# Patient Record
Sex: FEMALE | Race: WHITE | NOT HISPANIC OR LATINO | Employment: OTHER | URBAN - METROPOLITAN AREA
[De-identification: names, ages, dates, MRNs, and addresses within clinical notes are randomized per-mention and may not be internally consistent; named-entity substitution may affect disease eponyms.]

---

## 2017-01-04 ENCOUNTER — OFFICE VISIT (OUTPATIENT)
Dept: FAMILY MEDICINE | Facility: CLINIC | Age: 52
End: 2017-01-04
Payer: MEDICARE

## 2017-01-04 ENCOUNTER — HOSPITAL ENCOUNTER (OUTPATIENT)
Dept: RADIOLOGY | Facility: HOSPITAL | Age: 52
Discharge: HOME | End: 2017-01-04
Attending: FAMILY MEDICINE
Payer: MEDICARE

## 2017-01-04 VITALS
DIASTOLIC BLOOD PRESSURE: 70 MMHG | TEMPERATURE: 98 F | SYSTOLIC BLOOD PRESSURE: 134 MMHG | BODY MASS INDEX: 35.45 KG/M2 | HEART RATE: 100 BPM | OXYGEN SATURATION: 97 % | WEIGHT: 168.88 LBS | HEIGHT: 58 IN

## 2017-01-04 DIAGNOSIS — R06.2 WHEEZES: ICD-10-CM

## 2017-01-04 DIAGNOSIS — E11.69 DM TYPE 2 WITH DIABETIC DYSLIPIDEMIA: Primary | ICD-10-CM

## 2017-01-04 DIAGNOSIS — E78.5 DM TYPE 2 WITH DIABETIC DYSLIPIDEMIA: Primary | ICD-10-CM

## 2017-01-04 DIAGNOSIS — I10 ESSENTIAL HYPERTENSION: ICD-10-CM

## 2017-01-04 DIAGNOSIS — J44.1 COPD EXACERBATION: ICD-10-CM

## 2017-01-04 PROCEDURE — 71020 XR CHEST PA AND LATERAL: CPT | Mod: TC

## 2017-01-04 PROCEDURE — 71020 XR CHEST PA AND LATERAL: CPT | Mod: 26,,, | Performed by: RADIOLOGY

## 2017-01-04 PROCEDURE — 4010F ACE/ARB THERAPY RXD/TAKEN: CPT | Mod: S$GLB,,, | Performed by: FAMILY MEDICINE

## 2017-01-04 PROCEDURE — 1159F MED LIST DOCD IN RCRD: CPT | Mod: S$GLB,,, | Performed by: FAMILY MEDICINE

## 2017-01-04 PROCEDURE — 99214 OFFICE O/P EST MOD 30 MIN: CPT | Mod: S$GLB,,, | Performed by: FAMILY MEDICINE

## 2017-01-04 PROCEDURE — 3075F SYST BP GE 130 - 139MM HG: CPT | Mod: S$GLB,,, | Performed by: FAMILY MEDICINE

## 2017-01-04 PROCEDURE — 99499 UNLISTED E&M SERVICE: CPT | Mod: S$PBB,,, | Performed by: FAMILY MEDICINE

## 2017-01-04 PROCEDURE — 99999 PR PBB SHADOW E&M-EST. PATIENT-LVL III: CPT | Mod: PBBFAC,,, | Performed by: FAMILY MEDICINE

## 2017-01-04 PROCEDURE — 3078F DIAST BP <80 MM HG: CPT | Mod: S$GLB,,, | Performed by: FAMILY MEDICINE

## 2017-01-04 PROCEDURE — 2022F DILAT RTA XM EVC RTNOPTHY: CPT | Mod: S$GLB,,, | Performed by: FAMILY MEDICINE

## 2017-01-04 PROCEDURE — 3045F PR MOST RECENT HEMOGLOBIN A1C LEVEL 7.0-9.0%: CPT | Mod: S$GLB,,, | Performed by: FAMILY MEDICINE

## 2017-01-04 RX ORDER — PREDNISONE 20 MG/1
TABLET ORAL
Qty: 6 TABLET | Refills: 0 | Status: SHIPPED | OUTPATIENT
Start: 2017-01-04 | End: 2017-01-11

## 2017-01-04 RX ORDER — LEVOFLOXACIN 500 MG/1
500 TABLET, FILM COATED ORAL DAILY
Qty: 10 TABLET | Refills: 0 | Status: SHIPPED | OUTPATIENT
Start: 2017-01-04 | End: 2017-01-11

## 2017-01-04 NOTE — PROGRESS NOTES
Subjective:       Patient ID: Bianca Weldon is a 51 y.o. female.    Chief Complaint: Diabetes and URI    HPI Comments: 51y old female  here for f/u on DM, HTN and DLP . On aspirin,Current immunizations except for zoster .  Trying to watch her carbs , exercises :not walking just yet . Opthalmology : within the last 6 m  , Fastin bs are 165 , 188, 161, 184, 210  and  2 hrs pp 165 , 93 , 153, 165. She has ha also been wheezing for the last 2 days , she also has nasal congestion and green rhinorrhea. She has been using cough drop  And combivent  . She quit smoking . No fever    Diabetes     URI    Associated symptoms include rhinorrhea and wheezing.     Review of Systems   Constitutional: Negative.    HENT: Positive for rhinorrhea and sinus pressure.    Respiratory: Positive for shortness of breath and wheezing.    Cardiovascular: Negative.    Gastrointestinal: Negative.        Objective:      Physical Exam   Constitutional: She is oriented to person, place, and time. She appears well-developed and well-nourished. No distress.   HENT:   Head: Normocephalic and atraumatic.   Right Ear: External ear normal.   Left Ear: External ear normal.   Mouth/Throat: No oropharyngeal exudate.   Eyes: Conjunctivae and EOM are normal. Pupils are equal, round, and reactive to light. Right eye exhibits no discharge. Left eye exhibits no discharge. No scleral icterus.   Neck: Normal range of motion. Neck supple. No JVD present. No tracheal deviation present. No thyromegaly present.   Cardiovascular: Normal rate, regular rhythm and normal heart sounds.  Exam reveals no gallop and no friction rub.    No murmur heard.  Pulmonary/Chest: Effort normal. No stridor. No respiratory distress. She has decreased breath sounds. She has wheezes in the right upper field, the right middle field, the right lower field, the left upper field, the left middle field and the left lower field. She has rhonchi in the right middle field. She has no rales. She  exhibits no tenderness.   Abdominal: Soft. Bowel sounds are normal. She exhibits no distension. There is no tenderness. There is no rebound and no guarding.   Musculoskeletal: Normal range of motion.   Lymphadenopathy:     She has no cervical adenopathy.   Neurological: She is alert and oriented to person, place, and time.   Skin: Skin is warm and dry. She is not diaphoretic.   Psychiatric: She has a normal mood and affect. Her behavior is normal. Judgment and thought content normal.       Assessment:      Bianca was seen today for diabetes and uri.    Diagnoses and all orders for this visit:    DM type 2 with diabetic dyslipidemia  -     Comprehensive metabolic panel; Future  -     Hemoglobin A1c; Future    Wheezes  -     X-Ray Chest PA And Lateral; Future    COPD exacerbation    Essential hypertension    Other orders  -     predniSONE (DELTASONE) 20 MG tablet; 1 tab po qd for 3 days then 1/2 tab po qd for 3 days  -     levoFLOXacin (LEVAQUIN) 500 MG tablet; Take 1 tablet (500 mg total) by mouth once daily.      Plan:   Pt. encouraged to follow a strict diabetic type diet.   Encouraged daily physical activity/exercise for at least 30mins if tolerated.   Weight control recommenced as always.   Discussed how lifestyle changes make biggest impact on diabetes control.   Continue home glucose monitoring once daily and keep log.   Counseling provided today about hypoglycemia as well as about how diabetes increases risk of cardiovascular, cerebrovascular ,peripheral vascular disease and other serious health complications. He has been instructed to call if developing any new symptoms or hypoglycemia related symptoms.   See encounter for associated orders/medications.   - Lipid panel; Future     Low dose of prednisone , stop if BS> 350 . F/u in 1 w  Controlled. Cont med

## 2017-01-04 NOTE — MR AVS SNAPSHOT
[facility]  [address]  [facility] [address]  Phone: [phone]  Fax: [phone]                  Bianca Weldon   2017 8:00 AM   Office Visit    Description:  Female : 1965   Provider:  Lacy Cottrell MD   Department:  [facility]           Reason for Visit     Diabetes     URI           Diagnoses this Visit        Comments    DM type 2 with diabetic dyslipidemia    -  Primary     Wheezes         COPD exacerbation         Essential hypertension                To Do List           Future Appointments        Provider Department Dept Phone    2017 10:00 AM ONLH XR1- [facility] [phone]    2017 10:20 AM LABORATORY, [place]     2017 8:20 AM Lacy Cottrell MD [facility] [phone]    2017 10:00 AM Steve Best MD [facility] - Pulmonary Services [phone]      Goals (5 Years of Data)     None       These Medications        Disp Refills Start End    predniSONE (DELTASONE) 20 MG tablet 6 tablet 0 2017     1 tab po qd for 3 days then 1/2 tab po qd for 3 days    Pharmacy: [id number] [address] Ph #: [phone]       levoFLOXacin (LEVAQUIN) 500 MG tablet 10 tablet 0 2017    Take 1 tablet (500 mg total) by mouth once daily. - Oral    Pharmacy: [id number] [address] Ph #: [phone]         [facility]     [facility] Nurse Care [facility] -  Assistance  Registered nurses in the [facility] provide clinical advisement, health education, appointment booking, and other advisory services.  Call for this free service at [phone].             Medications           Message regarding Medications     Verify the changes and/or additions to your medication regime listed below are the same as discussed  with your clinician today.  If any of these changes or additions are incorrect, please notify your healthcare provider.        START taking these NEW medications        Refills    predniSONE (DELTASONE) 20 MG tablet 0    Si tab po qd for 3 days then 1/2 tab po qd for 3 days    Class: Normal    levoFLOXacin (LEVAQUIN) 500 MG tablet 0    Sig: Take 1 tablet (500 mg total) by mouth once daily.    Class: Normal    Route: Oral           Verify that the below list of medications is an accurate representation of the medications you are currently taking.  If none reported, the list may be blank. If incorrect, please contact your healthcare provider. Carry this list with you in case of emergency.           Current Medications     aripiprazole (ABILIFY) 20 MG Tab Take 20 mg by mouth once daily.     ascorbic acid (VITAMIN C) 250 MG tablet Take 250 mg by mouth once daily.    aspirin (ECOTRIN) 81 MG EC tablet Take 81 mg by mouth once daily.    atorvastatin (LIPITOR) 40 MG tablet Take 1 tablet (40 mg total) by mouth once daily.    benztropine (COGENTIN) 1 MG tablet Take 1 mg by mouth once daily.     blood sugar diagnostic Strp Use the one insurance covers Check b.s fastin and 2 hrs after biggest meal    blood-glucose meter Misc Check BG twice daily when fasting    calcium acetate (PHOSLO) 667 mg capsule Take 667 mg by mouth 3 (three) times daily with meals.    clonazepam (KLONOPIN) 2 MG Tab Take 2 mg by mouth every evening.     desvenlafaxine succinate (PRISTIQ) 50 MG Tb24 Take 50 mg by mouth once daily.     diclofenac (VOLTAREN) 75 MG EC tablet Take 1 tablet (75 mg total) by mouth 2 (two) times daily.    ferrous sulfate 325 (65 FE) MG EC tablet Take 325 mg by mouth 3 (three) times daily with meals.    FIBER CHOICE, INULIN, ORAL Take by mouth.    fluticasone (FLONASE) 50 mcg/actuation nasal spray 2 sprays by Each Nare route once daily.    fluticasone-vilanterol (BREO) 100-25 mcg/dose diskus inhaler Inhale 1 puff into the lungs  "once daily.    glipiZIDE (GLUCOTROL) 10 MG tablet 10 mg on am with breakfast , 7.5 mg on pm with dinner    ibuprofen (MOTRIN IB) 200 MG tablet Take 1 tablet (200 mg total) by mouth every 6 (six) hours as needed for Pain.    ipratropium-albuterol (COMBIVENT)  mcg/actuation inhaler Inhale 1 puff into the lungs every 6 (six) hours as needed for Wheezing.    lamotrigine (LAMICTAL) 100 MG tablet Take 400 mg by mouth once daily.     lancets 28 gauge Misc 1 lancet by Misc.(Non-Drug; Combo Route) route 2 (two) times daily. Use the one insurance covers    levothyroxine (SYNTHROID) 75 MCG tablet take 1 tablet by mouth once daily    lisinopril (PRINIVIL,ZESTRIL) 2.5 MG tablet Take 1 tablet (2.5 mg total) by mouth once daily.    lisinopril (PRINIVIL,ZESTRIL) 2.5 MG tablet Take 1 tablet (2.5 mg total) by mouth once daily.    methylphenidate (RITALIN) 10 MG tablet Take 10 mg by mouth once daily.    pantoprazole (PROTONIX) 40 MG tablet 1 tab po daily    Saccharomyces boulardii (PROBIOTIC, S.BOULARDII,) 250 mg capsule Take 250 mg by mouth once daily.    levoFLOXacin (LEVAQUIN) 500 MG tablet Take 1 tablet (500 mg total) by mouth once daily.    predniSONE (DELTASONE) 20 MG tablet 1 tab po qd for 3 days then 1/2 tab po qd for 3 days           Clinical Reference Information           Vital Signs - Last Recorded  Most recent update: 1/4/2017  8:09 AM by Olive Martinez MA    BP Pulse Temp Ht Wt SpO2    134/70 (BP Location: Right arm, Patient Position: Sitting, BP Method: Manual) 100 98.4 °F (36.9 °C) (Oral) 4' 10" (1.473 m) 76.6 kg (168 lb 14 oz) 97%    BMI                35.29 kg/m2          Blood Pressure          Most Recent Value    BP  134/70      Allergies as of 1/4/2017     Bactrim [Sulfamethoxazole-trimethoprim]    Macrodantin [Nitrofurantoin Macrocrystalline]      Immunizations Administered on Date of Encounter - 1/4/2017     None      Orders Placed During Today's Visit     Future Labs/Procedures Expected by Expires    " Comprehensive metabolic panel  1/4/2017 3/5/2018    Hemoglobin A1c  1/4/2017 3/5/2018    X-Ray Chest PA And Lateral  1/4/2017 4/4/2017      Smoking Cessation     If you would like to quit smoking:   You may be eligible for free services if you are a [place] resident and started smoking cigarettes before September 1, 1988.  Call the Smoking Cessation Trust (SCT) toll free at [phone] [phone] or [phone].   Call 0-800-QUIT-NOW if you do not meet the above criteria.

## 2017-01-05 ENCOUNTER — PATIENT MESSAGE (OUTPATIENT)
Dept: FAMILY MEDICINE | Facility: CLINIC | Age: 52
End: 2017-01-05

## 2017-01-06 RX ORDER — TRAMADOL HYDROCHLORIDE 50 MG/1
50 TABLET ORAL EVERY 6 HOURS PRN
OUTPATIENT
Start: 2017-01-06 | End: 2017-01-16

## 2017-01-06 NOTE — TELEPHONE ENCOUNTER
Bianca Cottrell MD 17 hours ago (3:37 PM)                        Would you please call in tramadol  hcl 50mg tablet for pain in my shoulder. Refills are appreciated, but, aren't necessary.  My last prescription was for 1/2 to 1 tablet by mouth every 6 to 8 hours if needed for pain.  Qty 60

## 2017-01-11 ENCOUNTER — OFFICE VISIT (OUTPATIENT)
Dept: FAMILY MEDICINE | Facility: CLINIC | Age: 52
End: 2017-01-11
Payer: MEDICARE

## 2017-01-11 VITALS
WEIGHT: 171.63 LBS | HEART RATE: 84 BPM | DIASTOLIC BLOOD PRESSURE: 70 MMHG | BODY MASS INDEX: 36.03 KG/M2 | SYSTOLIC BLOOD PRESSURE: 120 MMHG | HEIGHT: 58 IN | OXYGEN SATURATION: 95 % | TEMPERATURE: 98 F

## 2017-01-11 DIAGNOSIS — J44.1 COPD EXACERBATION: Primary | ICD-10-CM

## 2017-01-11 DIAGNOSIS — F31.0 BIPOLAR AFFECTIVE DISORDER, CURRENT EPISODE HYPOMANIC: ICD-10-CM

## 2017-01-11 DIAGNOSIS — E11.00 UNCONTROLLED TYPE 2 DIABETES MELLITUS WITH HYPEROSMOLARITY WITHOUT COMA, WITHOUT LONG-TERM CURRENT USE OF INSULIN: ICD-10-CM

## 2017-01-11 PROCEDURE — 3078F DIAST BP <80 MM HG: CPT | Mod: S$GLB,,, | Performed by: FAMILY MEDICINE

## 2017-01-11 PROCEDURE — 4010F ACE/ARB THERAPY RXD/TAKEN: CPT | Mod: S$GLB,,, | Performed by: FAMILY MEDICINE

## 2017-01-11 PROCEDURE — 1159F MED LIST DOCD IN RCRD: CPT | Mod: S$GLB,,, | Performed by: FAMILY MEDICINE

## 2017-01-11 PROCEDURE — 99999 PR PBB SHADOW E&M-EST. PATIENT-LVL III: CPT | Mod: PBBFAC,,, | Performed by: FAMILY MEDICINE

## 2017-01-11 PROCEDURE — 99213 OFFICE O/P EST LOW 20 MIN: CPT | Mod: S$GLB,,, | Performed by: FAMILY MEDICINE

## 2017-01-11 PROCEDURE — 2022F DILAT RTA XM EVC RTNOPTHY: CPT | Mod: S$GLB,,, | Performed by: FAMILY MEDICINE

## 2017-01-11 PROCEDURE — 3045F PR MOST RECENT HEMOGLOBIN A1C LEVEL 7.0-9.0%: CPT | Mod: S$GLB,,, | Performed by: FAMILY MEDICINE

## 2017-01-11 PROCEDURE — 99499 UNLISTED E&M SERVICE: CPT | Mod: S$PBB,,, | Performed by: FAMILY MEDICINE

## 2017-01-11 PROCEDURE — 3074F SYST BP LT 130 MM HG: CPT | Mod: S$GLB,,, | Performed by: FAMILY MEDICINE

## 2017-01-11 NOTE — MR AVS SNAPSHOT
[facility]  [address]  [facility] [address]  Phone: [phone]  Fax: [phone]                  Bianca Weldon   2017 8:20 AM   Office Visit    Description:  Female : 1965   Provider:  Lacy Cottrell MD   Department:  [facility]           Reason for Visit     Follow-up     Diabetes           Diagnoses this Visit        Comments    COPD exacerbation    -  Primary     Uncontrolled type 2 diabetes mellitus with hyperosmolarity without coma, without long-term current use of insulin                To Do List           Future Appointments        Provider Department Dept Phone    2017 8:00 AM Lacy Cottrell MD [facility] [phone]    2017 10:00 AM Steve Best MD [facility] - Pulmonary Services [phone]      Goals (5 Years of Data)     None       These Medications        Disp Refills Start End    SITagliptan (JANUVIA) 25 MG Tab 90 tablet 3 2017    Take 1 tablet (25 mg total) by mouth once daily. - Oral    [facility] [address] Ph #: [phone]         [facility]     [facility] Nurse Care [facility] -  Assistance  Registered nurses in the [facility] provide clinical advisement, health education, appointment booking, and other advisory services.  Call for this free service at [phone].             Medications           Message regarding Medications     Verify the changes and/or additions to your medication regime listed below are the same as discussed with your clinician today.  If any of these changes or additions are incorrect, please notify your healthcare provider.        START taking these NEW medications        Refills    SITagliptan (JANUVIA) 25 MG Tab 3    Sig: Take 1 tablet (25 mg total) by mouth once daily.    Class: Normal    Route: Oral      STOP taking these medications      predniSONE (DELTASONE) 20 MG tablet 1 tab po qd for 3 days then 1/2 tab po qd for 3 days    levoFLOXacin (LEVAQUIN) 500 MG tablet Take 1 tablet (500 mg total) by mouth once daily.           Verify that the below list of medications is an accurate representation of the medications you are currently taking.  If none reported, the list may be blank. If incorrect, please contact your healthcare provider. Carry this list with you in case of emergency.           Current Medications     aripiprazole (ABILIFY) 20 MG Tab Take 20 mg by mouth once daily.     ascorbic acid (VITAMIN C) 250 MG tablet Take 250 mg by mouth once daily.    aspirin (ECOTRIN) 81 MG EC tablet Take 81 mg by mouth once daily.    atorvastatin (LIPITOR) 40 MG tablet Take 1 tablet (40 mg total) by mouth once daily.    benztropine (COGENTIN) 1 MG tablet Take 1 mg by mouth once daily.     blood sugar diagnostic Strp Use the one insurance covers Check b.s fastin and 2 hrs after biggest meal    blood-glucose meter Misc Check BG twice daily when fasting    calcium acetate (PHOSLO) 667 mg capsule Take 667 mg by mouth 3 (three) times daily with meals.    clonazepam (KLONOPIN) 2 MG Tab Take 2 mg by mouth every evening.     desvenlafaxine succinate (PRISTIQ) 50 MG Tb24 Take 50 mg by mouth once daily.     diclofenac (VOLTAREN) 75 MG EC tablet Take 1 tablet (75 mg total) by mouth 2 (two) times daily.    ferrous sulfate 325 (65 FE) MG EC tablet Take 325 mg by mouth 3 (three) times daily with meals.    FIBER CHOICE, INULIN, ORAL Take by mouth.    fluticasone (FLONASE) 50 mcg/actuation nasal spray 2 sprays by Each Nare route once daily.    fluticasone-vilanterol (BREO) 100-25 mcg/dose diskus inhaler Inhale 1 puff into the lungs once daily.    glipiZIDE (GLUCOTROL) 10 MG tablet 10 mg on am with breakfast , 7.5 mg on pm with dinner    ibuprofen (MOTRIN IB) 200 MG tablet Take 1 tablet (200 mg total) by mouth every 6 (six) hours as needed for Pain.    ipratropium-albuterol  "(COMBIVENT)  mcg/actuation inhaler Inhale 1 puff into the lungs every 6 (six) hours as needed for Wheezing.    lamotrigine (LAMICTAL) 100 MG tablet Take 400 mg by mouth once daily.     lancets 28 gauge Misc 1 lancet by Misc.(Non-Drug; Combo Route) route 2 (two) times daily. Use the one insurance covers    levothyroxine (SYNTHROID) 75 MCG tablet take 1 tablet by mouth once daily    lisinopril (PRINIVIL,ZESTRIL) 2.5 MG tablet Take 1 tablet (2.5 mg total) by mouth once daily.    lisinopril (PRINIVIL,ZESTRIL) 2.5 MG tablet Take 1 tablet (2.5 mg total) by mouth once daily.    methylphenidate (RITALIN) 10 MG tablet Take 10 mg by mouth once daily.    pantoprazole (PROTONIX) 40 MG tablet 1 tab po daily    Saccharomyces boulardii (PROBIOTIC, S.BOULARDII,) 250 mg capsule Take 250 mg by mouth once daily.    SITagliptan (JANUVIA) 25 MG Tab Take 1 tablet (25 mg total) by mouth once daily.           Clinical Reference Information           Vital Signs - Last Recorded  Most recent update: 1/11/2017  8:02 AM by Olive Martinez MA    BP Pulse Temp Ht    120/70 (BP Location: Left arm, Patient Position: Sitting, BP Method: Manual) 84 98.4 °F (36.9 °C) (Oral) 4' 10" (1.473 m)    Wt SpO2 BMI    77.9 kg (171 lb 10.1 oz) 95% 35.87 kg/m2      Blood Pressure          Most Recent Value    BP  120/70      Allergies as of 1/11/2017     Bactrim [Sulfamethoxazole-trimethoprim]    Macrodantin [Nitrofurantoin Macrocrystalline]      Immunizations Administered on Date of Encounter - 1/11/2017     None      Smoking Cessation     If you would like to quit smoking:   You may be eligible for free services if you are a [place] resident and started smoking cigarettes before September 1, 1988.  Call the Smoking Cessation Trust (SCT) toll free at ([phone] [phone] or [phone].   Call 1-800-QUIT-NOW if you do not meet the above criteria.            "

## 2017-01-11 NOTE — PROGRESS NOTES
Subjective:       Patient ID: Bianca Weldon is a 51 y.o. female.    Chief Complaint: Follow-up and Diabetes    HPI Comments: 51 y old female with COPD, Uncontrolled  Type 2 dm and bipolar disorder   here for f.u on ex . Finish off abx and prednisone . BS did no get above 250 (fastin and 2 hr pp) , Breathing better . Still using albuterol prn . Wlaking on terdamill 3 timess wekly . Stilll see Dr Soni for BPD . No regis , no depression . compliant with meds     Diabetes       Review of Systems   Constitutional: Negative.    HENT: Negative.    Respiratory: Negative.    Cardiovascular: Negative.    Hematological: Negative.        Objective:      Physical Exam   Constitutional: She is oriented to person, place, and time. She appears well-developed and well-nourished. No distress.   HENT:   Head: Normocephalic and atraumatic.   Right Ear: External ear normal.   Left Ear: External ear normal.   Mouth/Throat: No oropharyngeal exudate.   Eyes: Conjunctivae and EOM are normal. Pupils are equal, round, and reactive to light. Right eye exhibits no discharge. Left eye exhibits no discharge. No scleral icterus.   Neck: Normal range of motion. Neck supple. No JVD present. No tracheal deviation present. No thyromegaly present.   Cardiovascular: Normal rate, regular rhythm and normal heart sounds.  Exam reveals no gallop and no friction rub.    No murmur heard.  Pulmonary/Chest: Effort normal and breath sounds normal. No stridor. No respiratory distress. She has no wheezes. She has no rales. She exhibits no tenderness.   Abdominal: Soft. Bowel sounds are normal. She exhibits no distension. There is no tenderness. There is no rebound and no guarding.   Musculoskeletal: Normal range of motion.   Lymphadenopathy:     She has no cervical adenopathy.   Neurological: She is alert and oriented to person, place, and time.   Skin: Skin is warm and dry. She is not diaphoretic.   Psychiatric: She has a normal mood and affect. Her behavior  is normal. Judgment and thought content normal.       Assessment:       Bianca was seen today for follow-up and diabetes.    Diagnoses and all orders for this visit:    COPD exacerbation    Uncontrolled type 2 diabetes mellitus with hyperosmolarity without coma, without long-term current use of insulin    Bipolar affective disorder, current episode hypomanic    Uncontrolled type 2 diabetes mellitus without complication, without long-term current use of insulin    Other orders  -     SITagliptan (JANUVIA) 25 MG Tab; Take 1 tablet (25 mg total) by mouth once daily.      Plan:   Resolved . Use albutyerol prn and Breo daily . Inmunizations are UTD   Lab Results   Component Value Date    HGBA1C 7.7 (H) 01/04/2017   start low woodward of Januvia. Cont lifestyle changes. Email BS in 1 w. F.u in 3 m   Stable /f.u with Psych

## 2017-01-13 ENCOUNTER — TELEPHONE (OUTPATIENT)
Dept: FAMILY MEDICINE | Facility: CLINIC | Age: 52
End: 2017-01-13

## 2017-01-13 NOTE — TELEPHONE ENCOUNTER
Returned call. Pt states that she needed to see or talk to the doctor about getting something stronger for pain. Informed doctor out of the office till Monday and offered urgent care. Pt states she has already been to urgent care but they wont giver her anything stronger than tramadol. Informed pt to wait till doctor was back in office and schedule appt.

## 2017-01-13 NOTE — TELEPHONE ENCOUNTER
----- Message from Brooke Mccarthy sent at 1/12/2017  3:46 PM CST -----  Would like to speak to nurse. please call back at [phone]. Thanks//cdb

## 2017-01-18 ENCOUNTER — PATIENT MESSAGE (OUTPATIENT)
Dept: FAMILY MEDICINE | Facility: CLINIC | Age: 52
End: 2017-01-18

## 2017-02-01 ENCOUNTER — PATIENT OUTREACH (OUTPATIENT)
Dept: ADMINISTRATIVE | Facility: HOSPITAL | Age: 52
End: 2017-02-01

## 2017-02-01 NOTE — PROGRESS NOTES
Last Diabetic Eye Exam and Mammogram results needed. Care Coordination nurse attempted to contact patient to determine who performed last Diabetic Eye Exam and last Mammogram. Unable to reach pt left voicemail.

## 2017-02-02 ENCOUNTER — LAB VISIT (OUTPATIENT)
Dept: LAB | Facility: HOSPITAL | Age: 52
End: 2017-02-02
Attending: FAMILY MEDICINE
Payer: MEDICARE

## 2017-02-02 ENCOUNTER — OFFICE VISIT (OUTPATIENT)
Dept: FAMILY MEDICINE | Facility: CLINIC | Age: 52
End: 2017-02-02
Payer: MEDICARE

## 2017-02-02 VITALS
OXYGEN SATURATION: 97 % | TEMPERATURE: 97 F | HEART RATE: 66 BPM | DIASTOLIC BLOOD PRESSURE: 64 MMHG | WEIGHT: 175.63 LBS | BODY MASS INDEX: 36.86 KG/M2 | SYSTOLIC BLOOD PRESSURE: 108 MMHG | HEIGHT: 58 IN

## 2017-02-02 DIAGNOSIS — Z01.818 PRE-OP EVALUATION: ICD-10-CM

## 2017-02-02 DIAGNOSIS — Z01.818 PRE-OP EVALUATION: Primary | ICD-10-CM

## 2017-02-02 LAB
BASOPHILS # BLD AUTO: 0.03 K/UL
BASOPHILS NFR BLD: 0.3 %
DIFFERENTIAL METHOD: NORMAL
EOSINOPHIL # BLD AUTO: 0.3 K/UL
EOSINOPHIL NFR BLD: 2.8 %
ERYTHROCYTE [DISTWIDTH] IN BLOOD BY AUTOMATED COUNT: 12.5 %
HCT VFR BLD AUTO: 42 %
HGB BLD-MCNC: 13.9 G/DL
LYMPHOCYTES # BLD AUTO: 2.2 K/UL
LYMPHOCYTES NFR BLD: 23 %
MCH RBC QN AUTO: 28.8 PG
MCHC RBC AUTO-ENTMCNC: 33.1 %
MCV RBC AUTO: 87 FL
MONOCYTES # BLD AUTO: 0.5 K/UL
MONOCYTES NFR BLD: 5.3 %
NEUTROPHILS # BLD AUTO: 6.4 K/UL
NEUTROPHILS NFR BLD: 68.4 %
PLATELET # BLD AUTO: 261 K/UL
PMV BLD AUTO: 10.1 FL
RBC # BLD AUTO: 4.82 M/UL
WBC # BLD AUTO: 9.4 K/UL

## 2017-02-02 PROCEDURE — 99499 UNLISTED E&M SERVICE: CPT | Mod: S$PBB,,, | Performed by: FAMILY MEDICINE

## 2017-02-02 PROCEDURE — 36415 COLL VENOUS BLD VENIPUNCTURE: CPT | Mod: PO

## 2017-02-02 PROCEDURE — 80048 BASIC METABOLIC PNL TOTAL CA: CPT

## 2017-02-02 PROCEDURE — 93010 ELECTROCARDIOGRAM REPORT: CPT | Mod: S$GLB,,, | Performed by: INTERNAL MEDICINE

## 2017-02-02 PROCEDURE — 93005 ELECTROCARDIOGRAM TRACING: CPT | Mod: S$GLB,,, | Performed by: FAMILY MEDICINE

## 2017-02-02 PROCEDURE — 99999 PR PBB SHADOW E&M-EST. PATIENT-LVL III: CPT | Mod: PBBFAC,,, | Performed by: FAMILY MEDICINE

## 2017-02-02 PROCEDURE — 99214 OFFICE O/P EST MOD 30 MIN: CPT | Mod: S$GLB,,, | Performed by: FAMILY MEDICINE

## 2017-02-02 PROCEDURE — 3074F SYST BP LT 130 MM HG: CPT | Mod: S$GLB,,, | Performed by: FAMILY MEDICINE

## 2017-02-02 PROCEDURE — 3078F DIAST BP <80 MM HG: CPT | Mod: S$GLB,,, | Performed by: FAMILY MEDICINE

## 2017-02-02 PROCEDURE — 85025 COMPLETE CBC W/AUTO DIFF WBC: CPT

## 2017-02-02 RX ORDER — MUPIROCIN 20 MG/G
OINTMENT TOPICAL 3 TIMES DAILY
Qty: 30 G | Refills: 0 | Status: SHIPPED | OUTPATIENT
Start: 2017-02-02 | End: 2017-02-12

## 2017-02-02 RX ORDER — GLIPIZIDE 10 MG/1
TABLET ORAL
Qty: 180 TABLET | Refills: 0 | Status: SHIPPED | OUTPATIENT
Start: 2017-02-02 | End: 2017-06-12 | Stop reason: SDUPTHER

## 2017-02-02 NOTE — PROGRESS NOTES
Subjective:       Patient ID: Bianca Weldon is a 52 y.o. female.    Chief Complaint: Pre-op Exam    HPI Comments: 52 Y OLD FEMALE with Mild COPD , uncontrolled diabetes here for .pre op eval  for  left shoulder arthroscopic debridement  with subacromial decompression and clavicle excision by Dr Agustin on 2/9 at [facility] . Doing great . Walking on treadmill daily , using bREO daily, She ha snot used rescue inhaler  in weeks . No SOB . CP , palpitations  . FBS :  122, 118 , 79, 160 , 169 . 2 hr pp 149-205, 160, 122 . On glipizide 20 mg divided and Januvia 50 mg qd     Review of Systems   Constitutional: Negative.    HENT: Negative.    Respiratory: Negative.    Cardiovascular: Negative.    Gastrointestinal: Negative.    Genitourinary: Negative.    Musculoskeletal: Negative.        Objective:      Physical Exam   Constitutional: She is oriented to person, place, and time. She appears well-developed and well-nourished. No distress.   HENT:   Head: Normocephalic and atraumatic.   Right Ear: External ear normal.   Left Ear: External ear normal.   Mouth/Throat: No oropharyngeal exudate.   Eyes: Conjunctivae and EOM are normal. Pupils are equal, round, and reactive to light. Right eye exhibits no discharge. Left eye exhibits no discharge. No scleral icterus.   Neck: Normal range of motion. Neck supple. No JVD present. No tracheal deviation present. No thyromegaly present.   Cardiovascular: Normal rate, regular rhythm and normal heart sounds.  Exam reveals no gallop and no friction rub.    No murmur heard.  Pulmonary/Chest: Effort normal and breath sounds normal. No stridor. No respiratory distress. She has no wheezes. She has no rales. She exhibits no tenderness.   Abdominal: Soft. Bowel sounds are normal. She exhibits no distension. There is no tenderness. There is no rebound and no guarding.   Musculoskeletal: Normal range of motion.   Lymphadenopathy:     She has no cervical adenopathy.   Neurological: She is alert and  oriented to person, place, and time.   Skin: Skin is warm and dry. She is not diaphoretic.   Psychiatric: She has a normal mood and affect. Her behavior is normal. Judgment and thought content normal.       Assessment:     Bianca was seen today for pre-op exam.    Diagnoses and all orders for this visit:    Pre-op evaluation  -     IN OFFICE EKG 12-LEAD (to Muse)  -     CBC auto differential; Future  -     Basic metabolic panel; Future    Other orders  -     SITagliptan (JANUVIA) 50 MG Tab; 1 tab and 1/2 daily with breakfast  -     glipiZIDE (GLUCOTROL) 10 MG tablet; 10 mg on am with breakfast , 10 mg on pm with dinner  -     mupirocin (BACTROBAN) 2 % ointment; Apply topically 3 (three) times daily.      Plan:       Risk has been discussed with patient who expressed and verbalized understanding. Based on my assessment , it is okay to proceed with surgery PROVIDED RISK IS ACCEPTABLE TO BOTH THE PATIENT AND THE SURGEON PERFORMING THE SURGERY.

## 2017-02-02 NOTE — MR AVS SNAPSHOT
[facility]  [address]  [facility] [address]  Phone: [phone]  Fax: [phone]                  Bianca Weldon   2017 11:40 AM   Office Visit    Description:  Female : 1965   Provider:  Lacy Cottrell MD   Department:  [facility]           Reason for Visit     Pre-op Exam           Diagnoses this Visit        Comments    Pre-op evaluation    -  Primary            To Do List           Future Appointments        Provider Department Dept Phone    2017 12:00 PM [facility], [place] [facility]     2017 8:00 AM Lacy Cottrell MD [facility] [phone]    2017 10:00 AM Steve Best MD [facility] [phone]      Goals (5 Years of Data)     None       These Medications        Disp Refills Start End    SITagliptan (JANUVIA) 50 MG Tab 135 tablet 0 2017     1 tab and 1/2 daily with breakfast    Pharmacy: [facility] Ph #: [phone]       glipiZIDE (GLUCOTROL) 10 MG tablet 180 tablet 0 2017     10 mg on am with breakfast , 10 mg on pm with dinner    Pharmacy: [id number] Ph #: [phone]       mupirocin (BACTROBAN) 2 % ointment 30 g 0 2017    Apply topically 3 (three) times daily. - Topical (Top)    Pharmacy: [id number] Ph #: [phone]         [facility]     [facility] -  Assistance  Registered nurses in the [facility] provide clinical advisement, health education, appointment booking, and other advisory services.  Call for this free service at [phone].             Medications           Message regarding Medications     Verify the changes and/or additions to your medication regime listed below  are the same as discussed with your clinician today.  If any of these changes or additions are incorrect, please notify your healthcare provider.        START taking these NEW medications        Refills    mupirocin (BACTROBAN) 2 % ointment 0    Sig: Apply topically 3 (three) times daily.    Class: Normal    Route: Topical (Top)      CHANGE how you are taking these medications     Start Taking Instead of    SITagliptan (JANUVIA) 50 MG Tab SITagliptan (JANUVIA) 25 MG Tab    Dosage:  1 tab and 1/2 daily with breakfast Dosage:  Take 1 tablet (25 mg total) by mouth once daily.    Reason for Change:  Reorder     glipiZIDE (GLUCOTROL) 10 MG tablet glipiZIDE (GLUCOTROL) 10 MG tablet    Dosage:  10 mg on am with breakfast , 10 mg on pm with dinner Dosage:  10 mg on am with breakfast , 7.5 mg on pm with dinner    Reason for Change:  Reorder       STOP taking these medications     diclofenac (VOLTAREN) 75 MG EC tablet Take 1 tablet (75 mg total) by mouth 2 (two) times daily.    ferrous sulfate 325 (65 FE) MG EC tablet Take 325 mg by mouth 3 (three) times daily with meals.           Verify that the below list of medications is an accurate representation of the medications you are currently taking.  If none reported, the list may be blank. If incorrect, please contact your healthcare provider. Carry this list with you in case of emergency.           Current Medications     aripiprazole (ABILIFY) 20 MG Tab Take 20 mg by mouth once daily.     ascorbic acid (VITAMIN C) 250 MG tablet Take 250 mg by mouth once daily.    aspirin (ECOTRIN) 81 MG EC tablet Take 81 mg by mouth once daily.    atorvastatin (LIPITOR) 40 MG tablet Take 1 tablet (40 mg total) by mouth once daily.    benztropine (COGENTIN) 1 MG tablet Take 1 mg by mouth once daily.     blood sugar diagnostic Strp Use the one insurance covers Check b.s fastin and 2 hrs after biggest meal    blood-glucose meter Misc Check BG twice daily when fasting    calcium acetate (PHOSLO)  "667 mg capsule Take 667 mg by mouth 3 (three) times daily with meals.    clonazepam (KLONOPIN) 2 MG Tab Take 2 mg by mouth every evening.     desvenlafaxine succinate (PRISTIQ) 50 MG Tb24 Take 50 mg by mouth once daily.     FIBER CHOICE, INULIN, ORAL Take by mouth.    fluticasone (FLONASE) 50 mcg/actuation nasal spray 2 sprays by Each Nare route once daily.    fluticasone-vilanterol (BREO) 100-25 mcg/dose diskus inhaler Inhale 1 puff into the lungs once daily.    glipiZIDE (GLUCOTROL) 10 MG tablet 10 mg on am with breakfast , 10 mg on pm with dinner    ibuprofen (MOTRIN IB) 200 MG tablet Take 1 tablet (200 mg total) by mouth every 6 (six) hours as needed for Pain.    ipratropium-albuterol (COMBIVENT)  mcg/actuation inhaler Inhale 1 puff into the lungs every 6 (six) hours as needed for Wheezing.    lamotrigine (LAMICTAL) 100 MG tablet Take 400 mg by mouth 4 (four) times daily.     lancets 28 gauge Misc 1 lancet by Misc.(Non-Drug; Combo Route) route 2 (two) times daily. Use the one insurance covers    levothyroxine (SYNTHROID) 75 MCG tablet take 1 tablet by mouth once daily    lisinopril (PRINIVIL,ZESTRIL) 2.5 MG tablet Take 1 tablet (2.5 mg total) by mouth once daily.    methylphenidate (RITALIN) 10 MG tablet Take 10 mg by mouth once daily.    pantoprazole (PROTONIX) 40 MG tablet 1 tab po daily    Saccharomyces boulardii (PROBIOTIC, S.BOULARDII,) 250 mg capsule Take 250 mg by mouth once daily.    SITagliptan (JANUVIA) 50 MG Tab 1 tab and 1/2 daily with breakfast    mupirocin (BACTROBAN) 2 % ointment Apply topically 3 (three) times daily.           Clinical Reference Information           Your Vitals Were     BP Pulse Temp Height    108/64 (BP Location: Left arm, Patient Position: Sitting, BP Method: Manual) 66 97 °F (36.1 °C) (Tympanic) 4' 10" (1.473 m)    Weight SpO2 BMI    79.6 kg (175 lb 9.5 oz) 97% 36.7 kg/m2      Blood Pressure          Most Recent Value    BP  108/64      Allergies as of 2/2/2017     " Bactrim [Sulfamethoxazole-trimethoprim]    Macrodantin [Nitrofurantoin Macrocrystalline]      Immunizations Administered on Date of Encounter - 2/2/2017     None      Orders Placed During Today's Visit      Normal Orders This Visit    IN OFFICE EKG 12-LEAD (to [place])     Future Labs/Procedures Expected by Expires    Basic metabolic panel  2/2/2017 4/3/2018    CBC auto differential  2/2/2017 5/3/2017      Language Assistance Services     ATTENTION: Language assistance services are available, free of charge. Please call [phone].      ATENCIÓN: Si habla rocky, tiene a franks disposición servicios gratuitos de asistencia lingüística. Llame al [phone].     CHÚ Ý: N?u b?n nói Ti?ng Vi?t, có các d?ch v? h? tr? ngôn ng? mi?n phí dành cho b?n. G?i s? [phone].         [facility] complies with applicable Federal civil rights laws and does not discriminate on the basis of race, color, national origin, age, disability, or sex.

## 2017-02-03 LAB
ANION GAP SERPL CALC-SCNC: 11 MMOL/L
BUN SERPL-MCNC: 10 MG/DL
CALCIUM SERPL-MCNC: 8.7 MG/DL
CHLORIDE SERPL-SCNC: 106 MMOL/L
CO2 SERPL-SCNC: 24 MMOL/L
CREAT SERPL-MCNC: 0.9 MG/DL
EST. GFR  (AFRICAN AMERICAN): >60 ML/MIN/1.73 M^2
EST. GFR  (NON AFRICAN AMERICAN): >60 ML/MIN/1.73 M^2
GLUCOSE SERPL-MCNC: 85 MG/DL
POTASSIUM SERPL-SCNC: 4.2 MMOL/L
SODIUM SERPL-SCNC: 141 MMOL/L

## 2017-02-06 ENCOUNTER — TELEPHONE (OUTPATIENT)
Dept: FAMILY MEDICINE | Facility: CLINIC | Age: 52
End: 2017-02-06

## 2017-02-06 DIAGNOSIS — R94.31 ABNORMAL EKG: Primary | ICD-10-CM

## 2017-02-07 ENCOUNTER — OFFICE VISIT (OUTPATIENT)
Dept: CARDIOLOGY | Facility: CLINIC | Age: 52
End: 2017-02-07
Payer: MEDICARE

## 2017-02-07 ENCOUNTER — PATIENT MESSAGE (OUTPATIENT)
Dept: FAMILY MEDICINE | Facility: CLINIC | Age: 52
End: 2017-02-07

## 2017-02-07 ENCOUNTER — TELEPHONE (OUTPATIENT)
Dept: FAMILY MEDICINE | Facility: CLINIC | Age: 52
End: 2017-02-07

## 2017-02-07 VITALS
DIASTOLIC BLOOD PRESSURE: 64 MMHG | SYSTOLIC BLOOD PRESSURE: 114 MMHG | WEIGHT: 176.13 LBS | HEIGHT: 58 IN | HEART RATE: 76 BPM | BODY MASS INDEX: 36.97 KG/M2

## 2017-02-07 DIAGNOSIS — Z01.810 PREOP CARDIOVASCULAR EXAM: Primary | ICD-10-CM

## 2017-02-07 DIAGNOSIS — J44.9 COPD, MILD: ICD-10-CM

## 2017-02-07 DIAGNOSIS — E78.5 DYSLIPIDEMIA: ICD-10-CM

## 2017-02-07 DIAGNOSIS — Z82.49 FAMILY HISTORY OF ISCHEMIC HEART DISEASE: ICD-10-CM

## 2017-02-07 DIAGNOSIS — E66.9 OBESITY (BMI 30-39.9): ICD-10-CM

## 2017-02-07 PROCEDURE — 3045F PR MOST RECENT HEMOGLOBIN A1C LEVEL 7.0-9.0%: CPT | Mod: S$GLB,,, | Performed by: INTERNAL MEDICINE

## 2017-02-07 PROCEDURE — 3074F SYST BP LT 130 MM HG: CPT | Mod: S$GLB,,, | Performed by: INTERNAL MEDICINE

## 2017-02-07 PROCEDURE — 99499 UNLISTED E&M SERVICE: CPT | Mod: S$PBB,,, | Performed by: INTERNAL MEDICINE

## 2017-02-07 PROCEDURE — 3078F DIAST BP <80 MM HG: CPT | Mod: S$GLB,,, | Performed by: INTERNAL MEDICINE

## 2017-02-07 PROCEDURE — 4010F ACE/ARB THERAPY RXD/TAKEN: CPT | Mod: S$GLB,,, | Performed by: INTERNAL MEDICINE

## 2017-02-07 PROCEDURE — 99999 PR PBB SHADOW E&M-EST. PATIENT-LVL III: CPT | Mod: PBBFAC,,, | Performed by: INTERNAL MEDICINE

## 2017-02-07 PROCEDURE — 2022F DILAT RTA XM EVC RTNOPTHY: CPT | Mod: S$GLB,,, | Performed by: INTERNAL MEDICINE

## 2017-02-07 PROCEDURE — 99204 OFFICE O/P NEW MOD 45 MIN: CPT | Mod: S$GLB,,, | Performed by: INTERNAL MEDICINE

## 2017-02-07 RX ORDER — HYDROCODONE BITARTRATE AND ACETAMINOPHEN 10; 325 MG/1; MG/1
1 TABLET ORAL EVERY 8 HOURS PRN
COMMUNITY
End: 2017-03-23

## 2017-02-07 RX ORDER — MELOXICAM 15 MG/1
15 TABLET ORAL DAILY
COMMUNITY
End: 2017-03-23 | Stop reason: SDUPTHER

## 2017-02-07 NOTE — LETTER
February 7, 2017      Lacy Cottrell MD  [address] [address] [address]           [facility]  [address] [place] [address]  Phone: [phone]  Fax: [phone]          Patient: Bianca Weldon   MR Number: [id number]   YOB: 1965   Date of Visit: 2/7/2017       Dear Dr. Lacy Cottrell:    Thank you for referring Bianca Weldon to me for evaluation. Attached you will find relevant portions of my assessment and plan of care.    If you have questions, please do not hesitate to call me. I look forward to following Bianca Weldon along with you.    Sincerely,    Tristian Verduzco MD    Enclosure  CC:  No Recipients    If you would like to receive this communication electronically, please contact [email or web address] or [phone] to request more information on [employer] Link access.    For providers and/or their staff who would like to refer a patient to [facility], please contact us through our one-stop-shop provider referral line, [facility] , at [phone].    If you feel you have received this communication in error or would no longer like to receive these types of communications, please e-mail [email or web address]

## 2017-02-07 NOTE — TELEPHONE ENCOUNTER
Please fax my note, pre op form , med list  , labs , ekg and cardiac clearance to surgeon. Let pt know when done

## 2017-02-07 NOTE — TELEPHONE ENCOUNTER
----- Message from Vivien Shipley sent at 2/7/2017  9:34 AM CST -----  Contact: pt  Pt requests nurse to call her at [phone] regarding approval for procedure. Pt would also like to know if papers/approval was faxed to [facility]. Pt can be reached at [phone] (home)

## 2017-02-07 NOTE — TELEPHONE ENCOUNTER
----- Message from Renetta Gibson sent at 2/7/2017  2:35 PM CST -----  Contact: [facility]  office k[place]ihsa [phone]  Can you pleas fax over clearance note preop note from visit . Pt is schedule to have surgery on 2-09-17.please fax at [phone].

## 2017-02-07 NOTE — PROGRESS NOTES
Subjective:   Patient ID:  Bianca Weldon is a 52 y.o. female who presents for cardiac consult of preop.    Refer from .     HPI  Hx of DM, HTN, HLP, COPD, obesity.   The patient came in today for cardiac consult of preop of left shoulder arthroscopic debridement with subacromial decompression and clavicle excision by Dr Agustin on 2/9 at [facility] .   Patient feels OK, not on diet, no chest pain, no sob, no palpitation, no dizziness, no syncope, no CNS symptoms.   Patient has fairly good exercise tolerance, treadmill 3 times a week.   Patient is compliant with medications.  Her father had CABG at age of 45.   2/2/2017 EKG, NSR, normal EKG.     Past Medical History   Diagnosis Date    Abnormal Pap smear of vagina      after hyst, repeat every 6 months, per pt, had bx's, unknown results    Acid reflux     Arthritis     Depression      bipolar    Diabetes mellitus     Diabetes mellitus, type 2     Hyperlipidemia     Hypertension     IBS (irritable bowel syndrome)     Interstitial cystitis     Restless legs syndrome     Thyroid disease        Past Surgical History   Procedure Laterality Date    Appendectomy      Tonsillectomy      Cystostomy w/ bladder biopsy       interstial cystitis    Hysterectomy  2001     TLH, LSO (endometriosis)    Oophorectomy  2001     LSO    Dilation and curettage of uterus       missed ab    Adenoidectomy      [place] tooth extraction         Social History   Substance Use Topics    Smoking status: Former Smoker     Quit date: 1/5/2017    Smokeless tobacco: Not on file    Alcohol use No       Family History   Problem Relation Age of Onset    Diabetes Mellitus Mother     Diabetes Mellitus Father     Colon cancer Father     Rheum arthritis Maternal Grandmother     Breast cancer Maternal Grandmother     Thrombophilia Paternal Grandmother      DVTs    Ovarian cancer Neg Hx        Patient's Medications   New Prescriptions    No medications on file   Previous  Medications    ARIPIPRAZOLE (ABILIFY) 20 MG TAB    Take 20 mg by mouth once daily.     ASCORBIC ACID (VITAMIN C) 250 MG TABLET    Take 250 mg by mouth once daily.    ASPIRIN (ECOTRIN) 81 MG EC TABLET    Take 81 mg by mouth once daily.    ATORVASTATIN (LIPITOR) 40 MG TABLET    Take 1 tablet (40 mg total) by mouth once daily.    BENZTROPINE (COGENTIN) 1 MG TABLET    Take 1 mg by mouth once daily.     BLOOD SUGAR DIAGNOSTIC STRP    Use the one insurance covers Check b.s fastin and 2 hrs after biggest meal    BLOOD-GLUCOSE METER MISC    Check BG twice daily when fasting    CALCIUM ACETATE (PHOSLO) 667 MG CAPSULE    Take 667 mg by mouth 3 (three) times daily with meals.    CLONAZEPAM (KLONOPIN) 2 MG TAB    Take 2 mg by mouth every evening.     DESVENLAFAXINE SUCCINATE (PRISTIQ) 50 MG TB24    Take 50 mg by mouth once daily.     FIBER CHOICE, INULIN, ORAL    Take by mouth.    FLUTICASONE (FLONASE) 50 MCG/ACTUATION NASAL SPRAY    2 sprays by Each Nare route once daily.    FLUTICASONE-VILANTEROL (BREO) 100-25 MCG/DOSE DISKUS INHALER    Inhale 1 puff into the lungs once daily.    GLIPIZIDE (GLUCOTROL) 10 MG TABLET    10 mg on am with breakfast , 10 mg on pm with dinner    IBUPROFEN (MOTRIN IB) 200 MG TABLET    Take 1 tablet (200 mg total) by mouth every 6 (six) hours as needed for Pain.    IPRATROPIUM-ALBUTEROL (COMBIVENT)  MCG/ACTUATION INHALER    Inhale 1 puff into the lungs every 6 (six) hours as needed for Wheezing.    LAMOTRIGINE (LAMICTAL) 100 MG TABLET    Take 400 mg by mouth 4 (four) times daily.     LANCETS 28 GAUGE MISC    1 lancet by Misc.(Non-Drug; Combo Route) route 2 (two) times daily. Use the one insurance covers    LEVOTHYROXINE (SYNTHROID) 75 MCG TABLET    take 1 tablet by mouth once daily    LISINOPRIL (PRINIVIL,ZESTRIL) 2.5 MG TABLET    Take 1 tablet (2.5 mg total) by mouth once daily.    METHYLPHENIDATE (RITALIN) 10 MG TABLET    Take 10 mg by mouth once daily.    MUPIROCIN (BACTROBAN) 2 % OINTMENT     Apply topically 3 (three) times daily.    PANTOPRAZOLE (PROTONIX) 40 MG TABLET    1 tab po daily    SACCHAROMYCES BOULARDII (PROBIOTIC, S.BOULARDII,) 250 MG CAPSULE    Take 250 mg by mouth once daily.    SITAGLIPTAN (JANUVIA) 50 MG TAB    1 tab and 1/2 daily with breakfast   Modified Medications    No medications on file   Discontinued Medications    No medications on file       Review of Systems   Constitution: Negative.   HENT: Negative.    Eyes: Negative.    Cardiovascular: Negative for chest pain.   Respiratory: Negative.    Endocrine: Negative.    Hematologic/Lymphatic: Negative.    Skin: Negative.    Musculoskeletal: Positive for back pain and joint pain.   Gastrointestinal: Negative.    Genitourinary: Negative.    Neurological: Negative.    Psychiatric/Behavioral: Negative.    Allergic/Immunologic: Negative.        Objective:   Physical Exam   Constitutional: She is oriented to person, place, and time. She appears well-developed and well-nourished.   HENT:   Head: Normocephalic and atraumatic.   Eyes: Conjunctivae are normal. Pupils are equal, round, and reactive to light.   Neck: Normal range of motion. Neck supple.   Cardiovascular: Normal rate and regular rhythm.    Pulmonary/Chest: Effort normal and breath sounds normal.   Abdominal: Soft. Bowel sounds are normal.   Musculoskeletal: Normal range of motion.   Neurological: She is alert and oriented to person, place, and time.   Skin: Skin is warm and dry.   Psychiatric: She has a normal mood and affect.       Lab Results   Component Value Date     02/02/2017    K 4.2 02/02/2017     02/02/2017    CO2 24 02/02/2017    BUN 10 02/02/2017    CREATININE 0.9 02/02/2017    GLU 85 02/02/2017    HGBA1C 7.7 (H) 01/04/2017    AST 18 01/04/2017    ALT 15 01/04/2017    ALBUMIN 4.1 01/04/2017    PROT 7.2 01/04/2017    BILITOT 0.5 01/04/2017    WBC 9.40 02/02/2017    HGB 13.9 02/02/2017    HCT 42.0 02/02/2017    MCV 87 02/02/2017     02/02/2017    TSH  1.641 05/02/2016    CHOL 102 (L) 08/04/2016    HDL 41 08/04/2016    LDLCALC 41.8 (L) 08/04/2016    TRIG 96 08/04/2016     Assessment:        ICD-10-CM ICD-9-CM   1. Preop cardiovascular exam Z01.810 V72.81   2. Family history of ischemic heart disease Z82.49 V17.3   3. Dyslipidemia E78.5 272.4   4. Uncontrolled type 2 diabetes mellitus without complication, without long-term current use of insulin E11.65 250.02   5. COPD, mild J44.9 496   6. Obesity (BMI 30-39.9) E66.9 278.00       Plan:     - she has fairly good ET, > 4 METS, she is at acceptable cardiac condition for planned intermediate surgical risk procedure, shoulder surgery. Holding aspirin 7 days prior to surgery.   - good BP and chol control. Cont current meds. Education on diet and weight loss. Follow up with PCP.   RTC prn.     Thank you for allowing me to participate in this patient's care. Please do not hesitate to contact me with any questions or concerns. Consult note has been forwarded to the referral physician.

## 2017-02-07 NOTE — PATIENT INSTRUCTIONS
Losing Weight for Heart Health  Excess weight is a major risk factor for heart disease. Losing weight has many benefits including lowering your blood pressure, improving your cholesterol level, and decreasing your risk for diseases such as diabetes and heart disease. It may help keep your arteries open so that your heart can get the oxygen-rich blood it needs. All in all, losing weight makes you healthier.     Exercise with a friend. When activity is fun, you're more likely to stick with it.   Calories and weight loss  · Calories are the fuel your body burns for energy. You get the calories you need from the food you eat. For healthy weight loss, women should eat at least 1,200 calories a day, men at least 1,500.  · When you eat more calories than you need, your body stores the extra calories as fat. One pound of fat equals 3,500 calories.  · To lose weight, try to reduce your total calorie intake by 500 calories. To do this, eat 250 calories less each day. Add activity to burn the other 250 calories. Walking 2.5 miles burns about 250 calories. Other more intense activities can burn more calories in the time you spend doing them, such as swimming and running. It is important to understand that reducing calorie intake is much more effective at weight loss than is exercise.  · Eat a variety of healthy foods to get the nutrients you need.  Tips for losing weight  · Drink 8 to 10 glasses of water a day.  · Dont skip meals. Instead, eat smaller portions.  · Eat your meals earlier in the day.  · Cut out sugary drinks such as soda and fruit juices.  · Make your later meals lighter than your earlier meals.   Brisk activity is best  Brisk activity gets your heart pumping faster and it makes it healthier. Its also a great way to burn calories. In fact, your body may keep burning calories for hours after you stop a brisk activity:  · Begin by walking 10 minutes most days.  · Add more time and speed to your walk. Build up  as you feel able.  · Aim for 3 to 4 sessions of aerobic exercise a week. Each session should last about 40 minutes and include moderate to vigorous physical activity.  · The most important part of the activity is that you break a sweat. This indicates your heart is working hard enough to burn fat.  Date Last Reviewed: 6/1/2016  © 5051-5722 [employer]. [address] [place] [address], [place] [address]. All rights reserved. This information is not intended as a substitute for professional medical care. Always follow your healthcare professional's instructions.

## 2017-02-07 NOTE — MR AVS SNAPSHOT
[facility]  [address] [facility] [address]  Phone: [phone]  Fax: [phone]                  Bianca Weldon   2017 8:20 AM   Office Visit    Description:  Female : 1965   Provider:  Tristian Verduzco MD   Department:  [facility]           Reason for Visit     Pre-op Exam           Diagnoses this Visit        Comments    Preop cardiovascular exam    -  Primary     Family history of ischemic heart disease         Dyslipidemia         Uncontrolled type 2 diabetes mellitus without complication, without long-term current use of insulin         COPD, mild         Obesity (BMI 30-39.9)                To Do List           Future Appointments        Provider Department Dept Phone    2017 8:00 AM Lacy Cottrell MD [facility] [phone]    2017 10:00 AM Steve Best MD [facility] [phone]      Goals (5 Years of Data)     None      [facility] On Call     [facility] On Call Nurse Care Line -  Assistance  Registered nurses in the [facility] On Call Center provide clinical advisement, health education, appointment booking, and other advisory services.  Call for this free service at [phone].             Medications           Message regarding Medications     Verify the changes and/or additions to your medication regime listed below are the same as discussed with your clinician today.  If any of these changes or additions are incorrect, please notify your healthcare provider.             Verify that the below list of medications is an accurate representation of the medications you are currently taking.  If none reported, the list may be blank. If incorrect, please contact your healthcare provider. Carry this list with you in case of emergency.           Current Medications     aripiprazole (ABILIFY) 20 MG Tab Take 20 mg by mouth once daily.     ascorbic acid (VITAMIN C) 250 MG tablet Take 250 mg by mouth once  daily.    aspirin (ECOTRIN) 81 MG EC tablet Take 81 mg by mouth once daily.    atorvastatin (LIPITOR) 40 MG tablet Take 1 tablet (40 mg total) by mouth once daily.    benztropine (COGENTIN) 1 MG tablet Take 1 mg by mouth once daily.     blood sugar diagnostic Strp Use the one insurance covers Check b.s fastin and 2 hrs after biggest meal    blood-glucose meter Misc Check BG twice daily when fasting    calcium acetate (PHOSLO) 667 mg capsule Take 667 mg by mouth 3 (three) times daily with meals.    clonazepam (KLONOPIN) 2 MG Tab Take 2 mg by mouth every evening.     desvenlafaxine succinate (PRISTIQ) 50 MG Tb24 Take 50 mg by mouth once daily.     FIBER CHOICE, INULIN, ORAL Take by mouth.    fluticasone (FLONASE) 50 mcg/actuation nasal spray 2 sprays by Each Nare route once daily.    fluticasone-vilanterol (BREO) 100-25 mcg/dose diskus inhaler Inhale 1 puff into the lungs once daily.    glipiZIDE (GLUCOTROL) 10 MG tablet 10 mg on am with breakfast , 10 mg on pm with dinner    hydrocodone-acetaminophen 10-325mg (NORCO)  mg Tab Take 1 tablet by mouth every 8 (eight) hours as needed for Pain.    ibuprofen (MOTRIN IB) 200 MG tablet Take 1 tablet (200 mg total) by mouth every 6 (six) hours as needed for Pain.    ipratropium-albuterol (COMBIVENT)  mcg/actuation inhaler Inhale 1 puff into the lungs every 6 (six) hours as needed for Wheezing.    lamotrigine (LAMICTAL) 100 MG tablet Take 400 mg by mouth 4 (four) times daily.     lancets 28 gauge Misc 1 lancet by Misc.(Non-Drug; Combo Route) route 2 (two) times daily. Use the one insurance covers    levothyroxine (SYNTHROID) 75 MCG tablet take 1 tablet by mouth once daily    lisinopril (PRINIVIL,ZESTRIL) 2.5 MG tablet Take 1 tablet (2.5 mg total) by mouth once daily.    meloxicam (MOBIC) 15 MG tablet Take 15 mg by mouth once daily.    methylphenidate (RITALIN) 10 MG tablet Take 10 mg by mouth once daily.    mupirocin (BACTROBAN) 2 % ointment Apply topically 3  "(three) times daily.    pantoprazole (PROTONIX) 40 MG tablet 1 tab po daily    Saccharomyces boulardii (PROBIOTIC, S.BOULARDII,) 250 mg capsule Take 250 mg by mouth once daily.    SITagliptan (JANUVIA) 50 MG Tab 1 tab and 1/2 daily with breakfast           Clinical Reference Information           Your Vitals Were     BP Pulse Height Weight BMI    114/64 76 4' 10" (1.473 m) 79.9 kg (176 lb 2.4 oz) 36.81 kg/m2      Blood Pressure          Most Recent Value    Right Arm BP - Sitting  114/64    Left Arm BP - Sitting  110/74    BP  114/64      Allergies as of 2/7/2017     Bactrim [Sulfamethoxazole-trimethoprim]    Macrodantin [Nitrofurantoin Macrocrystalline]      Immunizations Administered on Date of Encounter - 2/7/2017     None      Instructions      Losing Weight for Heart Health  Excess weight is a major risk factor for heart disease. Losing weight has many benefits including lowering your blood pressure, improving your cholesterol level, and decreasing your risk for diseases such as diabetes and heart disease. It may help keep your arteries open so that your heart can get the oxygen-rich blood it needs. All in all, losing weight makes you healthier.     Exercise with a friend. When activity is fun, you're more likely to stick with it.   Calories and weight loss  · Calories are the fuel your body burns for energy. You get the calories you need from the food you eat. For healthy weight loss, women should eat at least 1,200 calories a day, men at least 1,500.  · When you eat more calories than you need, your body stores the extra calories as fat. One pound of fat equals 3,500 calories.  · To lose weight, try to reduce your total calorie intake by 500 calories. To do this, eat 250 calories less each day. Add activity to burn the other 250 calories. Walking 2.5 miles burns about 250 calories. Other more intense activities can burn more calories in the time you spend doing them, such as swimming and running. It is " important to understand that reducing calorie intake is much more effective at weight loss than is exercise.  · Eat a variety of healthy foods to get the nutrients you need.  Tips for losing weight  · Drink 8 to 10 glasses of water a day.  · Dont skip meals. Instead, eat smaller portions.  · Eat your meals earlier in the day.  · Cut out sugary drinks such as soda and fruit juices.  · Make your later meals lighter than your earlier meals.   Brisk activity is best  Brisk activity gets your heart pumping faster and it makes it healthier. Its also a great way to burn calories. In fact, your body may keep burning calories for hours after you stop a brisk activity:  · Begin by walking 10 minutes most days.  · Add more time and speed to your walk. Build up as you feel able.  · Aim for 3 to 4 sessions of aerobic exercise a week. Each session should last about 40 minutes and include moderate to vigorous physical activity.  · The most important part of the activity is that you break a sweat. This indicates your heart is working hard enough to burn fat.  Date Last Reviewed: 6/1/2016  © 4673-4501 [employer]. [address] [place] [address]. All rights reserved. This information is not intended as a substitute for professional medical care. Always follow your healthcare professional's instructions.             Smoking Cessation     If you would like to quit smoking:   You may be eligible for free services if you are a [place] resident and started smoking cigarettes before September 1, 1988.  Call the [employer]oking [employer]essation [employer] (S[employer]) toll free at (208) 240-2575 or (021) 291-8174.   Call 1-800-QUIT-NOW if you do not meet the above criteria.            Language Assistance Services     ATTENTION: Language assistance services are available, free of charge. Please call [phone].      ATENCIÓN: Si habla español, tiene a franks disposición servicios gratuitos de asistencia lingüística. Llame al  [phone].     AYANA Ý: N?u b?n nói Ti?ng Vi?t, có các d?ch v? h? tr? ngôn ng? mi?n phí dành cho b?n. G?i s? [phone].         [facility] complies with applicable Federal civil rights laws and does not discriminate on the basis of race, color, national origin, age, disability, or sex.

## 2017-02-14 ENCOUNTER — PATIENT MESSAGE (OUTPATIENT)
Dept: FAMILY MEDICINE | Facility: CLINIC | Age: 52
End: 2017-02-14

## 2017-02-14 ENCOUNTER — TELEPHONE (OUTPATIENT)
Dept: FAMILY MEDICINE | Facility: CLINIC | Age: 52
End: 2017-02-14

## 2017-02-15 NOTE — TELEPHONE ENCOUNTER
Called and spoke with patient. Informed doctor request was patient schedule and appt. Patient scheduled appt for next Tuesday and stated that nothing has come up on her yet regarding the scabies, she was just exposed.

## 2017-02-22 ENCOUNTER — OFFICE VISIT (OUTPATIENT)
Dept: FAMILY MEDICINE | Facility: CLINIC | Age: 52
End: 2017-02-22
Payer: MEDICARE

## 2017-02-22 VITALS
TEMPERATURE: 98 F | HEART RATE: 90 BPM | WEIGHT: 177.69 LBS | BODY MASS INDEX: 37.3 KG/M2 | HEIGHT: 58 IN | SYSTOLIC BLOOD PRESSURE: 118 MMHG | DIASTOLIC BLOOD PRESSURE: 72 MMHG | OXYGEN SATURATION: 97 %

## 2017-02-22 DIAGNOSIS — T78.40XA ALLERGIC REACTION, INITIAL ENCOUNTER: Primary | ICD-10-CM

## 2017-02-22 PROCEDURE — 99213 OFFICE O/P EST LOW 20 MIN: CPT | Mod: S$GLB,,, | Performed by: FAMILY MEDICINE

## 2017-02-22 PROCEDURE — 3078F DIAST BP <80 MM HG: CPT | Mod: S$GLB,,, | Performed by: FAMILY MEDICINE

## 2017-02-22 PROCEDURE — 1160F RVW MEDS BY RX/DR IN RCRD: CPT | Mod: S$GLB,,, | Performed by: FAMILY MEDICINE

## 2017-02-22 PROCEDURE — 3074F SYST BP LT 130 MM HG: CPT | Mod: S$GLB,,, | Performed by: FAMILY MEDICINE

## 2017-02-22 PROCEDURE — 99999 PR PBB SHADOW E&M-EST. PATIENT-LVL III: CPT | Mod: PBBFAC,,, | Performed by: FAMILY MEDICINE

## 2017-02-22 RX ORDER — MUPIROCIN 20 MG/G
OINTMENT TOPICAL 3 TIMES DAILY
Qty: 22 G | Refills: 0 | Status: SHIPPED | OUTPATIENT
Start: 2017-02-22 | End: 2017-03-04

## 2017-02-22 RX ORDER — OXYCODONE AND ACETAMINOPHEN 10; 325 MG/1; MG/1
TABLET ORAL
Refills: 0 | COMMUNITY
Start: 2017-02-09 | End: 2017-03-23

## 2017-02-22 RX ORDER — TRAMADOL HYDROCHLORIDE 50 MG/1
TABLET ORAL
Refills: 0 | COMMUNITY
Start: 2017-02-20 | End: 2017-09-28

## 2017-02-22 NOTE — PROGRESS NOTES
Subjective:       Patient ID: Bianca Weldon is a 52 y.o. female.    Chief Complaint: infection from surgery site    HPI Comments: 52 y old female who underwent  left shoulder arthroscopic debridement  with subacromial decompression and clavicle excision by Dr Agustin on 2/9 , her to have us take a look at allergic reaction she developed on left forearm below incision site .Dr Agustin has  Laid eyes on it . Much better . Using topical Bactroban , it has improved     Review of Systems   Constitutional: Negative.    HENT: Negative.    Respiratory: Negative.    Cardiovascular: Negative.    Gastrointestinal: Negative.    Genitourinary: Negative.    Musculoskeletal: Negative.    Skin: Positive for rash.       Objective:      Physical Exam   Constitutional: She is oriented to person, place, and time. She appears well-developed and well-nourished. No distress.   HENT:   Head: Normocephalic and atraumatic.   Right Ear: External ear normal.   Left Ear: External ear normal.   Mouth/Throat: No oropharyngeal exudate.   Eyes: Conjunctivae and EOM are normal. Pupils are equal, round, and reactive to light. Right eye exhibits no discharge. Left eye exhibits no discharge. No scleral icterus.   Neck: Normal range of motion. Neck supple. No JVD present. No tracheal deviation present. No thyromegaly present.   Cardiovascular: Normal rate, regular rhythm and normal heart sounds.  Exam reveals no gallop and no friction rub.    No murmur heard.  Pulmonary/Chest: Effort normal and breath sounds normal. No stridor. No respiratory distress. She has no wheezes. She has no rales. She exhibits no tenderness.   Abdominal: Soft. Bowel sounds are normal. She exhibits no distension. There is no tenderness. There is no rebound and no guarding.   Musculoskeletal: Normal range of motion.   Lymphadenopathy:     She has no cervical adenopathy.   Neurological: She is alert and oriented to person, place, and time.   Skin: Skin is warm and dry. She is  not diaphoretic.        8 cm in length healing skin area with dried yellow  crust    Psychiatric: She has a normal mood and affect. Her behavior is normal. Judgment and thought content normal.       Assessment:       Bianca was seen today for infection from surgery site.    Diagnoses and all orders for this visit:    Allergic reaction, initial encounter    Other orders  -     mupirocin (BACTROBAN) 2 % ointment; Apply topically 3 (three) times daily.      Plan:   Improved. Cont wound care . Prn f.u

## 2017-02-22 NOTE — MR AVS SNAPSHOT
[facility]  [address]  [facility] [address]  Phone: [phone]  Fax: [phone]                  Bianca Weldon   2017 8:20 AM   Office Visit    Description:  Female : 1965   Provider:  Lacy Cottrell MD   Department:  [facility]           Reason for Visit     infection from surgery site                To Do List           Future Appointments        Provider Department Dept Phone    2017 8:00 AM Lacy Cottrell MD [facility] [phone]    2017 10:00 AM Steve Best MD [facility] [phone]      Goals (5 Years of Data)     None      [facility] On Call     [facility] On Call Nurse Care Line -  Assistance  Registered nurses in the [facility] On Call Center provide clinical advisement, health education, appointment booking, and other advisory services.  Call for this free service at [phone].             Medications           Message regarding Medications     Verify the changes and/or additions to your medication regime listed below are the same as discussed with your clinician today.  If any of these changes or additions are incorrect, please notify your healthcare provider.             Verify that the below list of medications is an accurate representation of the medications you are currently taking.  If none reported, the list may be blank. If incorrect, please contact your healthcare provider. Carry this list with you in case of emergency.           Current Medications     aripiprazole (ABILIFY) 20 MG Tab Take 20 mg by mouth once daily.     ascorbic acid (VITAMIN C) 250 MG tablet Take 250 mg by mouth once daily.    aspirin (ECOTRIN) 81 MG EC tablet Take 81 mg by mouth once daily.    atorvastatin (LIPITOR) 40 MG tablet Take 1 tablet (40 mg total) by mouth once daily.    benztropine (COGENTIN) 1 MG tablet Take 1 mg by mouth once daily.     blood sugar diagnostic Strp Use the  one insurance covers Check b.s fastin and 2 hrs after biggest meal    blood-glucose meter Misc Check BG twice daily when fasting    calcium acetate (PHOSLO) 667 mg capsule Take 667 mg by mouth 3 (three) times daily with meals.    clonazepam (KLONOPIN) 2 MG Tab Take 2 mg by mouth every evening.     desvenlafaxine succinate (PRISTIQ) 50 MG Tb24 Take 50 mg by mouth once daily.     FIBER CHOICE, INULIN, ORAL Take by mouth.    fluticasone (FLONASE) 50 mcg/actuation nasal spray 2 sprays by Each Nare route once daily.    fluticasone-vilanterol (BREO) 100-25 mcg/dose diskus inhaler Inhale 1 puff into the lungs once daily.    glipiZIDE (GLUCOTROL) 10 MG tablet 10 mg on am with breakfast , 10 mg on pm with dinner    hydrocodone-acetaminophen 10-325mg (NORCO)  mg Tab Take 1 tablet by mouth every 8 (eight) hours as needed for Pain.    ibuprofen (MOTRIN IB) 200 MG tablet Take 1 tablet (200 mg total) by mouth every 6 (six) hours as needed for Pain.    ipratropium-albuterol (COMBIVENT)  mcg/actuation inhaler Inhale 1 puff into the lungs every 6 (six) hours as needed for Wheezing.    lamotrigine (LAMICTAL) 100 MG tablet Take 400 mg by mouth 4 (four) times daily.     lancets 28 gauge Misc 1 lancet by Misc.(Non-Drug; Combo Route) route 2 (two) times daily. Use the one insurance covers    levothyroxine (SYNTHROID) 75 MCG tablet take 1 tablet by mouth once daily    lisinopril (PRINIVIL,ZESTRIL) 2.5 MG tablet Take 1 tablet (2.5 mg total) by mouth once daily.    meloxicam (MOBIC) 15 MG tablet Take 15 mg by mouth once daily.    methylphenidate (RITALIN) 10 MG tablet Take 10 mg by mouth once daily.    oxycodone-acetaminophen (PERCOCET)  mg per tablet take 1 to 2 tablets by mouth every 6 to 8 hours if needed for pain    pantoprazole (PROTONIX) 40 MG tablet 1 tab po daily    Saccharomyces boulardii (PROBIOTIC, S.BOULARDII,) 250 mg capsule Take 250 mg by mouth once daily.    SITagliptan (JANUVIA) 50 MG Tab 1 tab and 1/2  "daily with breakfast    tramadol (ULTRAM) 50 mg tablet take 1/2 to 1 tablet by mouth every 6 to 8 hours if needed for pain           Clinical Reference Information           Your Vitals Were     BP Pulse Temp Height    118/72 (BP Location: Right arm, Patient Position: Sitting, BP Method: Manual) 90 97.6 °F (36.4 °C) (Tympanic) 4' 10" (1.473 m)    Weight SpO2 BMI    80.6 kg (177 lb 10.5 oz) 97% 37.13 kg/m2      Blood Pressure          Most Recent Value    BP  118/72      Allergies as of 2/22/2017     Bactrim [Sulfamethoxazole-trimethoprim]    Macrodantin [Nitrofurantoin Macrocrystalline]      Immunizations Administered on Date of Encounter - 2/22/2017     None      Language Assistance Services     ATTENTION: Language assistance services are available, free of charge. Please call [phone].      ATENCIÓN: Si habla español, tiene a franks disposición servicios gratuitos de asistencia lingüística. Llame al [phone].     AYANA Ý: N?u b?n nói Ti?ng Vi?t, có các d?ch v? h? tr? ngôn ng? mi?n phí dành cho b?n. G?i s? [phone].         [facility] complies with applicable Federal civil rights laws and does not discriminate on the basis of race, color, national origin, age, disability, or sex.        "

## 2017-03-23 ENCOUNTER — OFFICE VISIT (OUTPATIENT)
Dept: DERMATOLOGY | Facility: CLINIC | Age: 52
End: 2017-03-23
Payer: MEDICARE

## 2017-03-23 DIAGNOSIS — L23.9 ALLERGIC CONTACT DERMATITIS, UNSPECIFIED TRIGGER: Primary | ICD-10-CM

## 2017-03-23 PROCEDURE — 1160F RVW MEDS BY RX/DR IN RCRD: CPT | Mod: S$GLB,,, | Performed by: DERMATOLOGY

## 2017-03-23 PROCEDURE — 99999 PR PBB SHADOW E&M-EST. PATIENT-LVL II: CPT | Mod: PBBFAC,,, | Performed by: DERMATOLOGY

## 2017-03-23 PROCEDURE — 99201 PR OFFICE/OUTPT VISIT,NEW,LEVL I: CPT | Mod: S$GLB,,, | Performed by: DERMATOLOGY

## 2017-03-23 RX ORDER — BETAMETHASONE DIPROPIONATE 0.5 MG/G
OINTMENT TOPICAL 2 TIMES DAILY
Qty: 90 G | Refills: 1 | Status: SHIPPED | OUTPATIENT
Start: 2017-03-23 | End: 2017-06-07

## 2017-03-23 RX ORDER — MELOXICAM 15 MG/1
15 TABLET ORAL
COMMUNITY
Start: 2014-06-05 | End: 2019-06-24

## 2017-03-23 RX ORDER — ESOMEPRAZOLE MAGNESIUM 40 MG/1
40 CAPSULE, DELAYED RELEASE ORAL
COMMUNITY
Start: 2015-07-15 | End: 2017-03-23

## 2017-03-23 RX ORDER — QUETIAPINE FUMARATE 100 MG/1
100 TABLET, FILM COATED ORAL
COMMUNITY
End: 2017-03-23

## 2017-03-23 RX ORDER — DESVENLAFAXINE SUCCINATE 50 MG/1
50 TABLET, EXTENDED RELEASE ORAL DAILY
COMMUNITY
Start: 2013-11-05 | End: 2021-04-01

## 2017-03-23 RX ORDER — LAMOTRIGINE 100 MG/1
100 TABLET ORAL
COMMUNITY
End: 2020-06-23

## 2017-03-23 RX ORDER — DEXTROSE 4 G
TABLET,CHEWABLE ORAL
COMMUNITY
Start: 2014-02-12 | End: 2017-05-25 | Stop reason: SDUPTHER

## 2017-03-23 RX ORDER — TRAZODONE HYDROCHLORIDE 100 MG/1
100 TABLET ORAL
COMMUNITY
End: 2017-03-23 | Stop reason: ALTCHOICE

## 2017-03-23 RX ORDER — CYCLOBENZAPRINE HCL 10 MG
TABLET ORAL
Refills: 0 | COMMUNITY
Start: 2017-01-18 | End: 2017-03-23

## 2017-03-23 RX ORDER — LEVOCETIRIZINE DIHYDROCHLORIDE 5 MG/1
TABLET, FILM COATED ORAL
Qty: 30 TABLET | Refills: 11 | Status: SHIPPED | OUTPATIENT
Start: 2017-03-23 | End: 2017-05-25

## 2017-03-23 RX ORDER — LEVOTHYROXINE SODIUM 75 UG/1
75 TABLET ORAL
COMMUNITY
Start: 2015-04-15 | End: 2017-03-23 | Stop reason: SDUPTHER

## 2017-03-23 RX ORDER — METHYLPHENIDATE HYDROCHLORIDE 20 MG/1
20 TABLET ORAL DAILY
Refills: 0 | COMMUNITY
Start: 2017-03-09 | End: 2017-09-28

## 2017-03-23 RX ORDER — HYDROXYZINE HYDROCHLORIDE 10 MG/1
10 TABLET, FILM COATED ORAL NIGHTLY PRN
Qty: 30 TABLET | Refills: 2 | Status: SHIPPED | OUTPATIENT
Start: 2017-03-23 | End: 2017-04-22

## 2017-03-23 RX ORDER — PROMETHAZINE HYDROCHLORIDE 25 MG/1
TABLET ORAL
Refills: 0 | COMMUNITY
Start: 2017-02-09 | End: 2017-03-23

## 2017-03-23 RX ORDER — ZOLPIDEM TARTRATE 10 MG/1
10 TABLET ORAL NIGHTLY
Refills: 0 | COMMUNITY
Start: 2017-01-12 | End: 2017-04-11

## 2017-03-23 RX ORDER — BENZTROPINE MESYLATE 1 MG/1
1 TABLET ORAL
COMMUNITY
Start: 2013-11-05 | End: 2017-03-23 | Stop reason: SDUPTHER

## 2017-03-23 RX ORDER — CLONAZEPAM 2 MG/1
2 TABLET ORAL
COMMUNITY
End: 2017-03-23 | Stop reason: SDUPTHER

## 2017-03-23 RX ORDER — CHOLECALCIFEROL (VITAMIN D3) 25 MCG
185 TABLET ORAL
COMMUNITY
End: 2017-05-25

## 2017-03-23 RX ORDER — LANCETS
EACH MISCELLANEOUS
COMMUNITY
Start: 2014-02-12 | End: 2017-03-23 | Stop reason: SDUPTHER

## 2017-03-23 RX ORDER — ARIPIPRAZOLE 20 MG/1
20 TABLET ORAL DAILY
COMMUNITY
Start: 2014-01-02 | End: 2021-04-01

## 2017-03-23 NOTE — PROGRESS NOTES
Subjective:       Patient ID:  Bianca Weldon is a 52 y.o. female who presents for   Chief Complaint   Patient presents with    Blister     c/o blister w/ itching and pain tx bactoban     HPI Comments: History of Present Illness: The patient presents with chief complaint of rash, began after shoulder surgery on 2/8/16.   Location: left arm   Duration: 6 weeks  Signs/Symptoms: pruritus, blistering    Prior treatments: bactroban with vitamin E        Review of Systems   Constitutional: Negative for fever and chills.   Gastrointestinal: Negative for nausea and vomiting.   Skin: Positive for itching and rash. Negative for daily sunscreen use, activity-related sunscreen use and recent sunburn.   Hematologic/Lymphatic: Does not bruise/bleed easily.        Objective:    Physical Exam   Constitutional: She appears well-developed and well-nourished. No distress.   Neurological: She is alert and oriented to person, place, and time. She is not disoriented.   Psychiatric: She has a normal mood and affect.   Skin:   Areas Examined (abnormalities noted in diagram):   Head / Face Inspection Performed  Neck Inspection Performed  RUE Inspected  LUE Inspection Performed  Nails and Digits Inspection Performed                 Assessment / Plan:        Allergic contact dermatitis, unspecified trigger  -     levocetirizine (XYZAL) 5 MG tablet; Take 1 tablet each morning.  Dispense: 30 tablet; Refill: 11  -     hydrOXYzine HCl (ATARAX) 10 MG Tab; Take 1 tablet (10 mg total) by mouth nightly as needed. May cause drowsiness.  Do not drive or operate heavy machinery.  Dispense: 30 tablet; Refill: 2  -     betamethasone dipropionate (DIPROLENE) 0.05 % ointment; Apply topically 2 (two) times daily.  Dispense: 90 g; Refill: 1  -     Possibility of allergic reaction to antiseptic vs. Adhesive from bandage used at time of surgery.  Will start above meds.  Offered ILK, pt defers.            Return if symptoms worsen or fail to improve.

## 2017-03-23 NOTE — MR AVS SNAPSHOT
[facility]  [address] [facility] [place] [address]  Phone: [phone]  Fax: [phone]                  Bianca Weldon   3/23/2017 3:00 PM   Office Visit    Description:  Female : 1965   Provider:  Selma Ricketts MD   Department:  [facility]           Reason for Visit     Blister           Diagnoses this Visit        Comments    Allergic contact dermatitis, unspecified trigger    -  Primary            To Do List           Future Appointments        Provider Department Dept Phone    3/23/2017 3:00 PM Selma Ricketts MD [facility] - Dermatology [phone]    2017 8:00 AM Lacy Cottrell MD [facility] [phone]    2017 10:00 AM Steve Best MD [facility] - Pulmonary Services [phone]      Goals (5 Years of Data)     None      Follow-Up and Disposition     Return if symptoms worsen or fail to improve.       These Medications        Disp Refills Start End    levocetirizine (XYZAL) 5 MG tablet 30 tablet 11 3/23/2017     Take 1 tablet each morning.    [facility] Ph #: [phone]       hydrOXYzine HCl (ATARAX) 10 MG Tab 30 tablet 2 3/23/2017 2017    Take 1 tablet (10 mg total) by mouth nightly as needed. May cause drowsiness.  Do not drive or operate heavy machinery. - Oral    Pharmacy: [facility] Ph #: [phone]       betamethasone dipropionate (DIPROLENE) 0.05 % ointment 90 g 1 3/23/2017 2017    Apply topically 2 (two) times daily. - Topical (Top)    Pharmacy: [facility] Ph #: [phone]         [facility]     [facility] -  Assistance  Registered nurses in the [facility] provide clinical advisement, health education, appointment booking, and other advisory services.  Call for this free  service at [phone].             Medications           Message regarding Medications     Verify the changes and/or additions to your medication regime listed below are the same as discussed with your clinician today.  If any of these changes or additions are incorrect, please notify your healthcare provider.        START taking these NEW medications        Refills    levocetirizine (XYZAL) 5 MG tablet 11    Sig: Take 1 tablet each morning.    Class: Normal    hydrOXYzine HCl (ATARAX) 10 MG Tab 2    Sig: Take 1 tablet (10 mg total) by mouth nightly as needed. May cause drowsiness.  Do not drive or operate heavy machinery.    Class: Normal    Route: Oral    betamethasone dipropionate (DIPROLENE) 0.05 % ointment 1    Sig: Apply topically 2 (two) times daily.    Class: Normal    Route: Topical (Top)      STOP taking these medications     lancets Misc Use to check blood glucose    atorvastatin (LIPITOR) 40 MG tablet Take 1 tablet (40 mg total) by mouth once daily.    cyclobenzaprine (FLEXERIL) 10 MG tablet     esomeprazole (NEXIUM) 40 MG capsule Take 40 mg by mouth.    hydrocodone-acetaminophen 10-325mg (NORCO)  mg Tab Take 1 tablet by mouth every 8 (eight) hours as needed for Pain.    ibuprofen (MOTRIN IB) 200 MG tablet Take 1 tablet (200 mg total) by mouth every 6 (six) hours as needed for Pain.    oxycodone-acetaminophen (PERCOCET)  mg per tablet take 1 to 2 tablets by mouth every 6 to 8 hours if needed for pain    promethazine (PHENERGAN) 25 MG tablet TAKE 1 TABLET BY MOUTH EVERY 4 TO 6 HOURS AS NEEDED FOR NAUSEA    quetiapine (SEROQUEL) 100 MG Tab Take 100 mg by mouth.    trazodone (DESYREL) 100 MG tablet Take 100 mg by mouth.           Verify that the below list of medications is an accurate representation of the medications you are currently taking.  If none reported, the list may be blank. If incorrect, please contact your healthcare provider. Carry this list with you in case of emergency.            Current Medications     aripiprazole (ABILIFY) 20 MG Tab Take 10 mg by mouth.    aspirin (ECOTRIN) 81 MG EC tablet Take 81 mg by mouth once daily.    benztropine (COGENTIN) 1 MG tablet Take 1 mg by mouth once daily.     blood sugar diagnostic Strp Use the one insurance covers Check b.s fastin and 2 hrs after biggest meal    blood-glucose meter Misc Check BG twice daily when fasting    clonazepam (KLONOPIN) 2 MG Tab Take 2 mg by mouth every evening.     desvenlafaxine succinate (PRISTIQ) 50 MG Tb24 Take 50 mg by mouth.    FIBER CHOICE, INULIN, ORAL Take by mouth.    fluticasone (FLONASE) 50 mcg/actuation nasal spray 2 sprays by Each Nare route once daily.    fluticasone-vilanterol (BREO) 100-25 mcg/dose diskus inhaler Inhale 1 puff into the lungs once daily.    glipiZIDE (GLUCOTROL) 10 MG tablet 10 mg on am with breakfast , 10 mg on pm with dinner    ipratropium-albuterol (COMBIVENT)  mcg/actuation inhaler Inhale 1 puff into the lungs every 6 (six) hours as needed for Wheezing.    lamotrigine (LAMICTAL) 100 MG tablet Take 100 mg by mouth.    lancets 28 gauge Misc 1 lancet by Misc.(Non-Drug; Combo Route) route 2 (two) times daily. Use the one insurance covers    levothyroxine (SYNTHROID) 75 MCG tablet take 1 tablet by mouth once daily    meloxicam (MOBIC) 15 MG tablet Take 15 mg by mouth.    methylphenidate (RITALIN) 10 MG tablet Take 10 mg by mouth once daily.    methylphenidate (RITALIN) 20 MG tablet Take 20 mg by mouth once daily.    pantoprazole (PROTONIX) 40 MG tablet 1 tab po daily    Saccharomyces boulardii (PROBIOTIC, S.BOULARDII,) 250 mg capsule Take 250 mg by mouth once daily.    SITagliptan (JANUVIA) 50 MG Tab 1 tab and 1/2 daily with breakfast    tramadol (ULTRAM) 50 mg tablet take 1/2 to 1 tablet by mouth every 6 to 8 hours if needed for pain    vitamin D 1000 units Tab Take 185 mg by mouth.    ascorbic acid (VITAMIN C) 250 MG tablet Take 250 mg by mouth once daily.    betamethasone  dipropionate (DIPROLENE) 0.05 % ointment Apply topically 2 (two) times daily.    calcium acetate (PHOSLO) 667 mg capsule Take 667 mg by mouth 3 (three) times daily with meals.    hydrOXYzine HCl (ATARAX) 10 MG Tab Take 1 tablet (10 mg total) by mouth nightly as needed. May cause drowsiness.  Do not drive or operate heavy machinery.    levocetirizine (XYZAL) 5 MG tablet Take 1 tablet each morning.    lisinopril (PRINIVIL,ZESTRIL) 2.5 MG tablet Take 1 tablet (2.5 mg total) by mouth once daily.    zolpidem (AMBIEN) 10 mg Tab Take 10 mg by mouth every evening.           Clinical Reference Information           Allergies as of 3/23/2017     Bactrim [Sulfamethoxazole-trimethoprim]    Macrodantin [Nitrofurantoin Macrocrystalline]      Immunizations Administered on Date of Encounter - 3/23/2017     None      Language Assistance Services     ATTENTION: Language assistance services are available, free of charge. Please call [phone].      ATENCIÓN: Si habla rocky, tiene a franks disposición servicios gratuitos de asistencia lingüística. Llame al [phone].     AYANA Ý: N?u b?n nói Ti?ng Vi?t, có các d?ch v? h? tr? ngôn ng? mi?n phí dành cho b?n. G?i s? [phone].         [facility] - Dermatology complies with applicable Federal civil rights laws and does not discriminate on the basis of race, color, national origin, age, disability, or sex.

## 2017-04-11 ENCOUNTER — OFFICE VISIT (OUTPATIENT)
Dept: FAMILY MEDICINE | Facility: CLINIC | Age: 52
End: 2017-04-11
Payer: MEDICARE

## 2017-04-11 ENCOUNTER — LAB VISIT (OUTPATIENT)
Dept: LAB | Facility: HOSPITAL | Age: 52
End: 2017-04-11
Attending: FAMILY MEDICINE
Payer: MEDICARE

## 2017-04-11 VITALS
OXYGEN SATURATION: 97 % | HEIGHT: 58 IN | HEART RATE: 90 BPM | BODY MASS INDEX: 38.23 KG/M2 | DIASTOLIC BLOOD PRESSURE: 72 MMHG | SYSTOLIC BLOOD PRESSURE: 108 MMHG | TEMPERATURE: 98 F | WEIGHT: 182.13 LBS

## 2017-04-11 DIAGNOSIS — E66.01 SEVERE OBESITY (BMI 35.0-35.9 WITH COMORBIDITY): ICD-10-CM

## 2017-04-11 DIAGNOSIS — E11.65 TYPE 2 DIABETES MELLITUS WITH HYPERGLYCEMIA, WITHOUT LONG-TERM CURRENT USE OF INSULIN: ICD-10-CM

## 2017-04-11 DIAGNOSIS — L03.012 PARONYCHIA, LEFT: ICD-10-CM

## 2017-04-11 DIAGNOSIS — I10 ESSENTIAL HYPERTENSION: ICD-10-CM

## 2017-04-11 DIAGNOSIS — E11.65 TYPE 2 DIABETES MELLITUS WITH HYPERGLYCEMIA, WITHOUT LONG-TERM CURRENT USE OF INSULIN: Primary | ICD-10-CM

## 2017-04-11 PROBLEM — E66.9 OBESITY (BMI 30-39.9): Status: RESOLVED | Noted: 2017-02-07 | Resolved: 2017-04-11

## 2017-04-11 LAB
ALBUMIN SERPL BCP-MCNC: 3.6 G/DL
ALP SERPL-CCNC: 166 U/L
ALT SERPL W/O P-5'-P-CCNC: 11 U/L
ANION GAP SERPL CALC-SCNC: 10 MMOL/L
AST SERPL-CCNC: 13 U/L
BILIRUB SERPL-MCNC: 0.6 MG/DL
BUN SERPL-MCNC: 10 MG/DL
CALCIUM SERPL-MCNC: 8.6 MG/DL
CHLORIDE SERPL-SCNC: 104 MMOL/L
CO2 SERPL-SCNC: 25 MMOL/L
CREAT SERPL-MCNC: 1 MG/DL
EST. GFR  (AFRICAN AMERICAN): >60 ML/MIN/1.73 M^2
EST. GFR  (NON AFRICAN AMERICAN): >60 ML/MIN/1.73 M^2
GLUCOSE SERPL-MCNC: 161 MG/DL
POTASSIUM SERPL-SCNC: 4.3 MMOL/L
PROT SERPL-MCNC: 6.4 G/DL
SODIUM SERPL-SCNC: 139 MMOL/L

## 2017-04-11 PROCEDURE — 4010F ACE/ARB THERAPY RXD/TAKEN: CPT | Mod: S$GLB,,, | Performed by: FAMILY MEDICINE

## 2017-04-11 PROCEDURE — 1160F RVW MEDS BY RX/DR IN RCRD: CPT | Mod: S$GLB,,, | Performed by: FAMILY MEDICINE

## 2017-04-11 PROCEDURE — 80053 COMPREHEN METABOLIC PANEL: CPT

## 2017-04-11 PROCEDURE — 99499 UNLISTED E&M SERVICE: CPT | Mod: S$PBB,,, | Performed by: FAMILY MEDICINE

## 2017-04-11 PROCEDURE — 99999 PR PBB SHADOW E&M-EST. PATIENT-LVL III: CPT | Mod: PBBFAC,,, | Performed by: FAMILY MEDICINE

## 2017-04-11 PROCEDURE — 36415 COLL VENOUS BLD VENIPUNCTURE: CPT | Mod: PO

## 2017-04-11 PROCEDURE — 3074F SYST BP LT 130 MM HG: CPT | Mod: S$GLB,,, | Performed by: FAMILY MEDICINE

## 2017-04-11 PROCEDURE — 3045F PR MOST RECENT HEMOGLOBIN A1C LEVEL 7.0-9.0%: CPT | Mod: S$GLB,,, | Performed by: FAMILY MEDICINE

## 2017-04-11 PROCEDURE — 3078F DIAST BP <80 MM HG: CPT | Mod: S$GLB,,, | Performed by: FAMILY MEDICINE

## 2017-04-11 PROCEDURE — 99214 OFFICE O/P EST MOD 30 MIN: CPT | Mod: S$GLB,,, | Performed by: FAMILY MEDICINE

## 2017-04-11 PROCEDURE — 83036 HEMOGLOBIN GLYCOSYLATED A1C: CPT

## 2017-04-11 RX ORDER — CLINDAMYCIN HYDROCHLORIDE 300 MG/1
300 CAPSULE ORAL 3 TIMES DAILY
Qty: 30 CAPSULE | Refills: 0 | Status: SHIPPED | OUTPATIENT
Start: 2017-04-11 | End: 2017-04-18

## 2017-04-11 NOTE — MR AVS SNAPSHOT
[facility] Medicine  [address]  [facility] [address]  Phone: [phone]  Fax: [phone]                  Bianca Weldon   2017 8:00 AM   Office Visit    Description:  Female : 1965   Provider:  Lacy Cottrell MD   Department:  [facility]           Reason for Visit     Diabetes           Diagnoses this Visit        Comments    Type 2 diabetes mellitus with hyperglycemia, without long-term current use of insulin    -  Primary     Essential hypertension         Severe obesity (BMI 35.0-35.9 with comorbidity)         Paronychia, left                To Do List           Future Appointments        Provider Department Dept Phone    2017 10:00 AM [facility]     2017 2:40 PM Lacy Cottrell MD [facility] [phone]    2017 10:00 AM Steve Best MD [facility] - Pulmonary Services [phone]      Goals (5 Years of Data)     None       These Medications        Disp Refills Start End    clindamycin (CLEOCIN) 300 MG capsule 30 capsule 0 2017    Take 1 capsule (300 mg total) by mouth 3 (three) times daily. - Oral    [facility] #: [phone]         [facility]     [facility] Care [facility] -  Assistance  Unless otherwise directed by your provider, please contact [facility], our nurse care line that is available for  assistance.     Registered nurses in the [facility] provide: appointment scheduling, clinical advisement, health education, and other advisory services.  Call: [phone] (toll free)               Medications           Message regarding Medications     Verify the changes and/or additions to your medication regime listed below are the same as discussed with your clinician today.  If any of these changes or additions are  incorrect, please notify your healthcare provider.        START taking these NEW medications        Refills    clindamycin (CLEOCIN) 300 MG capsule 0    Sig: Take 1 capsule (300 mg total) by mouth 3 (three) times daily.    Class: Normal    Route: Oral      STOP taking these medications     zolpidem (AMBIEN) 10 mg Tab Take 10 mg by mouth every evening.           Verify that the below list of medications is an accurate representation of the medications you are currently taking.  If none reported, the list may be blank. If incorrect, please contact your healthcare provider. Carry this list with you in case of emergency.           Current Medications     aripiprazole (ABILIFY) 20 MG Tab Take 10 mg by mouth.    ascorbic acid (VITAMIN C) 250 MG tablet Take 250 mg by mouth once daily.    aspirin (ECOTRIN) 81 MG EC tablet Take 81 mg by mouth once daily.    benztropine (COGENTIN) 1 MG tablet Take 1 mg by mouth once daily.     blood sugar diagnostic Strp Use the one insurance covers Check b.s fastin and 2 hrs after biggest meal    blood-glucose meter Misc Check BG twice daily when fasting    calcium acetate (PHOSLO) 667 mg capsule Take 667 mg by mouth 3 (three) times daily with meals.    clonazepam (KLONOPIN) 2 MG Tab Take 2 mg by mouth every evening.     desvenlafaxine succinate (PRISTIQ) 50 MG Tb24 Take 50 mg by mouth.    FIBER CHOICE, INULIN, ORAL Take by mouth.    fluticasone (FLONASE) 50 mcg/actuation nasal spray 2 sprays by Each Nare route once daily.    fluticasone-vilanterol (BREO) 100-25 mcg/dose diskus inhaler Inhale 1 puff into the lungs once daily.    glipiZIDE (GLUCOTROL) 10 MG tablet 10 mg on am with breakfast , 10 mg on pm with dinner    hydrOXYzine HCl (ATARAX) 10 MG Tab Take 1 tablet (10 mg total) by mouth nightly as needed. May cause drowsiness.  Do not drive or operate heavy machinery.    ipratropium-albuterol (COMBIVENT)  mcg/actuation inhaler Inhale 1 puff into the lungs every 6 (six) hours as  "needed for Wheezing.    lamotrigine (LAMICTAL) 100 MG tablet Take 100 mg by mouth.    lancets 28 gauge Misc 1 lancet by Misc.(Non-Drug; Combo Route) route 2 (two) times daily. Use the one insurance covers    levocetirizine (XYZAL) 5 MG tablet Take 1 tablet each morning.    levothyroxine (SYNTHROID) 75 MCG tablet take 1 tablet by mouth once daily    lisinopril (PRINIVIL,ZESTRIL) 2.5 MG tablet Take 1 tablet (2.5 mg total) by mouth once daily.    meloxicam (MOBIC) 15 MG tablet Take 15 mg by mouth.    methylphenidate (RITALIN) 10 MG tablet Take 10 mg by mouth once daily.    methylphenidate (RITALIN) 20 MG tablet Take 20 mg by mouth once daily.    pantoprazole (PROTONIX) 40 MG tablet 1 tab po daily    Saccharomyces boulardii (PROBIOTIC, S.BOULARDII,) 250 mg capsule Take 250 mg by mouth once daily.    SITagliptan (JANUVIA) 50 MG Tab 1 tab and 1/2 daily with breakfast    tramadol (ULTRAM) 50 mg tablet take 1/2 to 1 tablet by mouth every 6 to 8 hours if needed for pain    vitamin D 1000 units Tab Take 185 mg by mouth.    betamethasone dipropionate (DIPROLENE) 0.05 % ointment Apply topically 2 (two) times daily.    clindamycin (CLEOCIN) 300 MG capsule Take 1 capsule (300 mg total) by mouth 3 (three) times daily.           Clinical Reference Information           Your Vitals Were     BP Pulse Temp Height Weight SpO2    108/72 (BP Location: Left arm, Patient Position: Sitting, BP Method: Manual) 90 98.3 °F (36.8 °C) (Oral) 4' 10" (1.473 m) 82.6 kg (182 lb 1.6 oz) 97%    BMI                38.06 kg/m2          Blood Pressure          Most Recent Value    BP  108/72      Allergies as of 4/11/2017     Bactrim [Sulfamethoxazole-trimethoprim]    Macrodantin [Nitrofurantoin Macrocrystalline]      Immunizations Administered on Date of Encounter - 4/11/2017     None      Orders Placed During Today's Visit     Future Labs/Procedures Expected by Expires    Comprehensive metabolic panel  4/11/2017 6/10/2018    Hemoglobin A1c  4/11/2017 " 6/10/2018      Language Assistance Services     ATTENTION: Language assistance services are available, free of charge. Please call [phone].      ATENCIÓN: Si habla rocky, tiene a franks disposición servicios gratuitos de asistencia lingüística. Llame al [phone].     CHÚ Ý: N?u b?n nói Ti?ng Vi?t, có các d?ch v? h? tr? ngôn ng? mi?n phí dành cho b?n. G?i s? [phone].         [facility] complies with applicable Federal civil rights laws and does not discriminate on the basis of race, color, national origin, age, disability, or sex.

## 2017-04-11 NOTE — PROGRESS NOTES
Subjective:       Patient ID: Bianca Weldon is a 52 y.o. female.    Chief Complaint: Diabetes    HPI Comments: 52 y old here for f/u on DM , HTN and Obesity. On aspirin,Immunizations are UTD , exercises : doing  elliptical and bike for 45 min ,  watching starches  , Opthalmology  : seen Last Y at [place] [facility] , Fastin bs are  : 150, 146 , 180 , 140 , 156, 120 and  2 hrs pp  140, 120 , 150 , 790 , 119 , 159. Compliant with meds . She also has a nail infection on Left middle finger , oozing some, using Bactroban . Started 1 w ago     Diabetes       Review of Systems   Constitutional: Negative.    HENT: Negative.    Respiratory: Negative.    Cardiovascular: Negative.    Gastrointestinal: Negative.    Genitourinary: Negative.    Musculoskeletal: Negative.        Objective:      Physical Exam   Constitutional: She is oriented to person, place, and time. She appears well-developed and well-nourished. No distress.   HENT:   Head: Normocephalic and atraumatic.   Right Ear: External ear normal.   Left Ear: External ear normal.   Mouth/Throat: No oropharyngeal exudate.   Eyes: Conjunctivae and EOM are normal. Pupils are equal, round, and reactive to light. Right eye exhibits no discharge. Left eye exhibits no discharge. No scleral icterus.   Neck: Normal range of motion. Neck supple. No JVD present. No tracheal deviation present. No thyromegaly present.   Cardiovascular: Normal rate, regular rhythm and normal heart sounds.  Exam reveals no gallop and no friction rub.    No murmur heard.  Pulses:       Dorsalis pedis pulses are 2+ on the right side, and 2+ on the left side.        Posterior tibial pulses are 2+ on the right side, and 2+ on the left side.   Pulmonary/Chest: Effort normal and breath sounds normal. No stridor. No respiratory distress. She has no wheezes. She has no rales. She exhibits no tenderness.   Abdominal: Soft. Bowel sounds are normal. She exhibits no distension. There is no tenderness. There is  no rebound and no guarding.   Musculoskeletal: Normal range of motion.        Hands:       Right foot: There is normal range of motion and no deformity.        Left foot: There is no deformity.   Left middle finger : Mild Left lateal nail fold  edema ,non fluctuating    Feet:   Right Foot:   Protective Sensation: 10 sites tested. 10 sites sensed.   Skin Integrity: Negative for ulcer, blister, skin breakdown, erythema, warmth, callus or dry skin.   Left Foot:   Protective Sensation: 10 sites tested. 10 sites sensed.   Skin Integrity: Negative for blister, skin breakdown, erythema, warmth, callus or dry skin.   Lymphadenopathy:     She has no cervical adenopathy.   Neurological: She is alert and oriented to person, place, and time.   Skin: Skin is warm and dry. She is not diaphoretic.   Psychiatric: She has a normal mood and affect. Her behavior is normal. Judgment and thought content normal.       Assessment:     Bianca was seen today for diabetes.    Diagnoses and all orders for this visit:    Type 2 diabetes mellitus with hyperglycemia, without long-term current use of insulin  -     Hemoglobin A1c; Future  -     Comprehensive metabolic panel; Future    Essential hypertension    Severe obesity (BMI 35.0-35.9 with comorbidity)    Paronychia, left    Other orders  -     clindamycin (CLEOCIN) 300 MG capsule; Take 1 capsule (300 mg total) by mouth 3 (three) times daily.      Plan:   .Pt. encouraged to follow a strict diabetic type diet.   Encouraged daily physical activity/exercise for at least 30mins if tolerated.   Weight control recommenced as always.   Discussed how lifestyle changes make biggest impact on diabetes control.   Continue home glucose monitoring once daily and keep log.   Counseling provided today about hypoglycemia as well as about how diabetes increases risk of cardiovascular, cerebrovascular ,peripheral vascular disease and other serious health complications. He has been instructed to call if  developing any new symptoms or hypoglycemia related symptoms.   See encounter for associated orders/medications.   - Lipid panel; Future  controlled . Cont med   The patient is asked to make an attempt to improve diet and exercise patterns to aid in medical management of this problem.    STEPHIE Mullins discussed. F.u in 1 w

## 2017-04-12 LAB
ESTIMATED AVG GLUCOSE: 151 MG/DL
HBA1C MFR BLD HPLC: 6.9 %

## 2017-04-18 ENCOUNTER — LAB VISIT (OUTPATIENT)
Dept: LAB | Facility: HOSPITAL | Age: 52
End: 2017-04-18
Attending: FAMILY MEDICINE
Payer: MEDICARE

## 2017-04-18 ENCOUNTER — OFFICE VISIT (OUTPATIENT)
Dept: FAMILY MEDICINE | Facility: CLINIC | Age: 52
End: 2017-04-18
Payer: MEDICARE

## 2017-04-18 VITALS
WEIGHT: 178.56 LBS | DIASTOLIC BLOOD PRESSURE: 70 MMHG | BODY MASS INDEX: 37.48 KG/M2 | TEMPERATURE: 98 F | SYSTOLIC BLOOD PRESSURE: 124 MMHG | HEART RATE: 96 BPM | OXYGEN SATURATION: 97 % | HEIGHT: 58 IN

## 2017-04-18 DIAGNOSIS — R74.8 ELEVATED ALKALINE PHOSPHATASE LEVEL: Primary | ICD-10-CM

## 2017-04-18 DIAGNOSIS — R74.8 ELEVATED ALKALINE PHOSPHATASE LEVEL: ICD-10-CM

## 2017-04-18 LAB — GGT SERPL-CCNC: 69 U/L

## 2017-04-18 PROCEDURE — 3078F DIAST BP <80 MM HG: CPT | Mod: S$GLB,,, | Performed by: FAMILY MEDICINE

## 2017-04-18 PROCEDURE — 82977 ASSAY OF GGT: CPT

## 2017-04-18 PROCEDURE — 99213 OFFICE O/P EST LOW 20 MIN: CPT | Mod: S$GLB,,, | Performed by: FAMILY MEDICINE

## 2017-04-18 PROCEDURE — 3074F SYST BP LT 130 MM HG: CPT | Mod: S$GLB,,, | Performed by: FAMILY MEDICINE

## 2017-04-18 PROCEDURE — 1160F RVW MEDS BY RX/DR IN RCRD: CPT | Mod: S$GLB,,, | Performed by: FAMILY MEDICINE

## 2017-04-18 PROCEDURE — 99999 PR PBB SHADOW E&M-EST. PATIENT-LVL III: CPT | Mod: PBBFAC,,, | Performed by: FAMILY MEDICINE

## 2017-04-18 PROCEDURE — 36415 COLL VENOUS BLD VENIPUNCTURE: CPT | Mod: PO

## 2017-04-18 RX ORDER — ATORVASTATIN CALCIUM 40 MG/1
40 TABLET, FILM COATED ORAL DAILY
Qty: 90 TABLET | Refills: 3
Start: 2017-04-18 | End: 2017-08-08 | Stop reason: SDUPTHER

## 2017-04-18 NOTE — MR AVS SNAPSHOT
[facility] Medicine  [address]  [facility] [address]  Phone: [phone]  Fax: [phone]                  Bianca Weldon   2017 2:40 PM   Office Visit    Description:  Female : 1965   Provider:  Lacy Cottrell MD   Department:  [facility]           Reason for Visit     Follow-up     discuss enzymes           Diagnoses this Visit        Comments    Elevated alkaline phosphatase level    -  Primary            To Do List           Future Appointments        Provider Department Dept Phone    2017 10:00 AM Steve Best MD [facility] - Pulmonary Services [phone]      Goals (5 Years of Data)     None       These Medications        Disp Refills Start End    atorvastatin (LIPITOR) 40 MG tablet 90 tablet 3 2017    Take 1 tablet (40 mg total) by mouth once daily. - Oral    Pharmacy: [address] Ph #: [phone]         [facility]     [facility] On Call Nurse Care Line -  Assistance  Unless otherwise directed by your provider, please contact [facility], our nurse care line that is available for  assistance.     Registered nurses in the [facility] provide: appointment scheduling, clinical advisement, health education, and other advisory services.  Call: [phone] (toll free)               Medications           Message regarding Medications     Verify the changes and/or additions to your medication regime listed below are the same as discussed with your clinician today.  If any of these changes or additions are incorrect, please notify your healthcare provider.        START taking these NEW medications        Refills    atorvastatin (LIPITOR) 40 MG tablet 3    Sig: Take 1 tablet (40 mg total) by mouth once daily.    Class: No Print    Route: Oral      STOP taking these medications     clindamycin (CLEOCIN) 300 MG capsule Take 1  capsule (300 mg total) by mouth 3 (three) times daily.           Verify that the below list of medications is an accurate representation of the medications you are currently taking.  If none reported, the list may be blank. If incorrect, please contact your healthcare provider. Carry this list with you in case of emergency.           Current Medications     aripiprazole (ABILIFY) 20 MG Tab Take 10 mg by mouth.    ascorbic acid (VITAMIN C) 250 MG tablet Take 250 mg by mouth once daily.    aspirin (ECOTRIN) 81 MG EC tablet Take 81 mg by mouth once daily.    benztropine (COGENTIN) 1 MG tablet Take 1 mg by mouth once daily.     blood sugar diagnostic Strp Use the one insurance covers Check b.s fastin and 2 hrs after biggest meal    blood-glucose meter Misc Check BG twice daily when fasting    calcium acetate (PHOSLO) 667 mg capsule Take 667 mg by mouth 3 (three) times daily with meals.    clonazepam (KLONOPIN) 2 MG Tab Take 2 mg by mouth every evening.     desvenlafaxine succinate (PRISTIQ) 50 MG Tb24 Take 50 mg by mouth.    FIBER CHOICE, INULIN, ORAL Take by mouth.    fluticasone (FLONASE) 50 mcg/actuation nasal spray 2 sprays by Each Nare route once daily.    fluticasone-vilanterol (BREO) 100-25 mcg/dose diskus inhaler Inhale 1 puff into the lungs once daily.    glipiZIDE (GLUCOTROL) 10 MG tablet 10 mg on am with breakfast , 10 mg on pm with dinner    hydrOXYzine HCl (ATARAX) 10 MG Tab Take 1 tablet (10 mg total) by mouth nightly as needed. May cause drowsiness.  Do not drive or operate heavy machinery.    ipratropium-albuterol (COMBIVENT)  mcg/actuation inhaler Inhale 1 puff into the lungs every 6 (six) hours as needed for Wheezing.    lamotrigine (LAMICTAL) 100 MG tablet Take 100 mg by mouth.    lancets 28 gauge Misc 1 lancet by Misc.(Non-Drug; Combo Route) route 2 (two) times daily. Use the one insurance covers    levocetirizine (XYZAL) 5 MG tablet Take 1 tablet each morning.    levothyroxine (SYNTHROID) 75  "MCG tablet take 1 tablet by mouth once daily    lisinopril (PRINIVIL,ZESTRIL) 2.5 MG tablet Take 1 tablet (2.5 mg total) by mouth once daily.    meloxicam (MOBIC) 15 MG tablet Take 15 mg by mouth.    methylphenidate (RITALIN) 10 MG tablet Take 10 mg by mouth once daily.    methylphenidate (RITALIN) 20 MG tablet Take 20 mg by mouth once daily.    pantoprazole (PROTONIX) 40 MG tablet 1 tab po daily    Saccharomyces boulardii (PROBIOTIC, S.BOULARDII,) 250 mg capsule Take 250 mg by mouth once daily.    SITagliptan (JANUVIA) 50 MG Tab 1 tab and 1/2 daily with breakfast    tramadol (ULTRAM) 50 mg tablet take 1/2 to 1 tablet by mouth every 6 to 8 hours if needed for pain    vitamin D 1000 units Tab Take 185 mg by mouth.    atorvastatin (LIPITOR) 40 MG tablet Take 1 tablet (40 mg total) by mouth once daily.    betamethasone dipropionate (DIPROLENE) 0.05 % ointment Apply topically 2 (two) times daily.           Clinical Reference Information           Your Vitals Were     BP Pulse Temp Height Weight SpO2    124/70 (BP Location: Right arm, Patient Position: Sitting, BP Method: Manual) 96 98.4 °F (36.9 °C) (Oral) 4' 10" (1.473 m) 81 kg (178 lb 9.2 oz) 97%    BMI                37.32 kg/m2          Blood Pressure          Most Recent Value    BP  124/70      Allergies as of 4/18/2017     Bactrim [Sulfamethoxazole-trimethoprim]    Macrodantin [Nitrofurantoin Macrocrystalline]      Immunizations Administered on Date of Encounter - 4/18/2017     None      Orders Placed During Today's Visit     Future Labs/Procedures Expected by Expires    Gamma GT  4/18/2017 6/17/2018      Language Assistance Services     ATTENTION: Language assistance services are available, free of charge. Please call [phone].      ATENCIÓN: Si habla rocky, tiene a franks disposición servicios gratuitos de asistencia lingüística. Llame al [phone].     CHÚ Ý: N?u b?n nói Ti?ng Vi?t, có các d?ch v? h? tr? ngôn ng? mi?n phí dành cho b?n. G?i s? " [phone].         [facility] complies with applicable Federal civil rights laws and does not discriminate on the basis of race, color, national origin, age, disability, or sex.

## 2017-04-18 NOTE — PROGRESS NOTES
Subjective:       Patient ID: Bianca Weldon is a 52 y.o. female.    Chief Complaint: Follow-up and discuss enzymes    HPI Comments: 52 y old female here to discuss alk phop elevation .Not taking any new meds. NO risk fact for hep c , no jaundice , abd pain etc     Review of Systems   Constitutional: Negative.    Respiratory: Negative.    Cardiovascular: Negative.    Gastrointestinal: Negative.        Objective:      Physical Exam   Constitutional: She is oriented to person, place, and time. She appears well-developed and well-nourished. No distress.   HENT:   Head: Normocephalic and atraumatic.   Right Ear: External ear normal.   Left Ear: External ear normal.   Mouth/Throat: No oropharyngeal exudate.   Eyes: Conjunctivae and EOM are normal. Pupils are equal, round, and reactive to light. Right eye exhibits no discharge. Left eye exhibits no discharge. No scleral icterus.   Neck: Normal range of motion. Neck supple. No JVD present. No tracheal deviation present. No thyromegaly present.   Cardiovascular: Normal rate, regular rhythm and normal heart sounds.  Exam reveals no gallop and no friction rub.    No murmur heard.  Pulmonary/Chest: Effort normal and breath sounds normal. No stridor. No respiratory distress. She has no wheezes. She has no rales. She exhibits no tenderness.   Abdominal: Soft. Bowel sounds are normal. She exhibits no distension. There is no tenderness. There is no rebound and no guarding.   Musculoskeletal: Normal range of motion.   Lymphadenopathy:     She has no cervical adenopathy.   Neurological: She is alert and oriented to person, place, and time.   Skin: Skin is warm and dry. She is not diaphoretic.   Psychiatric: She has a normal mood and affect. Her behavior is normal. Judgment and thought content normal.       Assessment:       1. Elevated alkaline phosphatase level        Plan:     Bianca was seen today for follow-up and discuss enzymes.    Diagnoses and all orders for this  visit:    Elevated alkaline phosphatase level  -     Gamma GT; Future    Other orders  -     atorvastatin (LIPITOR) 40 MG tablet; Take 1 tablet (40 mg total) by mouth once daily.

## 2017-04-19 ENCOUNTER — TELEPHONE (OUTPATIENT)
Dept: FAMILY MEDICINE | Facility: CLINIC | Age: 52
End: 2017-04-19

## 2017-04-19 DIAGNOSIS — R74.8 ELEVATED ALKALINE PHOSPHATASE LEVEL: Primary | ICD-10-CM

## 2017-04-20 ENCOUNTER — PATIENT MESSAGE (OUTPATIENT)
Dept: FAMILY MEDICINE | Facility: CLINIC | Age: 52
End: 2017-04-20

## 2017-04-21 ENCOUNTER — TELEPHONE (OUTPATIENT)
Dept: FAMILY MEDICINE | Facility: CLINIC | Age: 52
End: 2017-04-21

## 2017-04-21 DIAGNOSIS — R74.8 ALKALINE PHOSPHATASE ELEVATION: Primary | ICD-10-CM

## 2017-04-21 NOTE — TELEPHONE ENCOUNTER
----- Message from Renetta Gibson sent at 4/20/2017  2:56 PM CDT -----  Pt need to talk to you about seeing dr. Hernandez. Please call pt back at [phone].

## 2017-04-28 ENCOUNTER — PATIENT MESSAGE (OUTPATIENT)
Dept: DERMATOLOGY | Facility: CLINIC | Age: 52
End: 2017-04-28

## 2017-04-28 RX ORDER — INSULIN PUMP SYRINGE, 3 ML
EACH MISCELLANEOUS
COMMUNITY
End: 2017-04-28 | Stop reason: SDUPTHER

## 2017-04-28 NOTE — TELEPHONE ENCOUNTER
----- Message from Arley Jama sent at 4/28/2017  9:30 AM CDT -----  Contact: [facility]-[phone]  Would like an order for  truemetrix meter. Please fax order to [phone]. Please call back @ [phone]. Thanks [facility].

## 2017-05-01 ENCOUNTER — INITIAL CONSULT (OUTPATIENT)
Dept: GASTROENTEROLOGY | Facility: CLINIC | Age: 52
End: 2017-05-01
Payer: MEDICARE

## 2017-05-01 ENCOUNTER — LAB VISIT (OUTPATIENT)
Dept: LAB | Facility: HOSPITAL | Age: 52
End: 2017-05-01
Attending: INTERNAL MEDICINE
Payer: MEDICARE

## 2017-05-01 VITALS
SYSTOLIC BLOOD PRESSURE: 130 MMHG | BODY MASS INDEX: 37.94 KG/M2 | HEIGHT: 58 IN | DIASTOLIC BLOOD PRESSURE: 70 MMHG | HEART RATE: 70 BPM | WEIGHT: 180.75 LBS

## 2017-05-01 DIAGNOSIS — R79.89 ABNORMAL LFTS: Primary | ICD-10-CM

## 2017-05-01 DIAGNOSIS — E66.09 OBESITY DUE TO EXCESS CALORIES, UNSPECIFIED OBESITY SEVERITY: ICD-10-CM

## 2017-05-01 DIAGNOSIS — R79.89 ABNORMAL LFTS: ICD-10-CM

## 2017-05-01 DIAGNOSIS — E11.9 TYPE 2 DIABETES MELLITUS WITHOUT COMPLICATION, WITHOUT LONG-TERM CURRENT USE OF INSULIN: ICD-10-CM

## 2017-05-01 DIAGNOSIS — I10 ESSENTIAL HYPERTENSION: ICD-10-CM

## 2017-05-01 LAB — CERULOPLASMIN SERPL-MCNC: 35 MG/DL

## 2017-05-01 PROCEDURE — 3075F SYST BP GE 130 - 139MM HG: CPT | Mod: S$GLB,,, | Performed by: INTERNAL MEDICINE

## 2017-05-01 PROCEDURE — 83516 IMMUNOASSAY NONANTIBODY: CPT | Mod: 59

## 2017-05-01 PROCEDURE — 3078F DIAST BP <80 MM HG: CPT | Mod: S$GLB,,, | Performed by: INTERNAL MEDICINE

## 2017-05-01 PROCEDURE — 1160F RVW MEDS BY RX/DR IN RCRD: CPT | Mod: S$GLB,,, | Performed by: INTERNAL MEDICINE

## 2017-05-01 PROCEDURE — 99203 OFFICE O/P NEW LOW 30 MIN: CPT | Mod: S$GLB,,, | Performed by: INTERNAL MEDICINE

## 2017-05-01 PROCEDURE — 86256 FLUORESCENT ANTIBODY TITER: CPT

## 2017-05-01 PROCEDURE — 86235 NUCLEAR ANTIGEN ANTIBODY: CPT | Mod: 91

## 2017-05-01 PROCEDURE — 82390 ASSAY OF CERULOPLASMIN: CPT

## 2017-05-01 PROCEDURE — 99499 UNLISTED E&M SERVICE: CPT | Mod: S$PBB,,, | Performed by: INTERNAL MEDICINE

## 2017-05-01 PROCEDURE — 99999 PR PBB SHADOW E&M-EST. PATIENT-LVL III: CPT | Mod: PBBFAC,,, | Performed by: INTERNAL MEDICINE

## 2017-05-01 PROCEDURE — 4010F ACE/ARB THERAPY RXD/TAKEN: CPT | Mod: S$GLB,,, | Performed by: INTERNAL MEDICINE

## 2017-05-01 PROCEDURE — 80074 ACUTE HEPATITIS PANEL: CPT

## 2017-05-01 PROCEDURE — 3044F HG A1C LEVEL LT 7.0%: CPT | Mod: S$GLB,,, | Performed by: INTERNAL MEDICINE

## 2017-05-01 NOTE — PROGRESS NOTES
Subjective:       Patient ID: Bianca Weldon is a 52 y.o. female.    Chief Complaint: Elevated Hepatic Enzymes    HPI Comments: The patient has a history of abnormal LFTs going back at least 34 years. She has no known history of primary viral hepatitis, but ruggiero had Mononucleosis which involved the liver. Her LFT abnormalities involve predominantly an increased Alkaline phosphatase. Her GGT is also elevated suggesting  her Alk phos is from her liver. She denies abdominal pain or nausea and vomiting. There is no anorexia or weight loss. She has no history of Jaundice as an adult patient.     The patient is on Januvia for control of diabetes and Pristiq.  Both of these have a potential to cause abnormal liver function studies, but the incidence is less than 1%.  The patient is also diabetic and significantly obese, though not morbid.  Was proceeded risk for fatty infiltration of the liver, but in most cases this leads to elevated transaminases and not an isolated alkaline phosphatase increased.    Review of Systems   Constitutional: Negative for activity change, appetite change, chills, diaphoresis, fatigue, fever and unexpected weight change.   HENT: Negative for congestion, ear discharge, ear pain, hearing loss, nosebleeds, postnasal drip and tinnitus.    Eyes: Negative for photophobia and visual disturbance.   Respiratory: Negative for apnea, cough, choking, chest tightness, shortness of breath and wheezing.    Cardiovascular: Negative for chest pain, palpitations and leg swelling.   Gastrointestinal: Negative for abdominal distention, abdominal pain, anal bleeding, blood in stool, constipation, diarrhea, nausea, rectal pain and vomiting.   Genitourinary: Negative for difficulty urinating, dyspareunia, dysuria, flank pain, frequency, hematuria, menstrual problem, pelvic pain, urgency, vaginal bleeding and vaginal discharge.   Musculoskeletal: Negative for arthralgias, back pain, gait problem, joint swelling, myalgias  and neck stiffness.   Skin: Positive for rash. Negative for pallor.   Neurological: Negative for dizziness, tremors, seizures, syncope, speech difficulty, weakness, numbness and headaches.   Hematological: Negative for adenopathy.   Psychiatric/Behavioral: Negative for agitation, confusion, hallucinations, sleep disturbance and suicidal ideas.       Objective:      Physical Exam   Constitutional: She is oriented to person, place, and time. She appears well-developed and well-nourished.   Obesity   HENT:   Head: Normocephalic and atraumatic.   Bilateral turbinate congestion   Eyes: Conjunctivae and EOM are normal. Pupils are equal, round, and reactive to light. Right eye exhibits no discharge. Left eye exhibits no discharge. No scleral icterus.   Neck: Normal range of motion. Neck supple. No JVD present. No thyromegaly present.   Cardiovascular: Normal rate, regular rhythm, normal heart sounds and intact distal pulses.  Exam reveals no gallop and no friction rub.    No murmur heard.  Pulmonary/Chest: Effort normal and breath sounds normal. No respiratory distress. She has no wheezes. She has no rales. She exhibits no tenderness.   Abdominal: Soft. Bowel sounds are normal. She exhibits no distension and no mass. There is no tenderness. There is no rebound and no guarding.   Musculoskeletal: Normal range of motion. She exhibits no edema.   Lymphadenopathy:     She has no cervical adenopathy.   Neurological: She is alert and oriented to person, place, and time. She has normal reflexes. She exhibits normal muscle tone. Coordination normal.   Skin: Skin is warm and dry. No rash noted. No erythema. No pallor.   Psychiatric: She has a normal mood and affect. Her behavior is normal. Judgment and thought content normal.   Vitals reviewed.      Assessment:     Hyper alkaline phosphatemia                 Increased gamma GT suggest this is a liver source    Type 2 diabetes mellitus    Hypothyroidism    Obesity     No diagnosis  found.    Plan:     check abdominal ultrasound.  Also check labs compatible with alternate causes of hepatitis.  Return to clinic after above.

## 2017-05-01 NOTE — LETTER
May 1, 2017      Lacy Cottrell MD  [address] [address] [address]           [facility]  [address] [place] [address]  Phone: [phone]  Fax: [phone]          Patient: Bianca Weldon   MR Number: [id number]   YOB: 1965   Date of Visit: 5/1/2017       Dear Dr. Lacy Cottrell:    Thank you for referring Bianca Weldon to me for evaluation. Attached you will find relevant portions of my assessment and plan of care.    If you have questions, please do not hesitate to call me. I look forward to following Bianca Weldon along with you.    Sincerely,    Fredo Grady III, MD    Enclosure  CC:  No Recipients    If you would like to receive this communication electronically, please contact [email or web address] or [phone] to request more information on "[employer]" Link access.    For providers and/or their staff who would like to refer a patient to [facility], please contact us through our one-stop-shop provider referral line, [facility], at [phone].    If you feel you have received this communication in error or would no longer like to receive these types of communications, please e-mail [email or web address]

## 2017-05-01 NOTE — MR AVS SNAPSHOT
[facility]  [address] [facility]  [place] [address]  Phone: [phone]  Fax: [phone]                  Bianca Weldon   2017 2:00 PM   Initial consult    Description:  Female : 1965   Provider:  Fredo Grady III, MD   Department:  [facility]           Reason for Visit     Elevated Hepatic Enzymes           Diagnoses this Visit        Comments    Abnormal LFTs    -  Primary            To Do List           Future Appointments        Provider Department Dept Phone    2017 10:00 AM Steve Best MD [facility] [phone]    2017 12:30 PM ONLH US1 [facility] [phone]      Goals (5 Years of Data)     None      [facility]     [facility] Nurse Care Line -  Assistance  Unless otherwise directed by your provider, please contact [facility], our nurse care line that is available for  assistance.     Registered nurses in the [facility] provide: appointment scheduling, clinical advisement, health education, and other advisory services.  Call: [phone] (toll free)               Medications           Message regarding Medications     Verify the changes and/or additions to your medication regime listed below are the same as discussed with your clinician today.  If any of these changes or additions are incorrect, please notify your healthcare provider.             Verify that the below list of medications is an accurate representation of the medications you are currently taking.  If none reported, the list may be blank. If incorrect, please contact your healthcare provider. Carry this list with you in case of emergency.           Current Medications     aripiprazole (ABILIFY) 20 MG Tab Take 10 mg by mouth.    ascorbic acid (VITAMIN C) 250 MG tablet Take 250 mg by mouth once daily.    aspirin (ECOTRIN) 81 MG EC tablet Take 81 mg by mouth once daily.    atorvastatin  (LIPITOR) 40 MG tablet Take 1 tablet (40 mg total) by mouth once daily.    benztropine (COGENTIN) 1 MG tablet Take 1 mg by mouth once daily.     BLOOD SUGAR DIAGNOSTIC (TRUE METRIX GLUCOSE TEST STRIP MISC) by Misc.(Non-Drug; Combo Route) route.    blood sugar diagnostic Strp Use the one insurance covers Check b.s fastin and 2 hrs after biggest meal    blood-glucose meter kit by Other route. Use as instructed    blood-glucose meter Misc Check BG twice daily when fasting    clonazepam (KLONOPIN) 2 MG Tab Take 2 mg by mouth every evening.     desvenlafaxine succinate (PRISTIQ) 50 MG Tb24 Take 50 mg by mouth.    esomeprazole magnesium (NEXIUM 24HR) 22.3 mg CpDR Take 1 tablet by mouth once daily at 6am.    fluticasone (FLONASE) 50 mcg/actuation nasal spray 2 sprays by Each Nare route once daily.    fluticasone-vilanterol (BREO) 100-25 mcg/dose diskus inhaler Inhale 1 puff into the lungs once daily.    glipiZIDE (GLUCOTROL) 10 MG tablet 10 mg on am with breakfast , 10 mg on pm with dinner    lamotrigine (LAMICTAL) 100 MG tablet Take 100 mg by mouth.    lancets 28 gauge Misc 1 lancet by Misc.(Non-Drug; Combo Route) route 2 (two) times daily. Use the one insurance covers    levothyroxine (SYNTHROID) 75 MCG tablet take 1 tablet by mouth once daily    lisinopril (PRINIVIL,ZESTRIL) 2.5 MG tablet Take 1 tablet (2.5 mg total) by mouth once daily.    methylphenidate (RITALIN) 20 MG tablet Take 20 mg by mouth once daily.    SITagliptan (JANUVIA) 50 MG Tab 1 tab and 1/2 daily with breakfast    betamethasone dipropionate (DIPROLENE) 0.05 % ointment Apply topically 2 (two) times daily.    calcium acetate (PHOSLO) 667 mg capsule Take 667 mg by mouth 3 (three) times daily with meals.    FIBER CHOICE, INULIN, ORAL Take by mouth.    ipratropium-albuterol (COMBIVENT)  mcg/actuation inhaler Inhale 1 puff into the lungs every 6 (six) hours as needed for Wheezing.    levocetirizine (XYZAL) 5 MG tablet Take 1 tablet each morning.     "meloxicam (MOBIC) 15 MG tablet Take 15 mg by mouth.    pantoprazole (PROTONIX) 40 MG tablet 1 tab po daily    Saccharomyces boulardii (PROBIOTIC, S.BOULARDII,) 250 mg capsule Take 250 mg by mouth once daily.    tramadol (ULTRAM) 50 mg tablet take 1/2 to 1 tablet by mouth every 6 to 8 hours if needed for pain    vitamin D 1000 units Tab Take 185 mg by mouth.           Clinical Reference Information           Your Vitals Were     BP Pulse Height Weight BMI    130/70 70 4' 10" (1.473 m) 82 kg (180 lb 12.4 oz) 37.78 kg/m2      Blood Pressure          Most Recent Value    BP  130/70      Allergies as of 5/1/2017     Bactrim [Sulfamethoxazole-trimethoprim]    Macrodantin [Nitrofurantoin Macrocrystalline]      Immunizations Administered on Date of Encounter - 5/1/2017     None      Orders Placed During Today's Visit     Future Labs/Procedures Expected by Expires    ANTI-SMOOTH MUSCLE ANTIBODY  5/1/2017 6/30/2018    Antimitochondrial antibody  5/1/2017 6/30/2018    Celiac Disease Panel  5/1/2017 6/30/2018    Ceruloplasmin  5/1/2017 6/30/2018    Hepatitis panel, acute  5/1/2017 6/30/2018    US Abdomen Complete  5/1/2017 5/1/2018      Language Assistance Services     ATTENTION: Language assistance services are available, free of charge. Please call [phone].      ATENCIÓN: Si habla español, tiene a franks disposición servicios gratuitos de asistencia lingüística. Llame al [phone].     CHÚ Ý: N?u b?n nói Ti?ng Vi?t, có các d?ch v? h? tr? ngôn ng? mi?n phí dành cho b?n. G?i s? [phone].         [facility] complies with applicable Federal civil rights laws and does not discriminate on the basis of race, color, national origin, age, disability, or sex.        "

## 2017-05-02 LAB
HAV IGM SERPL QL IA: NEGATIVE
HBV CORE IGM SERPL QL IA: NEGATIVE
HBV SURFACE AG SERPL QL IA: NEGATIVE
HCV AB SERPL QL IA: NEGATIVE
MITOCHONDRIA AB TITR SER IF: NORMAL {TITER}
SMOOTH MUSCLE AB TITR SER IF: NORMAL {TITER}

## 2017-05-02 RX ORDER — INSULIN PUMP SYRINGE, 3 ML
EACH MISCELLANEOUS
Qty: 1 EACH | Refills: 0 | Status: SHIPPED | OUTPATIENT
Start: 2017-05-02 | End: 2018-05-11

## 2017-05-02 RX ORDER — LANCETS 28 GAUGE
1 EACH MISCELLANEOUS 2 TIMES DAILY
Qty: 180 EACH | Refills: 3 | Status: SHIPPED | OUTPATIENT
Start: 2017-05-02 | End: 2018-05-11

## 2017-05-03 ENCOUNTER — PATIENT MESSAGE (OUTPATIENT)
Dept: GASTROENTEROLOGY | Facility: CLINIC | Age: 52
End: 2017-05-03

## 2017-05-03 LAB
GLIADIN PEPTIDE IGA SER-ACNC: 5 UNITS
GLIADIN PEPTIDE IGG SER-ACNC: 2 UNITS
IGA SERPL-MCNC: 110 MG/DL
TTG IGA SER IA-ACNC: 4 UNITS
TTG IGG SER IA-ACNC: 2 UNITS

## 2017-05-04 ENCOUNTER — PATIENT MESSAGE (OUTPATIENT)
Dept: FAMILY MEDICINE | Facility: CLINIC | Age: 52
End: 2017-05-04

## 2017-05-04 RX ORDER — ATORVASTATIN CALCIUM 40 MG/1
TABLET, FILM COATED ORAL
Qty: 90 TABLET | Refills: 0 | Status: SHIPPED | OUTPATIENT
Start: 2017-05-04 | End: 2017-05-09

## 2017-05-05 ENCOUNTER — PATIENT MESSAGE (OUTPATIENT)
Dept: GASTROENTEROLOGY | Facility: CLINIC | Age: 52
End: 2017-05-05

## 2017-05-08 ENCOUNTER — OFFICE VISIT (OUTPATIENT)
Dept: FAMILY MEDICINE | Facility: CLINIC | Age: 52
End: 2017-05-08
Payer: MEDICARE

## 2017-05-08 VITALS
TEMPERATURE: 98 F | RESPIRATION RATE: 18 BRPM | DIASTOLIC BLOOD PRESSURE: 80 MMHG | HEART RATE: 96 BPM | OXYGEN SATURATION: 98 % | HEIGHT: 58 IN | WEIGHT: 179.38 LBS | SYSTOLIC BLOOD PRESSURE: 130 MMHG | BODY MASS INDEX: 37.66 KG/M2

## 2017-05-08 DIAGNOSIS — J40 SINOBRONCHITIS: ICD-10-CM

## 2017-05-08 DIAGNOSIS — J32.9 SINOBRONCHITIS: ICD-10-CM

## 2017-05-08 PROCEDURE — 1160F RVW MEDS BY RX/DR IN RCRD: CPT | Mod: S$GLB,,, | Performed by: NURSE PRACTITIONER

## 2017-05-08 PROCEDURE — 3075F SYST BP GE 130 - 139MM HG: CPT | Mod: S$GLB,,, | Performed by: NURSE PRACTITIONER

## 2017-05-08 PROCEDURE — 96372 THER/PROPH/DIAG INJ SC/IM: CPT | Mod: S$GLB,,, | Performed by: NURSE PRACTITIONER

## 2017-05-08 PROCEDURE — 3079F DIAST BP 80-89 MM HG: CPT | Mod: S$GLB,,, | Performed by: NURSE PRACTITIONER

## 2017-05-08 PROCEDURE — 99213 OFFICE O/P EST LOW 20 MIN: CPT | Mod: 25,S$GLB,, | Performed by: NURSE PRACTITIONER

## 2017-05-08 PROCEDURE — 99999 PR PBB SHADOW E&M-EST. PATIENT-LVL V: CPT | Mod: PBBFAC,,, | Performed by: NURSE PRACTITIONER

## 2017-05-08 RX ORDER — METHYLPREDNISOLONE ACETATE 80 MG/ML
80 INJECTION, SUSPENSION INTRA-ARTICULAR; INTRALESIONAL; INTRAMUSCULAR; SOFT TISSUE
Status: COMPLETED | OUTPATIENT
Start: 2017-05-08 | End: 2017-05-08

## 2017-05-08 RX ORDER — PROMETHAZINE HYDROCHLORIDE AND DEXTROMETHORPHAN HYDROBROMIDE 6.25; 15 MG/5ML; MG/5ML
5 SYRUP ORAL 3 TIMES DAILY PRN
Qty: 118 ML | Refills: 0 | Status: SHIPPED | OUTPATIENT
Start: 2017-05-08 | End: 2017-05-18

## 2017-05-08 RX ORDER — AMOXICILLIN AND CLAVULANATE POTASSIUM 875; 125 MG/1; MG/1
1 TABLET, FILM COATED ORAL EVERY 12 HOURS
Qty: 20 TABLET | Refills: 0 | Status: SHIPPED | OUTPATIENT
Start: 2017-05-08 | End: 2017-05-25

## 2017-05-08 RX ADMIN — METHYLPREDNISOLONE ACETATE 80 MG: 80 INJECTION, SUSPENSION INTRA-ARTICULAR; INTRALESIONAL; INTRAMUSCULAR; SOFT TISSUE at 09:05

## 2017-05-08 NOTE — MR AVS SNAPSHOT
[facility]  [address]  [facility] [address]  Phone: [phone]  Fax: [phone]                  Bianca Weldon   2017 9:20 AM   Office Visit    Description:  Female : 1965   Provider:  Kristy Vanegas NP   Department:  [facility]           Reason for Visit     Cough     Sinusitis           Diagnoses this Visit        Comments    Sinobronchitis                To Do List           Future Appointments        Provider Department Dept Phone    2017 9:15 AM ONL XR1-DR [facility] [phone]    2017 9:40 AM SPIROMETRY, ON [facility] [phone]    2017 10:00 AM Steve Best MD [facility] [phone]    2017 12:30 PM ONL US1 [facility] [phone]      Goals (5 Years of Data)     None       These Medications        Disp Refills Start End    ipratropium-albuterol (COMBIVENT)  mcg/actuation inhaler 4 g 5 2017     Inhale 1 puff into the lungs every 6 (six) hours as needed for Wheezing. - Inhalation    Pharmacy: [facility] [facility] [address] [place] [address] Ph #: [phone]       amoxicillin-clavulanate 875-125mg (AUGMENTIN) 875-125 mg per tablet 20 tablet 0 2017     Take 1 tablet by mouth every 12 (twelve) hours. - Oral    Pharmacy: [facility] [address] [facility] [address] [place] [address] Ph #: [phone]       promethazine-dextromethorphan (PROMETHAZINE-DM) 6.25-15 mg/5 mL Syrp 118 mL 0 2017    Take 5 mLs by mouth 3 (three) times daily as needed (cough). - Oral    Pharmacy: [facility] [address] [facility] [address] Ph #: [phone]         [facility]     [facility] Nurse Care Line -  Assistance  Unless otherwise directed by your provider, please contact [facility], our nurse care line that is available for   assistance.     Registered nurses in the [facility] provide: appointment scheduling, clinical advisement, health education, and other advisory services.  Call: [phone] (toll free)               Medications           Message regarding Medications     Verify the changes and/or additions to your medication regime listed below are the same as discussed with your clinician today.  If any of these changes or additions are incorrect, please notify your healthcare provider.        START taking these NEW medications        Refills    amoxicillin-clavulanate 875-125mg (AUGMENTIN) 875-125 mg per tablet 0    Sig: Take 1 tablet by mouth every 12 (twelve) hours.    Class: Normal    Route: Oral    promethazine-dextromethorphan (PROMETHAZINE-DM) 6.25-15 mg/5 mL Syrp 0    Sig: Take 5 mLs by mouth 3 (three) times daily as needed (cough).    Class: Normal    Route: Oral      These medications were administered today        Dose Freq    methylPREDNISolone acetate injection 80 mg 80 mg Clinic/HOD 1 time    Sig: Inject 1 mL (80 mg total) into the muscle one time.    Class: Normal    Route: Intramuscular           Verify that the below list of medications is an accurate representation of the medications you are currently taking.  If none reported, the list may be blank. If incorrect, please contact your healthcare provider. Carry this list with you in case of emergency.           Current Medications     amoxicillin-clavulanate 875-125mg (AUGMENTIN) 875-125 mg per tablet Take 1 tablet by mouth every 12 (twelve) hours.    aripiprazole (ABILIFY) 20 MG Tab Take 10 mg by mouth.    ascorbic acid (VITAMIN C) 250 MG tablet Take 250 mg by mouth once daily.    aspirin (ECOTRIN) 81 MG EC tablet Take 81 mg by mouth once daily.    atorvastatin (LIPITOR) 40 MG tablet Take 1 tablet (40 mg total) by mouth once daily.    atorvastatin (LIPITOR) 40 MG tablet take 1 tablet by mouth once daily    benztropine (COGENTIN) 1 MG tablet Take 1 mg  by mouth once daily.     betamethasone dipropionate (DIPROLENE) 0.05 % ointment Apply topically 2 (two) times daily.    blood sugar diagnostic (TRUE METRIX GLUCOSE TEST STRIP) Strp Dispense  insurance covered brand . Check b.s fastin and 2 hrs after biggest meal    blood sugar diagnostic Strp Use the one insurance covers Check b.s fastin and 2 hrs after biggest meal    blood-glucose meter kit Use as instructed    blood-glucose meter Misc Check BG twice daily when fasting    calcium acetate (PHOSLO) 667 mg capsule Take 667 mg by mouth 3 (three) times daily with meals.    clonazepam (KLONOPIN) 2 MG Tab Take 2 mg by mouth every evening.     desvenlafaxine succinate (PRISTIQ) 50 MG Tb24 Take 50 mg by mouth.    esomeprazole magnesium (NEXIUM 24HR) 22.3 mg CpDR Take 1 tablet by mouth once daily at 6am.    FIBER CHOICE, INULIN, ORAL Take by mouth.    fluticasone (FLONASE) 50 mcg/actuation nasal spray 2 sprays by Each Nare route once daily.    fluticasone-vilanterol (BREO) 100-25 mcg/dose diskus inhaler Inhale 1 puff into the lungs once daily.    glipiZIDE (GLUCOTROL) 10 MG tablet 10 mg on am with breakfast , 10 mg on pm with dinner    ipratropium-albuterol (COMBIVENT)  mcg/actuation inhaler Inhale 1 puff into the lungs every 6 (six) hours as needed for Wheezing.    lamotrigine (LAMICTAL) 100 MG tablet Take 100 mg by mouth.    lancets 28 gauge Misc 1 lancet by Misc.(Non-Drug; Combo Route) route 2 (two) times daily. Dispense  insurance covered brand . Check b.s fastin and 2 hrs after biggest meal    levocetirizine (XYZAL) 5 MG tablet Take 1 tablet each morning.    levothyroxine (SYNTHROID) 75 MCG tablet take 1 tablet by mouth once daily    lisinopril (PRINIVIL,ZESTRIL) 2.5 MG tablet Take 1 tablet (2.5 mg total) by mouth once daily.    meloxicam (MOBIC) 15 MG tablet Take 15 mg by mouth.    methylphenidate (RITALIN) 20 MG tablet Take 20 mg by mouth once daily.    pantoprazole (PROTONIX) 40 MG tablet 1 tab po daily     "promethazine-dextromethorphan (PROMETHAZINE-DM) 6.25-15 mg/5 mL Syrp Take 5 mLs by mouth 3 (three) times daily as needed (cough).    Saccharomyces boulardii (PROBIOTIC, S.BOULARDII,) 250 mg capsule Take 250 mg by mouth once daily.    SITagliptan (JANUVIA) 50 MG Tab 1 tab and 1/2 daily with breakfast    tramadol (ULTRAM) 50 mg tablet take 1/2 to 1 tablet by mouth every 6 to 8 hours if needed for pain    vitamin D 1000 units Tab Take 185 mg by mouth.           Clinical Reference Information           Your Vitals Were     BP Pulse Temp Resp Height Weight    130/80 96 98.3 °F (36.8 °C) (Oral) 18 4' 10" (1.473 m) 81.4 kg (179 lb 5.5 oz)    SpO2 BMI             98% 37.48 kg/m2         Blood Pressure          Most Recent Value    BP  130/80      Allergies as of 5/8/2017     Bactrim [Sulfamethoxazole-trimethoprim]    Macrodantin [Nitrofurantoin Macrocrystalline]      Immunizations Administered on Date of Encounter - 5/8/2017     None      Administrations This Visit     methylPREDNISolone acetate injection 80 mg     Admin Date Action Dose Route Administered By             05/08/2017 Given 80 mg Intramuscular Nieves Tesfaye LPN                      Language Assistance Services     ATTENTION: Language assistance services are available, free of charge. Please call [phone].      ATENCIÓN: Si bhartila rocky, tiene a franks disposición servicios gratuitos de asistencia lingüística. Llame al [phone].     CHÚ Ý: N?u b?n nói Ti?ng Vi?t, có các d?ch v? h? tr? ngôn ng? mi?n phí dành cho b?n. G?i s? [phone].         [facility] complies with applicable Federal civil rights laws and does not discriminate on the basis of race, color, national origin, age, disability, or sex.        "

## 2017-05-08 NOTE — PROGRESS NOTES
CC:   Chief Complaint   Patient presents with    Cough    Sinusitis     HPI: This is a new problem.   Bianca Weldon is a 52 y.o. female with a complaint of URI.  The current episode started in the past 2 days.   The problem has been gradually worsening.   Associated symptoms included nasal congestion, facial pain, cough, wheezing.    Pertinent negatives include fever, chills, chest pain, dyspnea   Treatments tried: otc cough suppressant has been used and this has provided no relief.     [unfilled]  Outpatient Medications Prior to Visit   Medication Sig Dispense Refill    aripiprazole (ABILIFY) 20 MG Tab Take 10 mg by mouth.      ascorbic acid (VITAMIN C) 250 MG tablet Take 250 mg by mouth once daily.      aspirin (ECOTRIN) 81 MG EC tablet Take 81 mg by mouth once daily.      atorvastatin (LIPITOR) 40 MG tablet Take 1 tablet (40 mg total) by mouth once daily. 90 tablet 3    atorvastatin (LIPITOR) 40 MG tablet take 1 tablet by mouth once daily 90 tablet 0    benztropine (COGENTIN) 1 MG tablet Take 1 mg by mouth once daily.       blood sugar diagnostic (TRUE METRIX GLUCOSE TEST STRIP) Strp Dispense  insurance covered brand . Check b.s fastin and 2 hrs after biggest meal 180 strip 3    blood sugar diagnostic Strp Use the one insurance covers Check b.s fastin and 2 hrs after biggest meal 180 strip 0    blood-glucose meter kit Use as instructed 1 each 0    blood-glucose meter Misc Check BG twice daily when fasting      calcium acetate (PHOSLO) 667 mg capsule Take 667 mg by mouth 3 (three) times daily with meals.      clonazepam (KLONOPIN) 2 MG Tab Take 2 mg by mouth every evening.       desvenlafaxine succinate (PRISTIQ) 50 MG Tb24 Take 50 mg by mouth.      esomeprazole magnesium (NEXIUM 24HR) 22.3 mg CpDR Take 1 tablet by mouth once daily at 6am.      FIBER CHOICE, INULIN, ORAL Take by mouth.      fluticasone (FLONASE) 50 mcg/actuation nasal spray 2 sprays by Each Nare route once daily. 16 g 11     "fluticasone-vilanterol (BREO) 100-25 mcg/dose diskus inhaler Inhale 1 puff into the lungs once daily. 60 each 5    glipiZIDE (GLUCOTROL) 10 MG tablet 10 mg on am with breakfast , 10 mg on pm with dinner 180 tablet 0    lamotrigine (LAMICTAL) 100 MG tablet Take 100 mg by mouth.      lancets 28 gauge Misc 1 lancet by Misc.(Non-Drug; Combo Route) route 2 (two) times daily. Dispense  insurance covered brand . Check b.s fastin and 2 hrs after biggest meal 180 each 3    levocetirizine (XYZAL) 5 MG tablet Take 1 tablet each morning. 30 tablet 11    levothyroxine (SYNTHROID) 75 MCG tablet take 1 tablet by mouth once daily 90 tablet 0    lisinopril (PRINIVIL,ZESTRIL) 2.5 MG tablet Take 1 tablet (2.5 mg total) by mouth once daily. 90 tablet 2    meloxicam (MOBIC) 15 MG tablet Take 15 mg by mouth.      methylphenidate (RITALIN) 20 MG tablet Take 20 mg by mouth once daily.  0    pantoprazole (PROTONIX) 40 MG tablet 1 tab po daily 90 tablet 0    Saccharomyces boulardii (PROBIOTIC, S.BOULARDII,) 250 mg capsule Take 250 mg by mouth once daily.      SITagliptan (JANUVIA) 50 MG Tab 1 tab and 1/2 daily with breakfast 135 tablet 0    tramadol (ULTRAM) 50 mg tablet take 1/2 to 1 tablet by mouth every 6 to 8 hours if needed for pain  0    vitamin D 1000 units Tab Take 185 mg by mouth.      ipratropium-albuterol (COMBIVENT)  mcg/actuation inhaler Inhale 1 puff into the lungs every 6 (six) hours as needed for Wheezing. (Patient taking differently: Inhale 1 puff into the lungs every 6 (six) hours. ) 4 g 5    betamethasone dipropionate (DIPROLENE) 0.05 % ointment Apply topically 2 (two) times daily. 90 g 1     No facility-administered medications prior to visit.         Physical Exam   /80  Pulse 96  Temp 98.3 °F (36.8 °C) (Oral)   Resp 18  Ht 4' 10" (1.473 m)  Wt 81.4 kg (179 lb 5.5 oz)  SpO2 98%  BMI 37.48 kg/m2  Constitutional: The patient appears well-developed and well-nourished.   Head: Normocephalic " and atraumatic.   Right Ear: Tympanic membrane and ear canal normal. No drainage, swelling or tenderness. Tympanic membrane is not injected, not erythematous and not bulging.   Left Ear: Ear canal normal. No drainage, swelling or tenderness. Tympanic membrane is not injected, not erythematous and not bulging.   Nose: Mucosal edema and rhinorrhea present.bilateral frontal and maxillary sinus tenderness on palpation   Mouth/Throat: Uvula is midline. Posterior oropharyngeal erythema present. No oropharyngeal exudate.        THE MUCOSA IS BOGGY AND ERYTHEMATOUS.     Eyes: Conjunctivae normal and lids are normal. Pupils are equal, round, and reactive to light. Right eye exhibits no discharge. Left eye exhibits no discharge. Right eye exhibits normal extraocular motion. Left eye exhibits normal extraocular motion.   Neck: Trachea normal and normal range of motion. Neck supple. No tracheal tenderness present. No mass and no thyromegaly present.   Cardiovascular: Normal rate, regular rhythm, S1 normal, S2 normal and normal heart sounds.  Exam reveals no gallop, no S3, no S4 and no friction rub.    No murmur heard.  Pulmonary/Chest: Effort normal.  Not tachypneic. No respiratory distress. The patient has mild expiratory wheezes at bases.  The patient has no rhonchi. The patient has no rales.   Skin: The patient is not diaphoretic.     Encounter Diagnosis   Name Primary?    Sinobronchitis        PLAN:    Bianca was seen today for cough and sinusitis.    Diagnoses and all orders for this visit:    Sinobronchitis  -     ipratropium-albuterol (COMBIVENT)  mcg/actuation inhaler; Inhale 1 puff into the lungs every 6 (six) hours as needed for Wheezing.  -     amoxicillin-clavulanate 875-125mg (AUGMENTIN) 875-125 mg per tablet; Take 1 tablet by mouth every 12 (twelve) hours.  -     methylPREDNISolone acetate injection 80 mg; Inject 1 mL (80 mg total) into the muscle one time.  -     promethazine-dextromethorphan  (PROMETHAZINE-DM) 6.25-15 mg/5 mL Syrp; Take 5 mLs by mouth 3 (three) times daily as needed (cough).        Medications Ordered This Encounter      amoxicillin-clavulanate 875-125mg (AUGMENTIN) 875-125 mg per tablet          Sig: Take 1 tablet by mouth every 12 (twelve) hours.          Dispense:  20 tablet          Refill:  0      ipratropium-albuterol (COMBIVENT)  mcg/actuation inhaler          Sig: Inhale 1 puff into the lungs every 6 (six) hours as needed for Wheezing.          Dispense:  4 g          Refill:  5      methylPREDNISolone acetate injection 80 mg      promethazine-dextromethorphan (PROMETHAZINE-DM) 6.25-15 mg/5 mL Syrp          Sig: Take 5 mLs by mouth 3 (three) times daily as needed (cough).          Dispense:  118 mL          Refill:  0  No orders of the defined types were placed in this encounter.    RTC if symptoms are worsening or changing significantly or if not improved by the end of therapy.

## 2017-05-09 ENCOUNTER — PROCEDURE VISIT (OUTPATIENT)
Dept: PULMONOLOGY | Facility: CLINIC | Age: 52
End: 2017-05-09
Payer: MEDICARE

## 2017-05-09 ENCOUNTER — OFFICE VISIT (OUTPATIENT)
Dept: PULMONOLOGY | Facility: CLINIC | Age: 52
End: 2017-05-09
Payer: MEDICARE

## 2017-05-09 ENCOUNTER — HOSPITAL ENCOUNTER (OUTPATIENT)
Dept: RADIOLOGY | Facility: HOSPITAL | Age: 52
Discharge: HOME | End: 2017-05-09
Attending: INTERNAL MEDICINE
Payer: MEDICARE

## 2017-05-09 VITALS
WEIGHT: 177 LBS | DIASTOLIC BLOOD PRESSURE: 60 MMHG | OXYGEN SATURATION: 90 % | RESPIRATION RATE: 18 BRPM | HEIGHT: 58 IN | BODY MASS INDEX: 37.16 KG/M2 | HEART RATE: 96 BPM | SYSTOLIC BLOOD PRESSURE: 120 MMHG

## 2017-05-09 DIAGNOSIS — J44.9 CHRONIC OBSTRUCTIVE PULMONARY DISEASE, UNSPECIFIED COPD TYPE: ICD-10-CM

## 2017-05-09 DIAGNOSIS — J44.9 CHRONIC OBSTRUCTIVE PULMONARY DISEASE, UNSPECIFIED COPD TYPE: Primary | ICD-10-CM

## 2017-05-09 DIAGNOSIS — R09.81 NASAL CONGESTION: ICD-10-CM

## 2017-05-09 DIAGNOSIS — J42 CHRONIC BRONCHITIS, UNSPECIFIED CHRONIC BRONCHITIS TYPE: Primary | ICD-10-CM

## 2017-05-09 LAB
PRE FEV1 FVC: 78.28 % (ref 69.97–89.55)
PRE FEV1: 1.78 L (ref 1.73–2.69)
PRE FVC: 2.28 L (ref 2.22–3.35)
PRE PEF: 4.15 L/S (ref 4.37–7.17)

## 2017-05-09 PROCEDURE — 99999 PR PBB SHADOW E&M-EST. PATIENT-LVL III: CPT | Mod: PBBFAC,,, | Performed by: INTERNAL MEDICINE

## 2017-05-09 PROCEDURE — 94060 EVALUATION OF WHEEZING: CPT | Mod: S$GLB,,, | Performed by: INTERNAL MEDICINE

## 2017-05-09 PROCEDURE — 1160F RVW MEDS BY RX/DR IN RCRD: CPT | Mod: S$GLB,,, | Performed by: INTERNAL MEDICINE

## 2017-05-09 PROCEDURE — 99214 OFFICE O/P EST MOD 30 MIN: CPT | Mod: 25,S$GLB,, | Performed by: INTERNAL MEDICINE

## 2017-05-09 PROCEDURE — 3074F SYST BP LT 130 MM HG: CPT | Mod: S$GLB,,, | Performed by: INTERNAL MEDICINE

## 2017-05-09 PROCEDURE — 99499 UNLISTED E&M SERVICE: CPT | Mod: S$PBB,,, | Performed by: INTERNAL MEDICINE

## 2017-05-09 PROCEDURE — 71020 XR CHEST PA AND LATERAL: CPT | Mod: 26,,, | Performed by: RADIOLOGY

## 2017-05-09 PROCEDURE — 3078F DIAST BP <80 MM HG: CPT | Mod: S$GLB,,, | Performed by: INTERNAL MEDICINE

## 2017-05-09 NOTE — PROGRESS NOTES
Subjective:      Bianca Weldon is a 52 y.o. female with known Chronic brinchitis who presents without exacerbation.    Patient uses 1 pillows at night. Patient currently is not on home oxygen therapy..   CATS score 6  Recent sinus infection  No cough, No wheezing, NO chest pain, No hemoptysis  Medications reviewed: BREO and rescue albuterol HFA  I have reviewed the patient's medical history in detail and updated the computerized patient record.  Reviewed Spirometry show improvement  Immunizations current  Quit smoking    The following portions of the patient's history were reviewed and updated as appropriate:   She  has a past medical history of Abnormal Pap smear of vagina; Acid reflux; Arthritis; Depression; Diabetes mellitus; Diabetes mellitus, type 2; Hyperlipidemia; Hypertension; IBS (irritable bowel syndrome); Interstitial cystitis; Restless legs syndrome; and Thyroid disease.  She  does not have any pertinent problems on file.  She  has a past surgical history that includes Appendectomy; Tonsillectomy; Cystostomy w/ bladder biopsy; Hysterectomy (2001); Oophorectomy (2001); Dilation and curettage of uterus; Adenoidectomy; and [place] tooth extraction.  Her family history includes Breast cancer in her maternal grandmother; Colon cancer in her father; Diabetes Mellitus in her father and mother; Heart disease (age of onset: 45) in her father; Melanoma in her mother; Rheum arthritis in her maternal grandmother; Thrombophilia in her paternal grandmother. There is no history of Ovarian cancer.  She  reports that she has never smoked. She does not have any smokeless tobacco history on file. She reports that she does not drink alcohol or use illicit drugs.  She has a current medication list which includes the following prescription(s): amoxicillin-clavulanate 875-125mg, aripiprazole, ascorbic acid (vitamin c), aspirin, atorvastatin, benztropine, blood sugar diagnostic, blood sugar diagnostic, blood-glucose meter,  blood-glucose meter, calcium acetate, clonazepam, desvenlafaxine succinate, esomeprazole magnesium, inulin, fluticasone, fluticasone-vilanterol, glipizide, ipratropium-albuterol, lamotrigine, lancets, levocetirizine, levothyroxine, lisinopril, meloxicam, methylphenidate, pantoprazole, promethazine-dextromethorphan, saccharomyces boulardii, sitagliptan, tramadol, vitamin d, and betamethasone dipropionate.  Current Outpatient Prescriptions on File Prior to Visit   Medication Sig Dispense Refill    amoxicillin-clavulanate 875-125mg (AUGMENTIN) 875-125 mg per tablet Take 1 tablet by mouth every 12 (twelve) hours. 20 tablet 0    aripiprazole (ABILIFY) 20 MG Tab Take 10 mg by mouth.      ascorbic acid (VITAMIN C) 250 MG tablet Take 250 mg by mouth once daily.      aspirin (ECOTRIN) 81 MG EC tablet Take 81 mg by mouth once daily.      atorvastatin (LIPITOR) 40 MG tablet Take 1 tablet (40 mg total) by mouth once daily. 90 tablet 3    benztropine (COGENTIN) 1 MG tablet Take 1 mg by mouth once daily.       blood sugar diagnostic (TRUE METRIX GLUCOSE TEST STRIP) Strp Dispense  insurance covered brand . Check b.s fastin and 2 hrs after biggest meal 180 strip 3    blood sugar diagnostic Strp Use the one insurance covers Check b.s fastin and 2 hrs after biggest meal 180 strip 0    blood-glucose meter kit Use as instructed 1 each 0    blood-glucose meter Misc Check BG twice daily when fasting      calcium acetate (PHOSLO) 667 mg capsule Take 667 mg by mouth 3 (three) times daily with meals.      clonazepam (KLONOPIN) 2 MG Tab Take 2 mg by mouth every evening.       desvenlafaxine succinate (PRISTIQ) 50 MG Tb24 Take 50 mg by mouth.      esomeprazole magnesium (NEXIUM 24HR) 22.3 mg CpDR Take 1 tablet by mouth once daily at 6am.      FIBER CHOICE, INULIN, ORAL Take by mouth.      fluticasone (FLONASE) 50 mcg/actuation nasal spray 2 sprays by Each Nare route once daily. 16 g 11    fluticasone-vilanterol (BREO) 100-25  mcg/dose diskus inhaler Inhale 1 puff into the lungs once daily. 60 each 5    glipiZIDE (GLUCOTROL) 10 MG tablet 10 mg on am with breakfast , 10 mg on pm with dinner 180 tablet 0    ipratropium-albuterol (COMBIVENT)  mcg/actuation inhaler Inhale 1 puff into the lungs every 6 (six) hours as needed for Wheezing. 4 g 5    lamotrigine (LAMICTAL) 100 MG tablet Take 100 mg by mouth.      lancets 28 gauge Misc 1 lancet by Misc.(Non-Drug; Combo Route) route 2 (two) times daily. Dispense  insurance covered brand . Check b.s fastin and 2 hrs after biggest meal 180 each 3    levocetirizine (XYZAL) 5 MG tablet Take 1 tablet each morning. 30 tablet 11    levothyroxine (SYNTHROID) 75 MCG tablet take 1 tablet by mouth once daily 90 tablet 0    lisinopril (PRINIVIL,ZESTRIL) 2.5 MG tablet Take 1 tablet (2.5 mg total) by mouth once daily. 90 tablet 2    meloxicam (MOBIC) 15 MG tablet Take 15 mg by mouth.      methylphenidate (RITALIN) 20 MG tablet Take 20 mg by mouth once daily.  0    pantoprazole (PROTONIX) 40 MG tablet 1 tab po daily 90 tablet 0    promethazine-dextromethorphan (PROMETHAZINE-DM) 6.25-15 mg/5 mL Syrp Take 5 mLs by mouth 3 (three) times daily as needed (cough). 118 mL 0    Saccharomyces boulardii (PROBIOTIC, S.BOULARDII,) 250 mg capsule Take 250 mg by mouth once daily.      SITagliptan (JANUVIA) 50 MG Tab 1 tab and 1/2 daily with breakfast 135 tablet 0    tramadol (ULTRAM) 50 mg tablet take 1/2 to 1 tablet by mouth every 6 to 8 hours if needed for pain  0    vitamin D 1000 units Tab Take 185 mg by mouth.      betamethasone dipropionate (DIPROLENE) 0.05 % ointment Apply topically 2 (two) times daily. 90 g 1    [DISCONTINUED] atorvastatin (LIPITOR) 40 MG tablet take 1 tablet by mouth once daily 90 tablet 0     No current facility-administered medications on file prior to visit.      She is allergic to bactrim [sulfamethoxazole-trimethoprim] and macrodantin [nitrofurantoin  "macrocrystalline]..    Review of Systems  A comprehensive review of systems was negative.       Objective:      /60  Pulse 96  Resp 18  Ht 4' 10" (1.473 m)  Wt 80.3 kg (177 lb 0.5 oz)  SpO2 (!) 90%  BMI 37 kg/m2  FEV1: 1.78 L, 81 % of predicted  FEV1/FVC: 78 %     Physical Exam   Constitutional: She is oriented to person, place, and time. She appears well-developed and well-nourished. No distress.   HENT:   Head: Normocephalic.   Nose: Mucosal edema and rhinorrhea present.   Mouth/Throat: Oropharynx is clear and moist. No oropharyngeal exudate.   Eyes: Conjunctivae and EOM are normal. Pupils are equal, round, and reactive to light. Left eye exhibits discharge.   Neck: Normal range of motion. Neck supple. No JVD present. No tracheal deviation present. No thyromegaly present.   Cardiovascular: Normal rate, regular rhythm and normal heart sounds.    Pulmonary/Chest: Effort normal and breath sounds normal. No respiratory distress. She has no wheezes.   Abdominal: Soft. Bowel sounds are normal. She exhibits no distension. There is no tenderness.   Musculoskeletal: Normal range of motion. She exhibits no edema.   Neurological: She is alert and oriented to person, place, and time. No cranial nerve deficit.   Skin: Skin is warm and dry. She is not diaphoretic.   Psychiatric: She has a normal mood and affect.   Nursing note and vitals reviewed.          CXR  lungs are symmetrically aerated and clear.  CP angles are sharp.  Stable smooth elevation RIGHT hemidiaphragm.  Heart and palmar vasculature within normal limits and trachea is midline.  Osteopenia and mild spondylosis.       Impression          Stable exam without acute infiltrate.           Lab Results   Component Value Date    PREFVC 2.12 (L) 11/02/2016    VCRUDS1420 1.19 (L) 11/02/2016    PREPEF 4.01 (L) 11/02/2016    SCPYUD277 8.06 11/02/2016    PIGQFR3UVW 74 11/02/2016    POSTFVC 2.01 (L) 11/02/2016    POSTFEV1 1.52 (L) 11/02/2016    [id number] 1.24 " (L) 11/02/2016    POSTPEF 3.57 (L) 11/02/2016    DEIXHTN036 7.06 11/02/2016    TRCLHXP81 1.83 11/02/2016    ZEBCOZY4QAF 76 11/02/2016    NNWJWXD9ZCY 79.97 11/02/2016    PREDICTEDFVC 2.81 11/02/2016    PREDICTEDFEV 2.23 11/02/2016         Assessment:       Problem List Items Addressed This Visit     COPD, mild - Primary     COPD ROS: taking medications as instructed, no medication side effects noted, no significant ongoing wheezing or shortness of breath, using bronchodilator MDI less than twice a week.   CATS was 6  Has quit smoking  New concerns: None.     Exam: appears well, vitals normal, no respiratory distress, acyanotic, normal RR, chest clear, no wheezing, crepitations, rhonchi, normal symmetric air entry.     Assessment: Chr bronchitis reasonably well controlled.  FEv1 improved from 1.57 to 1.78  FVC improved from 2.12 to 2.28     Plan: current treatment plan is effective, no change in therapy.   Likely DC BREO next visit               Other Visit Diagnoses     Nasal congestion   (Active)      instruction on use of Flonase          Instruction on proper use of FLONASE    Plan:      Return in about 1 year (around 5/9/2018) for COPD annual exam, Spirometry and cxr.    This note was prepared using voice recognition system and is likely to have sound alike errors that may have been overlooked even after proof reading.  Please call me with any questions    Discussed diagnosis, its evaluation, treatment and usual course. All questions answered.    Thank you for the courtesy of participating in the care of this patient    Steve Best MD

## 2017-05-09 NOTE — MR AVS SNAPSHOT
[facility]  [address] [address]  Phone: [phone]  Fax: [phone]                  Bianca Weldon   2017 10:00 AM   Office Visit    Description:  Female : 1965   Provider:  Steve Best MD   Department:  [facility]           Reason for Visit     COPD           Diagnoses this Visit        Comments    Chronic bronchitis, unspecified chronic bronchitis type    -  Primary     Nasal congestion     instruction on use of Flonase           To Do List           Future Appointments        Provider Department Dept Phone    2017 12:30 PM ON[facility] US1 [facility] [phone]    2018 9:45 AM ONLH XR1-DR [facility] [phone]    2018 10:00 AM PULMONARY LAB, [facility] [phone]    2018 10:20 AM Steve Best MD [facility] [phone]      Goals (5 Years of Data)     None      Follow-Up and Disposition     Return in about 1 year (around 2018) for COPD annual exam, Spirometry and cxr.      [facility]     [facility] Nurse Care Line -  Assistance  Unless otherwise directed by your provider, please contact [facility], our nurse care line that is available for  assistance.     Registered nurses in the [facility] provide: appointment scheduling, clinical advisement, health education, and other advisory services.  Call: [phone] (toll free)               Medications           Message regarding Medications     Verify the changes and/or additions to your medication regime listed below are the same as discussed with your clinician today.  If any of these changes or additions are incorrect, please notify your healthcare provider.             Verify that the below list of medications is an accurate representation of the medications you are currently taking.  If none reported, the list may be blank.  If incorrect, please contact your healthcare provider. Carry this list with you in case of emergency.           Current Medications     amoxicillin-clavulanate 875-125mg (AUGMENTIN) 875-125 mg per tablet Take 1 tablet by mouth every 12 (twelve) hours.    aripiprazole (ABILIFY) 20 MG Tab Take 10 mg by mouth.    ascorbic acid (VITAMIN C) 250 MG tablet Take 250 mg by mouth once daily.    aspirin (ECOTRIN) 81 MG EC tablet Take 81 mg by mouth once daily.    atorvastatin (LIPITOR) 40 MG tablet Take 1 tablet (40 mg total) by mouth once daily.    benztropine (COGENTIN) 1 MG tablet Take 1 mg by mouth once daily.     blood sugar diagnostic (TRUE METRIX GLUCOSE TEST STRIP) Strp Dispense  insurance covered brand . Check b.s fastin and 2 hrs after biggest meal    blood sugar diagnostic Strp Use the one insurance covers Check b.s fastin and 2 hrs after biggest meal    blood-glucose meter kit Use as instructed    blood-glucose meter Misc Check BG twice daily when fasting    calcium acetate (PHOSLO) 667 mg capsule Take 667 mg by mouth 3 (three) times daily with meals.    clonazepam (KLONOPIN) 2 MG Tab Take 2 mg by mouth every evening.     desvenlafaxine succinate (PRISTIQ) 50 MG Tb24 Take 50 mg by mouth.    esomeprazole magnesium (NEXIUM 24HR) 22.3 mg CpDR Take 1 tablet by mouth once daily at 6am.    FIBER CHOICE, INULIN, ORAL Take by mouth.    fluticasone (FLONASE) 50 mcg/actuation nasal spray 2 sprays by Each Nare route once daily.    fluticasone-vilanterol (BREO) 100-25 mcg/dose diskus inhaler Inhale 1 puff into the lungs once daily.    glipiZIDE (GLUCOTROL) 10 MG tablet 10 mg on am with breakfast , 10 mg on pm with dinner    ipratropium-albuterol (COMBIVENT)  mcg/actuation inhaler Inhale 1 puff into the lungs every 6 (six) hours as needed for Wheezing.    lamotrigine (LAMICTAL) 100 MG tablet Take 100 mg by mouth.    lancets 28 gauge Misc 1 lancet by Misc.(Non-Drug; Combo Route) route 2 (two) times daily. Dispense   "insurance covered brand . Check b.s fastin and 2 hrs after biggest meal    levocetirizine (XYZAL) 5 MG tablet Take 1 tablet each morning.    levothyroxine (SYNTHROID) 75 MCG tablet take 1 tablet by mouth once daily    lisinopril (PRINIVIL,ZESTRIL) 2.5 MG tablet Take 1 tablet (2.5 mg total) by mouth once daily.    meloxicam (MOBIC) 15 MG tablet Take 15 mg by mouth.    methylphenidate (RITALIN) 20 MG tablet Take 20 mg by mouth once daily.    pantoprazole (PROTONIX) 40 MG tablet 1 tab po daily    promethazine-dextromethorphan (PROMETHAZINE-DM) 6.25-15 mg/5 mL Syrp Take 5 mLs by mouth 3 (three) times daily as needed (cough).    Saccharomyces boulardii (PROBIOTIC, S.BOULARDII,) 250 mg capsule Take 250 mg by mouth once daily.    SITagliptan (JANUVIA) 50 MG Tab 1 tab and 1/2 daily with breakfast    tramadol (ULTRAM) 50 mg tablet take 1/2 to 1 tablet by mouth every 6 to 8 hours if needed for pain    vitamin D 1000 units Tab Take 185 mg by mouth.    betamethasone dipropionate (DIPROLENE) 0.05 % ointment Apply topically 2 (two) times daily.           Clinical Reference Information           Your Vitals Were     BP Pulse Resp Height Weight SpO2    120/60 96 18 4' 10" (1.473 m) 80.3 kg (177 lb 0.5 oz) 90%    BMI                37 kg/m2          Blood Pressure          Most Recent Value    BP  120/60      Allergies as of 5/9/2017     Bactrim [Sulfamethoxazole-trimethoprim]    Macrodantin [Nitrofurantoin Macrocrystalline]      Immunizations Administered on Date of Encounter - 5/9/2017     None      Orders Placed During Today's Visit     Future Labs/Procedures Expected by Expires    X-Ray Chest PA And Lateral  5/9/2017 5/9/2018    Spirometry with/without bronchodilator  As directed 5/9/2018      Instructions    Thank You    Thank you for choosing the PULMONARY MEDICINE DEPARTMENT at [facility]. At [facility], your satisfaction is our priority. You may receive a survey asking about your experience with us. We " would appreciate you taking the time to complete and return the survey. Our goal is to provide you with a level 5 or Very Good experience. We thank you for allowing us to serve you and value your feedback.    Your Nurse/Medical Assistant: ___Shivam Chandler___________________________    Sincerely,  Pulmonary Department  Phone: [phone]  Fax: [phone]            Language Assistance Services     ATTENTION: Language assistance services are available, free of charge. Please call [phone].      ATENCIÓN: Si habla español, tiene a franks disposición servicios gratuitos de asistencia lingüística. Llame al [phone].     CHÚ Ý: N?u b?n nói Ti?ng Vi?t, có các d?ch v? h? tr? ngôn ng? mi?n phí dành cho b?n. G?i s? [phone].         [facility] complies with applicable Federal civil rights laws and does not discriminate on the basis of race, color, national origin, age, disability, or sex.

## 2017-05-09 NOTE — ASSESSMENT & PLAN NOTE
COPD ROS: taking medications as instructed, no medication side effects noted, no significant ongoing wheezing or shortness of breath, using bronchodilator MDI less than twice a week.   CATS was 6  Has quit smoking  New concerns: None.     Exam: appears well, vitals normal, no respiratory distress, acyanotic, normal RR, chest clear, no wheezing, crepitations, rhonchi, normal symmetric air entry.     Assessment: Chr bronchitis reasonably well controlled.  FEv1 improved from 1.57 to 1.78  FVC improved from 2.12 to 2.28     Plan: current treatment plan is effective, no change in therapy.   Likely DC BREO next visit

## 2017-05-09 NOTE — PATIENT INSTRUCTIONS
Thank You    Thank you for choosing the PULMONARY MEDICINE DEPARTMENT at [facility]. At [facility], your satisfaction is our priority. You may receive a survey asking about your experience with us. We would appreciate you taking the time to complete and return the survey. Our goal is to provide you with a level 5 or Very Good experience. We thank you for allowing us to serve you and value your feedback.    Your Nurse/Medical Assistant: ___Shivam Chandler___________________________    Sincerely,  Pulmonary Department  Phone: [phone]  Fax: [phone]

## 2017-05-10 ENCOUNTER — TELEPHONE (OUTPATIENT)
Dept: RADIOLOGY | Facility: HOSPITAL | Age: 52
End: 2017-05-10

## 2017-05-11 ENCOUNTER — HOSPITAL ENCOUNTER (OUTPATIENT)
Dept: RADIOLOGY | Facility: HOSPITAL | Age: 52
Discharge: HOME | End: 2017-05-11
Attending: INTERNAL MEDICINE
Payer: MEDICARE

## 2017-05-11 DIAGNOSIS — R79.89 ABNORMAL LFTS: ICD-10-CM

## 2017-05-11 DIAGNOSIS — E11.9 TYPE 2 DIABETES MELLITUS WITHOUT COMPLICATION, WITHOUT LONG-TERM CURRENT USE OF INSULIN: ICD-10-CM

## 2017-05-11 DIAGNOSIS — E66.09 OBESITY DUE TO EXCESS CALORIES, UNSPECIFIED OBESITY SEVERITY: ICD-10-CM

## 2017-05-11 DIAGNOSIS — I10 ESSENTIAL HYPERTENSION: ICD-10-CM

## 2017-05-11 PROCEDURE — 76700 US EXAM ABDOM COMPLETE: CPT | Mod: TC

## 2017-05-11 PROCEDURE — 76700 US EXAM ABDOM COMPLETE: CPT | Mod: 26,,, | Performed by: RADIOLOGY

## 2017-05-14 DIAGNOSIS — J44.9 COPD, MILD: ICD-10-CM

## 2017-05-15 RX ORDER — FLUTICASONE FUROATE AND VILANTEROL TRIFENATATE 100; 25 UG/1; UG/1
POWDER RESPIRATORY (INHALATION)
Qty: 60 EACH | Refills: 5 | Status: SHIPPED | OUTPATIENT
Start: 2017-05-15 | End: 2017-10-30 | Stop reason: SDUPTHER

## 2017-05-16 ENCOUNTER — PATIENT MESSAGE (OUTPATIENT)
Dept: GASTROENTEROLOGY | Facility: CLINIC | Age: 52
End: 2017-05-16

## 2017-05-16 RX ORDER — FLUTICASONE PROPIONATE 50 MCG
SPRAY, SUSPENSION (ML) NASAL
Qty: 16 G | Refills: 11 | Status: SHIPPED | OUTPATIENT
Start: 2017-05-16 | End: 2018-05-18 | Stop reason: SDUPTHER

## 2017-05-17 ENCOUNTER — PATIENT MESSAGE (OUTPATIENT)
Dept: OBSTETRICS AND GYNECOLOGY | Facility: CLINIC | Age: 52
End: 2017-05-17

## 2017-05-18 ENCOUNTER — TELEPHONE (OUTPATIENT)
Dept: OBSTETRICS AND GYNECOLOGY | Facility: CLINIC | Age: 52
End: 2017-05-18

## 2017-05-18 ENCOUNTER — OFFICE VISIT (OUTPATIENT)
Dept: FAMILY MEDICINE | Facility: CLINIC | Age: 52
End: 2017-05-18
Payer: MEDICARE

## 2017-05-18 VITALS
TEMPERATURE: 99 F | HEIGHT: 58 IN | BODY MASS INDEX: 36.94 KG/M2 | OXYGEN SATURATION: 96 % | DIASTOLIC BLOOD PRESSURE: 68 MMHG | HEART RATE: 98 BPM | WEIGHT: 176 LBS | SYSTOLIC BLOOD PRESSURE: 110 MMHG

## 2017-05-18 DIAGNOSIS — E11.65 TYPE 2 DIABETES MELLITUS WITH HYPERGLYCEMIA, WITHOUT LONG-TERM CURRENT USE OF INSULIN: ICD-10-CM

## 2017-05-18 DIAGNOSIS — J44.9 COPD, MILD: ICD-10-CM

## 2017-05-18 DIAGNOSIS — B37.31 YEAST VAGINITIS: Primary | ICD-10-CM

## 2017-05-18 DIAGNOSIS — I10 ESSENTIAL HYPERTENSION: ICD-10-CM

## 2017-05-18 DIAGNOSIS — Z12.31 VISIT FOR SCREENING MAMMOGRAM: Primary | ICD-10-CM

## 2017-05-18 DIAGNOSIS — B37.31 YEAST VAGINITIS: ICD-10-CM

## 2017-05-18 PROCEDURE — 99213 OFFICE O/P EST LOW 20 MIN: CPT | Mod: S$GLB,,, | Performed by: FAMILY MEDICINE

## 2017-05-18 PROCEDURE — 3078F DIAST BP <80 MM HG: CPT | Mod: S$GLB,,, | Performed by: FAMILY MEDICINE

## 2017-05-18 PROCEDURE — 3074F SYST BP LT 130 MM HG: CPT | Mod: S$GLB,,, | Performed by: FAMILY MEDICINE

## 2017-05-18 PROCEDURE — 99499 UNLISTED E&M SERVICE: CPT | Mod: S$PBB,,, | Performed by: FAMILY MEDICINE

## 2017-05-18 PROCEDURE — 3044F HG A1C LEVEL LT 7.0%: CPT | Mod: S$GLB,,, | Performed by: FAMILY MEDICINE

## 2017-05-18 PROCEDURE — 4010F ACE/ARB THERAPY RXD/TAKEN: CPT | Mod: S$GLB,,, | Performed by: FAMILY MEDICINE

## 2017-05-18 PROCEDURE — 1160F RVW MEDS BY RX/DR IN RCRD: CPT | Mod: S$GLB,,, | Performed by: FAMILY MEDICINE

## 2017-05-18 PROCEDURE — 99999 PR PBB SHADOW E&M-EST. PATIENT-LVL III: CPT | Mod: PBBFAC,,, | Performed by: FAMILY MEDICINE

## 2017-05-18 RX ORDER — FLUCONAZOLE 150 MG/1
150 TABLET ORAL DAILY
Qty: 1 TABLET | Refills: 0 | Status: SHIPPED | OUTPATIENT
Start: 2017-05-18 | End: 2017-05-18 | Stop reason: SDUPTHER

## 2017-05-18 RX ORDER — NYSTATIN AND TRIAMCINOLONE ACETONIDE 100000; 1 [USP'U]/G; MG/G
CREAM TOPICAL 2 TIMES DAILY
Qty: 30 G | Refills: 1 | Status: SHIPPED | OUTPATIENT
Start: 2017-05-18 | End: 2017-05-18 | Stop reason: SDUPTHER

## 2017-05-18 RX ORDER — FLUCONAZOLE 150 MG/1
150 TABLET ORAL DAILY
Qty: 1 TABLET | Refills: 0 | Status: SHIPPED | OUTPATIENT
Start: 2017-05-18 | End: 2017-05-19

## 2017-05-18 RX ORDER — NYSTATIN AND TRIAMCINOLONE ACETONIDE 100000; 1 [USP'U]/G; MG/G
CREAM TOPICAL 2 TIMES DAILY
Qty: 30 G | Refills: 1 | Status: SHIPPED | OUTPATIENT
Start: 2017-05-18 | End: 2017-05-25

## 2017-05-18 NOTE — TELEPHONE ENCOUNTER
----- Message from Chasity Sorenson sent at 5/18/2017  9:09 AM CDT -----  Call pt at [phone]//pt want to talk with you about a yeast infection  And her mammo she also has an appt on the 25th//thks ht

## 2017-05-18 NOTE — MR AVS SNAPSHOT
[facility]  [address]  [facility] [address]  Phone: [phone]  Fax: [phone]                  Bianca Weldon   2017 1:20 PM   Office Visit    Description:  Female : 1965   Provider:  Scotty Figueroa MD   Department:  [facility]           Reason for Visit     Vaginal Itching           Diagnoses this Visit        Comments    Yeast vaginitis    -  Primary     COPD, mild         Type 2 diabetes mellitus with hyperglycemia, without long-term current use of insulin         Essential hypertension                To Do List           Future Appointments        Provider Department Dept Phone    2017 8:00 AM João Khan MD [facility] - OB/ -[phone]    2017 9:30 AM Selma Ricketts MD [facility] - Dermatology [phone]    8/3/2017 8:20 AM Lacy Cottrell MD [facility] [phone]    2018 9:45 AM [facility] XR1- [facility] [phone]    2018 10:00 AM PULMONARY LAB, [facility] - Pulm Function [facility] [phone]      Goals (5 Years of Data)     None       These Medications        Disp Refills Start End    fluconazole (DIFLUCAN) 150 MG Tab 1 tablet 0 2017    Take 1 tablet (150 mg total) by mouth once daily. - Oral    [facility] Ph #: [phone]       nystatin-triamcinolone (MYCOLOG II) cream 30 g 1 2017     Apply topically 2 (two) times daily. - Topical (Top)    Ph[facility]acy: [facility] [address] [facility] [address] [facility] Ph #: [phone]         [facility]     [facility] Nurse Care Line - 24/7 Assistance  Unless otherwise directed by your provider, please contact [facility], our nurse care line that is available for 24/7 assistance.     Registered nurses in the [facility] provide: appointment scheduling, clinical advisement, health  education, and other advisory services.  Call: [phone] (toll free)               Medications           Message regarding Medications     Verify the changes and/or additions to your medication regime listed below are the same as discussed with your clinician today.  If any of these changes or additions are incorrect, please notify your healthcare provider.        START taking these NEW medications        Refills    fluconazole (DIFLUCAN) 150 MG Tab 0    Sig: Take 1 tablet (150 mg total) by mouth once daily.    Class: Normal    Route: Oral    nystatin-triamcinolone (MYCOLOG II) cream 1    Sig: Apply topically 2 (two) times daily.    Class: Normal    Route: Topical (Top)           Verify that the below list of medications is an accurate representation of the medications you are currently taking.  If none reported, the list may be blank. If incorrect, please contact your healthcare provider. Carry this list with you in case of emergency.           Current Medications     aripiprazole (ABILIFY) 20 MG Tab Take 10 mg by mouth.    ascorbic acid (VITAMIN C) 250 MG tablet Take 250 mg by mouth once daily.    aspirin (ECOTRIN) 81 MG EC tablet Take 81 mg by mouth once daily.    atorvastatin (LIPITOR) 40 MG tablet Take 1 tablet (40 mg total) by mouth once daily.    benztropine (COGENTIN) 1 MG tablet Take 1 mg by mouth once daily.     blood sugar diagnostic (TRUE METRIX GLUCOSE TEST STRIP) Strp Dispense  insurance covered brand . Check b.s fastin and 2 hrs after biggest meal    blood sugar diagnostic Strp Use the one insurance covers Check b.s fastin and 2 hrs after biggest meal    blood-glucose meter kit Use as instructed    blood-glucose meter Misc Check BG twice daily when fasting    BREO ELLIPTA 100-25 mcg/dose diskus inhaler take 1 inhalation by mouth once daily    calcium acetate (PHOSLO) 667 mg capsule Take 667 mg by mouth 3 (three) times daily with meals.    clonazepam (KLONOPIN) 2 MG Tab Take 2 mg by mouth every  evening.     desvenlafaxine succinate (PRISTIQ) 50 MG Tb24 Take 50 mg by mouth.    esomeprazole magnesium (NEXIUM 24HR) 22.3 mg CpDR Take 1 tablet by mouth once daily at 6am.    amoxicillin-clavulanate 875-125mg (AUGMENTIN) 875-125 mg per tablet Take 1 tablet by mouth every 12 (twelve) hours.    betamethasone dipropionate (DIPROLENE) 0.05 % ointment Apply topically 2 (two) times daily.    FIBER CHOICE, INULIN, ORAL Take by mouth.    fluconazole (DIFLUCAN) 150 MG Tab Take 1 tablet (150 mg total) by mouth once daily.    fluticasone (FLONASE) 50 mcg/actuation nasal spray instill 2 sprays into each nostril once daily    glipiZIDE (GLUCOTROL) 10 MG tablet 10 mg on am with breakfast , 10 mg on pm with dinner    ipratropium-albuterol (COMBIVENT)  mcg/actuation inhaler Inhale 1 puff into the lungs every 6 (six) hours as needed for Wheezing.    lamotrigine (LAMICTAL) 100 MG tablet Take 100 mg by mouth.    lancets 28 gauge Misc 1 lancet by Misc.(Non-Drug; Combo Route) route 2 (two) times daily. Dispense  insurance covered brand . Check b.s fastin and 2 hrs after biggest meal    levocetirizine (XYZAL) 5 MG tablet Take 1 tablet each morning.    levothyroxine (SYNTHROID) 75 MCG tablet take 1 tablet by mouth once daily    lisinopril (PRINIVIL,ZESTRIL) 2.5 MG tablet Take 1 tablet (2.5 mg total) by mouth once daily.    meloxicam (MOBIC) 15 MG tablet Take 15 mg by mouth.    methylphenidate (RITALIN) 20 MG tablet Take 20 mg by mouth once daily.    nystatin-triamcinolone (MYCOLOG II) cream Apply topically 2 (two) times daily.    pantoprazole (PROTONIX) 40 MG tablet 1 tab po daily    promethazine-dextromethorphan (PROMETHAZINE-DM) 6.25-15 mg/5 mL Syrp Take 5 mLs by mouth 3 (three) times daily as needed (cough).    Saccharomyces boulardii (PROBIOTIC, S.BOULARDII,) 250 mg capsule Take 250 mg by mouth once daily.    SITagliptan (JANUVIA) 50 MG Tab 1 tab and 1/2 daily with breakfast    tramadol (ULTRAM) 50 mg tablet take 1/2 to 1  "tablet by mouth every 6 to 8 hours if needed for pain    vitamin D 1000 units Tab Take 185 mg by mouth.           Clinical Reference Information           Your Vitals Were     BP Pulse Temp Height Weight SpO2    110/68 98 98.8 °F (37.1 °C) (Oral) 4' 10" (1.473 m) 79.8 kg (176 lb) 96%    BMI                36.78 kg/m2          Blood Pressure          Most Recent Value    BP  110/68      Allergies as of 5/18/2017     Bactrim [Sulfamethoxazole-trimethoprim]    Macrodantin [Nitrofurantoin Macrocrystalline]      Immunizations Administered on Date of Encounter - 5/18/2017     None      Instructions      Candida Vaginal Infection    You have a Candida vaginal infection. This is also known as a yeast infection. It is most often caused by a type of yeast (fungus) called Candida. Candida are normally found in the vagina. But if they increase in number, this can lead to infection and cause symptoms.  Symptoms of a yeast infection can include:  · Clumpy or thin, white discharge, which may look like cottage cheese  · Itching or burning  · Burning with urination  Certain factors can make a yeast infection more likely. These can include:  · Taking certain medicines, such as antibiotics or birth control pills  · Pregnancy  · Diabetes  · Weakened immune system  A yeast infection is most often treated with antifungal medicine. This may be given as a vaginal cream or pills you take by mouth. Treatment may last for about 1 to 7 days. Women with severe or recurrent infections may need longer courses of treatment.  Home care  · If youre prescribed medicine, be sure to use it as directed. Finish all of the medicine, even if your symptoms go away. Note: Dont try to treat yourself using over-the-counter products without talking to your provider first. He or she will let you know if this is a good option for you.  · Ask your provider what steps you can take to help reduce your risk of having a yeast infection in the future.  Follow-up " care  Follow up with your healthcare provider, or as directed.  When to seek medical advice  Call your healthcare provider right away if:  · You have a fever of 100.4ºF (38ºC) or higher, or as directed by your provider.  · Your symptoms worsen, or they dont go away within a few days of starting treatment.  · You have new pain in the lower belly or pelvic region.  · You have side effects that bother you or a reaction to the cream or pills youre prescribed.  · You or any partners you have sex with have new symptoms, such as a rash, joint pain, or sores.  Date Last Reviewed: 7/30/2015  © 5001-3622 [employer]. [address] [place] [address], [place] [address]. All rights reserved. This information is not intended as a substitute for professional medical care. Always follow your healthcare professional's instructions.             Language Assistance Services     ATTENTION: Language assistance services are available, free of charge. Please call [phone].      ATENCIÓN: Si habla rocky, tiene a franks disposición servicios gratuitos de asistencia lingüística. Llame al [phone].     AYANA Ý: N?u b?n nói Ti?ng Vi?t, có các d?ch v? h? tr? ngôn ng? mi?n phí dành cho b?n. G?i s? [phone].         [facility] complies with applicable Federal civil rights laws and does not discriminate on the basis of race, color, national origin, age, disability, or sex.

## 2017-05-18 NOTE — PROGRESS NOTES
Subjective:       Patient ID: Bianca Weldon is a 52 y.o. female.    Chief Complaint: Vaginal Itching      HPI Comments:   She presents with several days of vaginal itching and discharge very typical of yeast infections in the past.  She's been on Augmentin recently for bronchitis.  No abdominal pain, no bleeding, she status post hysterectomy.  No fever chills  HPI  Review of Systems   Constitutional: Positive for fever. Negative for activity change and appetite change.   Respiratory: Negative for cough.    Cardiovascular: Negative for chest pain.   Gastrointestinal: Negative for abdominal pain.   Genitourinary: Positive for vaginal discharge. Negative for difficulty urinating, dysuria, flank pain, genital sores, vaginal bleeding and vaginal pain.   Neurological: Negative for light-headedness.       Objective:      Physical Exam   Constitutional: She is oriented to person, place, and time. She appears well-developed and well-nourished.   HENT:   Head: Normocephalic.   Cardiovascular: Normal rate and regular rhythm.    Pulmonary/Chest: Effort normal and breath sounds normal. She has no wheezes.   Abdominal: Soft. There is no tenderness.   Neurological: She is alert and oriented to person, place, and time.   Skin: Skin is warm and dry.   Psychiatric: She has a normal mood and affect. Her behavior is normal. Judgment and thought content normal.   Nursing note and vitals reviewed.      Assessment:       1. Yeast vaginitis    2. COPD, mild    3. Type 2 diabetes mellitus with hyperglycemia, without long-term current use of insulin    4. Essential hypertension        Plan:   Yeast vaginitis  Comments:  We'll treat empirically given the highly suggestive symptoms and the context with antibiotic use follow-up with GYN next week for reevaluation.  Orders:  -     fluconazole (DIFLUCAN) 150 MG Tab; Take 1 tablet (150 mg total) by mouth once daily.  Dispense: 1 tablet; Refill: 0  -     nystatin-triamcinolone (MYCOLOG II) cream;  Apply topically 2 (two) times daily.  Dispense: 30 g; Refill: 1    COPD, mild  Comments:  Stable on current meds    Type 2 diabetes mellitus with hyperglycemia, without long-term current use of insulin  Comments:  Stable blood sugars currently    Essential hypertension  Comments:  Controlled

## 2017-05-18 NOTE — TELEPHONE ENCOUNTER
Pt c/o vaginal itching, discharge; states she has been taking Augmentin 875mg.  Pt requests rx be sent in to pharmacy.  ([facility] [address] in [place]).  Rx sent to pharmacy, per José Luis Kennedy.  Mammo appt made for 5/26/17 at 1:20pm at [facility] location, per pt request.  Pt voiced understanding.  CHRISTEL STEEN

## 2017-05-18 NOTE — PATIENT INSTRUCTIONS
Candida Vaginal Infection    You have a Candida vaginal infection. This is also known as a yeast infection. It is most often caused by a type of yeast (fungus) called Candida. Candida are normally found in the vagina. But if they increase in number, this can lead to infection and cause symptoms.  Symptoms of a yeast infection can include:  · Clumpy or thin, white discharge, which may look like cottage cheese  · Itching or burning  · Burning with urination  Certain factors can make a yeast infection more likely. These can include:  · Taking certain medicines, such as antibiotics or birth control pills  · Pregnancy  · Diabetes  · Weakened immune system  A yeast infection is most often treated with antifungal medicine. This may be given as a vaginal cream or pills you take by mouth. Treatment may last for about 1 to 7 days. Women with severe or recurrent infections may need longer courses of treatment.  Home care  · If youre prescribed medicine, be sure to use it as directed. Finish all of the medicine, even if your symptoms go away. Note: Dont try to treat yourself using over-the-counter products without talking to your provider first. He or she will let you know if this is a good option for you.  · Ask your provider what steps you can take to help reduce your risk of having a yeast infection in the future.  Follow-up care  Follow up with your healthcare provider, or as directed.  When to seek medical advice  Call your healthcare provider right away if:  · You have a fever of 100.4ºF (38ºC) or higher, or as directed by your provider.  · Your symptoms worsen, or they dont go away within a few days of starting treatment.  · You have new pain in the lower belly or pelvic region.  · You have side effects that bother you or a reaction to the cream or pills youre prescribed.  · You or any partners you have sex with have new symptoms, such as a rash, joint pain, or sores.  Date Last Reviewed: 7/30/2015  © 6054-0273 [employer]. [address] [place] [address], [place] [address]. All rights reserved. This information is not intended as a substitute for professional medical care. Always follow your healthcare professional's instructions.

## 2017-05-25 ENCOUNTER — HOSPITAL ENCOUNTER (OUTPATIENT)
Dept: RADIOLOGY | Facility: HOSPITAL | Age: 52
Discharge: HOME | End: 2017-05-25
Attending: OBSTETRICS & GYNECOLOGY
Payer: MEDICARE

## 2017-05-25 ENCOUNTER — OFFICE VISIT (OUTPATIENT)
Dept: OBSTETRICS AND GYNECOLOGY | Facility: CLINIC | Age: 52
End: 2017-05-25
Payer: MEDICARE

## 2017-05-25 VITALS
WEIGHT: 183.88 LBS | DIASTOLIC BLOOD PRESSURE: 68 MMHG | BODY MASS INDEX: 38.6 KG/M2 | HEIGHT: 58 IN | SYSTOLIC BLOOD PRESSURE: 104 MMHG

## 2017-05-25 DIAGNOSIS — E66.9 OBESITY (BMI 35.0-39.9 WITHOUT COMORBIDITY): ICD-10-CM

## 2017-05-25 DIAGNOSIS — Z01.419 ENCOUNTER FOR GYNECOLOGICAL EXAMINATION: Primary | ICD-10-CM

## 2017-05-25 DIAGNOSIS — Z12.31 VISIT FOR SCREENING MAMMOGRAM: ICD-10-CM

## 2017-05-25 PROCEDURE — 99999 PR PBB SHADOW E&M-EST. PATIENT-LVL IV: CPT | Mod: PBBFAC,,, | Performed by: OBSTETRICS & GYNECOLOGY

## 2017-05-25 PROCEDURE — 77067 SCR MAMMO BI INCL CAD: CPT | Mod: TC

## 2017-05-25 PROCEDURE — 77063 BREAST TOMOSYNTHESIS BI: CPT | Mod: 26,,, | Performed by: RADIOLOGY

## 2017-05-25 PROCEDURE — 88142 CYTOPATH C/V THIN LAYER: CPT

## 2017-05-25 PROCEDURE — 77067 SCR MAMMO BI INCL CAD: CPT | Mod: 26,,, | Performed by: RADIOLOGY

## 2017-05-25 PROCEDURE — G0101 CA SCREEN;PELVIC/BREAST EXAM: HCPCS | Mod: S$GLB,,, | Performed by: OBSTETRICS & GYNECOLOGY

## 2017-05-25 RX ORDER — TRIAMCINOLONE ACETONIDE 1 MG/G
CREAM TOPICAL
Refills: 0 | COMMUNITY
Start: 2017-05-11 | End: 2018-04-30

## 2017-05-25 RX ORDER — DESVENLAFAXINE 50 MG/1
TABLET, EXTENDED RELEASE ORAL
COMMUNITY
Start: 2017-05-18 | End: 2017-05-25 | Stop reason: SDUPTHER

## 2017-05-25 NOTE — PROGRESS NOTES
Bianca Weldon is a 52 y.o.  who presents for   Chief Complaint   Patient presents with    Gynecologic Exam     annual exam and has no complaints      S/p TLH LSO  Pt is sexually active - mut monog.     + hx of abnormal paps, after hysterectomy. Last pap was normal on 6/11/15.   Last mammogram was normal on 6/11/15, has one today.       Past Medical History:   Diagnosis Date    Abnormal Pap smear of cervix     Abnormal Pap smear of vagina     after hyst, repeat every 6 months, per pt, had bx's, unknown results    Acid reflux     Arthritis     Depression     bipolar    Diabetes mellitus     Diabetes mellitus, type 2     Hyperlipidemia     Hypertension     IBS (irritable bowel syndrome)     Interstitial cystitis     Restless legs syndrome     Thyroid disease        Past Surgical History:   Procedure Laterality Date    ADENOIDECTOMY      APPENDECTOMY      CYSTOSTOMY W/ BLADDER BIOPSY      interstial cystitis    DILATION AND CURETTAGE OF UTERUS      missed ab    HYSTERECTOMY      TLH, LSO (endometriosis)    OOPHORECTOMY      LSO    TONSILLECTOMY      WISDOM TOOTH EXTRACTION         Current Outpatient Prescriptions   Medication Sig Dispense Refill    aripiprazole (ABILIFY) 20 MG Tab Take 10 mg by mouth.      ascorbic acid (VITAMIN C) 250 MG tablet Take 250 mg by mouth once daily.      aspirin (ECOTRIN) 81 MG EC tablet Take 81 mg by mouth once daily.      atorvastatin (LIPITOR) 40 MG tablet Take 1 tablet (40 mg total) by mouth once daily. 90 tablet 3    benztropine (COGENTIN) 1 MG tablet Take 1 mg by mouth once daily.       betamethasone dipropionate (DIPROLENE) 0.05 % ointment Apply topically 2 (two) times daily. 90 g 1    blood sugar diagnostic (TRUE METRIX GLUCOSE TEST STRIP) Strp Dispense  insurance covered brand . Check b.s fastin and 2 hrs after biggest meal 180 strip 3    blood-glucose meter kit Use as instructed 1 each 0    BREO ELLIPTA 100-25 mcg/dose diskus inhaler  take 1 inhalation by mouth once daily 60 each 5    calcium acetate (PHOSLO) 667 mg capsule Take 667 mg by mouth 3 (three) times daily with meals.      clonazepam (KLONOPIN) 2 MG Tab Take 2 mg by mouth every evening.       desvenlafaxine succinate (PRISTIQ) 50 MG Tb24 Take 50 mg by mouth.      esomeprazole magnesium (NEXIUM 24HR) 22.3 mg CpDR Take 1 tablet by mouth once daily at 6am.      FIBER CHOICE, INULIN, ORAL Take by mouth.      fluticasone (FLONASE) 50 mcg/actuation nasal spray instill 2 sprays into each nostril once daily 16 g 11    glipiZIDE (GLUCOTROL) 10 MG tablet 10 mg on am with breakfast , 10 mg on pm with dinner 180 tablet 0    ipratropium-albuterol (COMBIVENT)  mcg/actuation inhaler Inhale 1 puff into the lungs every 6 (six) hours as needed for Wheezing. 4 g 5    lamotrigine (LAMICTAL) 100 MG tablet Take 100 mg by mouth.      lancets 28 gauge Misc 1 lancet by Misc.(Non-Drug; Combo Route) route 2 (two) times daily. Dispense  insurance covered brand . Check b.s fastin and 2 hrs after biggest meal 180 each 3    levothyroxine (SYNTHROID) 75 MCG tablet take 1 tablet by mouth once daily 90 tablet 0    lisinopril (PRINIVIL,ZESTRIL) 2.5 MG tablet Take 1 tablet (2.5 mg total) by mouth once daily. 90 tablet 2    meloxicam (MOBIC) 15 MG tablet Take 15 mg by mouth.      methylphenidate (RITALIN) 20 MG tablet Take 20 mg by mouth once daily.  0    SITagliptan (JANUVIA) 50 MG Tab 1 tab and 1/2 daily with breakfast 135 tablet 0    tramadol (ULTRAM) 50 mg tablet take 1/2 to 1 tablet by mouth every 6 to 8 hours if needed for pain  0    triamcinolone acetonide 0.1% (KENALOG) 0.1 % cream apply to affected area twice a day if needed  0     No current facility-administered medications for this visit.        Family History   Problem Relation Age of Onset    Diabetes Mellitus Mother     Melanoma Mother     Diabetes Mellitus Father     Colon cancer Father     Heart disease Father 45     CABG     "Rheum arthritis Maternal Grandmother     Breast cancer Maternal Grandmother     Thrombophilia Paternal Grandmother      DVTs    Ovarian cancer Neg Hx        Review of patient's allergies indicates:   Allergen Reactions    Bactrim [sulfamethoxazole-trimethoprim] Swelling, Other (See Comments) and Anaphylaxis    Macrodantin [nitrofurantoin macrocrystalline] Swelling, Other (See Comments) and Anaphylaxis       Social History   Substance Use Topics    Smoking status: Never Smoker    Smokeless tobacco: Never Used    Alcohol use Yes      Comment: rarely       /68   Ht 4' 10" (1.473 m)   Wt 83.4 kg (183 lb 13.8 oz)   BMI 38.43 kg/m²     ROS:  GENERAL: Doing well and no acute complaints.   BREASTS: No lumps, tenderness, discharge.  GI: No nausea, vomiting, melena, hematochezia.  : No significant incontinence, dysuria, hematuria.  GYN: No unusual discharge, pain, bleeding. no vasomotor symptoms.    GEN: Pleasant overwt lady in no distress. Alert and oriented.  HEAD and NECK: Normocephalic. No adenopathy. Thyroid normal to inspection and palpation.  SKIN: No hirsutism.  BREASTS: Normal to inspection and exam ( no suspicious masses, tenderness, or axillary adenopathy). No discharge.   ABDOMEN:  Obese, soft and non tender. No hernia, mass, hepatomegaly, or RUQT/CVAT.   PELVIC:      Vulva: normal female genitalia. No lesions. No inguinal adenopathy.      Urethra: normal size and location. No lesion, prolapse, mass, tenderness, or discharge.        Vagina:  Moist, no abnormal discharge, no significant cystocele or rectocele      Cuff: Intact, well supported and free of lesion. No bladder base tenderness. Pap done b/c past hx      Adnexa: No masses, tenderness, or CDS nodularity.      Anus: normal appearing.    IMPRESSION: nl gyn exam x for obesity    COUNSELING:  -Encouraged 1200 mg of calcium and 1000 u of Vitamin D daily and regular weight bearing exercise for osteoporosis prevention.  - Long talk w patient " again re weight - 8 lb wt gain since last visit and encourage her to continue trying.  - talk re her remaining ovary and lack of significant vasomotor and expected timing of menopause     PLAN: mammo today, annual f/u

## 2017-06-01 ENCOUNTER — TELEPHONE (OUTPATIENT)
Dept: OBSTETRICS AND GYNECOLOGY | Facility: CLINIC | Age: 52
End: 2017-06-01

## 2017-06-01 NOTE — TELEPHONE ENCOUNTER
----- Message from João Khan MD sent at 5/31/2017  6:04 PM CDT -----  Please call pt and let her know her pap was normal

## 2017-06-05 DIAGNOSIS — E03.1 CONGENITAL HYPOTHYROIDISM WITHOUT GOITER: Primary | ICD-10-CM

## 2017-06-05 RX ORDER — LEVOTHYROXINE SODIUM 75 UG/1
75 TABLET ORAL DAILY
Qty: 90 TABLET | Refills: 0 | Status: SHIPPED | OUTPATIENT
Start: 2017-06-05 | End: 2017-08-08 | Stop reason: SDUPTHER

## 2017-06-05 NOTE — TELEPHONE ENCOUNTER
Last office visit 04/18/2017. Last refill date 07/14/2016. Pt is requesting a refill on levothyroxine 75mcg #90 qd

## 2017-06-07 ENCOUNTER — OFFICE VISIT (OUTPATIENT)
Dept: DERMATOLOGY | Facility: CLINIC | Age: 52
End: 2017-06-07
Payer: MEDICARE

## 2017-06-07 DIAGNOSIS — L91.0 KELOID: Primary | ICD-10-CM

## 2017-06-07 PROCEDURE — 11900 INJECT SKIN LESIONS </W 7: CPT | Mod: S$GLB,,, | Performed by: DERMATOLOGY

## 2017-06-07 PROCEDURE — 99499 UNLISTED E&M SERVICE: CPT | Mod: S$GLB,,, | Performed by: DERMATOLOGY

## 2017-06-07 PROCEDURE — 99999 PR PBB SHADOW E&M-EST. PATIENT-LVL I: CPT | Mod: PBBFAC,,, | Performed by: DERMATOLOGY

## 2017-06-12 ENCOUNTER — PATIENT MESSAGE (OUTPATIENT)
Dept: FAMILY MEDICINE | Facility: CLINIC | Age: 52
End: 2017-06-12

## 2017-06-12 RX ORDER — GLIPIZIDE 10 MG/1
TABLET ORAL
Qty: 180 TABLET | Refills: 0 | Status: SHIPPED | OUTPATIENT
Start: 2017-06-12 | End: 2017-08-08 | Stop reason: SDUPTHER

## 2017-06-13 ENCOUNTER — TELEPHONE (OUTPATIENT)
Dept: FAMILY MEDICINE | Facility: CLINIC | Age: 52
End: 2017-06-13

## 2017-06-13 RX ORDER — SITAGLIPTIN 50 MG/1
TABLET, FILM COATED ORAL
Qty: 90 TABLET | Refills: 0 | Status: SHIPPED | OUTPATIENT
Start: 2017-06-13 | End: 2017-08-08 | Stop reason: SDUPTHER

## 2017-06-13 RX ORDER — SITAGLIPTIN 50 MG/1
TABLET, FILM COATED ORAL
Qty: 135 TABLET | Refills: 0 | Status: SHIPPED | OUTPATIENT
Start: 2017-06-13 | End: 2017-06-13 | Stop reason: SDUPTHER

## 2017-06-13 NOTE — TELEPHONE ENCOUNTER
----- Message from Chelsea Cohen LPN sent at 6/13/2017  5:13 PM CDT -----  Contact: Pt       ----- Message -----  From: Jaleel Quiros  Sent: 6/13/2017   8:46 AM  To: [facility] Staff    States she is calling rg jenuvia 50mg / 1 1/2 and insurance will not cover it but will cover the 1 50mg and can be reached at [phone]//susan/glo       Pt pharm is   [id number] [place] [place] [facility] [address] [facility]  [address] [facility] [address]  Phone: [phone] Fax: [phone]

## 2017-06-14 NOTE — TELEPHONE ENCOUNTER
----- Message from Jana Tolliver sent at 6/14/2017  8:18 AM CDT -----  Contact: Pt   Pt is returning a missed call.  Please advise [phone] (home)

## 2017-06-15 ENCOUNTER — TELEPHONE (OUTPATIENT)
Dept: FAMILY MEDICINE | Facility: CLINIC | Age: 52
End: 2017-06-15

## 2017-06-15 NOTE — TELEPHONE ENCOUNTER
----- Message from Betsy Holguin sent at 6/14/2017  2:26 PM CDT -----  Contact: pt  Pt returning nurses call ,,, please call pt back at [phone]

## 2017-07-05 ENCOUNTER — PATIENT OUTREACH (OUTPATIENT)
Dept: ADMINISTRATIVE | Facility: HOSPITAL | Age: 52
End: 2017-07-05

## 2017-07-05 NOTE — LETTER
July 18, 2017    Braden Don NP             [facility]  [address]  [facility] [address]  Phone: [phone] July 18, 2017     Patient: Bianca Wledon    YOB: 1965   Date of Visit: 7/5/2017     To whom it may concern     We are seeing Bianca Weldon, LES.O.B is 1965, at [facility]. MARY JANE CACERES MD is their primary care physician. To help with our jourdan maintenance records could you please send the following:     Most recent copy of Diabetic Eye Exam that states   Positive or Negative Retinopathy.      Please send fax to [phone], Attention Bárbara TELLEZ LPN Care Coordination.    Thank-you in advance for your assistance. If you have any questions or concerns, please don't hesitate to contact me at [phone].     Bárbara MARSHALL LPN  Care Coordination Department  [facility]

## 2017-07-07 ENCOUNTER — HOSPITAL ENCOUNTER (OUTPATIENT)
Dept: RADIOLOGY | Facility: HOSPITAL | Age: 52
Discharge: HOME | End: 2017-07-07
Attending: FAMILY MEDICINE
Payer: MEDICARE

## 2017-07-07 ENCOUNTER — OFFICE VISIT (OUTPATIENT)
Dept: FAMILY MEDICINE | Facility: CLINIC | Age: 52
End: 2017-07-07
Payer: MEDICARE

## 2017-07-07 VITALS
OXYGEN SATURATION: 96 % | HEIGHT: 58 IN | HEART RATE: 100 BPM | DIASTOLIC BLOOD PRESSURE: 78 MMHG | WEIGHT: 178 LBS | BODY MASS INDEX: 37.36 KG/M2 | SYSTOLIC BLOOD PRESSURE: 138 MMHG | TEMPERATURE: 98 F

## 2017-07-07 DIAGNOSIS — S69.92XA FINGER INJURY, LEFT, INITIAL ENCOUNTER: Primary | ICD-10-CM

## 2017-07-07 DIAGNOSIS — S69.92XA FINGER INJURY, LEFT, INITIAL ENCOUNTER: ICD-10-CM

## 2017-07-07 PROCEDURE — 99999 PR PBB SHADOW E&M-EST. PATIENT-LVL IV: CPT | Mod: PBBFAC,,, | Performed by: FAMILY MEDICINE

## 2017-07-07 PROCEDURE — 99213 OFFICE O/P EST LOW 20 MIN: CPT | Mod: S$GLB,,, | Performed by: FAMILY MEDICINE

## 2017-07-07 PROCEDURE — 73140 X-RAY EXAM OF FINGER(S): CPT | Mod: 26,,, | Performed by: RADIOLOGY

## 2017-07-07 PROCEDURE — 73140 X-RAY EXAM OF FINGER(S): CPT | Mod: TC,PO

## 2017-07-07 NOTE — PROGRESS NOTES
Subjective:       Patient ID: Bianca Weldon is a 52 y.o. female.    Chief Complaint: No chief complaint on file.      HPI Comments:     In late may her purse fell off a hook and as she tried to catch it her Left pinky finger got yanked out to the side and pulled down. Pain right away but not severe. Never went away. This is her initial presentation. He cannot close this finger into a fist with the others. Hurts on the inside aspect of the finger near the PIP knuckle.      Review of Systems   Constitutional: Positive for activity change. Negative for appetite change.   HENT: Negative for sore throat.    Respiratory: Negative for cough and shortness of breath.    Cardiovascular: Negative for chest pain.   Gastrointestinal: Negative for diarrhea and nausea.   Genitourinary: Negative for dysuria.   Musculoskeletal: Negative for arthralgias and myalgias.   Skin: Negative for wound.   Neurological: Negative for headaches.       Objective:      Physical Exam   Constitutional: She is oriented to person, place, and time. She appears well-developed and well-nourished. No distress.   HENT:   Head: Normocephalic.   Cardiovascular: Normal rate, regular rhythm and normal heart sounds.    Pulmonary/Chest: Effort normal and breath sounds normal.   Musculoskeletal: She exhibits tenderness and deformity.   Neurological: She is alert and oriented to person, place, and time.   Skin: Skin is warm and dry. She is not diaphoretic.   Psychiatric: She has a normal mood and affect. Her behavior is normal. Judgment and thought content normal.   Nursing note and vitals reviewed.      Assessment:       1. Finger injury, left, initial encounter        Plan:   Finger injury, left, initial encounter  Comments:  xray neg. Pt will call her ortho for appt and notify us; or will see [facility] at next available ( august). Concerned about tendon injury.  Orders:  -     X-Ray Finger 2 or More Views; Future; Expected date: 07/07/2017  -      Ambulatory referral to Orthopedics

## 2017-07-18 NOTE — PROGRESS NOTES
I spoke to [facility] that stated pt had her last DM Eye Exam on 6/20/17. Fax form sent for Diabetic Eye Exam result.

## 2017-08-03 ENCOUNTER — LAB VISIT (OUTPATIENT)
Dept: LAB | Facility: HOSPITAL | Age: 52
End: 2017-08-03
Attending: INTERNAL MEDICINE
Payer: MEDICARE

## 2017-08-03 DIAGNOSIS — K75.81 NASH (NONALCOHOLIC STEATOHEPATITIS): Primary | ICD-10-CM

## 2017-08-03 DIAGNOSIS — R10.13 EPIGASTRIC PAIN: ICD-10-CM

## 2017-08-03 LAB
ALBUMIN SERPL BCP-MCNC: 3.7 G/DL
ALP SERPL-CCNC: 209 U/L
ALT SERPL W/O P-5'-P-CCNC: 13 U/L
AST SERPL-CCNC: 12 U/L
BASOPHILS # BLD AUTO: 0.04 K/UL
BASOPHILS NFR BLD: 0.3 %
BILIRUB DIRECT SERPL-MCNC: 0.1 MG/DL
BILIRUB SERPL-MCNC: 0.3 MG/DL
BUN SERPL-MCNC: 9 MG/DL
CREAT SERPL-MCNC: 0.9 MG/DL
DIFFERENTIAL METHOD: ABNORMAL
EOSINOPHIL # BLD AUTO: 0.3 K/UL
EOSINOPHIL NFR BLD: 2 %
ERYTHROCYTE [DISTWIDTH] IN BLOOD BY AUTOMATED COUNT: 13.1 %
EST. GFR  (AFRICAN AMERICAN): >60 ML/MIN/1.73 M^2
EST. GFR  (NON AFRICAN AMERICAN): >60 ML/MIN/1.73 M^2
HCT VFR BLD AUTO: 41.4 %
HGB BLD-MCNC: 14 G/DL
LYMPHOCYTES # BLD AUTO: 2.4 K/UL
LYMPHOCYTES NFR BLD: 18.1 %
MCH RBC QN AUTO: 29.5 PG
MCHC RBC AUTO-ENTMCNC: 33.8 G/DL
MCV RBC AUTO: 87 FL
MONOCYTES # BLD AUTO: 0.7 K/UL
MONOCYTES NFR BLD: 5 %
NEUTROPHILS # BLD AUTO: 9.7 K/UL
NEUTROPHILS NFR BLD: 74.2 %
PLATELET # BLD AUTO: 261 K/UL
PMV BLD AUTO: 10.3 FL
PROT SERPL-MCNC: 6.9 G/DL
RBC # BLD AUTO: 4.75 M/UL
WBC # BLD AUTO: 13.09 K/UL

## 2017-08-03 PROCEDURE — 85025 COMPLETE CBC W/AUTO DIFF WBC: CPT

## 2017-08-03 PROCEDURE — 82565 ASSAY OF CREATININE: CPT

## 2017-08-03 PROCEDURE — 80076 HEPATIC FUNCTION PANEL: CPT

## 2017-08-03 PROCEDURE — 36415 COLL VENOUS BLD VENIPUNCTURE: CPT | Mod: PO

## 2017-08-03 PROCEDURE — 84520 ASSAY OF UREA NITROGEN: CPT

## 2017-08-08 ENCOUNTER — LAB VISIT (OUTPATIENT)
Dept: LAB | Facility: HOSPITAL | Age: 52
End: 2017-08-08
Attending: FAMILY MEDICINE
Payer: MEDICARE

## 2017-08-08 ENCOUNTER — OFFICE VISIT (OUTPATIENT)
Dept: FAMILY MEDICINE | Facility: CLINIC | Age: 52
End: 2017-08-08
Payer: MEDICARE

## 2017-08-08 VITALS
BODY MASS INDEX: 36.94 KG/M2 | HEART RATE: 100 BPM | SYSTOLIC BLOOD PRESSURE: 126 MMHG | TEMPERATURE: 98 F | OXYGEN SATURATION: 98 % | WEIGHT: 176 LBS | DIASTOLIC BLOOD PRESSURE: 68 MMHG | HEIGHT: 58 IN | RESPIRATION RATE: 14 BRPM

## 2017-08-08 DIAGNOSIS — E78.5 DM TYPE 2 WITH DIABETIC DYSLIPIDEMIA: ICD-10-CM

## 2017-08-08 DIAGNOSIS — E11.69 DM TYPE 2 WITH DIABETIC DYSLIPIDEMIA: ICD-10-CM

## 2017-08-08 DIAGNOSIS — E03.9 HYPOTHYROIDISM, UNSPECIFIED TYPE: ICD-10-CM

## 2017-08-08 DIAGNOSIS — E78.5 DM TYPE 2 WITH DIABETIC DYSLIPIDEMIA: Primary | ICD-10-CM

## 2017-08-08 DIAGNOSIS — E11.69 DM TYPE 2 WITH DIABETIC DYSLIPIDEMIA: Primary | ICD-10-CM

## 2017-08-08 DIAGNOSIS — I10 ESSENTIAL HYPERTENSION: ICD-10-CM

## 2017-08-08 LAB
ALBUMIN SERPL BCP-MCNC: 3.5 G/DL
ALP SERPL-CCNC: 191 U/L
ALT SERPL W/O P-5'-P-CCNC: 11 U/L
ANION GAP SERPL CALC-SCNC: 11 MMOL/L
AST SERPL-CCNC: 13 U/L
BASOPHILS # BLD AUTO: 0.05 K/UL
BASOPHILS NFR BLD: 0.4 %
BILIRUB SERPL-MCNC: 0.4 MG/DL
BUN SERPL-MCNC: 12 MG/DL
CALCIUM SERPL-MCNC: 8.6 MG/DL
CHLORIDE SERPL-SCNC: 105 MMOL/L
CHOLEST/HDLC SERPL: 2.6 {RATIO}
CO2 SERPL-SCNC: 23 MMOL/L
CREAT SERPL-MCNC: 0.9 MG/DL
CREAT UR-MCNC: 82 MG/DL
DIFFERENTIAL METHOD: ABNORMAL
EOSINOPHIL # BLD AUTO: 0.4 K/UL
EOSINOPHIL NFR BLD: 3.1 %
ERYTHROCYTE [DISTWIDTH] IN BLOOD BY AUTOMATED COUNT: 13.5 %
EST. GFR  (AFRICAN AMERICAN): >60 ML/MIN/1.73 M^2
EST. GFR  (NON AFRICAN AMERICAN): >60 ML/MIN/1.73 M^2
GLUCOSE SERPL-MCNC: 237 MG/DL
HCT VFR BLD AUTO: 39.6 %
HDL/CHOLESTEROL RATIO: 38.2 %
HDLC SERPL-MCNC: 110 MG/DL
HDLC SERPL-MCNC: 42 MG/DL
HGB BLD-MCNC: 13.6 G/DL
LDLC SERPL CALC-MCNC: 52.2 MG/DL
LYMPHOCYTES # BLD AUTO: 2.1 K/UL
LYMPHOCYTES NFR BLD: 18.2 %
MCH RBC QN AUTO: 29.4 PG
MCHC RBC AUTO-ENTMCNC: 34.3 G/DL
MCV RBC AUTO: 86 FL
MICROALBUMIN UR DL<=1MG/L-MCNC: 3 UG/ML
MICROALBUMIN/CREATININE RATIO: 3.7 UG/MG
MONOCYTES # BLD AUTO: 0.4 K/UL
MONOCYTES NFR BLD: 3.6 %
NEUTROPHILS # BLD AUTO: 8.7 K/UL
NEUTROPHILS NFR BLD: 74.4 %
NONHDLC SERPL-MCNC: 68 MG/DL
PLATELET # BLD AUTO: 252 K/UL
PMV BLD AUTO: 10.2 FL
POTASSIUM SERPL-SCNC: 4.3 MMOL/L
PROT SERPL-MCNC: 6.3 G/DL
RBC # BLD AUTO: 4.63 M/UL
SODIUM SERPL-SCNC: 139 MMOL/L
TRIGL SERPL-MCNC: 79 MG/DL
TSH SERPL DL<=0.005 MIU/L-ACNC: 0.57 UIU/ML
WBC # BLD AUTO: 11.65 K/UL

## 2017-08-08 PROCEDURE — 85025 COMPLETE CBC W/AUTO DIFF WBC: CPT

## 2017-08-08 PROCEDURE — 3008F BODY MASS INDEX DOCD: CPT | Mod: S$GLB,,, | Performed by: FAMILY MEDICINE

## 2017-08-08 PROCEDURE — 4010F ACE/ARB THERAPY RXD/TAKEN: CPT | Mod: S$GLB,,, | Performed by: FAMILY MEDICINE

## 2017-08-08 PROCEDURE — 99499 UNLISTED E&M SERVICE: CPT | Mod: S$PBB,,, | Performed by: FAMILY MEDICINE

## 2017-08-08 PROCEDURE — 3078F DIAST BP <80 MM HG: CPT | Mod: S$GLB,,, | Performed by: FAMILY MEDICINE

## 2017-08-08 PROCEDURE — 80061 LIPID PANEL: CPT

## 2017-08-08 PROCEDURE — 80053 COMPREHEN METABOLIC PANEL: CPT

## 2017-08-08 PROCEDURE — 3074F SYST BP LT 130 MM HG: CPT | Mod: S$GLB,,, | Performed by: FAMILY MEDICINE

## 2017-08-08 PROCEDURE — 83036 HEMOGLOBIN GLYCOSYLATED A1C: CPT

## 2017-08-08 PROCEDURE — 99999 PR PBB SHADOW E&M-EST. PATIENT-LVL III: CPT | Mod: PBBFAC,,, | Performed by: FAMILY MEDICINE

## 2017-08-08 PROCEDURE — 3044F HG A1C LEVEL LT 7.0%: CPT | Mod: S$GLB,,, | Performed by: FAMILY MEDICINE

## 2017-08-08 PROCEDURE — 36415 COLL VENOUS BLD VENIPUNCTURE: CPT | Mod: PO

## 2017-08-08 PROCEDURE — 99214 OFFICE O/P EST MOD 30 MIN: CPT | Mod: S$GLB,,, | Performed by: FAMILY MEDICINE

## 2017-08-08 PROCEDURE — 84443 ASSAY THYROID STIM HORMONE: CPT

## 2017-08-08 RX ORDER — PANTOPRAZOLE SODIUM 40 MG/1
TABLET, DELAYED RELEASE ORAL
COMMUNITY
Start: 2017-07-26 | End: 2017-09-28

## 2017-08-08 RX ORDER — CYCLOSPORINE 0.5 MG/ML
EMULSION OPHTHALMIC
COMMUNITY
Start: 2017-07-19 | End: 2018-04-30

## 2017-08-08 RX ORDER — LOTEPREDNOL ETABONATE 5 MG/G
GEL OPHTHALMIC
COMMUNITY
Start: 2017-07-17 | End: 2017-09-28

## 2017-08-08 RX ORDER — LEVOTHYROXINE SODIUM 75 UG/1
75 TABLET ORAL DAILY
Qty: 90 TABLET | Refills: 0 | Status: SHIPPED | OUTPATIENT
Start: 2017-08-08 | End: 2020-04-15 | Stop reason: SDUPTHER

## 2017-08-08 RX ORDER — ATORVASTATIN CALCIUM 40 MG/1
40 TABLET, FILM COATED ORAL DAILY
Qty: 90 TABLET | Refills: 3 | Status: SHIPPED | OUTPATIENT
Start: 2017-08-08 | End: 2018-08-15 | Stop reason: SDUPTHER

## 2017-08-08 RX ORDER — GLIPIZIDE 10 MG/1
TABLET ORAL
Qty: 180 TABLET | Refills: 0 | Status: SHIPPED | OUTPATIENT
Start: 2017-08-08 | End: 2017-09-28

## 2017-08-08 RX ORDER — LISINOPRIL 2.5 MG/1
2.5 TABLET ORAL DAILY
Qty: 90 TABLET | Refills: 2 | Status: SHIPPED | OUTPATIENT
Start: 2017-08-08 | End: 2017-09-28

## 2017-08-08 NOTE — PROGRESS NOTES
Subjective:       Patient ID: Bianca Weldon is a 52 y.o. female.    Chief Complaint: Diabetes (follow-up)    52 y old here for f/u on DM , HTN, dlp , hypothyroidism  and Obesity. On aspirin,Immunizations are UTD  execept  for Zoster  Which she declines today , exercises : doing  elliptical and bike for 45 min ,  watching starches  , Opthalmology  : seen [facility] at [facility], pending cataract surgery  , Fastin bs are  : 147, 131, 95  hrs pp   152, 104m. Compliant with meds .       Diabetes       Review of Systems   Constitutional: Negative.    HENT: Negative.    Respiratory: Negative.    Cardiovascular: Negative.    Gastrointestinal: Negative.    Musculoskeletal: Negative.        Objective:      Physical Exam   Constitutional: She is oriented to person, place, and time. She appears well-developed and well-nourished. No distress.   HENT:   Head: Normocephalic and atraumatic.   Right Ear: External ear normal.   Left Ear: External ear normal.   Mouth/Throat: No oropharyngeal exudate.   Eyes: Conjunctivae and EOM are normal. Pupils are equal, round, and reactive to light. Right eye exhibits no discharge. Left eye exhibits no discharge. No scleral icterus.   Neck: Normal range of motion. Neck supple. No JVD present. No tracheal deviation present. No thyromegaly present.   Cardiovascular: Normal rate, regular rhythm and normal heart sounds.  Exam reveals no gallop and no friction rub.    No murmur heard.  Pulses:       Dorsalis pedis pulses are 2+ on the right side, and 2+ on the left side.        Posterior tibial pulses are 2+ on the right side, and 2+ on the left side.   Pulmonary/Chest: Effort normal and breath sounds normal. No stridor. No respiratory distress. She has no wheezes. She has no rales. She exhibits no tenderness.   Abdominal: Soft. Bowel sounds are normal. She exhibits no distension. There is no tenderness. There is no rebound and no guarding.   Musculoskeletal: Normal range of motion.         Right foot: There is normal range of motion and no deformity.        Left foot: There is normal range of motion and no deformity.   Feet:   Right Foot:   Protective Sensation: 10 sites tested. 10 sites sensed.   Skin Integrity: Positive for callus and dry skin. Negative for ulcer, blister, skin breakdown, erythema or warmth.   Left Foot:   Protective Sensation: 10 sites tested. 10 sites sensed.   Skin Integrity: Positive for callus and dry skin. Negative for ulcer, blister, skin breakdown, erythema or warmth.   Lymphadenopathy:     She has no cervical adenopathy.   Neurological: She is alert and oriented to person, place, and time.   Skin: Skin is warm and dry. She is not diaphoretic.   Psychiatric: She has a normal mood and affect. Her behavior is normal. Judgment and thought content normal.       Assessment:     Bianca was seen today for diabetes.    Diagnoses and all orders for this visit:    DM type 2 with diabetic dyslipidemia  -     CBC auto differential; Future  -     Comprehensive metabolic panel; Future  -     Hemoglobin A1c; Future  -     Lipid panel; Future  -     Microalbumin/creatinine urine ratio    Hypothyroidism, unspecified type  -     TSH; Future    Essential hypertension    Other orders  -     atorvastatin (LIPITOR) 40 MG tablet; Take 1 tablet (40 mg total) by mouth once daily.  -     glipiZIDE (GLUCOTROL) 10 MG tablet; 10 mg on am with breakfast , 10 mg on pm with dinner  -     SITagliptin (JANUVIA) 50 MG Tab; Take 1 tablet (50 mg total) by mouth once daily.  -     levothyroxine (SYNTHROID) 75 MCG tablet; Take 1 tablet (75 mcg total) by mouth once daily.  -     lisinopril (PRINIVIL,ZESTRIL) 2.5 MG tablet; Take 1 tablet (2.5 mg total) by mouth once daily.  -     SITagliptin (JANUVIA) 25 MG Tab; Take 1 tablet (25 mg total) by mouth once daily.      Plan:   Pt. encouraged to follow a strict diabetic type diet.   Encouraged daily physical activity/exercise for at least 30mins if tolerated.   Weight  control recommenced as always.   Discussed how lifestyle changes make biggest impact on diabetes control.   Continue home glucose monitoring once daily and keep log.   Counseling provided today about hypoglycemia as well as about how diabetes increases risk of cardiovascular, cerebrovascular ,peripheral vascular disease and other serious health complications. He has been instructed to call if developing any new symptoms or hypoglycemia related symptoms.   See encounter for associated orders/medications.   - Lipid panel; Future      Get labs   Controlled. Cont med

## 2017-08-09 LAB
ESTIMATED AVG GLUCOSE: 140 MG/DL
HBA1C MFR BLD HPLC: 6.5 %

## 2017-08-16 ENCOUNTER — OFFICE VISIT (OUTPATIENT)
Dept: FAMILY MEDICINE | Facility: CLINIC | Age: 52
End: 2017-08-16
Payer: MEDICARE

## 2017-08-16 VITALS
HEIGHT: 58 IN | SYSTOLIC BLOOD PRESSURE: 114 MMHG | RESPIRATION RATE: 18 BRPM | WEIGHT: 181.31 LBS | HEART RATE: 81 BPM | DIASTOLIC BLOOD PRESSURE: 75 MMHG | TEMPERATURE: 98 F | BODY MASS INDEX: 38.06 KG/M2 | OXYGEN SATURATION: 98 %

## 2017-08-16 DIAGNOSIS — M79.645 PAIN OF FINGER OF LEFT HAND: ICD-10-CM

## 2017-08-16 DIAGNOSIS — J44.9 COPD, MILD: ICD-10-CM

## 2017-08-16 DIAGNOSIS — J06.9 VIRAL UPPER RESPIRATORY TRACT INFECTION: Primary | ICD-10-CM

## 2017-08-16 PROCEDURE — 99499 UNLISTED E&M SERVICE: CPT | Mod: S$PBB,,, | Performed by: NURSE PRACTITIONER

## 2017-08-16 PROCEDURE — 99999 PR PBB SHADOW E&M-EST. PATIENT-LVL V: CPT | Mod: PBBFAC,,, | Performed by: NURSE PRACTITIONER

## 2017-08-16 PROCEDURE — 3078F DIAST BP <80 MM HG: CPT | Mod: S$GLB,,, | Performed by: NURSE PRACTITIONER

## 2017-08-16 PROCEDURE — 99213 OFFICE O/P EST LOW 20 MIN: CPT | Mod: S$GLB,,, | Performed by: NURSE PRACTITIONER

## 2017-08-16 PROCEDURE — 3008F BODY MASS INDEX DOCD: CPT | Mod: S$GLB,,, | Performed by: NURSE PRACTITIONER

## 2017-08-16 PROCEDURE — 3074F SYST BP LT 130 MM HG: CPT | Mod: S$GLB,,, | Performed by: NURSE PRACTITIONER

## 2017-08-16 RX ORDER — CODEINE PHOSPHATE AND GUAIFENESIN 10; 100 MG/5ML; MG/5ML
5 SOLUTION ORAL EVERY 8 HOURS PRN
Qty: 118 ML | Refills: 0 | Status: SHIPPED | OUTPATIENT
Start: 2017-08-16 | End: 2017-08-26

## 2017-08-16 RX ORDER — PREDNISONE 20 MG/1
TABLET ORAL
Qty: 20 TABLET | Refills: 0 | Status: SHIPPED | OUTPATIENT
Start: 2017-08-16 | End: 2018-04-30

## 2017-08-16 RX ORDER — LEVOCETIRIZINE DIHYDROCHLORIDE 5 MG/1
5 TABLET, FILM COATED ORAL NIGHTLY
Qty: 30 TABLET | Refills: 11 | Status: SHIPPED | OUTPATIENT
Start: 2017-08-16 | End: 2020-12-18 | Stop reason: SDUPTHER

## 2017-08-16 RX ORDER — TRAMADOL HYDROCHLORIDE 50 MG/1
50 TABLET ORAL EVERY 6 HOURS PRN
Qty: 10 TABLET | Refills: 0 | Status: SHIPPED | OUTPATIENT
Start: 2017-08-16 | End: 2017-08-26

## 2017-08-16 NOTE — PROGRESS NOTES
CC:   Chief Complaint   Patient presents with    Sinusitis     HPI: This is a new problem.   Bianca Weldon is a 52 y.o. female with a complaint of URI.  The current episode started in the past 3 days.   The problem has been gradually worsening.   Associated symptoms included nasal congestion, rhinorrhea, cough, wheezing.    Pertinent negatives include fever, chills, dyspnea   Treatments tried: robitussin DM has been used and this has provided no relief.     [unfilled]  Outpatient Medications Prior to Visit   Medication Sig Dispense Refill    aripiprazole (ABILIFY) 20 MG Tab Take 10 mg by mouth.      ascorbic acid (VITAMIN C) 250 MG tablet Take 250 mg by mouth once daily.      atorvastatin (LIPITOR) 40 MG tablet Take 1 tablet (40 mg total) by mouth once daily. 90 tablet 3    benztropine (COGENTIN) 1 MG tablet Take 1 mg by mouth once daily.       blood sugar diagnostic (TRUE METRIX GLUCOSE TEST STRIP) Strp Dispense  insurance covered brand . Check b.s fastin and 2 hrs after biggest meal 180 strip 3    blood-glucose meter kit Use as instructed 1 each 0    BREO ELLIPTA 100-25 mcg/dose diskus inhaler take 1 inhalation by mouth once daily 60 each 5    clonazepam (KLONOPIN) 2 MG Tab Take 2 mg by mouth every evening.       desvenlafaxine succinate (PRISTIQ) 50 MG Tb24 Take 50 mg by mouth.      FIBER CHOICE, INULIN, ORAL Take by mouth.      fluticasone (FLONASE) 50 mcg/actuation nasal spray instill 2 sprays into each nostril once daily 16 g 11    glipiZIDE (GLUCOTROL) 10 MG tablet 10 mg on am with breakfast , 10 mg on pm with dinner 180 tablet 0    ipratropium-albuterol (COMBIVENT)  mcg/actuation inhaler Inhale 1 puff into the lungs every 6 (six) hours as needed for Wheezing. 4 g 5    lamotrigine (LAMICTAL) 100 MG tablet Take 100 mg by mouth.      lancets 28 gauge Misc 1 lancet by Misc.(Non-Drug; Combo Route) route 2 (two) times daily. Dispense  insurance covered brand . Check b.s fastin and 2 hrs  "after biggest meal 180 each 3    levothyroxine (SYNTHROID) 75 MCG tablet Take 1 tablet (75 mcg total) by mouth once daily. 90 tablet 0    lisinopril (PRINIVIL,ZESTRIL) 2.5 MG tablet Take 1 tablet (2.5 mg total) by mouth once daily. 90 tablet 2    LOTEMAX 0.5 % DrpG       methylphenidate (RITALIN) 20 MG tablet Take 20 mg by mouth once daily.  0    pantoprazole (PROTONIX) 40 MG tablet       RESTASIS 0.05 % ophthalmic emulsion       SITagliptin (JANUVIA) 25 MG Tab Take 1 tablet (25 mg total) by mouth once daily. 90 tablet 3    SITagliptin (JANUVIA) 50 MG Tab Take 1 tablet (50 mg total) by mouth once daily. 90 tablet 0    triamcinolone acetonide 0.1% (KENALOG) 0.1 % cream apply to affected area twice a day if needed  0    aspirin (ECOTRIN) 81 MG EC tablet Take 81 mg by mouth once daily.      calcium acetate (PHOSLO) 667 mg capsule Take 667 mg by mouth 3 (three) times daily with meals.      meloxicam (MOBIC) 15 MG tablet Take 15 mg by mouth.      tramadol (ULTRAM) 50 mg tablet take 1/2 to 1 tablet by mouth every 6 to 8 hours if needed for pain  0     No facility-administered medications prior to visit.         Physical Exam   /75   Pulse 81   Temp 98.3 °F (36.8 °C) (Tympanic)   Resp 18   Ht 4' 10" (1.473 m)   Wt 82.2 kg (181 lb 5.3 oz)   SpO2 98%   BMI 37.90 kg/m²   Constitutional: The patient appears well-developed and well-nourished.   Head: Normocephalic and atraumatic.   Right Ear: Tympanic membrane and ear canal normal. No drainage, swelling or tenderness. Tympanic membrane is not injected, not erythematous and not bulging.   Left Ear: Ear canal normal. No drainage, swelling or tenderness. Tympanic membrane is not injected, not erythematous and not bulging.   Nose: Mucosal edema and rhinorrhea present. No sinus tenderness on palpation  Mouth/Throat: Uvula is midline. Posterior oropharyngeal erythema present. No oropharyngeal exudate.        THE MUCOSA IS BOGGY AND ERYTHEMATOUS.     Eyes: " Conjunctivae normal and lids are normal. Pupils are equal, round, and reactive to light. Right eye exhibits no discharge. Left eye exhibits no discharge. Right eye exhibits normal extraocular motion. Left eye exhibits normal extraocular motion.   Neck: Trachea normal and normal range of motion. Neck supple. No tracheal tenderness present. No mass and no thyromegaly present.   Cardiovascular: Normal rate, regular rhythm, S1 normal, S2 normal and normal heart sounds.  Exam reveals no gallop, no S3, no S4 and no friction rub.    No murmur heard.  Pulmonary/Chest: Effort normal. No stridor. Not tachypneic. No respiratory distress. The patient has mild expiratory  Wheezes throughout. The patient has no rhonchi. The patient has no rales.   Skin: The patient is not diaphoretic.     Encounter Diagnoses   Name Primary?    Viral upper respiratory tract infection Yes    COPD, mild     Pain of finger of left hand        PLAN:    Bianca was seen today for sinusitis.    Diagnoses and all orders for this visit:    Viral upper respiratory tract infection  -     predniSONE (DELTASONE) 20 MG tablet; Take 3 tabs daily for 3 days; then take 2 tabs daily for 3 day; then take 1 tab daily for 3 days; then take 0.5 tab daily for 4 days  -     guaifenesin-codeine 100-10 mg/5 ml (TUSSI-ORGANIDIN NR)  mg/5 mL syrup; Take 5 mLs by mouth every 8 (eight) hours as needed for Cough.  -     levocetirizine (XYZAL) 5 MG tablet; Take 1 tablet (5 mg total) by mouth every evening.    COPD, mild  -     predniSONE (DELTASONE) 20 MG tablet; Take 3 tabs daily for 3 days; then take 2 tabs daily for 3 day; then take 1 tab daily for 3 days; then take 0.5 tab daily for 4 days    Pain of finger of left hand  -     tramadol (ULTRAM) 50 mg tablet; Take 1 tablet (50 mg total) by mouth every 6 (six) hours as needed for Pain.        Medications Ordered This Encounter      guaifenesin-codeine 100-10 mg/5 ml (TUSSI-ORGANIDIN NR)  mg/5 mL syrup           Sig: Take 5 mLs by mouth every 8 (eight) hours as needed for Cough.          Dispense:  118 mL          Refill:  0      levocetirizine (XYZAL) 5 MG tablet          Sig: Take 1 tablet (5 mg total) by mouth every evening.          Dispense:  30 tablet          Refill:  11      predniSONE (DELTASONE) 20 MG tablet          Sig: Take 3 tabs daily for 3 days; then take 2 tabs daily for 3 day; then take 1 tab daily for 3 days; then take 0.5 tab daily for 4 days          Dispense:  20 tablet          Refill:  0      tramadol (ULTRAM) 50 mg tablet          Sig: Take 1 tablet (50 mg total) by mouth every 6 (six) hours as needed for Pain.          Dispense:  10 tablet          Refill:  0  No orders of the defined types were placed in this encounter.    RTC if symptoms are worsening or changing significantly or if not improved by the end of therapy.

## 2017-08-21 ENCOUNTER — PATIENT MESSAGE (OUTPATIENT)
Dept: GASTROENTEROLOGY | Facility: CLINIC | Age: 52
End: 2017-08-21

## 2017-08-21 ENCOUNTER — PATIENT MESSAGE (OUTPATIENT)
Dept: FAMILY MEDICINE | Facility: CLINIC | Age: 52
End: 2017-08-21

## 2017-09-28 ENCOUNTER — OFFICE VISIT (OUTPATIENT)
Dept: DERMATOLOGY | Facility: CLINIC | Age: 52
End: 2017-09-28
Payer: MEDICARE

## 2017-09-28 DIAGNOSIS — L91.0 KELOID: Primary | ICD-10-CM

## 2017-09-28 PROCEDURE — 11900 INJECT SKIN LESIONS </W 7: CPT | Mod: S$GLB,,, | Performed by: DERMATOLOGY

## 2017-09-28 PROCEDURE — 99499 UNLISTED E&M SERVICE: CPT | Mod: S$GLB,,, | Performed by: DERMATOLOGY

## 2017-09-28 PROCEDURE — 99999 PR PBB SHADOW E&M-EST. PATIENT-LVL II: CPT | Mod: PBBFAC,,, | Performed by: DERMATOLOGY

## 2017-09-28 RX ORDER — FLUTICASONE FUROATE AND VILANTEROL 100; 25 UG/1; UG/1
POWDER RESPIRATORY (INHALATION)
COMMUNITY
Start: 2017-09-01 | End: 2017-10-30 | Stop reason: SDUPTHER

## 2017-09-28 RX ORDER — ATORVASTATIN CALCIUM 40 MG/1
40 TABLET, FILM COATED ORAL
COMMUNITY
End: 2017-09-28

## 2017-09-28 RX ORDER — LEVOCETIRIZINE DIHYDROCHLORIDE 5 MG/1
5 TABLET, FILM COATED ORAL
COMMUNITY
End: 2017-09-28

## 2017-09-28 RX ORDER — LOSARTAN POTASSIUM 25 MG/1
25 TABLET ORAL DAILY
COMMUNITY
Start: 2017-08-22 | End: 2020-03-11

## 2017-09-28 RX ORDER — FLUTICASONE PROPIONATE 50 MCG
1 SPRAY, SUSPENSION (ML) NASAL
COMMUNITY
End: 2017-09-28

## 2017-09-28 RX ORDER — FLUCONAZOLE 150 MG/1
TABLET ORAL
Refills: 0 | COMMUNITY
Start: 2017-08-22 | End: 2017-09-28

## 2017-09-28 RX ORDER — METHYLPHENIDATE HYDROCHLORIDE 20 MG/1
20 TABLET ORAL DAILY
COMMUNITY
End: 2020-07-14

## 2017-09-28 RX ORDER — PANTOPRAZOLE SODIUM 20 MG/1
20 TABLET, DELAYED RELEASE ORAL
COMMUNITY
End: 2018-04-30

## 2017-09-28 RX ORDER — AZITHROMYCIN 250 MG/1
TABLET, FILM COATED ORAL
Refills: 0 | COMMUNITY
Start: 2017-09-11 | End: 2017-09-28

## 2017-09-28 RX ORDER — PEN NEEDLE, DIABETIC 30 GX3/16"
NEEDLE, DISPOSABLE MISCELLANEOUS
COMMUNITY
Start: 2017-09-11 | End: 2018-05-11

## 2017-09-28 RX ORDER — INSULIN GLARGINE 100 [IU]/ML
INJECTION, SOLUTION SUBCUTANEOUS
COMMUNITY
Start: 2017-09-11 | End: 2018-04-30

## 2017-09-28 RX ORDER — CEFDINIR 300 MG/1
300 CAPSULE ORAL 2 TIMES DAILY
Refills: 0 | COMMUNITY
Start: 2017-08-22 | End: 2017-09-28

## 2017-09-28 RX ORDER — LOTEPREDNOL ETABONATE 5 MG/ML
1 SUSPENSION/ DROPS OPHTHALMIC
COMMUNITY
End: 2018-04-30

## 2017-09-28 RX ORDER — TRAMADOL HYDROCHLORIDE 50 MG/1
TABLET ORAL
COMMUNITY
Start: 2017-08-25 | End: 2017-09-28

## 2017-09-28 RX ORDER — LEVOTHYROXINE SODIUM 75 UG/1
75 TABLET ORAL
COMMUNITY
Start: 2017-09-11 | End: 2017-09-28

## 2017-09-28 RX ORDER — CODEINE PHOSPHATE AND GUAIFENESIN 10; 100 MG/5ML; MG/5ML
5 SOLUTION ORAL
COMMUNITY
End: 2018-04-30

## 2017-09-28 RX ORDER — CYCLOSPORINE 0.5 MG/ML
1 EMULSION OPHTHALMIC 2 TIMES DAILY PRN
COMMUNITY

## 2017-09-28 NOTE — PROGRESS NOTES
Subjective:       Patient ID:  Bianca Weldon is a 52 y.o. female who presents for   Chief Complaint   Patient presents with    Scar     f/u pt notices improvement     Hx of irritant vs. ACD resolving with keloid, began after shoulder surgery on 2/8/16, last seen on 6/7/17.  She is s/p ILK #1 at last visit.  + improvement in pruritus and pain.  + occasional itch.  She is currently on xyzal.         Review of Systems   Constitutional: Negative for fever and chills.   Gastrointestinal: Negative for nausea and vomiting.   Skin: Positive for itching. Negative for daily sunscreen use, activity-related sunscreen use and recent sunburn.   Hematologic/Lymphatic: Does not bruise/bleed easily.        Objective:    Physical Exam   Constitutional: She appears well-developed and well-nourished. No distress.   Neurological: She is alert and oriented to person, place, and time. She is not disoriented.   Psychiatric: She has a normal mood and affect.   Skin:   Areas Examined (abnormalities noted in diagram):   Head / Face Inspection Performed  Neck Inspection Performed  Chest / Axilla Inspection Performed  RUE Inspected  LUE Inspection Performed  Nails and Digits Inspection Performed                 Assessment / Plan:        Keloid  -     triamcinolone acetonide injection 10 mg; 1 mL (10 mg total) by Other route one time.  -     D/c betamethasone.  + improvement, however residual area at lateral border. ILK#2 done today. After risks, benefits and alternatives explained and side effect profile reviewed, including hypopigmentation, telangiectasia and atrophy, the patient verbally consented to ILK injection. A total of 1 cc of kenalog 10 mg/ml was injected into the keloid of the left upper arm.  Pt tolerated well without side effects.  RTC in 4 weeks for repeat injection.                 Return in about 6 weeks (around 11/9/2017).

## 2017-10-30 DIAGNOSIS — J44.9 COPD, MILD: ICD-10-CM

## 2017-10-30 RX ORDER — FLUTICASONE FUROATE AND VILANTEROL TRIFENATATE 100; 25 UG/1; UG/1
POWDER RESPIRATORY (INHALATION)
Qty: 60 EACH | Refills: 5 | Status: SHIPPED | OUTPATIENT
Start: 2017-10-30 | End: 2018-05-11

## 2017-11-06 ENCOUNTER — OFFICE VISIT (OUTPATIENT)
Dept: DERMATOLOGY | Facility: CLINIC | Age: 52
End: 2017-11-06
Payer: MEDICARE

## 2017-11-06 DIAGNOSIS — L91.0 KELOID: Primary | ICD-10-CM

## 2017-11-06 PROCEDURE — 99999 PR PBB SHADOW E&M-EST. PATIENT-LVL II: CPT | Mod: PBBFAC,,, | Performed by: DERMATOLOGY

## 2017-11-06 PROCEDURE — 11900 INJECT SKIN LESIONS </W 7: CPT | Mod: S$GLB,,, | Performed by: DERMATOLOGY

## 2017-11-06 PROCEDURE — 99499 UNLISTED E&M SERVICE: CPT | Mod: S$GLB,,, | Performed by: DERMATOLOGY

## 2017-11-06 RX ORDER — CANAGLIFLOZIN 300 MG/1
TABLET, FILM COATED ORAL
COMMUNITY
Start: 2017-10-24 | End: 2019-06-24

## 2017-11-06 RX ORDER — TRIAMCINOLONE ACETONIDE 40 MG/ML
40 INJECTION, SUSPENSION INTRA-ARTICULAR; INTRAMUSCULAR ONCE
Status: COMPLETED | OUTPATIENT
Start: 2017-11-06 | End: 2017-11-06

## 2017-11-06 RX ORDER — PANTOPRAZOLE SODIUM 40 MG/1
20 TABLET, DELAYED RELEASE ORAL
COMMUNITY
Start: 2017-10-15 | End: 2020-04-15 | Stop reason: SDUPTHER

## 2017-11-06 RX ORDER — ASPIRIN 81 MG/1
81 TABLET ORAL
COMMUNITY
Start: 2017-10-24 | End: 2018-10-24

## 2017-11-06 RX ADMIN — TRIAMCINOLONE ACETONIDE 40 MG: 40 INJECTION, SUSPENSION INTRA-ARTICULAR; INTRAMUSCULAR at 10:11

## 2017-11-06 NOTE — PROGRESS NOTES
Subjective:       Patient ID:  Bianca Weldon is a 52 y.o. female who presents for   Chief Complaint   Patient presents with    Spot     c/o spot to right hand 4th digit x 1 yr,,asympatomatic,,no tx    Keloid     c/o keloid scar to right shoulder,,well improved with injections        Hx of irritant vs. ACD resolving with keloid, began after shoulder surgery on 2/8/16, last seen on 9/28/17.  She is s/p ILK #2 at last visit.  + improvement in pruritus and pain.  + occasional itch.  She is currently on xyzal.         Review of Systems   Constitutional: Negative for fever and chills.   Gastrointestinal: Negative for nausea and vomiting.   Skin: Positive for itching. Negative for daily sunscreen use, activity-related sunscreen use and recent sunburn.   Hematologic/Lymphatic: Does not bruise/bleed easily.        Objective:    Physical Exam   Constitutional: She appears well-developed and well-nourished. No distress.   Neurological: She is alert and oriented to person, place, and time. She is not disoriented.   Psychiatric: She has a normal mood and affect.   Skin:   Areas Examined (abnormalities noted in diagram):   Head / Face Inspection Performed  Neck Inspection Performed  Chest / Axilla Inspection Performed  Back Inspection Performed  RUE Inspected  LUE Inspection Performed  Nails and Digits Inspection Performed             Assessment / Plan:        Keloid  -     triamcinolone acetonide injection 40 mg; 1 mL (40 mg total) by Other route once.  + improvement, however smaller residual area within scar. ILK#3 done today. After risks, benefits and alternatives explained and side effect profile reviewed, including hypopigmentation, telangiectasia and atrophy, the patient verbally consented to ILK injection. A total of 0.5 cc of kenalog 10 mg/ml (diluted from kenalog 40 mg/ml) was injected into the keloid of the left upper arm.  Pt tolerated well without side effects.  RTC in 4 weeks for repeat injection.              Return if symptoms worsen or fail to improve.

## 2018-01-25 ENCOUNTER — TELEPHONE (OUTPATIENT)
Dept: PULMONOLOGY | Facility: CLINIC | Age: 53
End: 2018-01-25

## 2018-01-25 NOTE — TELEPHONE ENCOUNTER
----- Message from Carli Tolliver sent at 1/25/2018 12:15 PM CST -----  Contact: pt  The pt request a call concerning her 4/17 appt, the pt can be reached at [phone]///[id number]

## 2018-01-31 ENCOUNTER — TELEPHONE (OUTPATIENT)
Dept: PULMONOLOGY | Facility: CLINIC | Age: 53
End: 2018-01-31

## 2018-01-31 NOTE — TELEPHONE ENCOUNTER
----- Message from Nellie Guo sent at 1/31/2018 10:39 AM CST -----  Pt at [phone]//states she has appts on 4-17-18//which she is needing to change//please call to discuss//thanks/[facility]

## 2018-02-16 DIAGNOSIS — E11.9 TYPE 2 DIABETES MELLITUS WITHOUT COMPLICATION: ICD-10-CM

## 2018-03-06 ENCOUNTER — TELEPHONE (OUTPATIENT)
Dept: PULMONOLOGY | Facility: CLINIC | Age: 53
End: 2018-03-06

## 2018-03-09 ENCOUNTER — LAB VISIT (OUTPATIENT)
Dept: LAB | Facility: HOSPITAL | Age: 53
End: 2018-03-09
Attending: OPTOMETRIST
Payer: MEDICARE

## 2018-03-09 DIAGNOSIS — K75.81 NONALCOHOLIC STEATOHEPATITIS: Primary | ICD-10-CM

## 2018-03-09 LAB
ALBUMIN SERPL BCP-MCNC: 4.1 G/DL
ALP SERPL-CCNC: 189 U/L
ALT SERPL W/O P-5'-P-CCNC: 16 U/L
APTT BLDCRRT: 29.8 SEC
AST SERPL-CCNC: 19 U/L
BILIRUB DIRECT SERPL-MCNC: 0.2 MG/DL
BILIRUB SERPL-MCNC: 0.4 MG/DL
INR PPP: 1
PROT SERPL-MCNC: 7.1 G/DL
PROTHROMBIN TIME: 10.9 SEC

## 2018-03-09 PROCEDURE — 86038 ANTINUCLEAR ANTIBODIES: CPT

## 2018-03-09 PROCEDURE — 86256 FLUORESCENT ANTIBODY TITER: CPT | Mod: 91

## 2018-03-09 PROCEDURE — 85610 PROTHROMBIN TIME: CPT

## 2018-03-09 PROCEDURE — 86235 NUCLEAR ANTIGEN ANTIBODY: CPT

## 2018-03-09 PROCEDURE — 85730 THROMBOPLASTIN TIME PARTIAL: CPT

## 2018-03-09 PROCEDURE — 80076 HEPATIC FUNCTION PANEL: CPT

## 2018-03-09 PROCEDURE — 36415 COLL VENOUS BLD VENIPUNCTURE: CPT | Mod: PO

## 2018-03-12 LAB
ANA SER QL IF: NORMAL
MITOCHONDRIA AB TITR SER IF: NORMAL {TITER}
SMOOTH MUSCLE AB TITR SER IF: NORMAL {TITER}

## 2018-03-28 ENCOUNTER — TELEPHONE (OUTPATIENT)
Dept: PULMONOLOGY | Facility: CLINIC | Age: 53
End: 2018-03-28

## 2018-03-28 NOTE — TELEPHONE ENCOUNTER
----- Message from Liss Liang sent at 3/28/2018  2:56 PM CDT -----  Pt is returning a call from nurse in regards to an apt.          Please call pt back at [phone]

## 2018-04-30 ENCOUNTER — OFFICE VISIT (OUTPATIENT)
Dept: OBSTETRICS AND GYNECOLOGY | Facility: CLINIC | Age: 53
End: 2018-04-30
Payer: MEDICARE

## 2018-04-30 VITALS
WEIGHT: 161.81 LBS | HEIGHT: 58 IN | DIASTOLIC BLOOD PRESSURE: 66 MMHG | SYSTOLIC BLOOD PRESSURE: 114 MMHG | BODY MASS INDEX: 33.97 KG/M2

## 2018-04-30 DIAGNOSIS — Z00.00 PREVENTATIVE HEALTH CARE: ICD-10-CM

## 2018-04-30 DIAGNOSIS — Z12.39 BREAST CANCER SCREENING: ICD-10-CM

## 2018-04-30 DIAGNOSIS — Z01.419 WELL WOMAN EXAM WITH ROUTINE GYNECOLOGICAL EXAM: Primary | ICD-10-CM

## 2018-04-30 PROCEDURE — G0101 CA SCREEN;PELVIC/BREAST EXAM: HCPCS | Mod: S$GLB,,, | Performed by: NURSE PRACTITIONER

## 2018-04-30 PROCEDURE — 88175 CYTOPATH C/V AUTO FLUID REDO: CPT

## 2018-04-30 PROCEDURE — 99999 PR PBB SHADOW E&M-EST. PATIENT-LVL IV: CPT | Mod: PBBFAC,,, | Performed by: NURSE PRACTITIONER

## 2018-04-30 RX ORDER — FLUCONAZOLE 150 MG/1
150 TABLET ORAL DAILY
Qty: 2 TABLET | Refills: 11 | Status: SHIPPED | OUTPATIENT
Start: 2018-04-30 | End: 2019-06-24

## 2018-04-30 RX ORDER — TRIAMCINOLONE ACETONIDE 1 MG/G
CREAM TOPICAL 2 TIMES DAILY
Qty: 45 BOTTLE | Refills: 6 | Status: SHIPPED | OUTPATIENT
Start: 2018-04-30 | End: 2021-08-20

## 2018-04-30 RX ORDER — ESTRADIOL 0.1 MG/G
1 CREAM VAGINAL
Qty: 42.5 G | Refills: 1 | Status: SHIPPED | OUTPATIENT
Start: 2018-04-30 | End: 2018-10-25 | Stop reason: SDUPTHER

## 2018-04-30 RX ORDER — INSULIN GLARGINE 100 [IU]/ML
18 INJECTION, SOLUTION SUBCUTANEOUS DAILY
COMMUNITY
End: 2020-03-19 | Stop reason: SDUPTHER

## 2018-04-30 NOTE — PROGRESS NOTES
"CC: Well woman exam    Bianca Weldon is a 53 y.o. female  presents for a well woman exam. Patient states that she had " abnormal pap exam after hysterectomy". Was told she needed pap exam every year. Patient reports has vaginal dryness with intercourse. Last mammogram was normal. Is sexually active.    Past Medical History:   Diagnosis Date    Abnormal Pap smear of cervix     Abnormal Pap smear of vagina     after hyst, repeat every 6 months, per pt, had bx's, unknown results    Acid reflux     Arthritis     Depression     bipolar    Diabetes mellitus     Diabetes mellitus, type 2     Hyperlipidemia     Hypertension     IBS (irritable bowel syndrome)     Interstitial cystitis     Restless legs syndrome     Thyroid disease      Past Surgical History:   Procedure Laterality Date    ADENOIDECTOMY      APPENDECTOMY      COLONOSCOPY      CYSTOSTOMY W/ BLADDER BIOPSY      interstial cystitis    DILATION AND CURETTAGE OF UTERUS      missed ab    ESOPHAGEAL DILATION      HYSTERECTOMY      TLH, LSO (endometriosis)    OOPHORECTOMY      LSO    TONSILLECTOMY      WISDOM TOOTH EXTRACTION       Family History   Problem Relation Age of Onset    Diabetes Mellitus Mother     Melanoma Mother     Diabetes Mellitus Father     Colon cancer Father     Heart disease Father 45     CABG    Rheum arthritis Maternal Grandmother     Breast cancer Maternal Grandmother     Thrombophilia Paternal Grandmother      DVTs    Ovarian cancer Neg Hx      Social History   Substance Use Topics    Smoking status: Never Smoker    Smokeless tobacco: Never Used    Alcohol use No     OB History      Para Term  AB Living    3 2 2   1 2    SAB TAB Ectopic Multiple Live Births    1                  /66   Ht 4' 10" (1.473 m)   Wt 73.4 kg (161 lb 13.1 oz)   BMI 33.82 kg/m²     ROS:  GENERAL: Denies weight gain or weight loss. Feeling well overall.   SKIN: Denies rash or lesions.   HEAD: " Denies head injury or headache.   NODES: Denies enlarged lymph nodes.   CHEST: Denies chest pain or shortness of breath.   CARDIOVASCULAR: Denies palpitations or left sided chest pain.   ABDOMEN: No abdominal pain, constipation, diarrhea, nausea, vomiting or rectal bleeding.   URINARY: No frequency, dysuria, hematuria, or burning on urination.  REPRODUCTIVE: See HPI.   BREASTS: The patient performs breast self-examination and denies pain, lumps, or nipple discharge.   HEMATOLOGIC: No easy bruisability or excessive bleeding.   MUSCULOSKELETAL: Denies joint pain or swelling.   NEUROLOGIC: Denies syncope or weakness.   PSYCHIATRIC: Denies depression, anxiety or mood swings.    PE:   APPEARANCE: Well nourished, well developed, in no acute distress.  AFFECT: WNL, alert and oriented x 3.  SKIN: No acne or hirsutism.  NECK: Neck symmetric without masses or thyromegaly.  NODES: No inguinal, cervical, axillary or femoral lymph node enlargement.  CHEST: Good respiratory effort.   ABDOMEN: Soft. No tenderness or masses. No hepatosplenomegaly. No hernias.  BREASTS: Symmetrical, no skin changes or visible lesions. No palpable masses, nipple discharge bilaterally.  PELVIC: External female genitalia erythema consistent with yeast. Normal hair distribution. Adequate perineal body, normal urethral meatus. Vagina atrophic without lesions or discharge. No significant cystocele or rectocele. Bimanual exam shows uterus and cervix to be surgically absent. Adnexa without masses or tenderness.  RECTAL: Rectovaginal exam confirms above with normal sphincter tone, no masses.  EXTREMITIES: No edema.    1. Well woman exam with routine gynecological exam  Mammo Digital Screening Bilat with CAD    CANCELED: Liquid-based pap smear, screening   2. Preventative health care  Mammo Digital Screening Bilat with CAD    CANCELED: Liquid-based pap smear, screening   3. Breast cancer screening  Mammo Digital Screening Bilat with CAD     PLAN:  Mammogram  Estrace cream  Pap exam( see HPI)  Patient was counseled today on A.C.S. Pap guidelines and recommendations for yearly pelvic exams, mammograms and monthly self breast exams; to see her PCP for other health maintenance.

## 2018-05-09 ENCOUNTER — TELEPHONE (OUTPATIENT)
Dept: PULMONOLOGY | Facility: CLINIC | Age: 53
End: 2018-05-09

## 2018-05-09 NOTE — TELEPHONE ENCOUNTER
----- Message from Lynn Warner sent at 5/9/2018  8:31 AM CDT -----  Contact: patient  Calling to reschedule her appointments. Have death in family and had to leave out of town. please call patient back @ [phone]. Thanks, maría

## 2018-05-10 ENCOUNTER — PROCEDURE VISIT (OUTPATIENT)
Dept: PULMONOLOGY | Facility: CLINIC | Age: 53
End: 2018-05-10
Payer: MEDICARE

## 2018-05-10 ENCOUNTER — HOSPITAL ENCOUNTER (OUTPATIENT)
Dept: RADIOLOGY | Facility: HOSPITAL | Age: 53
Discharge: HOME | End: 2018-05-10
Attending: INTERNAL MEDICINE
Payer: MEDICARE

## 2018-05-10 DIAGNOSIS — J42 CHRONIC BRONCHITIS, UNSPECIFIED CHRONIC BRONCHITIS TYPE: ICD-10-CM

## 2018-05-10 LAB
POST FEF 25 75: 2.41 L/S (ref 1.77–2.76)
POST FET 100: 7.14 S
POST FEV1 FVC: 84 %
POST FEV1: 2 L (ref 1.88–2.35)
POST FIF 50: 3.68 L/S
POST FVC: 2.37 L (ref 2.4–2.95)
POST PEF: 4.68 L/S (ref 4.94–6.3)
PRE FEF 25 75: 2.24 L/S (ref 1.77–2.76)
PRE FET 100: 7.32 S
PRE FEV1 FVC: 82 %
PRE FEV1: 1.97 L (ref 1.88–2.35)
PRE FIF 50: 2.69 L/S
PRE FVC: 2.39 L (ref 2.4–2.95)
PRE PEF: 4.87 L/S (ref 4.94–6.3)
PREDICTED FEV1 FVC: 79.55 % (ref 74.65–84.44)
PREDICTED FEV1: 2.11 L (ref 1.88–2.35)
PREDICTED FVC: 2.68 L (ref 2.4–2.95)
PROVOCATION PROTOCOL: ABNORMAL

## 2018-05-10 PROCEDURE — 71046 X-RAY EXAM CHEST 2 VIEWS: CPT | Mod: TC,FY,PO

## 2018-05-10 PROCEDURE — 71046 X-RAY EXAM CHEST 2 VIEWS: CPT | Mod: 26,,, | Performed by: RADIOLOGY

## 2018-05-10 PROCEDURE — 94060 EVALUATION OF WHEEZING: CPT | Mod: S$GLB,,, | Performed by: INTERNAL MEDICINE

## 2018-05-11 ENCOUNTER — PATIENT MESSAGE (OUTPATIENT)
Dept: FAMILY MEDICINE | Facility: CLINIC | Age: 53
End: 2018-05-11

## 2018-05-11 ENCOUNTER — OFFICE VISIT (OUTPATIENT)
Dept: PULMONOLOGY | Facility: CLINIC | Age: 53
End: 2018-05-11
Payer: MEDICARE

## 2018-05-11 ENCOUNTER — TELEPHONE (OUTPATIENT)
Dept: PULMONOLOGY | Facility: CLINIC | Age: 53
End: 2018-05-11

## 2018-05-11 VITALS
HEART RATE: 83 BPM | BODY MASS INDEX: 33.55 KG/M2 | OXYGEN SATURATION: 98 % | WEIGHT: 159.81 LBS | DIASTOLIC BLOOD PRESSURE: 72 MMHG | HEIGHT: 58 IN | SYSTOLIC BLOOD PRESSURE: 122 MMHG | RESPIRATION RATE: 18 BRPM

## 2018-05-11 DIAGNOSIS — R06.02 SOB (SHORTNESS OF BREATH): Primary | ICD-10-CM

## 2018-05-11 DIAGNOSIS — E66.01 SEVERE OBESITY (BMI 35.0-35.9 WITH COMORBIDITY): ICD-10-CM

## 2018-05-11 DIAGNOSIS — F17.200 TOBACCO DEPENDENCE: ICD-10-CM

## 2018-05-11 DIAGNOSIS — J42 CHRONIC BRONCHITIS, UNSPECIFIED CHRONIC BRONCHITIS TYPE: Primary | ICD-10-CM

## 2018-05-11 PROCEDURE — 3074F SYST BP LT 130 MM HG: CPT | Mod: CPTII,S$GLB,, | Performed by: INTERNAL MEDICINE

## 2018-05-11 PROCEDURE — 99999 PR PBB SHADOW E&M-EST. PATIENT-LVL IV: CPT | Mod: PBBFAC,,, | Performed by: INTERNAL MEDICINE

## 2018-05-11 PROCEDURE — 3078F DIAST BP <80 MM HG: CPT | Mod: CPTII,S$GLB,, | Performed by: INTERNAL MEDICINE

## 2018-05-11 PROCEDURE — 3008F BODY MASS INDEX DOCD: CPT | Mod: CPTII,S$GLB,, | Performed by: INTERNAL MEDICINE

## 2018-05-11 PROCEDURE — 99214 OFFICE O/P EST MOD 30 MIN: CPT | Mod: S$GLB,,, | Performed by: INTERNAL MEDICINE

## 2018-05-11 RX ORDER — AMOXICILLIN 500 MG
1 CAPSULE ORAL DAILY
COMMUNITY
End: 2022-08-24 | Stop reason: ALTCHOICE

## 2018-05-11 NOTE — PROGRESS NOTES
Subjective:      Bianca Weldon is a 53 y.o. female with known Chronic bronchitis who presents without exacerbation.   LV 05/09/2017.Has not had any exacerbations.Still smoking .  No cough, No wheezing, No SOB  Immunizations are current  Has not been taking UltraLABA/ICS over last few months  Normal Spirometry. Will DC Breo.  Diabetes control improved with med changes per Dr Lyles.  Weight dropped from 177lb to 159 Lb  I have reviewed the patient's medical history in detail and updated the computerized patient record.         The following portions of the patient's history were reviewed and updated as appropriate:   She  has a past medical history of Abnormal Pap smear of cervix; Abnormal Pap smear of vagina; Acid reflux; Arthritis; Depression; Diabetes mellitus; Diabetes mellitus, type 2; Hyperlipidemia; Hypertension; IBS (irritable bowel syndrome); Interstitial cystitis; Restless legs syndrome; and Thyroid disease.  She  does not have any pertinent problems on file.  She  has a past surgical history that includes Appendectomy; Tonsillectomy; Cystostomy w/ bladder biopsy; Hysterectomy (2001); Oophorectomy (2001); Dilation and curettage of uterus; Adenoidectomy; Harmon tooth extraction; Colonoscopy; and Esophageal dilation.  Her family history includes Breast cancer in her maternal grandmother; Colon cancer in her father; Diabetes Mellitus in her father and mother; Heart disease (age of onset: 45) in her father; Melanoma in her mother; Rheum arthritis in her maternal grandmother; Thrombophilia in her paternal grandmother.  She  reports that she has been smoking.  She has never used smokeless tobacco. She reports that she does not drink alcohol or use drugs.  She has a current medication list which includes the following prescription(s): aripiprazole, ascorbic acid (vitamin c), aspirin, atorvastatin, benztropine, clonazepam, cyclosporine, desvenlafaxine succinate, estradiol, fish oil-omega-3 fatty acids, fluconazole,  fluticasone, insulin glargine, invokana, ipratropium-albuterol, lamotrigine, levocetirizine, levothyroxine, losartan, meloxicam, methylphenidate hcl, pantoprazole, triamcinolone acetonide 0.1%, vitamin e, and omega-3 fatty acids fish oil.  Current Outpatient Prescriptions on File Prior to Visit   Medication Sig Dispense Refill    aripiprazole (ABILIFY) 20 MG Tab Take 10 mg by mouth.      ascorbic acid (VITAMIN C) 250 MG tablet Take 250 mg by mouth once daily.      aspirin (ECOTRIN) 81 MG EC tablet Take 81 mg by mouth.      atorvastatin (LIPITOR) 40 MG tablet Take 1 tablet (40 mg total) by mouth once daily. 90 tablet 3    benztropine (COGENTIN) 1 MG tablet Take 1 mg by mouth once daily.       clonazepam (KLONOPIN) 2 MG Tab Take 2 mg by mouth every evening.       cycloSPORINE (RESTASIS) 0.05 % ophthalmic emulsion 1 drop.      desvenlafaxine succinate (PRISTIQ) 50 MG Tb24 Take 50 mg by mouth.      estradiol (ESTRACE) 0.01 % (0.1 mg/gram) vaginal cream Place 1 g vaginally every 7 days. 42.5 g 1    fluconazole (DIFLUCAN) 150 MG Tab Take 1 tablet (150 mg total) by mouth once daily. 2 tablet 11    fluticasone (FLONASE) 50 mcg/actuation nasal spray instill 2 sprays into each nostril once daily 16 g 11    insulin glargine (BASAGLAR KWIKPEN U-100 INSULIN) 100 unit/mL (3 mL) InPn pen Inject 18 Units into the skin once daily.      INVOKANA 300 mg Tab tablet       ipratropium-albuterol (COMBIVENT RESPIMAT)  mcg/actuation inhaler Inhale 1 puff into the lungs.      lamotrigine (LAMICTAL) 100 MG tablet Take 100 mg by mouth.      levocetirizine (XYZAL) 5 MG tablet Take 1 tablet (5 mg total) by mouth every evening. 30 tablet 11    levothyroxine (SYNTHROID) 75 MCG tablet Take 1 tablet (75 mcg total) by mouth once daily. 90 tablet 0    losartan (COZAAR) 25 MG tablet Take 25 mg by mouth once daily.       meloxicam (MOBIC) 15 MG tablet Take 15 mg by mouth.      methylphenidate HCl (RITALIN) 20 MG tablet Take 20  "mg by mouth once daily.       pantoprazole (PROTONIX) 40 MG tablet       triamcinolone acetonide 0.1% (KENALOG) 0.1 % cream Apply topically 2 (two) times daily. Do not use on face 45 Bottle 6    [DISCONTINUED] BREO ELLIPTA 100-25 mcg/dose diskus inhaler take 1 inhalation by mouth once daily 60 each 5    [DISCONTINUED] blood sugar diagnostic (TRUE METRIX GLUCOSE TEST STRIP) Strp Dispense  insurance covered brand . Check b.s fastin and 2 hrs after biggest meal 180 strip 3    [DISCONTINUED] blood-glucose meter kit Use as instructed 1 each 0    [DISCONTINUED] lancets 28 gauge Misc 1 lancet by Misc.(Non-Drug; Combo Route) route 2 (two) times daily. Dispense  insurance covered brand . Check b.s fastin and 2 hrs after biggest meal 180 each 3    [DISCONTINUED] pen needle, diabetic 31 gauge x 3/16" Ndle Daily injection       No current facility-administered medications on file prior to visit.      She is allergic to bactrim [sulfamethoxazole-trimethoprim]; macrodantin [nitrofurantoin macrocrystalline]; and metformin..    Review of Systems  A comprehensive review of systems was negative.       Objective:      /72   Pulse 83   Resp 18   Ht 4' 10" (1.473 m)   Wt 72.5 kg (159 lb 13.3 oz)   SpO2 98%   BMI 33.41 kg/m²        Physical Exam   Constitutional: She is oriented to person, place, and time. She appears well-developed and well-nourished. No distress.   HENT:   Head: Normocephalic.   Nose: No mucosal edema or rhinorrhea.   Mouth/Throat: Oropharynx is clear and moist. No oropharyngeal exudate.   Eyes: Conjunctivae and EOM are normal. Pupils are equal, round, and reactive to light. Left eye exhibits no discharge.   Neck: Normal range of motion. Neck supple. No JVD present. No tracheal deviation present. No thyromegaly present.   Cardiovascular: Normal rate, regular rhythm and normal heart sounds.    Pulmonary/Chest: Effort normal and breath sounds normal. No respiratory distress. She has no wheezes. "   Abdominal: Soft. Bowel sounds are normal. She exhibits no distension. There is no tenderness.   Musculoskeletal: Normal range of motion. She exhibits no edema.   Neurological: She is alert and oriented to person, place, and time. No cranial nerve deficit.   Skin: Skin is warm and dry. She is not diaphoretic.   Psychiatric: She has a normal mood and affect.   Nursing note and vitals reviewed.          PFT  Normal spirometry. Normal airflow.  Since study 11/02/2016: FEV1 improved by 25%.  (Physician 05/10/2018 03:05PM, Dr. MILAN Best / Final: 05/10/2018 03:05PM, Dr. MILAN Best)    FEV1 1.97L( 93%), FVC 2.39L( 89%), FEV1/FVC 83  FVC improved by 13%  FEV1 improved by 25%        Lab Results   Component Value Date    PREFVC 2.39 (L) 05/10/2018    TJSGGA1711 2.24 05/10/2018    PREPEF 4.87 (L) 05/10/2018    JOXTYG733 7.32 05/10/2018    ABSLWC0MYI 82 05/10/2018    POSTFVC 2.37 (L) 05/10/2018    POSTFEV1 2 05/10/2018    [id number] 2.41 05/10/2018    POSTPEF 4.68 (L) 05/10/2018    HOQEQGG070 7.14 05/10/2018    KGLZBIX83 3.68 05/10/2018    HMMHSKT2QBX 84 05/10/2018    PZFOBJF2BQN 79.55 05/10/2018    PREDICTEDFVC 2.68 05/10/2018    PREDICTEDFEV 2.11 05/10/2018         Assessment:       Problem List Items Addressed This Visit     Severe obesity (BMI 35.0-35.9 with comorbidity)     General weight loss/lifestyle modification strategies discussed (elicit support from others; identify saboteurs; non-food rewards).             Chronic bronchitis - Primary     CAT score 1  No cough, No wheezing  Spirometry improved  Normal spirometry  DC BREO         Relevant Orders    X-Ray Chest PA And Lateral    Tobacco dependence    Relevant Orders    Ambulatory referral to Smoking Cessation Program        May need LDCT when > 55 year if continues smoking    Improved    Plan:      Follow-up in about 1 year (around 5/11/2019) for Weight loss and exercise, Spirometry, Smoking cessation counselling and referal.    This note was prepared using voice  recognition system and is likely to have sound alike errors that may have been overlooked even after proof reading.  Please call me with any questions    Discussed diagnosis, its evaluation, treatment and usual course. All questions answered.    Thank you for the courtesy of participating in the care of this patient    Steve Best MD

## 2018-05-11 NOTE — ASSESSMENT & PLAN NOTE
General weight loss/lifestyle modification strategies discussed (elicit support from others; identify saboteurs; non-food rewards).

## 2018-05-18 RX ORDER — FLUTICASONE PROPIONATE 50 MCG
SPRAY, SUSPENSION (ML) NASAL
Qty: 16 G | Refills: 11 | Status: SHIPPED | OUTPATIENT
Start: 2018-05-18 | End: 2020-03-20

## 2018-05-24 ENCOUNTER — CLINICAL SUPPORT (OUTPATIENT)
Dept: SMOKING CESSATION | Facility: CLINIC | Age: 53
End: 2018-05-24
Payer: COMMERCIAL

## 2018-05-24 DIAGNOSIS — F17.210 HEAVY CIGARETTE SMOKER (20-39 PER DAY): Primary | ICD-10-CM

## 2018-05-24 PROCEDURE — 99404 PREV MED CNSL INDIV APPRX 60: CPT | Mod: S$GLB,,,

## 2018-05-24 PROCEDURE — 99999 PR PBB SHADOW E&M-EST. PATIENT-LVL I: CPT | Mod: PBBFAC,,,

## 2018-05-24 RX ORDER — IBUPROFEN 200 MG
1 TABLET ORAL DAILY
Qty: 28 PATCH | Refills: 0 | Status: SHIPPED | OUTPATIENT
Start: 2018-05-24 | End: 2018-06-18 | Stop reason: SDUPTHER

## 2018-05-24 RX ORDER — VARENICLINE TARTRATE 0.5 (11)-1
KIT ORAL
Qty: 53 TABLET | Refills: 0 | Status: SHIPPED | OUTPATIENT
Start: 2018-05-24 | End: 2018-06-18 | Stop reason: DRUGHIGH

## 2018-05-24 NOTE — Clinical Note
Patient seen for the tobacco cessation program. This is her first attempt to stop smoking with the smoking cessation program. She is a cigarette smoker of 1.5 PPD X 33 years. She shared she and her boyfriend are trying to quit smoking together. She is motivated to quit the use of tobacco and has agreed to attend our 6 week tobacco cessation program.

## 2018-05-25 ENCOUNTER — TELEPHONE (OUTPATIENT)
Dept: PHARMACY | Facility: CLINIC | Age: 53
End: 2018-05-25

## 2018-05-25 NOTE — TELEPHONE ENCOUNTER
Called and spoke with patient notifying her PA on Chantix was approved by her insurance for a $0.00 copayment.    Patient requested to  prescription at [facility] pharmacy.  I spoke with pharmacist on duty to ensure product was in stock and they are going to fill prescription for patient.    Patient will  today.    PA Information  [facility]  [phone]  Approval Dates: 5/24/18-11/25/18  Chantix     Thanks,   Tamara Garcia  Patient Care Advocate   [facility]  Phone: [phone]  Fax: [phone]

## 2018-05-28 ENCOUNTER — HOSPITAL ENCOUNTER (OUTPATIENT)
Dept: RADIOLOGY | Facility: HOSPITAL | Age: 53
Discharge: HOME | End: 2018-05-28
Attending: NURSE PRACTITIONER
Payer: MEDICARE

## 2018-05-28 VITALS — WEIGHT: 159 LBS | BODY MASS INDEX: 33.37 KG/M2 | HEIGHT: 58 IN

## 2018-05-28 DIAGNOSIS — Z12.39 BREAST CANCER SCREENING: ICD-10-CM

## 2018-05-28 DIAGNOSIS — Z00.00 PREVENTATIVE HEALTH CARE: ICD-10-CM

## 2018-05-28 DIAGNOSIS — Z01.419 WELL WOMAN EXAM WITH ROUTINE GYNECOLOGICAL EXAM: ICD-10-CM

## 2018-05-28 PROCEDURE — 77067 SCR MAMMO BI INCL CAD: CPT | Mod: 26,,, | Performed by: RADIOLOGY

## 2018-05-28 PROCEDURE — 77063 BREAST TOMOSYNTHESIS BI: CPT | Mod: 26,,, | Performed by: RADIOLOGY

## 2018-05-28 PROCEDURE — 77067 SCR MAMMO BI INCL CAD: CPT | Mod: TC

## 2018-06-06 ENCOUNTER — CLINICAL SUPPORT (OUTPATIENT)
Dept: SMOKING CESSATION | Facility: CLINIC | Age: 53
End: 2018-06-06
Payer: COMMERCIAL

## 2018-06-06 DIAGNOSIS — F17.210 LIGHT CIGARETTE SMOKER (1-9 CIGS/DAY): Primary | ICD-10-CM

## 2018-06-06 PROCEDURE — 99999 PR PBB SHADOW E&M-EST. PATIENT-LVL I: CPT | Mod: PBBFAC,,,

## 2018-06-06 PROCEDURE — 90853 GROUP PSYCHOTHERAPY: CPT | Mod: S$GLB,,,

## 2018-06-06 NOTE — Clinical Note
Patient reports decreasing cigarette smoking from 1.5 - 2 packs per day for 33 years to 3 cigarettes per day. She is pleased with her progress and intends to be tobacco free soon. The patient remains on the prescribed tobacco cessation medication regimen of 1 mg Chantix BID with a 21 mg nicotine patch QD without any negative side effects at this time.

## 2018-06-07 NOTE — PROGRESS NOTES
Site: [place]  Date:  6/6/18  Clinical Status of Patient: Outpatient   Length of Service and Code: 90 minutes - 32363   Number in Attendance: 2  Group Activities/Focus of Group:  Orientation, client introductions, completion of TCRS (Tobacco Cessation Rating Scale) learned addiction model, cues/triggers, personal reasons for quitting, medications, goals and quit date. The patient will continue with group therapy sessions and medication monitoring by CTTS. Prescribed medication management will be by physician. The patient will continue with group therapy sessions and medication monitoring by CTTS. Prescribed medication management will be by physician.     Target symptoms:  withdrawal and medication side effects             The following were rated moderate (3) to severe (4) on TCRS:       Moderate 3: none     Severe 4:   none  Patient's Response to Intervention: Patient reports decreasing cigarette smoking from 1.5 - 2 packs per day for 33 years to 3 cigarettes per day. She is pleased with her progress and intends to be tobacco free soon. The patient remains on the prescribed tobacco cessation medication regimen of 1 mg Chantix BID with a 21 mg nicotine patch QD without any negative side effects at this time.     Progress Toward Goals and Other Mental Status Changes: The patient denies any abnormal behavioral or mental changes at this time.   Plan: The patient will continue with group therapy sessions and medication regimen prescribed with management by physician or Cessation Clinic Provider. Patient will inform Smoking Clinic Cessation Counselor of symptoms as rated high on TCRS.  The patient will continue with group therapy sessions and medication monitoring by CTTS. Prescribed medication management will be by physician.     Return to Clinic: 1 week

## 2018-06-13 ENCOUNTER — CLINICAL SUPPORT (OUTPATIENT)
Dept: SMOKING CESSATION | Facility: CLINIC | Age: 53
End: 2018-06-13
Payer: COMMERCIAL

## 2018-06-13 DIAGNOSIS — F17.210 HEAVY CIGARETTE SMOKER (20-39 PER DAY): ICD-10-CM

## 2018-06-13 DIAGNOSIS — F17.210 LIGHT CIGARETTE SMOKER (1-9 CIGS/DAY): Primary | ICD-10-CM

## 2018-06-13 PROCEDURE — 90853 GROUP PSYCHOTHERAPY: CPT | Mod: S$GLB,,,

## 2018-06-13 PROCEDURE — 99999 PR PBB SHADOW E&M-EST. PATIENT-LVL I: CPT | Mod: PBBFAC,,,

## 2018-06-13 NOTE — Clinical Note
Patient reports decreasing cigarette smoking from 1.5 packs of cigarettes per day for 33 years to 1 cigarette today. She is pleased with her progress and has set a quit date of 6/30/18.  The patient remains on the prescribed tobacco cessation medication regimen of 1 mg Chantix BID with a 21 mg nicotine patch QD and 2 mg nicotine lozenges PRN without any negative side effects at this time.

## 2018-06-14 RX ORDER — DM/P-EPHED/ACETAMINOPH/DOXYLAM 30-7.5/3
2 LIQUID (ML) ORAL
Qty: 108 LOZENGE | Refills: 0 | Status: SHIPPED | OUTPATIENT
Start: 2018-06-14 | End: 2018-07-19 | Stop reason: SDUPTHER

## 2018-06-18 RX ORDER — IBUPROFEN 200 MG
1 TABLET ORAL DAILY
Qty: 28 PATCH | Refills: 0 | Status: SHIPPED | OUTPATIENT
Start: 2018-06-18 | End: 2018-07-16 | Stop reason: SDUPTHER

## 2018-06-18 RX ORDER — VARENICLINE TARTRATE 1 MG/1
1 TABLET, FILM COATED ORAL 2 TIMES DAILY
Qty: 56 TABLET | Refills: 0 | Status: SHIPPED | OUTPATIENT
Start: 2018-06-18 | End: 2018-07-16 | Stop reason: SDUPTHER

## 2018-06-18 NOTE — PROGRESS NOTES
Smoking Cessation Group Session #2    Site: [place]   Date:  6/13/18  Clinical Status of Patient: Outpatient   Length of Service and Code: 90 minutes - 26294   Number in Attendance: 4  Group Activities/Focus of Group: completion of TCRS (Tobacco Cessation Rating Scale) reviewed strategies, cues, and triggers. Introduced the negative impact of tobacco on health, the health advantages of discontinuing the use of tobacco, time line improved health changes after a quit, withdrawal issues to expect from nicotine and habit, and ways to achieve the goal of a quit.     Target symptoms:  withdrawal and medication side effects             The following were rated moderate (3) to severe (4) on TCRS:       Moderate 3: none     Severe 4:   none  Patient's Response to Intervention: Patient reports decreasing cigarette smoking from 1.5 packs of cigarettes per day for 33 years to 1 cigarette today. She is pleased with her progress and has set a quit date of 6/30/18.  The patient remains on the prescribed tobacco cessation medication regimen of 1 mg Chantix BID with a 21 mg nicotine patch QD and 2 mg nicotine lozenges PRN without any negative side effects at this time.     Progress Toward Goals and Other Mental Status Changes: The patient denies any abnormal behavioral or mental changes at this time.     Plan: The patient will continue with group therapy sessions and medication regimen prescribed with management by physician or by the Cessation Clinic Provider. Patient will inform Smoking Cessation Counselor of symptoms as rated high on TCRS.  The patient will continue with group therapy sessions and medication monitoring by CTTS. Prescribed medication management will be by physician.     Return to Clinic: 1 week    Quit Date: none   Planned Quit Date: 6/30/18

## 2018-06-26 RX ORDER — ATORVASTATIN CALCIUM 40 MG/1
TABLET, FILM COATED ORAL
Qty: 90 TABLET | Refills: 0 | OUTPATIENT
Start: 2018-06-26

## 2018-06-26 NOTE — TELEPHONE ENCOUNTER
Spoke with patient and she states that Dr. Lyles is her PCP, informed her to call his office to have medication refilled, she verbalized and did not have any further questions at this time.

## 2018-06-28 ENCOUNTER — TELEPHONE (OUTPATIENT)
Dept: OBSTETRICS AND GYNECOLOGY | Facility: CLINIC | Age: 53
End: 2018-06-28

## 2018-06-28 RX ORDER — FLUCONAZOLE 150 MG/1
150 TABLET ORAL DAILY
Qty: 2 TABLET | Refills: 1 | Status: SHIPPED | OUTPATIENT
Start: 2018-06-28 | End: 2019-06-24

## 2018-06-28 NOTE — TELEPHONE ENCOUNTER
----- Message from Renetta Mccray sent at 6/28/2018 10:02 AM CDT -----  Contact: pt  Pt is on [place] and is causes yeast infection that causes pt to have them every 8-10 days.      1. What is the name of the medication you are requesting? Diflucan  2. What is the dose? .  3. How do you take the medication? Orally, topically, etc? orally  4. How often do you take this medication? .  5. Do you need a 30 day or 90 day supply? .  6. How many refills are you requesting? several  7. What is your preferred pharmacy and location of the pharmacy? [facility]  8. Who can we contact with further questions? [phone]

## 2018-06-28 NOTE — TELEPHONE ENCOUNTER
Spoke with pt and informed that prescription for Diflucan has been sent to pharmacy with one additional refill. Pt voiced understanding.

## 2018-07-16 ENCOUNTER — CLINICAL SUPPORT (OUTPATIENT)
Dept: SMOKING CESSATION | Facility: CLINIC | Age: 53
End: 2018-07-16
Payer: COMMERCIAL

## 2018-07-16 DIAGNOSIS — F17.210 LIGHT CIGARETTE SMOKER (1-9 CIGS/DAY): Primary | ICD-10-CM

## 2018-07-16 PROCEDURE — 99407 BEHAV CHNG SMOKING > 10 MIN: CPT | Mod: S$GLB,,,

## 2018-07-16 RX ORDER — IBUPROFEN 200 MG
1 TABLET ORAL DAILY
Qty: 28 PATCH | Refills: 0 | Status: SHIPPED | OUTPATIENT
Start: 2018-07-16 | End: 2018-08-09 | Stop reason: SDUPTHER

## 2018-07-16 RX ORDER — VARENICLINE TARTRATE 1 MG/1
1 TABLET, FILM COATED ORAL 2 TIMES DAILY
Qty: 56 TABLET | Refills: 0 | Status: SHIPPED | OUTPATIENT
Start: 2018-07-16 | End: 2018-08-09 | Stop reason: SDUPTHER

## 2018-07-19 ENCOUNTER — CLINICAL SUPPORT (OUTPATIENT)
Dept: SMOKING CESSATION | Facility: CLINIC | Age: 53
End: 2018-07-19
Payer: COMMERCIAL

## 2018-07-19 DIAGNOSIS — F17.210 LIGHT CIGARETTE SMOKER (1-9 CIGS/DAY): Primary | ICD-10-CM

## 2018-07-19 PROCEDURE — 90853 GROUP PSYCHOTHERAPY: CPT | Mod: S$GLB,,,

## 2018-07-19 PROCEDURE — 99999 PR PBB SHADOW E&M-EST. PATIENT-LVL I: CPT | Mod: PBBFAC,,,

## 2018-07-19 RX ORDER — DM/P-EPHED/ACETAMINOPH/DOXYLAM 30-7.5/3
2 LIQUID (ML) ORAL
Qty: 144 LOZENGE | Refills: 0 | Status: SHIPPED | OUTPATIENT
Start: 2018-07-19 | End: 2018-07-26 | Stop reason: SDUPTHER

## 2018-07-19 NOTE — PROGRESS NOTES
Site: [place]  Date:  7/19/2018  Clinical Status of Patient: Outpatient   Length of Service and Code: 90 minutes - 85357   Number in Attendance: 3  Group Activities/Focus of Group:  Orientation, client introductions, completion of TCRS (Tobacco Cessation Rating Scale) learned addiction model, cues/triggers, personal reasons for quitting, medications, goals and quit date. The patient will continue with group therapy sessions and medication monitoring by CTTS. Prescribed medication management will be by physician.     Target symptoms:  withdrawal and medication side effects             The following were rated moderate (3) to severe (4) on TCRS:       Moderate 3: none     Severe 4:   none  Patient's Response to Intervention: Patient reports decreasing cigarette smoking from 1.5 packs per day for 33 years to 2 cigarettes today. She is pleased with her progress and has set a quit date of 7/31/18. The patient remains on the prescribed tobacco cessation medication regimen of 1 mg Chantix BID with a 21 mg nicotine patch QD and 2 mg nicotine lozenges PRN without any negative side effects at this time.     Progress Toward Goals and Other Mental Status Changes: The patient denies any abnormal behavioral or mental changes at this time.     Plan: The patient will continue with group therapy sessions and medication regimen prescribed with management by physician or Cessation Clinic Provider. Patient will inform Smoking Clinic Cessation Counselor of symptoms as rated high on TCRS. The patient will continue with group therapy sessions and medication monitoring by CTTS. Prescribed medication management will be by physician.     Return to Clinic: 1 week

## 2018-07-19 NOTE — Clinical Note
Patient reports decreasing cigarette smoking from 1.5 packs per day for 33 years to 2 cigarettes today. She is pleased with her progress and has set a quit date of 7/31/18. The patient remains on the prescribed tobacco cessation medication regimen of 1 mg Chantix BID with a 21 mg nicotine patch QD and 2 mg nicotine lozenges PRN without any negative side effects at this time.

## 2018-07-26 ENCOUNTER — CLINICAL SUPPORT (OUTPATIENT)
Dept: SMOKING CESSATION | Facility: CLINIC | Age: 53
End: 2018-07-26
Payer: COMMERCIAL

## 2018-07-26 DIAGNOSIS — F17.210 LIGHT CIGARETTE SMOKER (1-9 CIGS/DAY): ICD-10-CM

## 2018-07-26 PROCEDURE — 99404 PREV MED CNSL INDIV APPRX 60: CPT | Mod: S$GLB,,,

## 2018-07-26 PROCEDURE — 99999 PR PBB SHADOW E&M-EST. PATIENT-LVL I: CPT | Mod: PBBFAC,,,

## 2018-07-26 RX ORDER — DM/P-EPHED/ACETAMINOPH/DOXYLAM 30-7.5/3
2 LIQUID (ML) ORAL
Qty: 144 LOZENGE | Refills: 0 | Status: SHIPPED | OUTPATIENT
Start: 2018-07-26 | End: 2018-08-09 | Stop reason: SDUPTHER

## 2018-07-26 NOTE — Clinical Note
Patient reports decreasing cigarette smoking from 1.5 packs per day for 33 years to 2 cigarettes today. She is aware life stressors trigger her to smoke.  Discussed and reviewed strategies, habitual behavior, stress, and high risk situations. Introduced stress with addition interventions, SOLVE, relaxation with interventions, nutrition, exercise, weight gain, and the importance of rewarding oneself for accomplishments toward becoming tobacco free. Open discussion of all items with interventions. The patient remains on the prescribed tobacco cessation medication regimen of 1 mg Chantix BID with a 21 mg nicotine patch QD and 2 mg nicotine lozenges PRN without any negative side effects at this time. The patient denies any abnormal behavioral or mental changes at this time. The patient will continue with group therapy sessions and medication monitoring by CTTS. Prescribed medication management will be by physician.

## 2018-07-26 NOTE — PROGRESS NOTES
Individual Follow-Up Form    7/26/2018    Quit Date: none    Clinical Status of Patient: Outpatient    Length of Service: 60 minutes    Continuing Medication: yes  Chantix, Patches or Nicotine Lozenges    Other Medications: none     Target Symptoms: Withdrawal and medication side effects. The following were  rated moderate (3) to severe (4) on TCRS:  · Moderate (3): none  · Severe (4): none    Comments: Patient reports decreasing cigarette smoking from 1.5 packs per day for 33 years to 2 cigarettes today. She is aware life stressors trigger her to smoke.  Discussed and reviewed strategies, habitual behavior, stress, and high risk situations. Introduced stress with addition interventions, SOLVE, relaxation with interventions, nutrition, exercise, weight gain, and the importance of rewarding oneself for accomplishments toward becoming tobacco free. Open discussion of all items with interventions. The patient remains on the prescribed tobacco cessation medication regimen of 1 mg Chantix BID with a 21 mg nicotine patch QD and 2 mg nicotine lozenges PRN without any negative side effects at this time. The patient denies any abnormal behavioral or mental changes at this time. The patient will continue with group therapy sessions and medication monitoring by CTTS. Prescribed medication management will be by physician.       Diagnosis: F17.210    Next Visit: 1 week

## 2018-08-02 ENCOUNTER — CLINICAL SUPPORT (OUTPATIENT)
Dept: SMOKING CESSATION | Facility: CLINIC | Age: 53
End: 2018-08-02
Payer: COMMERCIAL

## 2018-08-02 DIAGNOSIS — F17.210 LIGHT CIGARETTE SMOKER (1-9 CIGS/DAY): Primary | ICD-10-CM

## 2018-08-02 PROCEDURE — 99404 PREV MED CNSL INDIV APPRX 60: CPT | Mod: S$GLB,,,

## 2018-08-02 PROCEDURE — 99999 PR PBB SHADOW E&M-EST. PATIENT-LVL I: CPT | Mod: PBBFAC,,,

## 2018-08-02 NOTE — PROGRESS NOTES
Individual Follow-Up Form    8/2/2018    Quit Date: none    Clinical Status of Patient: Outpatient    Length of Service: 60 minutes    Continuing Medication: yes  Chantix, Patches or Nicotine Lozenges    Other Medications: none     Target Symptoms: Withdrawal and medication side effects. The following were  rated moderate (3) to severe (4) on TCRS:  · Moderate (3): none  · Severe (4): none    Comments: Patient reports decreasing cigarette smoking from 1.5 pack per day for 33 years to 3 cigarettes per day. She shared she will be having surgery next week and wants to be completely tobacco free by then.  Discussed and reviewed strategies, habitual behavior, high risks situations, understanding urges and cravings, stress and relaxation with open discussion and additional interventions, Introduced lapses, relapses, understanding them and analyzing the situation of a lapse, conflict issues that may be linked to a lapse. The patient remains on the prescribed tobacco cessation medication regimen of 1 mg Chantix BID with a 21 mg nicotine patch QD and a 2 mg nicotine lozenge PRN without any negative side effects at this time. The patient denies any abnormal behavioral or mental changes at this time. The patient will continue with group therapy sessions and medication monitoring by CTTS. Prescribed medication management will be by physician.     Diagnosis: F17.210    Next Visit: 1 week

## 2018-08-02 NOTE — Clinical Note
Patient reports decreasing cigarette smoking from 1.5 pack per day for 33 years to 3 cigarettes per day. She shared she will be having surgery next week and wants to be completely tobacco free by then.  Discussed and reviewed strategies, habitual behavior, high risks situations, understanding urges and cravings, stress and relaxation with open discussion and additional interventions, Introduced lapses, relapses, understanding them and analyzing the situation of a lapse, conflict issues that may be linked to a lapse. The patient remains on the prescribed tobacco cessation medication regimen of 1 mg Chantix BID with a 21 mg nicotine patch QD and a 2 mg nicotine lozenge PRN without any negative side effects at this time. The patient denies any abnormal behavioral or mental changes at this time. The patient will continue with group therapy sessions and medication monitoring by CTTS. Prescribed medication management will be by physician.

## 2018-08-09 ENCOUNTER — CLINICAL SUPPORT (OUTPATIENT)
Dept: SMOKING CESSATION | Facility: CLINIC | Age: 53
End: 2018-08-09
Payer: COMMERCIAL

## 2018-08-09 ENCOUNTER — TELEPHONE (OUTPATIENT)
Dept: SMOKING CESSATION | Facility: CLINIC | Age: 53
End: 2018-08-09

## 2018-08-09 DIAGNOSIS — F17.210 CIGARETTE NICOTINE DEPENDENCE, UNCOMPLICATED: Primary | ICD-10-CM

## 2018-08-09 DIAGNOSIS — F17.210 LIGHT CIGARETTE SMOKER (1-9 CIGS/DAY): ICD-10-CM

## 2018-08-09 PROCEDURE — 99407 BEHAV CHNG SMOKING > 10 MIN: CPT | Mod: S$GLB,,,

## 2018-08-09 RX ORDER — VARENICLINE TARTRATE 1 MG/1
1 TABLET, FILM COATED ORAL 2 TIMES DAILY
Qty: 56 TABLET | Refills: 0 | Status: SHIPPED | OUTPATIENT
Start: 2018-06-18 | End: 2018-09-06 | Stop reason: SDUPTHER

## 2018-08-09 RX ORDER — IBUPROFEN 200 MG
1 TABLET ORAL DAILY
Qty: 28 PATCH | Refills: 0 | Status: SHIPPED | OUTPATIENT
Start: 2018-08-09 | End: 2018-09-06 | Stop reason: SDUPTHER

## 2018-08-09 RX ORDER — DM/P-EPHED/ACETAMINOPH/DOXYLAM 30-7.5/3
2 LIQUID (ML) ORAL
Qty: 144 LOZENGE | Refills: 0 | Status: SHIPPED | OUTPATIENT
Start: 2018-08-09 | End: 2018-09-06 | Stop reason: SDUPTHER

## 2018-08-15 ENCOUNTER — CLINICAL SUPPORT (OUTPATIENT)
Dept: SMOKING CESSATION | Facility: CLINIC | Age: 53
End: 2018-08-15
Payer: COMMERCIAL

## 2018-08-15 DIAGNOSIS — F17.200 NICOTINE DEPENDENCE: Primary | ICD-10-CM

## 2018-08-15 PROCEDURE — 99407 BEHAV CHNG SMOKING > 10 MIN: CPT | Mod: S$GLB,,,

## 2018-08-15 RX ORDER — ATORVASTATIN CALCIUM 40 MG/1
TABLET, FILM COATED ORAL
Qty: 12 TABLET | Refills: 0 | Status: SHIPPED | OUTPATIENT
Start: 2018-08-15 | End: 2020-04-15 | Stop reason: SDUPTHER

## 2018-08-15 NOTE — PROGRESS NOTES
Successful contact with patient regarding tobacco cessation quit #1. Pt states, she remain tobacco free since 8/10/2018. Pt commended for the accomplishment thus far. Pt also states, she recently had surgery and she is unable to schedule an appointment at this time. Pt informed of her current benefit status, future 6 and 12 month telephone follow ups, and contact information to schedule an appointment when she ready. Will update the tobacco cessation smart form for 3 months on quit #1.

## 2018-08-16 ENCOUNTER — PATIENT MESSAGE (OUTPATIENT)
Dept: FAMILY MEDICINE | Facility: CLINIC | Age: 53
End: 2018-08-16

## 2018-08-28 RX ORDER — ATORVASTATIN CALCIUM 40 MG/1
TABLET, FILM COATED ORAL
Qty: 12 TABLET | Refills: 0 | OUTPATIENT
Start: 2018-08-28

## 2018-09-04 RX ORDER — FLUTICASONE FUROATE AND VILANTEROL TRIFENATATE 100; 25 UG/1; UG/1
POWDER RESPIRATORY (INHALATION)
Qty: 60 EACH | Refills: 11 | Status: SHIPPED | OUTPATIENT
Start: 2018-09-04 | End: 2020-02-28

## 2018-09-05 ENCOUNTER — CLINICAL SUPPORT (OUTPATIENT)
Dept: SMOKING CESSATION | Facility: CLINIC | Age: 53
End: 2018-09-05
Payer: COMMERCIAL

## 2018-09-05 DIAGNOSIS — F17.200 NICOTINE DEPENDENCE: Primary | ICD-10-CM

## 2018-09-05 DIAGNOSIS — F17.210 CIGARETTE NICOTINE DEPENDENCE, UNCOMPLICATED: ICD-10-CM

## 2018-09-05 PROCEDURE — 99407 BEHAV CHNG SMOKING > 10 MIN: CPT | Mod: S$GLB,,,

## 2018-09-06 RX ORDER — VARENICLINE TARTRATE 1 MG/1
1 TABLET, FILM COATED ORAL 2 TIMES DAILY
Qty: 56 TABLET | Refills: 0 | Status: SHIPPED | OUTPATIENT
Start: 2018-09-06 | End: 2019-05-23 | Stop reason: ALTCHOICE

## 2018-09-06 RX ORDER — DM/P-EPHED/ACETAMINOPH/DOXYLAM 30-7.5/3
2 LIQUID (ML) ORAL
Qty: 144 LOZENGE | Refills: 0 | Status: SHIPPED | OUTPATIENT
Start: 2018-09-06 | End: 2018-10-19 | Stop reason: SDUPTHER

## 2018-09-06 RX ORDER — IBUPROFEN 200 MG
1 TABLET ORAL DAILY
Qty: 28 PATCH | Refills: 0 | Status: SHIPPED | OUTPATIENT
Start: 2018-09-06 | End: 2019-05-23 | Stop reason: ALTCHOICE

## 2018-10-19 DIAGNOSIS — F17.200 NICOTINE DEPENDENCE: ICD-10-CM

## 2018-10-19 DIAGNOSIS — F17.210 LIGHT CIGARETTE SMOKER (1-9 CIGS/DAY): ICD-10-CM

## 2018-10-19 RX ORDER — DM/P-EPHED/ACETAMINOPH/DOXYLAM 30-7.5/3
2 LIQUID (ML) ORAL
Qty: 144 LOZENGE | Refills: 0 | Status: SHIPPED | OUTPATIENT
Start: 2018-10-19 | End: 2019-05-23 | Stop reason: ALTCHOICE

## 2018-10-19 RX ORDER — IBUPROFEN 200 MG
TABLET ORAL
Qty: 28 PATCH | Refills: 0 | Status: SHIPPED | OUTPATIENT
Start: 2018-10-19 | End: 2019-03-14 | Stop reason: SDUPTHER

## 2018-10-25 RX ORDER — ESTRADIOL 0.1 MG/G
1 CREAM VAGINAL
Qty: 4 G | Refills: 5 | Status: SHIPPED | OUTPATIENT
Start: 2018-10-25 | End: 2022-08-25 | Stop reason: SDUPTHER

## 2018-10-25 NOTE — TELEPHONE ENCOUNTER
----- Message from Karissa Holguin sent at 10/25/2018 11:49 AM CDT -----  Contact: pt   1. What is the name of the medication you are requesting? Estradiol  2. What is the dose? 0.01 cream  3. How do you take the medication? Orally, topically, etc? topically  4. How often do you take this medication? As needed  5. Do you need a 30 day or 90 day supply? 0  6. How many refills are you requesting? 1  7. What is your preferred pharmacy and location of the pharmacy? [facility] [address]  8. Who can we contact with further questions? [phone]    Please contact pt once it has been sent

## 2018-11-09 ENCOUNTER — TELEPHONE (OUTPATIENT)
Dept: SMOKING CESSATION | Facility: CLINIC | Age: 53
End: 2018-11-09

## 2018-11-26 ENCOUNTER — TELEPHONE (OUTPATIENT)
Dept: SMOKING CESSATION | Facility: CLINIC | Age: 53
End: 2018-11-26

## 2019-02-01 ENCOUNTER — TELEPHONE (OUTPATIENT)
Dept: PULMONOLOGY | Facility: CLINIC | Age: 54
End: 2019-02-01

## 2019-02-01 NOTE — TELEPHONE ENCOUNTER
Returned patient's call regarding rescheduling appointment. Accommodated patient with rescheduling to a later time. Appointment letter mailed to patient.

## 2019-02-01 NOTE — TELEPHONE ENCOUNTER
----- Message from Tamie Mcduffie sent at 2/1/2019 12:26 PM CST -----  Contact: self  Requesting call back regarding questions about upcoming appt. Please call back at [phone].    Thanks,  Tamie Mcduffie

## 2019-03-14 DIAGNOSIS — F17.210 LIGHT CIGARETTE SMOKER (1-9 CIGS/DAY): ICD-10-CM

## 2019-03-14 RX ORDER — IBUPROFEN 200 MG
TABLET ORAL
Qty: 28 PATCH | Refills: 0 | Status: SHIPPED | OUTPATIENT
Start: 2019-03-14 | End: 2019-05-23 | Stop reason: ALTCHOICE

## 2019-05-09 DIAGNOSIS — Z12.39 BREAST CANCER SCREENING: Primary | ICD-10-CM

## 2019-05-23 ENCOUNTER — CLINICAL SUPPORT (OUTPATIENT)
Dept: SMOKING CESSATION | Facility: CLINIC | Age: 54
End: 2019-05-23
Payer: COMMERCIAL

## 2019-05-23 DIAGNOSIS — F17.210 HEAVY CIGARETTE SMOKER (20-39 PER DAY): Primary | ICD-10-CM

## 2019-05-23 PROCEDURE — 99999 PR PBB SHADOW E&M-EST. PATIENT-LVL I: CPT | Mod: PBBFAC,,,

## 2019-05-23 PROCEDURE — 99999 PR PBB SHADOW E&M-EST. PATIENT-LVL I: ICD-10-PCS | Mod: PBBFAC,,,

## 2019-05-23 PROCEDURE — 90853 PR GROUP PSYCHOTHERAPY: ICD-10-PCS | Mod: S$GLB,,,

## 2019-05-23 PROCEDURE — 90853 GROUP PSYCHOTHERAPY: CPT | Mod: S$GLB,,,

## 2019-05-23 RX ORDER — ASPIRIN/CALCIUM CARB/MAGNESIUM 325 MG
4 TABLET ORAL
Qty: 108 LOZENGE | Refills: 0 | Status: SHIPPED | OUTPATIENT
Start: 2019-05-23 | End: 2019-06-24

## 2019-05-23 RX ORDER — IBUPROFEN 200 MG
1 TABLET ORAL DAILY
Qty: 28 PATCH | Refills: 0 | Status: SHIPPED | OUTPATIENT
Start: 2019-05-23 | End: 2019-06-18 | Stop reason: SDUPTHER

## 2019-05-23 RX ORDER — VARENICLINE TARTRATE 0.5 (11)-1
KIT ORAL
Qty: 53 TABLET | Refills: 0 | Status: SHIPPED | OUTPATIENT
Start: 2019-05-23 | End: 2019-06-21 | Stop reason: DRUGHIGH

## 2019-05-23 NOTE — Clinical Note
Patient seen for the tobacco cessation program. She is a cigarette smoker of 1+  PPD X 24  years. This is her second attempt to stop smoking through our program.    is motivated to quit the use of tobacco and has agreed to attend our 6 week tobacco cessation program.

## 2019-05-30 ENCOUNTER — CLINICAL SUPPORT (OUTPATIENT)
Dept: SMOKING CESSATION | Facility: CLINIC | Age: 54
End: 2019-05-30
Payer: COMMERCIAL

## 2019-05-30 DIAGNOSIS — F17.210 LIGHT CIGARETTE SMOKER (1-9 CIGS/DAY): Primary | ICD-10-CM

## 2019-05-30 PROCEDURE — 99407 PR TOBACCO USE CESSATION INTENSIVE >10 MINUTES: ICD-10-PCS | Mod: S$GLB,,,

## 2019-05-30 PROCEDURE — 99407 BEHAV CHNG SMOKING > 10 MIN: CPT | Mod: S$GLB,,,

## 2019-06-10 ENCOUNTER — TELEPHONE (OUTPATIENT)
Dept: SMOKING CESSATION | Facility: CLINIC | Age: 54
End: 2019-06-10

## 2019-06-10 ENCOUNTER — CLINICAL SUPPORT (OUTPATIENT)
Dept: SMOKING CESSATION | Facility: CLINIC | Age: 54
End: 2019-06-10
Payer: COMMERCIAL

## 2019-06-10 DIAGNOSIS — F17.200 NICOTINE DEPENDENCE: Primary | ICD-10-CM

## 2019-06-10 PROCEDURE — 99407 BEHAV CHNG SMOKING > 10 MIN: CPT | Mod: S$GLB,,,

## 2019-06-10 PROCEDURE — 99407 PR TOBACCO USE CESSATION INTENSIVE >10 MINUTES: ICD-10-PCS | Mod: S$GLB,,,

## 2019-06-10 NOTE — TELEPHONE ENCOUNTER
Attempted to contact patient to follow up with smoking cessation. I was able to leave a detailed message with the following information: Diane Miller, [facility] and my phone number [phone]. I requested a return call.

## 2019-06-14 ENCOUNTER — CLINICAL SUPPORT (OUTPATIENT)
Dept: SMOKING CESSATION | Facility: CLINIC | Age: 54
End: 2019-06-14
Payer: COMMERCIAL

## 2019-06-14 DIAGNOSIS — F17.200 NICOTINE DEPENDENCE: Primary | ICD-10-CM

## 2019-06-14 PROCEDURE — 99407 PR TOBACCO USE CESSATION INTENSIVE >10 MINUTES: ICD-10-PCS | Mod: S$GLB,,,

## 2019-06-14 PROCEDURE — 99407 BEHAV CHNG SMOKING > 10 MIN: CPT | Mod: S$GLB,,,

## 2019-06-18 DIAGNOSIS — F17.210 HEAVY CIGARETTE SMOKER (20-39 PER DAY): ICD-10-CM

## 2019-06-18 RX ORDER — IBUPROFEN 200 MG
1 TABLET ORAL DAILY
Qty: 28 PATCH | Refills: 0 | Status: SHIPPED | OUTPATIENT
Start: 2019-06-18 | End: 2019-06-24

## 2019-06-20 ENCOUNTER — CLINICAL SUPPORT (OUTPATIENT)
Dept: SMOKING CESSATION | Facility: CLINIC | Age: 54
End: 2019-06-20
Payer: COMMERCIAL

## 2019-06-20 DIAGNOSIS — F17.200 NICOTINE DEPENDENCE: Primary | ICD-10-CM

## 2019-06-21 RX ORDER — VARENICLINE TARTRATE 1 MG/1
1 TABLET, FILM COATED ORAL 2 TIMES DAILY
Qty: 56 TABLET | Refills: 0 | Status: SHIPPED | OUTPATIENT
Start: 2019-06-21 | End: 2019-07-29 | Stop reason: SDUPTHER

## 2019-06-24 ENCOUNTER — OFFICE VISIT (OUTPATIENT)
Dept: OBSTETRICS AND GYNECOLOGY | Facility: CLINIC | Age: 54
End: 2019-06-24
Payer: MEDICARE

## 2019-06-24 VITALS
SYSTOLIC BLOOD PRESSURE: 112 MMHG | DIASTOLIC BLOOD PRESSURE: 60 MMHG | BODY MASS INDEX: 37.06 KG/M2 | WEIGHT: 176.56 LBS | HEIGHT: 58 IN

## 2019-06-24 DIAGNOSIS — Z01.419 WELL WOMAN EXAM WITH ROUTINE GYNECOLOGICAL EXAM: ICD-10-CM

## 2019-06-24 DIAGNOSIS — N39.46 MIXED INCONTINENCE: Primary | ICD-10-CM

## 2019-06-24 DIAGNOSIS — R35.1 NOCTURIA: ICD-10-CM

## 2019-06-24 DIAGNOSIS — Z00.00 PREVENTATIVE HEALTH CARE: ICD-10-CM

## 2019-06-24 PROCEDURE — 99999 PR PBB SHADOW E&M-EST. PATIENT-LVL IV: ICD-10-PCS | Mod: PBBFAC,,, | Performed by: NURSE PRACTITIONER

## 2019-06-24 PROCEDURE — 3074F PR MOST RECENT SYSTOLIC BLOOD PRESSURE < 130 MM HG: ICD-10-PCS | Mod: CPTII,S$GLB,, | Performed by: NURSE PRACTITIONER

## 2019-06-24 PROCEDURE — G0101 CA SCREEN;PELVIC/BREAST EXAM: HCPCS | Mod: S$GLB,,, | Performed by: NURSE PRACTITIONER

## 2019-06-24 PROCEDURE — 87086 URINE CULTURE/COLONY COUNT: CPT

## 2019-06-24 PROCEDURE — 3078F DIAST BP <80 MM HG: CPT | Mod: CPTII,S$GLB,, | Performed by: NURSE PRACTITIONER

## 2019-06-24 PROCEDURE — 3074F SYST BP LT 130 MM HG: CPT | Mod: CPTII,S$GLB,, | Performed by: NURSE PRACTITIONER

## 2019-06-24 PROCEDURE — 99999 PR PBB SHADOW E&M-EST. PATIENT-LVL IV: CPT | Mod: PBBFAC,,, | Performed by: NURSE PRACTITIONER

## 2019-06-24 PROCEDURE — 3078F PR MOST RECENT DIASTOLIC BLOOD PRESSURE < 80 MM HG: ICD-10-PCS | Mod: CPTII,S$GLB,, | Performed by: NURSE PRACTITIONER

## 2019-06-24 PROCEDURE — G0101 PR CA SCREEN;PELVIC/BREAST EXAM: ICD-10-PCS | Mod: S$GLB,,, | Performed by: NURSE PRACTITIONER

## 2019-06-24 RX ORDER — MODAFINIL 200 MG/1
200 TABLET ORAL DAILY
COMMUNITY
Start: 2019-06-10 | End: 2020-07-14

## 2019-06-24 RX ORDER — PREDNISOLONE ACETATE 10 MG/ML
SUSPENSION/ DROPS OPHTHALMIC
COMMUNITY
Start: 2019-06-18 | End: 2020-03-11

## 2019-06-24 RX ORDER — DULAGLUTIDE 0.75 MG/.5ML
INJECTION, SOLUTION SUBCUTANEOUS
COMMUNITY
Start: 2019-06-18 | End: 2020-03-11

## 2019-06-24 RX ORDER — NAPROXEN SODIUM 550 MG/1
550 TABLET ORAL
COMMUNITY
End: 2020-08-31

## 2019-06-24 RX ORDER — FLUDROCORTISONE ACETATE 0.1 MG/1
TABLET ORAL
COMMUNITY
Start: 2019-06-20 | End: 2020-04-15 | Stop reason: SDUPTHER

## 2019-06-24 RX ORDER — MIDODRINE HYDROCHLORIDE 10 MG/1
TABLET ORAL
COMMUNITY
Start: 2019-06-17 | End: 2020-04-15 | Stop reason: SDUPTHER

## 2019-06-24 NOTE — PROGRESS NOTES
CC: Well woman exam    Bianca Weldon is a 54 y.o. female  presents for a well woman exam.  Patient is s/p benign hysterectomy with one ovary. Is sexually active, using estrace cream. C/O nocturia ( 2-3 times/night) and mixed incontinence. Normal mammogram, 2018.    Past Medical History:   Diagnosis Date    Abnormal Pap smear of cervix     Abnormal Pap smear of vagina     after hyst, repeat every 6 months, per pt, had bx's, unknown results    Acid reflux     Arthritis     Depression     bipolar    Diabetes mellitus     Diabetes mellitus, type 2     Hyperlipidemia     Hypertension     IBS (irritable bowel syndrome)     Interstitial cystitis     Restless legs syndrome     Thyroid disease      Past Surgical History:   Procedure Laterality Date    ADENOIDECTOMY      APPENDECTOMY      COLONOSCOPY      CYSTOSTOMY W/ BLADDER BIOPSY      interstial cystitis    DILATION AND CURETTAGE OF UTERUS      missed ab    ESOPHAGEAL DILATION      HYSTERECTOMY      TLH, LSO (endometriosis)    OOPHORECTOMY      LSO    TONSILLECTOMY      WISDOM TOOTH EXTRACTION       Family History   Problem Relation Age of Onset    Diabetes Mellitus Mother     Melanoma Mother     Diabetes Mellitus Father     Colon cancer Father     Heart disease Father 45        CABG    Rheum arthritis Maternal Grandmother     Breast cancer Maternal Grandmother     Thrombophilia Paternal Grandmother         DVTs    Ovarian cancer Neg Hx      Social History     Tobacco Use    Smoking status: Former Smoker     Packs/day: 1.50     Years: 33.00     Pack years: 49.50     Last attempt to quit: 2017     Years since quittin.4    Smokeless tobacco: Former User   Substance Use Topics    Alcohol use: No    Drug use: No     OB History        3    Para   2    Term   2            AB   1    Living   2       SAB   1    TAB        Ectopic        Multiple        Live Births                     /60 (BP Location:  "Right arm, Patient Position: Sitting, BP Method: Medium (Manual))   Ht 4' 10" (1.473 m)   Wt 80.1 kg (176 lb 9.4 oz)   BMI 36.91 kg/m²     ROS:  GENERAL: Denies weight gain or weight loss. Feeling well overall.   SKIN: Denies rash or lesions.   HEAD: Denies head injury or headache.   NODES: Denies enlarged lymph nodes.   CHEST: Denies chest pain or shortness of breath.   CARDIOVASCULAR: Denies palpitations or left sided chest pain.   ABDOMEN: No abdominal pain, constipation, diarrhea, nausea, vomiting or rectal bleeding.   URINARY: No frequency, dysuria, hematuria, or burning on urination.  REPRODUCTIVE: See HPI.   BREASTS: The patient performs breast self-examination and denies pain, lumps, or nipple discharge.   HEMATOLOGIC: No easy bruisability or excessive bleeding.   MUSCULOSKELETAL: Denies joint pain or swelling.   NEUROLOGIC: Denies syncope or weakness.   PSYCHIATRIC: Denies depression, anxiety or mood swings.    PE:   APPEARANCE: Obese female, in no acute distress.  AFFECT: WNL, alert and oriented x 3.  SKIN: No acne or hirsutism.  NECK: Neck symmetric without masses or thyromegaly.  NODES: No inguinal, cervical, axillary or femoral lymph node enlargement.  CHEST: Good respiratory effort.   ABDOMEN: Soft. No tenderness or masses. No hepatosplenomegaly. No hernias.  BREASTS: Symmetrical, no skin changes or visible lesions. No palpable masses, nipple discharge bilaterally.  PELVIC: Normal external female genitalia without lesions. Normal hair distribution. Adequate perineal body, normal urethral meatus. Vagina atrophic without lesions or discharge. No significant cystocele or rectocele. Bimanual exam shows uterus and cervix to be surgically absent. Adnexa without masses or tenderness.  RECTAL: Rectovaginal exam confirms above with normal sphincter tone, no masses.  EXTREMITIES: No edema.    1. Mixed incontinence  Urine culture    Ambulatory consult to Physical Therapy   2. Well woman exam with routine " gynecological exam     3. Nocturia  Urine culture    Ambulatory consult to Physical Therapy   4. Preventative health care       PLAN:  Bladder matters book given for reference  Mammogram  Referral for PT pelvic floor training  Patient was counseled today on A.C.S. Pap guidelines and recommendations for yearly pelvic exams, mammograms and monthly self breast exams; to see her PCP for other health maintenance.

## 2019-06-25 ENCOUNTER — HOSPITAL ENCOUNTER (OUTPATIENT)
Dept: RADIOLOGY | Facility: HOSPITAL | Age: 54
Discharge: HOME | End: 2019-06-25
Attending: NURSE PRACTITIONER
Payer: MEDICARE

## 2019-06-25 DIAGNOSIS — Z12.39 BREAST CANCER SCREENING: ICD-10-CM

## 2019-06-25 PROCEDURE — 77067 SCR MAMMO BI INCL CAD: CPT | Mod: TC

## 2019-06-25 PROCEDURE — 77063 BREAST TOMOSYNTHESIS BI: CPT | Mod: 26,,, | Performed by: RADIOLOGY

## 2019-06-25 PROCEDURE — 77067 MAMMO DIGITAL SCREENING BILAT WITH TOMOSYNTHESIS_CAD: ICD-10-PCS | Mod: 26,,, | Performed by: RADIOLOGY

## 2019-06-25 PROCEDURE — 77067 SCR MAMMO BI INCL CAD: CPT | Mod: 26,,, | Performed by: RADIOLOGY

## 2019-06-25 PROCEDURE — 77063 MAMMO DIGITAL SCREENING BILAT WITH TOMOSYNTHESIS_CAD: ICD-10-PCS | Mod: 26,,, | Performed by: RADIOLOGY

## 2019-06-26 LAB — BACTERIA UR CULT: NO GROWTH

## 2019-07-26 ENCOUNTER — TELEPHONE (OUTPATIENT)
Dept: PULMONOLOGY | Facility: CLINIC | Age: 54
End: 2019-07-26

## 2019-07-26 NOTE — TELEPHONE ENCOUNTER
----- Message from Gerry Perez sent at 7/26/2019 11:49 AM CDT -----  Contact: Pt  The patient is calling back to reschedule her pulmonary labs & appointments for next week if possible,please advise [phone] (home)

## 2019-07-29 ENCOUNTER — CLINICAL SUPPORT (OUTPATIENT)
Dept: SMOKING CESSATION | Facility: CLINIC | Age: 54
End: 2019-07-29
Payer: COMMERCIAL

## 2019-07-29 DIAGNOSIS — F17.200 NICOTINE DEPENDENCE: Primary | ICD-10-CM

## 2019-07-29 PROCEDURE — 99407 PR TOBACCO USE CESSATION INTENSIVE >10 MINUTES: ICD-10-PCS | Mod: S$GLB,,,

## 2019-07-29 PROCEDURE — 99407 BEHAV CHNG SMOKING > 10 MIN: CPT | Mod: S$GLB,,,

## 2019-07-29 RX ORDER — VARENICLINE TARTRATE 1 MG/1
1 TABLET, FILM COATED ORAL 2 TIMES DAILY
Qty: 56 TABLET | Refills: 0 | Status: SHIPPED | OUTPATIENT
Start: 2019-07-29 | End: 2020-03-11

## 2019-07-29 RX ORDER — ASPIRIN/CALCIUM CARB/MAGNESIUM 325 MG
4 TABLET ORAL
Qty: 108 LOZENGE | Refills: 0 | Status: SHIPPED | OUTPATIENT
Start: 2019-07-29 | End: 2020-03-11

## 2019-11-05 ENCOUNTER — CLINICAL SUPPORT (OUTPATIENT)
Dept: SMOKING CESSATION | Facility: CLINIC | Age: 54
End: 2019-11-05
Payer: COMMERCIAL

## 2019-11-05 ENCOUNTER — TELEPHONE (OUTPATIENT)
Dept: SMOKING CESSATION | Facility: CLINIC | Age: 54
End: 2019-11-05

## 2019-11-05 DIAGNOSIS — F17.200 NICOTINE DEPENDENCE: Primary | ICD-10-CM

## 2019-11-05 PROCEDURE — 99407 PR TOBACCO USE CESSATION INTENSIVE >10 MINUTES: ICD-10-PCS | Mod: S$GLB,,,

## 2019-11-05 PROCEDURE — 99407 BEHAV CHNG SMOKING > 10 MIN: CPT | Mod: S$GLB,,,

## 2019-11-05 NOTE — PROGRESS NOTES
Called pt to f/u on her 3 and 6 month smoking cessation quit status. Pt stated she remains tobacco free since 6/8/19. Stated she used Chantix, patches, and lozenges. Congratulated her on her hard work and success. Informed her of benefit period, phone follow ups, and contact information. Will complete 3 and 6 month smart form and will continue to follow up on quit #2 episode.

## 2020-02-17 ENCOUNTER — OFFICE VISIT (OUTPATIENT)
Dept: PODIATRY | Facility: CLINIC | Age: 55
End: 2020-02-17
Payer: MEDICARE

## 2020-02-17 VITALS
WEIGHT: 174.19 LBS | HEIGHT: 58 IN | SYSTOLIC BLOOD PRESSURE: 152 MMHG | DIASTOLIC BLOOD PRESSURE: 78 MMHG | BODY MASS INDEX: 36.56 KG/M2 | HEART RATE: 92 BPM

## 2020-02-17 DIAGNOSIS — M72.2 PLANTAR FASCIITIS: Primary | ICD-10-CM

## 2020-02-17 DIAGNOSIS — E66.01 SEVERE OBESITY (BMI 35.0-39.9) WITH COMORBIDITY: ICD-10-CM

## 2020-02-17 DIAGNOSIS — M79.671 PAIN IN RIGHT FOOT: ICD-10-CM

## 2020-02-17 DIAGNOSIS — M24.571 CONTRACTURE, RIGHT ANKLE: ICD-10-CM

## 2020-02-17 DIAGNOSIS — B35.3 TINEA PEDIS OF BOTH FEET: ICD-10-CM

## 2020-02-17 PROCEDURE — 20550 NJX 1 TENDON SHEATH/LIGAMENT: CPT | Mod: RT,S$GLB,, | Performed by: PODIATRIST

## 2020-02-17 PROCEDURE — 3077F PR MOST RECENT SYSTOLIC BLOOD PRESSURE >= 140 MM HG: ICD-10-PCS | Mod: CPTII,S$GLB,, | Performed by: PODIATRIST

## 2020-02-17 PROCEDURE — 20550 PR INJECT TENDON SHEATH/LIGAMENT: ICD-10-PCS | Mod: RT,S$GLB,, | Performed by: PODIATRIST

## 2020-02-17 PROCEDURE — 3008F BODY MASS INDEX DOCD: CPT | Mod: CPTII,S$GLB,, | Performed by: PODIATRIST

## 2020-02-17 PROCEDURE — 3078F DIAST BP <80 MM HG: CPT | Mod: CPTII,S$GLB,, | Performed by: PODIATRIST

## 2020-02-17 PROCEDURE — 99203 PR OFFICE/OUTPT VISIT, NEW, LEVL III, 30-44 MIN: ICD-10-PCS | Mod: 25,S$GLB,, | Performed by: PODIATRIST

## 2020-02-17 PROCEDURE — 3008F PR BODY MASS INDEX (BMI) DOCUMENTED: ICD-10-PCS | Mod: CPTII,S$GLB,, | Performed by: PODIATRIST

## 2020-02-17 PROCEDURE — 3077F SYST BP >= 140 MM HG: CPT | Mod: CPTII,S$GLB,, | Performed by: PODIATRIST

## 2020-02-17 PROCEDURE — 3078F PR MOST RECENT DIASTOLIC BLOOD PRESSURE < 80 MM HG: ICD-10-PCS | Mod: CPTII,S$GLB,, | Performed by: PODIATRIST

## 2020-02-17 PROCEDURE — 99999 PR PBB SHADOW E&M-EST. PATIENT-LVL III: ICD-10-PCS | Mod: PBBFAC,,, | Performed by: PODIATRIST

## 2020-02-17 PROCEDURE — 99203 OFFICE O/P NEW LOW 30 MIN: CPT | Mod: 25,S$GLB,, | Performed by: PODIATRIST

## 2020-02-17 PROCEDURE — 99999 PR PBB SHADOW E&M-EST. PATIENT-LVL III: CPT | Mod: PBBFAC,,, | Performed by: PODIATRIST

## 2020-02-17 RX ORDER — DEXAMETHASONE SODIUM PHOSPHATE 4 MG/ML
4 INJECTION, SOLUTION INTRA-ARTICULAR; INTRALESIONAL; INTRAMUSCULAR; INTRAVENOUS; SOFT TISSUE ONCE
Status: COMPLETED | OUTPATIENT
Start: 2020-02-17 | End: 2020-02-17

## 2020-02-17 RX ORDER — METHYLPREDNISOLONE ACETATE 40 MG/ML
40 INJECTION, SUSPENSION INTRA-ARTICULAR; INTRALESIONAL; INTRAMUSCULAR; SOFT TISSUE
Status: COMPLETED | OUTPATIENT
Start: 2020-02-17 | End: 2020-02-17

## 2020-02-17 RX ORDER — KETOCONAZOLE 20 MG/G
CREAM TOPICAL 2 TIMES DAILY
Qty: 30 G | Refills: 2 | Status: SHIPPED | OUTPATIENT
Start: 2020-02-17 | End: 2020-04-04

## 2020-02-17 RX ADMIN — METHYLPREDNISOLONE ACETATE 40 MG: 40 INJECTION, SUSPENSION INTRA-ARTICULAR; INTRALESIONAL; INTRAMUSCULAR; SOFT TISSUE at 08:02

## 2020-02-17 RX ADMIN — DEXAMETHASONE SODIUM PHOSPHATE 4 MG: 4 INJECTION, SOLUTION INTRA-ARTICULAR; INTRALESIONAL; INTRAMUSCULAR; INTRAVENOUS; SOFT TISSUE at 08:02

## 2020-02-17 NOTE — PROGRESS NOTES
Subjective:       Patient ID: Bianca Weldon is a 55 y.o. female.    Chief Complaint: Foot Pain (medial plantar heel pain, last injection was 2017, c/o pain 7/10, pt is a diabetic)      HPI: Bianca Weldon complains of moderate pains to the right foot. States pains are sharp and stabbing-like in nature. Pains are to the plantar foot, mostly with walking and standing. Rates the pains at approx. 7/10. States post-static dyskinesia. Denies any recent identifiable trauma. Does limp with gait. No NSAID medications thus far. Pains have been present for the past several weeks to months and the pains have worsened over the past couple of weeks.  She does have a history of plantar fasciitis. States walking and standing causes and/or exacerbates the symptoms. Patient has had no recent corticosteroid injection(s) to the right foot, prior. Patient's Primary Care Provider is Garry Lyles MD.  Patient also states a rash to bilateral foot, to the webspaces.    Review of patient's allergies indicates:   Allergen Reactions    Bactrim [sulfamethoxazole-trimethoprim] Swelling, Other (See Comments) and Anaphylaxis    Macrodantin [nitrofurantoin macrocrystalline] Swelling, Other (See Comments) and Anaphylaxis    Canagliflozin      Yeast infections    Metformin Diarrhea     diarrhea  diarrhea  diarrhea       Past Medical History:   Diagnosis Date    Abnormal Pap smear of cervix     Abnormal Pap smear of vagina     after hyst, repeat every 6 months, per pt, had bx's, unknown results    Acid reflux     Arthritis     Depression     bipolar    Diabetes mellitus     Diabetes mellitus, type 2     Hyperlipidemia     Hypertension     IBS (irritable bowel syndrome)     Interstitial cystitis     Restless legs syndrome     Thyroid disease        Family History   Problem Relation Age of Onset    Diabetes Mellitus Mother     Melanoma Mother     Diabetes Mellitus Father     Colon cancer Father     Heart disease Father 45         CABG    Rheum arthritis Maternal Grandmother     Breast cancer Maternal Grandmother     Thrombophilia Paternal Grandmother         DVTs    Ovarian cancer Neg Hx        Social History     Socioeconomic History    Marital status:      Spouse name: Not on file    Number of children: Not on file    Years of education: Not on file    Highest education level: Not on file   Occupational History    Occupation: disabled   Social Needs    Financial resource strain: Not on file    Food insecurity:     Worry: Not on file     Inability: Not on file    Transportation needs:     Medical: Not on file     Non-medical: Not on file   Tobacco Use    Smoking status: Former Smoker     Packs/day: 1.50     Years: 33.00     Pack years: 49.50     Last attempt to quit: 1/5/2017     Years since quitting: 3.1    Smokeless tobacco: Former User   Substance and Sexual Activity    Alcohol use: No    Drug use: No    Sexual activity: Yes     Partners: Male     Birth control/protection: Surgical     Comment: hyst; mut monog   Lifestyle    Physical activity:     Days per week: Not on file     Minutes per session: Not on file    Stress: Not on file   Relationships    Social connections:     Talks on phone: Not on file     Gets together: Not on file     Attends Judaism service: Not on file     Active member of club or organization: Not on file     Attends meetings of clubs or organizations: Not on file     Relationship status: Not on file   Other Topics Concern    Are you pregnant or think you may be? No    Breast-feeding No   Social History Narrative    Not on file       Past Surgical History:   Procedure Laterality Date    ADENOIDECTOMY      APPENDECTOMY      COLONOSCOPY      CYSTOSTOMY W/ BLADDER BIOPSY      interstial cystitis    DILATION AND CURETTAGE OF UTERUS      missed ab    ESOPHAGEAL DILATION      HYSTERECTOMY  2001    TLH, LSO (endometriosis)    OOPHORECTOMY  2001    LSO    TONSILLECTOMY      KARINA  "TOOTH EXTRACTION         Review of Systems   Constitutional: Negative for chills, fatigue and fever.   HENT: Negative for hearing loss.    Eyes: Negative for photophobia and visual disturbance.   Respiratory: Negative for cough, chest tightness, shortness of breath and wheezing.    Cardiovascular: Negative for chest pain and palpitations.   Gastrointestinal: Negative for constipation, diarrhea, nausea and vomiting.   Endocrine: Negative for cold intolerance and heat intolerance.   Genitourinary: Negative for flank pain.   Musculoskeletal: Positive for gait problem. Negative for neck pain and neck stiffness.   Skin: Positive for rash.   Neurological: Negative for light-headedness and headaches.   Psychiatric/Behavioral: Negative for sleep disturbance.         Objective:   BP (!) 152/78 (BP Location: Right arm, Patient Position: Sitting, BP Method: Medium (Automatic))   Pulse 92   Ht 4' 10" (1.473 m)   Wt 79 kg (174 lb 2.6 oz)   BMI 36.40 kg/m²         Physical Exam   LOWER EXTREMITY PHYSICAL EXAMINATION    VASCULAR: The right DP pulse is 2/4 and the left DP is 2/4. The right PT pulse is 2/4 and the left PT pulse is 2/4. Proximal to distal, warm to warm. No dependent rubor or elevation palor is noted. Capillary refill time is less than 3 seconds. Hair growth is appreciated to the dorsal foot and digits.    NEUROLOGY: Proprioception is intact, bilateral. Sensation to light touch is intact. Negative Tinel's Sign and negative Valleix Sign. No neurological sensations with compressio mild n of the area of Reyes's Nerve in the area of the Abductor Hallucis muscle belly.    ORTHOPEDIC:  There is moderate tenderness to palpation of the area around the plantar medial calcaneal tubercle on the right foot. Pains are characterized as sharp and stabbing-like with direct palpation of the area. There is also mild pain to palpation of the immediate plantar aspect of the heel, and mild pains to the lateral band of the fascia. No " edema is noted. No fullness is noted. No pains or defects are noted within the plantar fascia at the arch. No plantar fibromas are noted. No defects are noted within the ligament. Dorsiflexion of the MTPJs with simultaneous palpation of the fascia at the arch, does worsen and exacerbate the pains. No pains with medial to lateral compression of the heel. Equinus contracture is noted. Antalgic gait pattern is noted.     DERMATOLOGY: No ecchymosis is noted.  Skin is supple, dry and intact. Skin is supple.  No hyperkeratosis noted. No calluses.  No open wounds or ulcerations are noted.  No palpable plantar fibromas noted. Tinea pedis noted, bilateral lesser webspaces.  Maceration with malodor is noted.    Assessment:     1. Plantar fasciitis    2. Pain in right foot    3. Contracture, right ankle    4. Severe obesity (BMI 35.0-39.9) with comorbidity    5. Tinea pedis of both feet          Plan:     Plantar fasciitis  Pain in right foot  -     methylPREDNISolone acetate injection 40 mg  -     dexamethasone injection 4 mg    Thorough discussion is had with the patient today, concerning the diagnosis, its etiology, and the treatment algorithm at present.    Pres[place] [facility] [place] [address] [place] [id number], for lower extremity orthotic(s) management and/or shoe gear management.    Following sterile preparation with alcohol swab and/or betadine paint, injection was given into and around the area of the right plantar medial calcaneal tubercle, using 25-gauge needle. Injection consisted of approximately 0.5cc of Dexamethasone Sodium Phosphate, 0.5cc of Depo-Medrol (40mg/dL) and 0.5cc of 1% Lidocaine w/ or w/o Epinephrine or 0.25% or 0.50% Marcaine plain. Bandage application thereafter. Patient tolerated procedure well, and without complications or complaints. Patient educated that the area of pathology, might actually be slightly more painful, due to the  injection, over the course of the next one to two days.       Air Cast Walking Boot, short/tall is dispensed to the patient. The Air Cast Walking Boot is appropriately fitted and customized to the patient's lower extremity physique by the LPN/MA. Patient to ambulate with the walking device at all times. The patient should not sleep with the device or shower with the device, or drive with the device (if dispensed for right ankle/foot pathology).     Contracture, right ankle  Stretching exercises are discussed, taught, and are demonstrates to the patient this afternoon due to concomitant diagnosis of equinus contracture. The relationship between equinus contracture and the other aforementioned pathologies are detailed and outlined to the patient. The patient does acknowledge understanding, and we will embark on a vigorous stretching algorithm for the lower extremity.    Severe obesity (BMI 35.0-39.9) with comorbidity  Patient is counseled and reminded concerning the importance of good nutrition and healthy eating habits, which may include blood sugar control, to prevent and/or help podiatric foot and ankle complications.    Tinea pedis of both feet  -     ketoconazole (NIZORAL) 2 % cream; Apply topically 2 (two) times daily. for 14 days  Dispense: 30 g; Refill: 2    Recommend moisture wicking socks to alleviate the hyperhidrosis which makes one prone to recurrent tinea pedis.  I also recommend to alternate shoe gear, meaning, Monday, Wednesday, Friday, Saturday/Sunday and Tuesday, Thursday, Saturday/Sunday.  I do also recommend Tinactin antifungal spray to shoes daily.  After the aforementioned, put the shoes aside and allow to dry overnight and/or one full day.  Patient may purchase OTC Gold Bond Powder and use to his/her shoes daily prior to putting them on. He/She may also sprinkle a slight amount of powder to the foot prior to putting on socks.          No future appointments.

## 2020-02-24 ENCOUNTER — TELEPHONE (OUTPATIENT)
Dept: PODIATRY | Facility: CLINIC | Age: 55
End: 2020-02-24

## 2020-02-24 NOTE — TELEPHONE ENCOUNTER
Spoke with pt she wanted to cancel her appt. Pt stated that she will give us a call back when she is ready to reschedule.        Roya Hidalgo MA  Podiatry Surgical Department  [facility]               ----- Message from Leidy Alas sent at 2/24/2020  3:09 PM CST -----  Contact: self  needs to reschedule procedure..[phone] (home)

## 2020-02-28 ENCOUNTER — OFFICE VISIT (OUTPATIENT)
Dept: FAMILY MEDICINE | Facility: CLINIC | Age: 55
End: 2020-02-28
Payer: MEDICARE

## 2020-02-28 VITALS
HEART RATE: 119 BPM | WEIGHT: 171.31 LBS | OXYGEN SATURATION: 98 % | TEMPERATURE: 99 F | DIASTOLIC BLOOD PRESSURE: 78 MMHG | SYSTOLIC BLOOD PRESSURE: 134 MMHG | HEIGHT: 58 IN | BODY MASS INDEX: 35.96 KG/M2

## 2020-02-28 DIAGNOSIS — E66.01 SEVERE OBESITY (BMI 35.0-35.9 WITH COMORBIDITY): ICD-10-CM

## 2020-02-28 DIAGNOSIS — I10 ESSENTIAL HYPERTENSION: ICD-10-CM

## 2020-02-28 DIAGNOSIS — Z79.4 TYPE 2 DIABETES MELLITUS WITH CHRONIC KIDNEY DISEASE, WITH LONG-TERM CURRENT USE OF INSULIN, UNSPECIFIED CKD STAGE: ICD-10-CM

## 2020-02-28 DIAGNOSIS — R11.0 CHRONIC NAUSEA: Primary | ICD-10-CM

## 2020-02-28 DIAGNOSIS — E11.22 TYPE 2 DIABETES MELLITUS WITH CHRONIC KIDNEY DISEASE, WITH LONG-TERM CURRENT USE OF INSULIN, UNSPECIFIED CKD STAGE: ICD-10-CM

## 2020-02-28 DIAGNOSIS — E03.9 HYPOTHYROIDISM, UNSPECIFIED TYPE: ICD-10-CM

## 2020-02-28 DIAGNOSIS — F31.9 BIPOLAR AFFECTIVE DISORDER, REMISSION STATUS UNSPECIFIED: ICD-10-CM

## 2020-02-28 DIAGNOSIS — I95.1 ORTHOSTATIC HYPOTENSION: ICD-10-CM

## 2020-02-28 PROCEDURE — 3078F DIAST BP <80 MM HG: CPT | Mod: CPTII,S$GLB,, | Performed by: FAMILY MEDICINE

## 2020-02-28 PROCEDURE — 3008F BODY MASS INDEX DOCD: CPT | Mod: CPTII,S$GLB,, | Performed by: FAMILY MEDICINE

## 2020-02-28 PROCEDURE — 3078F PR MOST RECENT DIASTOLIC BLOOD PRESSURE < 80 MM HG: ICD-10-PCS | Mod: CPTII,S$GLB,, | Performed by: FAMILY MEDICINE

## 2020-02-28 PROCEDURE — 99999 PR PBB SHADOW E&M-EST. PATIENT-LVL III: ICD-10-PCS | Mod: PBBFAC,,, | Performed by: FAMILY MEDICINE

## 2020-02-28 PROCEDURE — 99214 OFFICE O/P EST MOD 30 MIN: CPT | Mod: S$GLB,,, | Performed by: FAMILY MEDICINE

## 2020-02-28 PROCEDURE — 99214 PR OFFICE/OUTPT VISIT, EST, LEVL IV, 30-39 MIN: ICD-10-PCS | Mod: S$GLB,,, | Performed by: FAMILY MEDICINE

## 2020-02-28 PROCEDURE — 3075F PR MOST RECENT SYSTOLIC BLOOD PRESS GE 130-139MM HG: ICD-10-PCS | Mod: CPTII,S$GLB,, | Performed by: FAMILY MEDICINE

## 2020-02-28 PROCEDURE — 3075F SYST BP GE 130 - 139MM HG: CPT | Mod: CPTII,S$GLB,, | Performed by: FAMILY MEDICINE

## 2020-02-28 PROCEDURE — 99999 PR PBB SHADOW E&M-EST. PATIENT-LVL III: CPT | Mod: PBBFAC,,, | Performed by: FAMILY MEDICINE

## 2020-02-28 PROCEDURE — 3008F PR BODY MASS INDEX (BMI) DOCUMENTED: ICD-10-PCS | Mod: CPTII,S$GLB,, | Performed by: FAMILY MEDICINE

## 2020-02-28 RX ORDER — ONDANSETRON 4 MG/1
4 TABLET, ORALLY DISINTEGRATING ORAL EVERY 6 HOURS PRN
Qty: 20 TABLET | Refills: 0 | Status: SHIPPED | OUTPATIENT
Start: 2020-02-28 | End: 2020-04-17 | Stop reason: SDUPTHER

## 2020-02-28 NOTE — PROGRESS NOTES
Subjective:       Patient ID: Bianca Weldon is a 55 y.o. female.    Chief Complaint: Blood Sugar Problem      HPI Comments:       Current Outpatient Medications:     aripiprazole (ABILIFY) 20 MG Tab, Take 10 mg by mouth., Disp: , Rfl:     ascorbic acid (VITAMIN C) 250 MG tablet, Take 250 mg by mouth once daily., Disp: , Rfl:     atorvastatin (LIPITOR) 40 MG tablet, TAKE 1 TABLET BY MOUTH EVERY DAY, Disp: 12 tablet, Rfl: 0    benztropine (COGENTIN) 1 MG tablet, Take 1 mg by mouth once daily. , Disp: , Rfl:     clonazepam (KLONOPIN) 2 MG Tab, Take 2 mg by mouth every evening. , Disp: , Rfl:     cycloSPORINE (RESTASIS) 0.05 % ophthalmic emulsion, 1 drop., Disp: , Rfl:     desvenlafaxine succinate (PRISTIQ) 50 MG Tb24, Take 50 mg by mouth., Disp: , Rfl:     estradiol (ESTRACE) 0.01 % (0.1 mg/gram) vaginal cream, Place 1 g vaginally every 7 days., Disp: 4 g, Rfl: 5    fish oil-omega-3 fatty acids 300-1,000 mg capsule, Take by mouth once daily., Disp: , Rfl:     fludrocortisone (FLORINEF) 0.1 mg Tab, , Disp: , Rfl:     fluticasone (FLONASE) 50 mcg/actuation nasal spray, instill 2 sprays into each nostril once daily, Disp: 16 g, Rfl: 11    insulin glargine (BASAGLAR KWIKPEN U-100 INSULIN) 100 unit/mL (3 mL) InPn pen, Inject 18 Units into the skin once daily., Disp: , Rfl:     ketoconazole (NIZORAL) 2 % cream, Apply topically 2 (two) times daily. for 14 days, Disp: 30 g, Rfl: 2    lamotrigine (LAMICTAL) 100 MG tablet, Take 100 mg by mouth., Disp: , Rfl:     levocetirizine (XYZAL) 5 MG tablet, Take 1 tablet (5 mg total) by mouth every evening., Disp: 30 tablet, Rfl: 11    levothyroxine (SYNTHROID) 75 MCG tablet, Take 1 tablet (75 mcg total) by mouth once daily., Disp: 90 tablet, Rfl: 0    losartan (COZAAR) 25 MG tablet, Take 25 mg by mouth once daily. , Disp: , Rfl:     methylphenidate HCl (RITALIN) 20 MG tablet, Take 20 mg by mouth once daily. , Disp: , Rfl:     midodrine (PROAMATINE) 10 MG tablet, ,  Disp: , Rfl:     modafinil (PROVIGIL) 200 MG Tab, , Disp: , Rfl:     naproxen sodium (ANAPROX) 550 MG tablet, Take 550 mg by mouth., Disp: , Rfl:     nicotine polacrilex 4 MG Lozg, Take 1 lozenge (4 mg total) by mouth as needed., Disp: 108 lozenge, Rfl: 0    ondansetron (ZOFRAN-ODT) 4 MG TbDL, Take 1 tablet (4 mg total) by mouth every 6 (six) hours as needed., Disp: 20 tablet, Rfl: 0    pantoprazole (PROTONIX) 40 MG tablet, 20 mg. , Disp: , Rfl:     prednisoLONE acetate (PRED FORTE) 1 % DrpS, , Disp: , Rfl:     triamcinolone acetonide 0.1% (KENALOG) 0.1 % cream, Apply topically 2 (two) times daily. Do not use on face, Disp: 45 Bottle, Rfl: 6    TRULICITY 0.75 mg/0.5 mL PnIj, , Disp: , Rfl:     varenicline (CHANTIX) 1 mg Tab, Take 1 tablet (1 mg total) by mouth 2 (two) times daily., Disp: 56 tablet, Rfl: 0    vitamin E 100 UNIT capsule, Take 100 Units by mouth once daily., Disp: , Rfl:       She is dissatisfied with the communication with her previous PCP.  Wants to establish care here.  Recently got a cortisone shot for plantar fasciitis, about 10 days ago.  Subsequently her blood sugars spiraled out of control getting as high as 398.  Today it is 167.  During this time she has had nausea and dizziness but no vomiting.  She has some chronic nausea off and on that has been evaluated in the past without any sufficient explanation.  She does have a history of autonomic neuropathy from her diabetes.  So she may have gastroparesis as well.    Has been taken off Trulicity because of stomach pains.  These have mostly resolved.  In the past metformin caused her to get diarrhea and dehydration.  She has been on Invokana in the past but for some reason was taken off.  Currently her only diabetic medication is Basaglar at 20 units HS.  She takes Protonix every day.  She denies any recent fever chills, cough or cold symptoms.    Review of Systems   Constitutional: Positive for fatigue. Negative for activity change,  "appetite change and fever.   HENT: Negative for sore throat.    Respiratory: Negative for cough and shortness of breath.    Cardiovascular: Negative for chest pain.   Gastrointestinal: Positive for nausea. Negative for abdominal pain and diarrhea.   Endocrine: Positive for polyphagia. Negative for polydipsia and polyuria.   Genitourinary: Negative for difficulty urinating.   Musculoskeletal: Negative for arthralgias and myalgias.   Skin: Negative for pallor.   Neurological: Positive for dizziness and weakness. Negative for tremors, seizures, speech difficulty and headaches.   Psychiatric/Behavioral: Negative for confusion. The patient is not nervous/anxious.        Objective:      Vitals:    02/28/20 0836   BP: 134/78   Pulse: (!) 119   Temp: 99.1 °F (37.3 °C)   TempSrc: Tympanic   SpO2: 98%   Weight: 77.7 kg (171 lb 4.8 oz)   Height: 4' 10" (1.473 m)   PainSc:   4   PainLoc: Abdomen     Physical Exam   Constitutional: She is oriented to person, place, and time. She appears well-developed and well-nourished. No distress.   HENT:   Head: Normocephalic.   Neck: Neck supple. No thyromegaly present.   Cardiovascular: Normal rate, regular rhythm and normal heart sounds.   No murmur heard.  Pulmonary/Chest: Effort normal and breath sounds normal. She has no wheezes. She has no rales.   Abdominal: Soft. She exhibits no distension.   Musculoskeletal: She exhibits no edema.   Lymphadenopathy:     She has no cervical adenopathy.   Neurological: She is alert and oriented to person, place, and time.   Skin: Skin is warm and dry. She is not diaphoretic.   Psychiatric: She has a normal mood and affect. Her behavior is normal. Judgment and thought content normal.   Nursing note and vitals reviewed.      Assessment:       1. Chronic nausea    2. Type 2 diabetes mellitus with chronic kidney disease, with long-term current use of insulin, unspecified CKD stage    3. Essential hypertension    4. Hypothyroidism, unspecified type    5. " Severe obesity (BMI 35.0-35.9 with comorbidity)    6. Orthostatic hypotension    7. Bipolar affective disorder, remission status unspecified        Plan:   Chronic nausea  Comments:  Rx Zofran p.r.n..  Consider evaluation for possible gastroparesis.  Follow-up 6 weeks    Type 2 diabetes mellitus with chronic kidney disease, with long-term current use of insulin, unspecified CKD stage  Comments:  Generally well controlled but lots of recent medications have had to be stopped.  Diabetic education to review other medication options.  Orders:  -     Ambulatory referral/consult to Diabetes Education; Future; Expected date: 03/06/2020    Essential hypertension  Comments:  Controlled    Hypothyroidism, unspecified type  Comments:  Normal TSH    Severe obesity (BMI 35.0-35.9 with comorbidity)    Orthostatic hypotension  Comments:  On midodrine    Bipolar affective disorder, remission status unspecified  Comments:  Followed by Psychiatry with multiple medications    Other orders  -     ondansetron (ZOFRAN-ODT) 4 MG TbDL; Take 1 tablet (4 mg total) by mouth every 6 (six) hours as needed.  Dispense: 20 tablet; Refill: 0

## 2020-03-11 ENCOUNTER — OFFICE VISIT (OUTPATIENT)
Dept: ENDOCRINOLOGY | Facility: CLINIC | Age: 55
End: 2020-03-11
Payer: MEDICARE

## 2020-03-11 VITALS — RESPIRATION RATE: 16 BRPM | WEIGHT: 173.31 LBS | HEIGHT: 58 IN | BODY MASS INDEX: 36.38 KG/M2

## 2020-03-11 DIAGNOSIS — E11.29 TYPE 2 DIABETES MELLITUS WITH MICROALBUMINURIA, WITH LONG-TERM CURRENT USE OF INSULIN: Primary | ICD-10-CM

## 2020-03-11 DIAGNOSIS — Z79.4 TYPE 2 DIABETES MELLITUS WITH MICROALBUMINURIA, WITH LONG-TERM CURRENT USE OF INSULIN: Primary | ICD-10-CM

## 2020-03-11 DIAGNOSIS — R80.9 TYPE 2 DIABETES MELLITUS WITH MICROALBUMINURIA, WITH LONG-TERM CURRENT USE OF INSULIN: Primary | ICD-10-CM

## 2020-03-11 DIAGNOSIS — E78.5 DYSLIPIDEMIA: ICD-10-CM

## 2020-03-11 LAB — GLUCOSE SERPL-MCNC: 116 MG/DL (ref 70–110)

## 2020-03-11 PROCEDURE — 3008F PR BODY MASS INDEX (BMI) DOCUMENTED: ICD-10-PCS | Mod: CPTII,S$GLB,, | Performed by: INTERNAL MEDICINE

## 2020-03-11 PROCEDURE — 82962 POCT GLUCOSE, HAND-HELD DEVICE: ICD-10-PCS | Mod: S$GLB,,, | Performed by: INTERNAL MEDICINE

## 2020-03-11 PROCEDURE — 99204 PR OFFICE/OUTPT VISIT, NEW, LEVL IV, 45-59 MIN: ICD-10-PCS | Mod: S$GLB,,, | Performed by: INTERNAL MEDICINE

## 2020-03-11 PROCEDURE — 99999 PR PBB SHADOW E&M-EST. PATIENT-LVL II: CPT | Mod: PBBFAC,,, | Performed by: INTERNAL MEDICINE

## 2020-03-11 PROCEDURE — 99999 PR PBB SHADOW E&M-EST. PATIENT-LVL II: ICD-10-PCS | Mod: PBBFAC,,, | Performed by: INTERNAL MEDICINE

## 2020-03-11 PROCEDURE — 82962 GLUCOSE BLOOD TEST: CPT | Mod: S$GLB,,, | Performed by: INTERNAL MEDICINE

## 2020-03-11 PROCEDURE — 3008F BODY MASS INDEX DOCD: CPT | Mod: CPTII,S$GLB,, | Performed by: INTERNAL MEDICINE

## 2020-03-11 PROCEDURE — 99204 OFFICE O/P NEW MOD 45 MIN: CPT | Mod: S$GLB,,, | Performed by: INTERNAL MEDICINE

## 2020-03-11 RX ORDER — IBUPROFEN 800 MG/1
TABLET ORAL
COMMUNITY
Start: 2020-03-09 | End: 2020-03-27

## 2020-03-11 RX ORDER — HYOSCYAMINE SULFATE 0.12 MG/5ML
125 LIQUID ORAL EVERY 4 HOURS PRN
COMMUNITY
End: 2021-08-20

## 2020-03-11 RX ORDER — FLUTICASONE FUROATE AND VILANTEROL 100; 25 UG/1; UG/1
POWDER RESPIRATORY (INHALATION)
COMMUNITY
Start: 2018-09-04 | End: 2020-07-14 | Stop reason: SDUPTHER

## 2020-03-11 RX ORDER — DICLOFENAC SODIUM 10 MG/G
4 GEL TOPICAL
COMMUNITY
Start: 2020-02-03 | End: 2020-11-30

## 2020-03-11 RX ORDER — ASPIRIN 81 MG/1
81 TABLET ORAL DAILY
COMMUNITY

## 2020-03-11 NOTE — PROGRESS NOTES
Reason for referral:    Type 2 diabetes mellitus with chronic kidney disease, with long-term current use of insulin, unspecified CKD stage    Referring Dr:   Scotty Figueroa MD  PCP: Scotty Figueroa MD     Patient ID: Bianca Weldon is a 55 y.o. female.    Chief Complaint:   Diabetes      HPI    Lab Results   Component Value Date    HGBA1C 6.5 (H) 08/08/2017    HGBA1C 6.9 (H) 04/11/2017    HGBA1C 7.7 (H) 01/04/2017     A copy from Dr. Scotty Figueroa   note  She is dissatisfied with the communication with her previous PCP.  Wants to establish care here.  Recently got a cortisone shot for plantar fasciitis, about 10 days ago.  Subsequently her blood sugars spiraled out of control getting as high as 398.  Today it is 167.  During this time she has had nausea and dizziness but no vomiting.  She has some chronic nausea off and on that has been evaluated in the past without any sufficient explanation.  She does have a history of autonomic neuropathy from her diabetes.  So she may have gastroparesis as well.     Has been taken off Trulicity because of stomach pains.  These have mostly resolved.  In the past metformin caused her to get diarrhea and dehydration.  She has been on Invokana in the past but for some reason was taken off.  Currently her only diabetic medication is Basaglar at 20 units HS.  She takes Protonix every day.  She denies any recent fever chills, cough or cold symptoms.    ------------------------------------  DM diagnosed probably at least 5 y ago ( pt is not sure)  Oral medication: off Metformin  Off Trulicity sec to stomach problem  Basaglar 24 units q hs  CBGs:115, 120, 125  CBGs: Am, pre meal, and 2 h post supper  A1C was 6.1 in 2/3/ 2020  Bs 398 after Cortisone inj  CBG back to normal  Hypoglycemia rarely, not in few years  Eye exam done in 2020  Kidney problem: no  Pain or numbness in feet: no  Visied a podiatrist on 2/17  History of low Bp  On Fludrocortisone and Midodrine  Bipolar  disorder  No CAD  Taking 2 meds to stay awake  Taking cholesterol medication: Atorvastatin  Following diet for diabetes: yes  Exercise: occ, when nothing hurts  History of diabetes education : yes  No complaints of  dysphagia, n/v, cp, sob, abd pain, rash, or edema.     +FH of diabetes    Living with her mother.        Past Medical History:   Diagnosis Date    Abnormal Pap smear of cervix     Abnormal Pap smear of vagina     after hyst, repeat every 6 months, per pt, had bx's, unknown results    Acid reflux     Arthritis     Bipolar 1 disorder     Depression     bipolar    Diabetes mellitus     Diabetes mellitus, type 2     Elevated alkaline phosphatase level     GERD (gastroesophageal reflux disease)     Hyperlipidemia     Hypertension     IBS (irritable bowel syndrome)     Interstitial cystitis     Nonalcoholic steatohepatitis     Orthostatic hypotension     Restless legs syndrome     Thyroid disease        Past Surgical History:   Procedure Laterality Date    ADENOIDECTOMY      APPENDECTOMY      julia shoulder surgery      COLONOSCOPY      CYSTOSTOMY W/ BLADDER BIOPSY      interstial cystitis    DILATION AND CURETTAGE OF UTERUS      missed ab    ESOPHAGEAL DILATION      HYSTERECTOMY  2001    [facility], LSO (endometriosis)    OOPHORECTOMY  2001    LSO    TONSILLECTOMY      WISDOM TOOTH EXTRACTION         Social History     Socioeconomic History    Marital status:      Spouse name: Not on file    Number of children: Not on file    Years of education: Not on file    Highest education level: Not on file   Occupational History    Occupation: disabled   Social Needs    Financial resource strain: Very hard    Food insecurity:     Worry: Often true     Inability: Sometimes true    Transportation needs:     Medical: No     Non-medical: No   Tobacco Use    Smoking status: Former Smoker     Packs/day: 1.50     Years: 33.00     Pack years: 49.50     Last attempt to quit: 1/5/2017      Years since quitting: 3.1    Smokeless tobacco: Former User   Substance and Sexual Activity    Alcohol use: No     Frequency: Never    Drug use: No    Sexual activity: Yes     Partners: Male     Birth control/protection: Surgical     Comment: hyst; mut monog   Lifestyle    Physical activity:     Days per week: 0 days     Minutes per session: Not on file    Stress: Not on file   Relationships    Social connections:     Talks on phone: More than three times a week     Gets together: Once a week     Attends Jain service: Not on file     Active member of club or organization: Yes     Attends meetings of clubs or organizations: More than 4 times per year     Relationship status:    Other Topics Concern    Are you pregnant or think you may be? No    Breast-feeding No   Social History Narrative    Not on file       ROS:   + Diabetes   Vomiting occ  Nausea occ; taking Demerol occ  Diarrhea occ  Weight gain  ROS otherwise neg except for what is mentioned in the PMH, PSH and HPI    PE:  Vitals:    03/11/20 0935   Resp: 16     Alert and oriented  No acute distress  + Obesity  Body mass index is 36.22 kg/m².  + Acanthosis  No acne  No Proptosis or conjunctivitis  No rash on tongue, + teeth  Nose nl  No goitre by inspection  Thyroid gland is not palpable  No cervical lymphadenopathy  Heart reg, no gallop  Lungs cta, no wheezing  Abd soft, no tnd  No edema in lower legs  No ulcers in feet  A callus  No rash  No bruises  Speech normal  Behavior normal  No tremor      Lab Review:     Lab Results   Component Value Date    CHOL 110 (L) 08/08/2017    TRIG 79 08/08/2017    HDL 42 08/08/2017    CHOLHDL 38.2 08/08/2017    TOTALCHOLEST 2.6 08/08/2017    NONHDLCHOL 68 08/08/2017     BMP  Lab Results   Component Value Date     08/08/2017    K 4.3 08/08/2017     08/08/2017    CO2 23 08/08/2017    BUN 12 08/08/2017    CREATININE 0.9 08/08/2017    CALCIUM 8.6 (L) 08/08/2017    ANIONGAP 11 08/08/2017     ESTGFRAFRICA >60.0 08/08/2017    EGFRNONAA >60.0 08/08/2017     Microalb/ Crea  32    A/P  Type 2 diabetes mellitus with microalbuminuria, with long-term current use of insulin  Dyslipidemia       Patient Instructions   I you plan to have a steroid inj, then take  34 units of Basaglar  instead of taking 24 units for 2-3 days. So increase the dose just prior to taking the injection.       History autonomic neuropathy  Patient is advised to monitor her blood pressure  If blood pressure continues to be above 140/90 then patient contact her nephrologist for possible reducing one the medications doses    CBGs have been very reasonable recently except for couple days after treatment with steroid injection.  Patient will be discharged from my endocrine service today.  She can be referred back.  On as needed basis.  Patient understands and agrees on the plan.

## 2020-03-11 NOTE — PATIENT INSTRUCTIONS
I you plan to have a steroid inj, then take  34 units of Basaglar  instead of taking 24 units for 2-3 days. So increase the dose just prior to taking the injection.

## 2020-03-13 DIAGNOSIS — E11.9 TYPE 2 DIABETES MELLITUS WITHOUT COMPLICATION: ICD-10-CM

## 2020-03-19 ENCOUNTER — PATIENT MESSAGE (OUTPATIENT)
Dept: FAMILY MEDICINE | Facility: CLINIC | Age: 55
End: 2020-03-19

## 2020-03-19 RX ORDER — INSULIN GLARGINE 100 [IU]/ML
18 INJECTION, SOLUTION SUBCUTANEOUS DAILY
Qty: 15 ML | Refills: 5 | Status: SHIPPED | OUTPATIENT
Start: 2020-03-19 | End: 2020-04-15

## 2020-03-20 RX ORDER — FLUTICASONE PROPIONATE 50 MCG
SPRAY, SUSPENSION (ML) NASAL
Qty: 16 G | Refills: 11 | Status: SHIPPED | OUTPATIENT
Start: 2020-03-20 | End: 2020-04-15 | Stop reason: SDUPTHER

## 2020-03-27 RX ORDER — IBUPROFEN 800 MG/1
TABLET ORAL
Qty: 30 TABLET | Refills: 0 | Status: SHIPPED | OUTPATIENT
Start: 2020-03-27 | End: 2020-11-27

## 2020-04-04 DIAGNOSIS — B35.3 TINEA PEDIS OF BOTH FEET: ICD-10-CM

## 2020-04-04 RX ORDER — KETOCONAZOLE 20 MG/G
CREAM TOPICAL 2 TIMES DAILY
Qty: 30 G | Refills: 2 | Status: SHIPPED | OUTPATIENT
Start: 2020-04-04 | End: 2020-12-18 | Stop reason: SDUPTHER

## 2020-04-15 ENCOUNTER — OFFICE VISIT (OUTPATIENT)
Dept: FAMILY MEDICINE | Facility: CLINIC | Age: 55
End: 2020-04-15
Payer: MEDICARE

## 2020-04-15 DIAGNOSIS — I10 ESSENTIAL HYPERTENSION: ICD-10-CM

## 2020-04-15 DIAGNOSIS — Z79.4 TYPE 2 DIABETES MELLITUS WITH CHRONIC KIDNEY DISEASE, WITH LONG-TERM CURRENT USE OF INSULIN, UNSPECIFIED CKD STAGE: Primary | ICD-10-CM

## 2020-04-15 DIAGNOSIS — E11.22 TYPE 2 DIABETES MELLITUS WITH CHRONIC KIDNEY DISEASE, WITH LONG-TERM CURRENT USE OF INSULIN, UNSPECIFIED CKD STAGE: Primary | ICD-10-CM

## 2020-04-15 DIAGNOSIS — E03.9 HYPOTHYROIDISM, UNSPECIFIED TYPE: ICD-10-CM

## 2020-04-15 DIAGNOSIS — Z79.899 POLYPHARMACY: ICD-10-CM

## 2020-04-15 DIAGNOSIS — G90.3 NEUROGENIC ORTHOSTATIC HYPOTENSION: ICD-10-CM

## 2020-04-15 DIAGNOSIS — Z11.4 SCREENING FOR HIV (HUMAN IMMUNODEFICIENCY VIRUS): ICD-10-CM

## 2020-04-15 DIAGNOSIS — E66.01 SEVERE OBESITY (BMI 35.0-39.9) WITH COMORBIDITY: ICD-10-CM

## 2020-04-15 PROCEDURE — 99214 OFFICE O/P EST MOD 30 MIN: CPT | Mod: 95,,, | Performed by: FAMILY MEDICINE

## 2020-04-15 PROCEDURE — 99499 RISK ADDL DX/OHS AUDIT: ICD-10-PCS | Mod: 95,,, | Performed by: FAMILY MEDICINE

## 2020-04-15 PROCEDURE — 99499 UNLISTED E&M SERVICE: CPT | Mod: 95,,, | Performed by: FAMILY MEDICINE

## 2020-04-15 PROCEDURE — 99214 PR OFFICE/OUTPT VISIT, EST, LEVL IV, 30-39 MIN: ICD-10-PCS | Mod: 95,,, | Performed by: FAMILY MEDICINE

## 2020-04-15 RX ORDER — ATORVASTATIN CALCIUM 40 MG/1
40 TABLET, FILM COATED ORAL DAILY
Qty: 90 TABLET | Refills: 1 | Status: SHIPPED | OUTPATIENT
Start: 2020-04-15 | End: 2020-08-22 | Stop reason: SDUPTHER

## 2020-04-15 RX ORDER — MIDODRINE HYDROCHLORIDE 10 MG/1
10 TABLET ORAL DAILY
Qty: 90 TABLET | Refills: 1 | Status: SHIPPED | OUTPATIENT
Start: 2020-04-15 | End: 2021-08-27

## 2020-04-15 RX ORDER — FLUDROCORTISONE ACETATE 0.1 MG/1
100 TABLET ORAL DAILY
Qty: 90 TABLET | Refills: 1 | Status: SHIPPED | OUTPATIENT
Start: 2020-04-15 | End: 2020-06-23 | Stop reason: SDUPTHER

## 2020-04-15 RX ORDER — PANTOPRAZOLE SODIUM 40 MG/1
40 TABLET, DELAYED RELEASE ORAL DAILY
Qty: 90 TABLET | Refills: 3 | Status: SHIPPED | OUTPATIENT
Start: 2020-04-15 | End: 2021-03-06 | Stop reason: SDUPTHER

## 2020-04-15 RX ORDER — LEVOTHYROXINE SODIUM 75 UG/1
75 TABLET ORAL DAILY
Qty: 90 TABLET | Refills: 0 | Status: SHIPPED | OUTPATIENT
Start: 2020-04-15 | End: 2020-05-28

## 2020-04-15 RX ORDER — INSULIN GLARGINE 300 [IU]/ML
INJECTION, SOLUTION SUBCUTANEOUS
Qty: 3 ML | Refills: 5 | Status: SHIPPED | OUTPATIENT
Start: 2020-04-15 | End: 2020-04-21 | Stop reason: SDUPTHER

## 2020-04-15 RX ORDER — FLUTICASONE PROPIONATE 50 MCG
2 SPRAY, SUSPENSION (ML) NASAL DAILY
Qty: 16 G | Refills: 11 | Status: SHIPPED | OUTPATIENT
Start: 2020-04-15 | End: 2021-03-06 | Stop reason: SDUPTHER

## 2020-04-15 NOTE — PROGRESS NOTES
Subjective:       Patient ID: Bianca Weldon is a 55 y.o. female.    Chief Complaint: No chief complaint on file.      HPI Comments:       Current Outpatient Medications:     aripiprazole (ABILIFY) 20 MG Tab, Take 10 mg by mouth., Disp: , Rfl:     ascorbic acid (VITAMIN C) 250 MG tablet, Take 250 mg by mouth once daily., Disp: , Rfl:     aspirin (ECOTRIN) 81 MG EC tablet, Take 81 mg by mouth., Disp: , Rfl:     atorvastatin (LIPITOR) 40 MG tablet, Take 1 tablet (40 mg total) by mouth once daily., Disp: 90 tablet, Rfl: 1    benztropine (COGENTIN) 1 MG tablet, Take 1 mg by mouth once daily. , Disp: , Rfl:     clonazepam (KLONOPIN) 2 MG Tab, Take 2 mg by mouth every evening. , Disp: , Rfl:     cycloSPORINE (RESTASIS) 0.05 % ophthalmic emulsion, 1 drop., Disp: , Rfl:     desvenlafaxine succinate (PRISTIQ) 50 MG Tb24, Take 50 mg by mouth., Disp: , Rfl:     diclofenac sodium (VOLTAREN) 1 % Gel, Apply 4 g topically., Disp: , Rfl:     estradiol (ESTRACE) 0.01 % (0.1 mg/gram) vaginal cream, Place 1 g vaginally every 7 days., Disp: 4 g, Rfl: 5    fish oil-omega-3 fatty acids 300-1,000 mg capsule, Take by mouth once daily., Disp: , Rfl:     fludrocortisone (FLORINEF) 0.1 mg Tab, Take 1 tablet (100 mcg total) by mouth once daily., Disp: 90 tablet, Rfl: 1    fluticasone furoate-vilanteroL (BREO) 100-25 mcg/dose diskus inhaler, 1 (one) time each day, Disp: , Rfl:     fluticasone propionate (FLONASE) 50 mcg/actuation nasal spray, 2 sprays (100 mcg total) by Each Nostril route once daily., Disp: 16 g, Rfl: 11    hyoscyamine (LEVSIN) 0.125 mg/5 mL Elix, Take 125 mcg by mouth., Disp: , Rfl:     ibuprofen (ADVIL,MOTRIN) 800 MG tablet, TAKE 1 TABLET BY MOUTH EVERY 6 HOURS AS NEEDED, Disp: 30 tablet, Rfl: 0    insulin glargine U-300 conc (TOUJEO MAX U-300 SOLOSTAR) 300 unit/mL (3 mL) InPn, 26 units subQ each evening, Disp: 3 mL, Rfl: 5    ketoconazole (NIZORAL) 2 % cream, Apply topically 2 (two) times daily. Apply to  the affected area twice daily., Disp: 30 g, Rfl: 2    lamotrigine (LAMICTAL) 100 MG tablet, Take 100 mg by mouth., Disp: , Rfl:     levocetirizine (XYZAL) 5 MG tablet, Take 1 tablet (5 mg total) by mouth every evening., Disp: 30 tablet, Rfl: 11    levothyroxine (SYNTHROID) 75 MCG tablet, Take 1 tablet (75 mcg total) by mouth once daily., Disp: 90 tablet, Rfl: 0    methylphenidate HCl (RITALIN) 20 MG tablet, Take 20 mg by mouth once daily. , Disp: , Rfl:     midodrine (PROAMATINE) 10 MG tablet, Take 1 tablet (10 mg total) by mouth once daily., Disp: 90 tablet, Rfl: 1    modafinil (PROVIGIL) 200 MG Tab, , Disp: , Rfl:     naproxen sodium (ANAPROX) 550 MG tablet, Take 550 mg by mouth., Disp: , Rfl:     ondansetron (ZOFRAN-ODT) 4 MG TbDL, Take 1 tablet (4 mg total) by mouth every 6 (six) hours as needed., Disp: 20 tablet, Rfl: 0    pantoprazole (PROTONIX) 40 MG tablet, Take 1 tablet (40 mg total) by mouth once daily., Disp: 90 tablet, Rfl: 3    triamcinolone acetonide 0.1% (KENALOG) 0.1 % cream, Apply topically 2 (two) times daily. Do not use on face, Disp: 45 Bottle, Rfl: 6    vitamin E 100 UNIT capsule, Take 100 Units by mouth once daily., Disp: , Rfl:         The patient location is:  Home  The chief complaint leading to consultation is:  Diabetic  Visit type: audiovisual  Total time spent with patient:  Approximately 20 min  Each patient to whom he or she provides medical services by telemedicine is:  (1) informed of the relationship between the physician and patient and the respective role of any other health care provider with respect to management of the patient; and (2) notified that he or she may decline to receive medical services by telemedicine and may withdraw from such care at any time.    Notes:  6 week follow-up.  Establish care with me last.  Basaglar no longer covered by insurance.  Switch to Toujeo, which is covered.  Sees Dr. Tolliver in for eyes and will get his records sent to me.  Has a  new psychiatrist.  She is trying to wean off Klonopin.  Also takes Provigil.  Saw podiatry and says that feet K also sounds like she had some carotid studies done recently says normal    Blood pressure consistently less than 140/90 at home blood sugars have been a bit higher lately.  Will check A1c.  Needs to see diabetic education if elevated    Review of Systems   Constitutional: Positive for activity change and unexpected weight change.   HENT: Positive for hearing loss. Negative for rhinorrhea and trouble swallowing.    Eyes: Negative for discharge and visual disturbance.   Respiratory: Negative for chest tightness and wheezing.    Cardiovascular: Negative for chest pain and palpitations.   Gastrointestinal: Negative for blood in stool, constipation, diarrhea and vomiting.   Endocrine: Negative for polydipsia and polyuria.   Genitourinary: Negative for difficulty urinating, dysuria, hematuria and menstrual problem.   Musculoskeletal: Positive for neck pain. Negative for arthralgias and joint swelling.   Neurological: Negative for headaches.   Psychiatric/Behavioral: Positive for dysphoric mood.       Objective:      There were no vitals filed for this visit.  Physical Exam   Constitutional: She is oriented to person, place, and time. She appears well-developed and well-nourished.   HENT:   Head: Normocephalic.   Neck: Neck supple.   Pulmonary/Chest: Effort normal. No respiratory distress.   Neurological: She is alert and oriented to person, place, and time.   Skin: She is not diaphoretic.   Psychiatric: She has a normal mood and affect. Her behavior is normal. Judgment and thought content normal.       Assessment:       1. Type 2 diabetes mellitus with chronic kidney disease, with long-term current use of insulin, unspecified CKD stage    2. Essential hypertension    3. Hypothyroidism, unspecified type    4. Screening for HIV (human immunodeficiency virus)    5. Neurogenic orthostatic hypotension    6. Severe  obesity (BMI 35.0-39.9) with comorbidity    7. Polypharmacy        Plan:   Type 2 diabetes mellitus with chronic kidney disease, with long-term current use of insulin, unspecified CKD stage  Comments:  Change Basaglar to Toujeo.  A1c today.  Urine micro today.  She will have Ophthalmology send me his last note  Orders:  -     Hemoglobin A1c; Future; Expected date: 04/15/2020  -     Lipid panel; Future; Expected date: 04/15/2020  -     Microalbumin/creatinine urine ratio; Future; Expected date: 04/15/2020    Essential hypertension  Comments:  Controlled by patient.  Labs today  Orders:  -     CBC auto differential; Future; Expected date: 04/15/2020  -     Comprehensive metabolic panel; Future; Expected date: 04/15/2020    Hypothyroidism, unspecified type  Comments:  On replacement.  TSH today  Orders:  -     TSH; Future; Expected date: 04/15/2020    Screening for HIV (human immunodeficiency virus)  Comments:  Test ordered  Orders:  -     HIV 1/2 Ag/Ab (4th Gen); Future; Expected date: 04/15/2020    Neurogenic orthostatic hypotension  Comments:  Refill midodrine and Florinef    Severe obesity (BMI 35.0-39.9) with comorbidity    Polypharmacy  Comments:  CMP today    Other orders  -     insulin glargine U-300 conc (TOUJEO MAX U-300 SOLOSTAR) 300 unit/mL (3 mL) InPn; 26 units subQ each evening  Dispense: 3 mL; Refill: 5  -     atorvastatin (LIPITOR) 40 MG tablet; Take 1 tablet (40 mg total) by mouth once daily.  Dispense: 90 tablet; Refill: 1  -     levothyroxine (SYNTHROID) 75 MCG tablet; Take 1 tablet (75 mcg total) by mouth once daily.  Dispense: 90 tablet; Refill: 0  -     pantoprazole (PROTONIX) 40 MG tablet; Take 1 tablet (40 mg total) by mouth once daily.  Dispense: 90 tablet; Refill: 3  -     fluticasone propionate (FLONASE) 50 mcg/actuation nasal spray; 2 sprays (100 mcg total) by Each Nostril route once daily.  Dispense: 16 g; Refill: 11  -     midodrine (PROAMATINE) 10 MG tablet; Take 1 tablet (10 mg total) by  mouth once daily.  Dispense: 90 tablet; Refill: 1  -     fludrocortisone (FLORINEF) 0.1 mg Tab; Take 1 tablet (100 mcg total) by mouth once daily.  Dispense: 90 tablet; Refill: 1

## 2020-04-17 ENCOUNTER — TELEPHONE (OUTPATIENT)
Dept: FAMILY MEDICINE | Facility: CLINIC | Age: 55
End: 2020-04-17

## 2020-04-17 RX ORDER — ONDANSETRON 4 MG/1
4 TABLET, ORALLY DISINTEGRATING ORAL EVERY 6 HOURS PRN
Qty: 20 TABLET | Refills: 0 | Status: SHIPPED | OUTPATIENT
Start: 2020-04-17 | End: 2020-06-23 | Stop reason: SDUPTHER

## 2020-04-17 NOTE — TELEPHONE ENCOUNTER
Spoke to pt about records being faxed over to the two drs office. Wants something called in nausea

## 2020-04-17 NOTE — TELEPHONE ENCOUNTER
----- Message from Wen Golden sent at 4/17/2020  8:47 AM CDT -----  Contact: Patient   Bianca needs a call back at [phone], Regards to the fax the number for Dr. René Sanabria at [phone] and Dr. Shauna Will at [phone] for records and labs results from Dr. Figueroa office to those two Doctors. She also needs to get something for nausea called in.    [id number] [place] - [address] AT [facility] OF [address]  [address] [place] [address]  Phone: [phone] Fax: [phone]      Thanks  Td

## 2020-04-19 ENCOUNTER — PATIENT OUTREACH (OUTPATIENT)
Dept: ADMINISTRATIVE | Facility: OTHER | Age: 55
End: 2020-04-19

## 2020-04-21 ENCOUNTER — OFFICE VISIT (OUTPATIENT)
Dept: DIABETES | Facility: CLINIC | Age: 55
End: 2020-04-21
Payer: MEDICARE

## 2020-04-21 DIAGNOSIS — E11.40 TYPE 2 DIABETES MELLITUS WITH DIABETIC NEUROPATHY, WITH LONG-TERM CURRENT USE OF INSULIN: Primary | ICD-10-CM

## 2020-04-21 DIAGNOSIS — E78.5 DYSLIPIDEMIA: ICD-10-CM

## 2020-04-21 DIAGNOSIS — Z79.4 TYPE 2 DIABETES MELLITUS WITH DIABETIC NEUROPATHY, WITH LONG-TERM CURRENT USE OF INSULIN: Primary | ICD-10-CM

## 2020-04-21 PROCEDURE — 99213 OFFICE O/P EST LOW 20 MIN: CPT | Mod: 95,,, | Performed by: NURSE PRACTITIONER

## 2020-04-21 PROCEDURE — 99213 PR OFFICE/OUTPT VISIT, EST, LEVL III, 20-29 MIN: ICD-10-PCS | Mod: 95,,, | Performed by: NURSE PRACTITIONER

## 2020-04-21 RX ORDER — INSULIN GLARGINE 300 [IU]/ML
50 INJECTION, SOLUTION SUBCUTANEOUS DAILY
Qty: 2 SYRINGE | Refills: 3 | Status: SHIPPED | OUTPATIENT
Start: 2020-04-21 | End: 2020-06-03 | Stop reason: SDUPTHER

## 2020-04-21 NOTE — Clinical Note
Ariel Figueroa,I had a video visit with patient today.  She is requesting that a Vitamin B12, Vitamin D, and Lamictal level be added to her scheduled labs on 4/29.  Do you mind ordering these labs and having your nurse link them to your other labs?  Thanks !

## 2020-04-21 NOTE — PROGRESS NOTES
PCP: Scotty Figueroa MD    Subjective:     Chief Complaint: Diabetes Consult    HISTORY OF PRESENT ILLNESS: 55 y.o.  female presenting for diabetes consult. I have not seen her in clinic since 2015, but she has Patient has had Type II diabetes since 2014 and has the following complications: neuropathy. She has attended diabetes education in the past. Other pertinent conditions include: HTN, HLD, irritable bowel disease, and bipolar disorder.     Of note, she has tried several DM medications in the past, including Invokana ( unsure why medication was stopped ); metformin caused severe diarrhea with dehydration; and Trulicity caused abdominal pain and discomfort. Her PCP recently switched her from Basaglar to Toujeo, which she is currently taking.  She has history of cortisone injection for plantar fascitis, last month, which caused blood sugars to spiral out of control.    Blood glucose testing is performed regularly.  Per recall, fasting BGs are 148 - 231; hs 180, 283.  She denies any recent hospital admissions, emergency room visits, hypoglycemia, or syncope.      Labs Reviewed.       No results found for: CPEPTIDE  No results found for: GLUTAMICACID  No results found for: HUMANINSULIN    DM MEDICATIONS:   Toujeo 26 units daily    Review of Systems   Constitutional: Positive for diaphoresis. Negative for appetite change, fatigue and unexpected weight change.   Eyes: Negative for visual disturbance.   Gastrointestinal: Positive for nausea (chronic, intermittent). Negative for abdominal pain, constipation and diarrhea.   Endocrine: Positive for polyuria. Negative for polydipsia and polyphagia.   Neurological: Positive for numbness. Negative for dizziness and headaches.   Psychiatric/Behavioral: Negative for confusion and decreased concentration. The patient is not nervous/anxious.        Diabetes Management Status  Statin: Taking  ACE/ARB: Not taking    Screening or Prevention Patient's value Goal  Complete/Controlled?   HgA1C Testing and Control   Lab Results   Component Value Date    HGBA1C 6.5 (H) 2017      Annually/Less than 8% No   Lipid profile : 10/29/2019 Annually No   LDL control Lab Results   Component Value Date    LDLCALC 52.2 (L) 2017    Annually/Less than 100 mg/dl  No   Nephropathy screening Lab Results   Component Value Date    MICALBCREAT 3.7 2017     Lab Results   Component Value Date    PROTEINUA Negative 2015    Annually No   Blood pressure BP Readings from Last 1 Encounters:   20 134/78    Less than 140/90 Yes   Dilated retinal exam : 2020 Annually Yes, [facility], will request records   Foot exam   : 2017 Annually No     ACTIVITY LEVEL: Rarely Active. Discussed activities, benefits, methods, and precautions.  MEAL PLANNING: Patient reports number of meals per day to be 3 and number of snacks per day to be 2.   Patient is encouraged to carb count and consume no more than 45 - 60 grams of carbohydrates in each meal, and 1800 k / richard per day.      BLOOD GLUCOSE TESTIN times daily  SOCIAL HISTORY: .  Former smoker.     Objective:      Physical Exam   Constitutional: She appears well-developed and well-nourished.   HENT:   Head: Normocephalic and atraumatic.   Eyes: Pupils are equal, round, and reactive to light. EOM are normal.   Pulmonary/Chest: Effort normal. No respiratory distress.   Psychiatric: She has a normal mood and affect. Her behavior is normal. Judgment and thought content normal.       Assessment / Plan:     1.) Type 2 diabetes mellitus with diabetic neuropathy, with long-term current use of insulin  Comments:  -  Condition uncontrolled.  I advised patient to increase Toujeo by 2 units every 2 days until fasting BG < 130 s on a routine basis.  We discussed trying an alternative GLP-1.  I did explain that she could have the same symptoms  that she experienced with Trulicity, such as abdominal pain, nausea,  indigestion, etc. She voiced understanding and wishes to proceed with trying the Ozempic.     - She denies history of pancreatitis, gastroparesis, or medullary thyroid cancer.  Start Ozempic 0.25 mg weekly X 2 weeks, then increase to 0.5 mg weekly.  If tolerated, will increase to max dose of 1 mg weekly.     Orders:  -     insulin glargine U-300 conc (TOUJEO MAX U-300 SOLOSTAR) 300 unit/mL (3 mL) InPn; Inject 50 Units into the skin once daily.  Dispense: 2 Syringe; Refill: 3  -     semaglutide (OZEMPIC) 0.25 mg or 0.5 mg(2 mg/1.5 mL) PnIj; Inject 0.25 mg into the skin once a week. for 2 weeks, then increase to 0.5 mg weekly for 3 weeks.  Dispense: 1.5 mL; Refill: 0    2.) Dyslipidemia - continue meds a prescribed.    Additional Plan Details:    1.) Continue monitoring blood sugar2 x daily, fasting and ac dinner or at bedtime. Discussed ADA goal for fasting blood sugar, 80 - 130 mg/dL; pp blood sugars below 180 mg/dl. Also, discussed prevention of hypoglycemia and the need to adjust goals to higher levels if persistent hypoglycemia.  Reminded to bring BG records or meter to each visit for review.  2.) Return to clinic in  6 weeks for video visit. The patient was explained the above plan and given opportunity to ask questions.  She understands, chooses and consents to this plan and accepts all the risks, which include but are not limited to the risks mentioned above. She understands the alternative of having no testing, interventions or treatments at this time. She left content and without further questions.     Renetta Whitlock, OMAR-C, CDE    The patient location is: Home  The chief complaint leading to consultation is: Type 2 Diabetes  Visit type: audiovisual  Total time spent with patient: 25 minutes  Each patient to whom he or she provides medical services by telemedicine is:  (1) informed of the relationship between the physician and patient and the respective role of any other health care provider with  respect to management of the patient; and (2) notified that he or she may decline to receive medical services by telemedicine and may withdraw from such care at any time.

## 2020-04-22 DIAGNOSIS — K58.9 IRRITABLE BOWEL SYNDROME, UNSPECIFIED TYPE: ICD-10-CM

## 2020-04-22 DIAGNOSIS — F31.9 BIPOLAR AFFECTIVE DISORDER, REMISSION STATUS UNSPECIFIED: Primary | ICD-10-CM

## 2020-04-22 DIAGNOSIS — R41.9 UNSPECIFIED SYMPTOMS AND SIGNS INVOLVING COGNITIVE FUNCTIONS AND AWARENESS: ICD-10-CM

## 2020-04-22 DIAGNOSIS — E55.9 VITAMIN D DEFICIENCY: ICD-10-CM

## 2020-04-28 ENCOUNTER — PATIENT OUTREACH (OUTPATIENT)
Dept: ADMINISTRATIVE | Facility: OTHER | Age: 55
End: 2020-04-28

## 2020-04-30 ENCOUNTER — CLINICAL SUPPORT (OUTPATIENT)
Dept: DIABETES | Facility: CLINIC | Age: 55
End: 2020-04-30
Payer: MEDICARE

## 2020-04-30 DIAGNOSIS — Z79.4 TYPE 2 DIABETES MELLITUS WITH CHRONIC KIDNEY DISEASE, WITH LONG-TERM CURRENT USE OF INSULIN, UNSPECIFIED CKD STAGE: Primary | ICD-10-CM

## 2020-04-30 DIAGNOSIS — E11.22 TYPE 2 DIABETES MELLITUS WITH CHRONIC KIDNEY DISEASE, WITH LONG-TERM CURRENT USE OF INSULIN, UNSPECIFIED CKD STAGE: Primary | ICD-10-CM

## 2020-04-30 PROCEDURE — G0108 PR DIAB MANAGE TRN  PER INDIV: ICD-10-PCS | Mod: 95,,, | Performed by: DIETITIAN, REGISTERED

## 2020-04-30 PROCEDURE — G0108 DIAB MANAGE TRN  PER INDIV: HCPCS | Mod: 95,,, | Performed by: DIETITIAN, REGISTERED

## 2020-04-30 NOTE — PROGRESS NOTES
Diabetes Education  Author: Marisa Carrillo RD, CDE  Date: 4/30/2020      Diabetes Care Specialist Virtual Visit Note   It was in the patient's best interest to receive diabetes self-management education and support services in this format to prevent the exposure to COVID-19.        The patient location is: home   The chief complaint leading to consultation is: Diabetes  Visit type: audiovisual  Total time spent with patient: 60 min   Each patient to whom he or she provides medical services by telemedicine is:  (1) informed of the relationship between the physician and patient and the respective role of any other health care provider with respect to management of the patient; and (2) notified that he or she may decline to receive medical services by telemedicine and may withdraw from such care at any time.    Diabetes Care Management Summary  Diabetes Education Record Assessment/Progress: Initial  Current Diabetes Risk Level: Low( A1c: 6.1)     Referring Provider: Scotty Figueroa MD     55 y.o. female in clinic today for diabetes education. Patient has taken diabetes education courses in the past.   Patient recently started Ozempic, .25mg therapeutic dose for 2 weeks then increase to .5mg dose for 3 weeks then Diabetes Provider, Renetta Whitlock will assess patient's tolerance. Patient also taking Toujeo, increasing by 2u every 2 days until FBG < 130 mg/dl on a consistent basis. Patient is currently taking 28u.             There is no height or weight on file to calculate BMI.          Diabetes Type  Diabetes Type : Type II    Diabetes History  Diabetes Diagnosis: 5-10 years  Current Treatment: Injectable, Insulin( Toujeo, Ozempic)  Reviewed Problem List with Patient: Yes    Health Maintenance was reviewed today with patient. Discussed with patient importance of routine eye exams, foot exams/foot care, blood work (i.e.: A1c, microalbumin, and lipid), dental visits, yearly flu vaccine, and pneumonia vaccine as  indicated by PCP. Patient verbalized understanding.     Health Maintenance Topics with due status: Not Due       Topic Last Completion Date    TETANUS VACCINE 08/11/2014    Colonoscopy 07/26/2017    Lipid Panel 10/29/2019    Hemoglobin A1c 02/03/2020    Eye Exam 02/03/2020    Low Dose Statin 04/15/2020     Health Maintenance Due   Topic Date Due    Foot Exam  08/08/2018    Urine Microalbumin  08/08/2018    Pneumococcal Vaccine (Highest Risk) (3 of 3 - PCV13) 12/20/2019    LDCT Lung Screen  01/22/2020    Mammogram  06/25/2020       Nutrition  Meal Planning: skipping meals, 3 meals per day, snacks between meal( If she skips a meal, it is usually breakfast, lunch is heaviest meal)  What type of sweetener do you use?: Equal, Sweet N Low  What type of beverages do you drink?: water, sport drinks, diet soda/tea( Powerade Zero// Coke zero- 2 a day)  Meal Plan 24 Hour Recall - Breakfast:  Egg and wheat toast, coffee with creamer// also like oatmeal   Meal Plan 24 Hour Recall - Lunch:  Chicken Parm. pasta with red sauce, broccoli, water  Meal Plan 24 Hour Recall - Dinner:  Garden salad with grilled shrimp with cheese and thousand island dressing, water  Meal Plan 24 Hour Recall - Snack:  popcorn, ricotta cheese dessert    Monitoring   Self Monitoring :  Checks at least BID  Blood Glucose Logs: No( FBG has come down: 150-180)  Do you use a personal continuous glucose monitor?: No  In the last month, how often have you had a low blood sugar reaction?: never  What are your symptoms of low blood sugar?:  disoriented, tired, sleepy  How do you treat low blood sugar?:  juice or something sweet  Can you tell when your blood sugar is too high?: yes( gets nauseated, sweaty)  How do you treat high blood sugar?:  exercise, watch what she eats    Exercise   Exercise Type: ( arm and leg exercises)  Intensity: Low    Current Diabetes Treatment   Current Treatment: Injectable, Insulin( Toujeo, Ozempic)    Social History  Preferred  Learning Method: Face to Face  Primary Support: Self  Occupation:  Disabled  Smoking Status: Never a Smoker  Alcohol Use: Never                                Barriers to Change  Barriers to Change: None  Learning Challenges : None    Readiness to Learn   Readiness to Learn : Acceptance    Cultural Influences  Cultural Influences: No    Diabetes Education Assessment/Progress  Diabetes Disease Process (diabetes disease process and treatment options): Comprehends Key Points, Individual Session, Discussion  Nutrition (Incorporating nutritional management into one's lifestyle): Comprehends Key Points, Individual Session, Discussion, Written Materials Provided( Educated on plate method for meal planning. Emphasized importance of portion control. )  Physical Activity (incorporating physical activity into one's lifestyle): Comprehends Key Points, Individual Session, Discussion  Medications (states correct name, dose, onset, peak, duration, side effects & timing of meds): Comprehends Key Points, Individual Session, Discussion( Explained action of Ozempic and possible side effects. )  Monitoring (monitoring blood glucose/other parameters & using results): Individual Session, Discussion, Comprehends Key Points( Requested patient record blood sugars on paper and send to Educator through My Chart. )  Acute Complications (preventing, detecting, and treating acute complications): Written Materials Provided, Comprehends Key Points, Individual Session, Discussion( Educated on s/s of hypo and hyperglycemia and how to treat. )  Chronic Complications (preventing, detecting, and treating chronic complications): Comprehends Key Points, Individual Session, Discussion  Clinical (diabetes, other pertinent medical history, and relevant comorbidities reviewed during visit): Individual Session, Discussion  Cognitive (knowledge of self-management skills, functional health literacy): Individual Session, Discussion  Psychosocial (emotional  response to diabetes): Individual Session, Discussion  Diabetes Distress and Support Systems: Not Covered/Deferred  Behavioral (readiness for change, lifestyle practices, self-care behaviors): Individual Session, Discussion    Goals  Patient has selected/evaluated goals during today's session: Yes, selected  Healthy Eating: Set( Patient will follow prescribed meal plan // small meals and drink more water. )  Start Date: 04/30/20  Target Date: 06/30/20      Mailed educational materials: Plate Method for Meal Planning, 30-45g CHO meal plans, Hypo and Hyperglycemia (symptoms and treatment).        Diabetes Care Plan/Intervention  Education Plan/Intervention: Individual Follow-Up DSMT( 1 month follow-up)    Diabetes Meal Plan  Restrictions: Restricted Carbohydrate  Calories: 1400  Carbohydrate Per Meal: 30-45g  Carbohydrate Per Snack : 7-15g    Today's Self-Management Care Plan was developed with the patient's input and is based on barriers identified during today's assessment.    The long and short-term goals in the care plan were written with the patient/caregiver's input. The patient has agreed to work toward these goals to improve her overall diabetes control.      The patient received a copy of today's self-management plan and verbalized understanding of the care plan, goals, and all of today's instructions.      The patient was encouraged to communicate with her physician and care team regarding her condition(s) and treatment.  I provided the patient with my contact information today and encouraged her to contact me via phone or patient portal as needed.

## 2020-05-01 ENCOUNTER — LAB VISIT (OUTPATIENT)
Dept: LAB | Facility: HOSPITAL | Age: 55
End: 2020-05-01
Attending: FAMILY MEDICINE
Payer: MEDICARE

## 2020-05-01 DIAGNOSIS — E11.22 TYPE 2 DIABETES MELLITUS WITH CHRONIC KIDNEY DISEASE, WITH LONG-TERM CURRENT USE OF INSULIN, UNSPECIFIED CKD STAGE: ICD-10-CM

## 2020-05-01 DIAGNOSIS — Z79.4 TYPE 2 DIABETES MELLITUS WITH CHRONIC KIDNEY DISEASE, WITH LONG-TERM CURRENT USE OF INSULIN, UNSPECIFIED CKD STAGE: ICD-10-CM

## 2020-05-01 LAB
ALBUMIN/CREAT UR: 37.2 UG/MG (ref 0–30)
CREAT UR-MCNC: 137 MG/DL (ref 15–325)
MICROALBUMIN UR DL<=1MG/L-MCNC: 51 UG/ML

## 2020-05-01 PROCEDURE — 82043 UR ALBUMIN QUANTITATIVE: CPT

## 2020-05-12 ENCOUNTER — OFFICE VISIT (OUTPATIENT)
Dept: FAMILY MEDICINE | Facility: CLINIC | Age: 55
End: 2020-05-12
Payer: MEDICARE

## 2020-05-12 DIAGNOSIS — I10 ESSENTIAL HYPERTENSION: ICD-10-CM

## 2020-05-12 DIAGNOSIS — E03.9 HYPOTHYROIDISM, UNSPECIFIED TYPE: ICD-10-CM

## 2020-05-12 DIAGNOSIS — E66.01 SEVERE OBESITY (BMI 35.0-35.9 WITH COMORBIDITY): ICD-10-CM

## 2020-05-12 DIAGNOSIS — F31.9 BIPOLAR AFFECTIVE DISORDER, REMISSION STATUS UNSPECIFIED: ICD-10-CM

## 2020-05-12 DIAGNOSIS — Z79.4 TYPE 2 DIABETES MELLITUS WITH CHRONIC KIDNEY DISEASE, WITH LONG-TERM CURRENT USE OF INSULIN, UNSPECIFIED CKD STAGE: Primary | ICD-10-CM

## 2020-05-12 DIAGNOSIS — E55.9 VITAMIN D DEFICIENCY: ICD-10-CM

## 2020-05-12 DIAGNOSIS — I95.1 ORTHOSTATIC HYPOTENSION: ICD-10-CM

## 2020-05-12 DIAGNOSIS — E11.22 TYPE 2 DIABETES MELLITUS WITH CHRONIC KIDNEY DISEASE, WITH LONG-TERM CURRENT USE OF INSULIN, UNSPECIFIED CKD STAGE: Primary | ICD-10-CM

## 2020-05-12 PROCEDURE — 99499 UNLISTED E&M SERVICE: CPT | Mod: 95,,, | Performed by: FAMILY MEDICINE

## 2020-05-12 PROCEDURE — 3051F PR MOST RECENT HEMOGLOBIN A1C LEVEL 7.0 - < 8.0%: ICD-10-PCS | Mod: CPTII,95,, | Performed by: FAMILY MEDICINE

## 2020-05-12 PROCEDURE — 3051F HG A1C>EQUAL 7.0%<8.0%: CPT | Mod: CPTII,95,, | Performed by: FAMILY MEDICINE

## 2020-05-12 PROCEDURE — 99499 RISK ADDL DX/OHS AUDIT: ICD-10-PCS | Mod: 95,,, | Performed by: FAMILY MEDICINE

## 2020-05-12 PROCEDURE — 99214 OFFICE O/P EST MOD 30 MIN: CPT | Mod: 95,,, | Performed by: FAMILY MEDICINE

## 2020-05-12 PROCEDURE — 99214 PR OFFICE/OUTPT VISIT, EST, LEVL IV, 30-39 MIN: ICD-10-PCS | Mod: 95,,, | Performed by: FAMILY MEDICINE

## 2020-05-12 RX ORDER — LOSARTAN POTASSIUM 25 MG/1
25 TABLET ORAL DAILY
Qty: 90 TABLET | Refills: 1 | Status: SHIPPED | OUTPATIENT
Start: 2020-05-12 | End: 2020-11-01

## 2020-05-12 NOTE — PROGRESS NOTES
Subjective:       Patient ID: Bianca Weldon is a 55 y.o. female.    Chief Complaint: No chief complaint on file.      HPI Comments:       Current Outpatient Medications:     aripiprazole (ABILIFY) 20 MG Tab, Take 10 mg by mouth., Disp: , Rfl:     ascorbic acid (VITAMIN C) 250 MG tablet, Take 250 mg by mouth once daily., Disp: , Rfl:     aspirin (ECOTRIN) 81 MG EC tablet, Take 81 mg by mouth., Disp: , Rfl:     atorvastatin (LIPITOR) 40 MG tablet, Take 1 tablet (40 mg total) by mouth once daily., Disp: 90 tablet, Rfl: 1    benztropine (COGENTIN) 1 MG tablet, Take 1 mg by mouth once daily. , Disp: , Rfl:     clonazepam (KLONOPIN) 2 MG Tab, Take 2 mg by mouth every evening. , Disp: , Rfl:     cycloSPORINE (RESTASIS) 0.05 % ophthalmic emulsion, 1 drop., Disp: , Rfl:     desvenlafaxine succinate (PRISTIQ) 50 MG Tb24, Take 50 mg by mouth., Disp: , Rfl:     diclofenac sodium (VOLTAREN) 1 % Gel, Apply 4 g topically., Disp: , Rfl:     estradiol (ESTRACE) 0.01 % (0.1 mg/gram) vaginal cream, Place 1 g vaginally every 7 days., Disp: 4 g, Rfl: 5    fish oil-omega-3 fatty acids 300-1,000 mg capsule, Take by mouth once daily., Disp: , Rfl:     fludrocortisone (FLORINEF) 0.1 mg Tab, Take 1 tablet (100 mcg total) by mouth once daily., Disp: 90 tablet, Rfl: 1    fluticasone furoate-vilanteroL (BREO) 100-25 mcg/dose diskus inhaler, 1 (one) time each day, Disp: , Rfl:     fluticasone propionate (FLONASE) 50 mcg/actuation nasal spray, 2 sprays (100 mcg total) by Each Nostril route once daily., Disp: 16 g, Rfl: 11    hyoscyamine (LEVSIN) 0.125 mg/5 mL Elix, Take 125 mcg by mouth., Disp: , Rfl:     ibuprofen (ADVIL,MOTRIN) 800 MG tablet, TAKE 1 TABLET BY MOUTH EVERY 6 HOURS AS NEEDED, Disp: 30 tablet, Rfl: 0    insulin glargine U-300 conc (TOUJEO MAX U-300 SOLOSTAR) 300 unit/mL (3 mL) InPn, Inject 50 Units into the skin once daily., Disp: 2 Syringe, Rfl: 3    ketoconazole (NIZORAL) 2 % cream, Apply topically 2 (two)  times daily. Apply to the affected area twice daily., Disp: 30 g, Rfl: 2    lamotrigine (LAMICTAL) 100 MG tablet, Take 100 mg by mouth., Disp: , Rfl:     levocetirizine (XYZAL) 5 MG tablet, Take 1 tablet (5 mg total) by mouth every evening., Disp: 30 tablet, Rfl: 11    levothyroxine (SYNTHROID) 75 MCG tablet, Take 1 tablet (75 mcg total) by mouth once daily., Disp: 90 tablet, Rfl: 0    losartan (COZAAR) 25 MG tablet, Take 1 tablet (25 mg total) by mouth once daily., Disp: 90 tablet, Rfl: 1    methylphenidate HCl (RITALIN) 20 MG tablet, Take 20 mg by mouth once daily. , Disp: , Rfl:     midodrine (PROAMATINE) 10 MG tablet, Take 1 tablet (10 mg total) by mouth once daily., Disp: 90 tablet, Rfl: 1    modafinil (PROVIGIL) 200 MG Tab, , Disp: , Rfl:     naproxen sodium (ANAPROX) 550 MG tablet, Take 550 mg by mouth., Disp: , Rfl:     ondansetron (ZOFRAN-ODT) 4 MG TbDL, Take 1 tablet (4 mg total) by mouth every 6 (six) hours as needed., Disp: 20 tablet, Rfl: 0    pantoprazole (PROTONIX) 40 MG tablet, Take 1 tablet (40 mg total) by mouth once daily., Disp: 90 tablet, Rfl: 3    semaglutide (OZEMPIC) 0.25 mg or 0.5 mg(2 mg/1.5 mL) PnIj, Inject 0.25 mg into the skin once a week. for 2 weeks, then increase to 0.5 mg weekly for 3 weeks., Disp: 1.5 mL, Rfl: 0    triamcinolone acetonide 0.1% (KENALOG) 0.1 % cream, Apply topically 2 (two) times daily. Do not use on face, Disp: 45 Bottle, Rfl: 6    vitamin E 100 UNIT capsule, Take 100 Units by mouth once daily., Disp: , Rfl:       The patient location is:  Home  The chief complaint leading to consultation is:  Lab review  Visit type:  Tele audiovisual   Total time spent with patient:  Approximately 20 minutes  Each patient to whom he or she provides medical services by telemedicine is:  (1) informed of the relationship between the physician and patient and the respective role of any other health care provider with respect to management of the patient; and (2) notified  that he or she may decline to receive medical services by telemedicine and may withdraw from such care at any time.    Notes:  Lab review.  Recommended that she take 2000 IU of vitamin-D daily.  Also started her on losartan 25 mg daily for microalbuminuria.  Monitor for blood pressure response that she also has a history of orthostatic hypotension and is on midodrine.  Followed by sleep specialist for narcolepsy.  Plans to try to get her off the methylphenidate but not may need to restarted another time.  A1c was elevated 7.9.  Partly this may have been due to a steroid reaction that she had during that period.  She sees Renetta Whitlock on 06/03    Review of Systems   Constitutional: Positive for activity change and unexpected weight change.   HENT: Negative for hearing loss, rhinorrhea and trouble swallowing.    Eyes: Negative for discharge and visual disturbance.   Respiratory: Negative for chest tightness and wheezing.    Cardiovascular: Negative for chest pain and palpitations.   Gastrointestinal: Negative for blood in stool, constipation, diarrhea and vomiting.   Endocrine: Positive for polydipsia and polyuria.   Genitourinary: Negative for difficulty urinating, dysuria, hematuria and menstrual problem.   Musculoskeletal: Positive for arthralgias and joint swelling. Negative for neck pain.   Neurological: Positive for headaches. Negative for weakness.   Psychiatric/Behavioral: Negative for confusion and dysphoric mood.       Objective:      There were no vitals filed for this visit.  Physical Exam   Constitutional: She is oriented to person, place, and time. She appears well-developed and well-nourished. She does not appear ill. No distress.   HENT:   Head: Normocephalic.   Neck: Neck supple.   Pulmonary/Chest: Effort normal. No respiratory distress.   Neurological: She is alert and oriented to person, place, and time.   Skin: She is not diaphoretic.   Psychiatric: She has a normal mood and affect. Her behavior is  normal. Judgment and thought content normal.       Assessment:       1. Type 2 diabetes mellitus with chronic kidney disease, with long-term current use of insulin, unspecified CKD stage    2. Essential hypertension    3. Hypothyroidism, unspecified type    4. Vitamin D deficiency    5. Severe obesity (BMI 35.0-35.9 with comorbidity)    6. Orthostatic hypotension    7. Bipolar affective disorder, remission status unspecified        Plan:   Type 2 diabetes mellitus with chronic kidney disease, with long-term current use of insulin, unspecified CKD stage  Comments:  Recent elevation after reaction steroids.  Some variability in her medications recently.  Diabetic education 6/3 for med review.  Low-dose losartan added    Essential hypertension  Comments:  Previously untreated.    Hypothyroidism, unspecified type  Comments:  Controlled    Vitamin D deficiency  Comments:  1000 IU daily    Severe obesity (BMI 35.0-35.9 with comorbidity)    Orthostatic hypotension  Comments:  Monitor blood pressure response to low-dose losartan    Bipolar affective disorder, remission status unspecified  Comments:  Lab results faxed to psychiatrist    Other orders  -     losartan (COZAAR) 25 MG tablet; Take 1 tablet (25 mg total) by mouth once daily.  Dispense: 90 tablet; Refill: 1

## 2020-05-21 DIAGNOSIS — Z79.4 TYPE 2 DIABETES MELLITUS WITH DIABETIC NEUROPATHY, WITH LONG-TERM CURRENT USE OF INSULIN: ICD-10-CM

## 2020-05-21 DIAGNOSIS — E11.40 TYPE 2 DIABETES MELLITUS WITH DIABETIC NEUROPATHY, WITH LONG-TERM CURRENT USE OF INSULIN: ICD-10-CM

## 2020-05-28 ENCOUNTER — TELEPHONE (OUTPATIENT)
Dept: DIABETES | Facility: CLINIC | Age: 55
End: 2020-05-28

## 2020-05-28 RX ORDER — LEVOTHYROXINE SODIUM 75 UG/1
TABLET ORAL
Qty: 90 TABLET | Refills: 0 | Status: SHIPPED | OUTPATIENT
Start: 2020-05-28 | End: 2020-08-27

## 2020-05-28 NOTE — TELEPHONE ENCOUNTER
Called to reschedule diabetes education follow-up visit. No answer, left recorded message with call back information or requested patient send me a My Chart message to reschedule.

## 2020-06-02 ENCOUNTER — PATIENT OUTREACH (OUTPATIENT)
Dept: ADMINISTRATIVE | Facility: OTHER | Age: 55
End: 2020-06-02

## 2020-06-02 NOTE — PROGRESS NOTES
LINKS immunization registry triggered  Care Everywhere updated  Health Maintenance updated  Chart reviewed for overdue Proactive [facility] Encounters health maintenance testing

## 2020-06-02 NOTE — PROGRESS NOTES
PCP: Scotty Figueroa MD    Subjective:     Chief Complaint: Diabetes Follow Up    HISTORY OF PRESENT ILLNESS: 55 y.o.  female presenting for diabetes follow up.  Patient has had Type II diabetes since 2014 and has the following complications: neuropathy. She has attended diabetes education in the past. Other pertinent conditions include: HTN, HLD, irritable bowel disease, and bipolar disorder.  Of note, she has a history of cortisone injections for plantar fasciitis.       Of note, she has tried several DM medications in the past, including Invokana ( unsure why medication was stopped ); metformin caused severe diarrhea with dehydration; and Trulicity caused abdominal pain and discomfort.     Blood glucose testing is performed regularly.  Per recall, all blood sugars are less than 130 s.  She denies any recent hospital admissions, emergency room visits, hypoglycemia, or syncope.      Labs Reviewed.       No results found for: CPEPTIDE  No results found for: GLUTAMICACID  No results found for: HUMANINSULIN    DM MEDICATIONS:   Toujeo 26 units daily  Ozempic 0.5 mg weekly    Review of Systems   Constitutional: Negative for appetite change, diaphoresis, fatigue and unexpected weight change.   Eyes: Negative for visual disturbance.   Gastrointestinal: Positive for nausea (chronic, intermittent). Negative for abdominal pain, constipation and diarrhea.   Endocrine: Negative for polydipsia, polyphagia and polyuria.   Neurological: Positive for numbness. Negative for dizziness and headaches.   Psychiatric/Behavioral: Negative for confusion and decreased concentration. The patient is not nervous/anxious.        Diabetes Management Status  Statin: Taking  ACE/ARB: Not taking    Screening or Prevention Patient's value Goal Complete/Controlled?   HgA1C Testing and Control   Lab Results   Component Value Date    HGBA1C 7.9 (H) 05/01/2020      Annually/Less than 8% No   Lipid profile : 05/01/2020 Annually No   LDL  control Lab Results   Component Value Date    LDLCALC 68.8 2020    Annually/Less than 100 mg/dl  No   Nephropathy screening Lab Results   Component Value Date    MICALBCREAT 37.2 (H) 2020     Lab Results   Component Value Date    PROTEINUA Negative 2015    Annually No   Blood pressure BP Readings from Last 1 Encounters:   20 134/78    Less than 140/90 Yes   Dilated retinal exam : 2020 Annually Yes, [facility], will request records   Foot exam   : 2017 Annually No     ACTIVITY LEVEL: Rarely Active. Discussed activities, benefits, methods, and precautions.  MEAL PLANNING: Patient reports number of meals per day to be 3 and number of snacks per day to be 2.   Patient is encouraged to carb count and consume no more than 45 - 60 grams of carbohydrates in each meal, and 1800 k / richard per day.      BLOOD GLUCOSE TESTIN times daily  SOCIAL HISTORY: .  Former smoker.     Objective:      Physical Exam   Constitutional: She appears well-developed and well-nourished.   HENT:   Head: Normocephalic and atraumatic.   Eyes: Pupils are equal, round, and reactive to light. EOM are normal.   Pulmonary/Chest: Effort normal. No respiratory distress.   Psychiatric: She has a normal mood and affect. Her behavior is normal. Judgment and thought content normal.       Assessment / Plan:     1.) Type 2 diabetes mellitus with diabetic neuropathy, with long-term current use of insulin  Comments:  -  Condition improving. Continue Toujeo 26 units daily.  She is tolerating Ozempic without GI side effects, so will increase Ozempic 1 mg weekly.  Scheduled repeat labs ( A1C,  CMP ) in 3 months.     2.) Dyslipidemia - continue meds a prescribed.    Additional Plan Details:    1.) Continue monitoring blood sugar2 x daily, fasting and ac dinner or at bedtime. Discussed ADA goal for fasting blood sugar, 80 - 130 mg/dL; pp blood sugars below 180 mg/dl. Also, discussed prevention of hypoglycemia and  the need to adjust goals to higher levels if persistent hypoglycemia.  Reminded to bring BG records or meter to each visit for review.  2.) Return to clinic in 3 months for face-to-face person. The patient was explained the above plan and given opportunity to ask questions.  She understands, chooses and consents to this plan and accepts all the risks, which include but are not limited to the risks mentioned above. She understands the alternative of having no testing, interventions or treatments at this time. She left content and without further questions.     Renetta Whitlock, NP-C, CDE    The patient location is: Home  The chief complaint leading to consultation is: Type 2 Diabetes  Visit type: audiovisual  Total time spent with patient: 10 minutes  Each patient to whom he or she provides medical services by telemedicine is:  (1) informed of the relationship between the physician and patient and the respective role of any other health care provider with respect to management of the patient; and (2) notified that he or she may decline to receive medical services by telemedicine and may withdraw from such care at any time.

## 2020-06-03 ENCOUNTER — OFFICE VISIT (OUTPATIENT)
Dept: DIABETES | Facility: CLINIC | Age: 55
End: 2020-06-03
Payer: MEDICARE

## 2020-06-03 DIAGNOSIS — E11.29 TYPE 2 DIABETES MELLITUS WITH MICROALBUMINURIA, WITH LONG-TERM CURRENT USE OF INSULIN: Primary | ICD-10-CM

## 2020-06-03 DIAGNOSIS — Z79.4 TYPE 2 DIABETES MELLITUS WITH MICROALBUMINURIA, WITH LONG-TERM CURRENT USE OF INSULIN: Primary | ICD-10-CM

## 2020-06-03 DIAGNOSIS — E11.40 TYPE 2 DIABETES MELLITUS WITH DIABETIC NEUROPATHY, WITH LONG-TERM CURRENT USE OF INSULIN: ICD-10-CM

## 2020-06-03 DIAGNOSIS — Z79.4 TYPE 2 DIABETES MELLITUS WITH DIABETIC NEUROPATHY, WITH LONG-TERM CURRENT USE OF INSULIN: ICD-10-CM

## 2020-06-03 DIAGNOSIS — R80.9 TYPE 2 DIABETES MELLITUS WITH MICROALBUMINURIA, WITH LONG-TERM CURRENT USE OF INSULIN: Primary | ICD-10-CM

## 2020-06-03 PROCEDURE — 99214 PR OFFICE/OUTPT VISIT, EST, LEVL IV, 30-39 MIN: ICD-10-PCS | Mod: 95,,, | Performed by: NURSE PRACTITIONER

## 2020-06-03 PROCEDURE — 3051F PR MOST RECENT HEMOGLOBIN A1C LEVEL 7.0 - < 8.0%: ICD-10-PCS | Mod: CPTII,95,, | Performed by: NURSE PRACTITIONER

## 2020-06-03 PROCEDURE — 99214 OFFICE O/P EST MOD 30 MIN: CPT | Mod: 95,,, | Performed by: NURSE PRACTITIONER

## 2020-06-03 PROCEDURE — 3051F HG A1C>EQUAL 7.0%<8.0%: CPT | Mod: CPTII,95,, | Performed by: NURSE PRACTITIONER

## 2020-06-03 RX ORDER — INSULIN GLARGINE 300 [IU]/ML
50 INJECTION, SOLUTION SUBCUTANEOUS DAILY
Qty: 3 SYRINGE | Refills: 3 | Status: SHIPPED | OUTPATIENT
Start: 2020-06-03 | End: 2020-12-16 | Stop reason: SDUPTHER

## 2020-06-09 ENCOUNTER — CLINICAL SUPPORT (OUTPATIENT)
Dept: SMOKING CESSATION | Facility: CLINIC | Age: 55
End: 2020-06-09
Payer: COMMERCIAL

## 2020-06-09 DIAGNOSIS — F17.200 NICOTINE DEPENDENCE: Primary | ICD-10-CM

## 2020-06-09 PROCEDURE — 99407 BEHAV CHNG SMOKING > 10 MIN: CPT | Mod: S$GLB,,, | Performed by: INTERNAL MEDICINE

## 2020-06-09 PROCEDURE — 99407 PR TOBACCO USE CESSATION INTENSIVE >10 MINUTES: ICD-10-PCS | Mod: S$GLB,,, | Performed by: INTERNAL MEDICINE

## 2020-06-09 NOTE — PROGRESS NOTES
Spoke with patient today in regard to smoking cessation progress for 12 month telephone follow up, she states tobacco free since 6/8/2019. Patient states using the prescribed tobacco cessation medication of Chantix and then quitting on her own. Commended patient on the accomplishment. Informed patient of benefit period and contact information if any further help or support is needed. Will resolve episode and complete smart form for Quit attempt #2.

## 2020-06-16 ENCOUNTER — TELEPHONE (OUTPATIENT)
Dept: DIABETES | Facility: CLINIC | Age: 55
End: 2020-06-16

## 2020-06-16 NOTE — PROGRESS NOTES
PCP: Scotty Figueroa MD    Subjective:     Chief Complaint: Diabetes Follow Up    HISTORY OF PRESENT ILLNESS: 55 y.o.  female presenting for diabetes follow up.  Patient has had Type II diabetes since 2014 and has the following complications: neuropathy. She has attended diabetes education in the past. Other pertinent conditions include: HTN, HLD, irritable bowel disease, and bipolar disorder.  Of note, she has a history of cortisone injections for plantar fasciitis.       In the past, she has tried several DM medications: including Invokana ( unsure why medication was stopped ); metformin caused GI side effects; and Trulicity caused abdominal pain and discomfort.     Blood glucose testing is performed regularly, but no records today. Per recall, fasting BGs are 120 s - 130 s.  She denies any recent hospital admissions, emergency room visits, hypoglycemia, or syncope.      Labs Reviewed.       No results found for: CPEPTIDE  No results found for: GLUTAMICACID  No results found for: HUMANINSULIN    DM MEDICATIONS:   Toujeo 26 units daily  Ozempic 1 mg weekly    Review of Systems   Constitutional: Positive for diaphoresis. Negative for appetite change, fatigue and unexpected weight change.   Eyes: Negative for visual disturbance.   Gastrointestinal: Positive for nausea (chronic, intermittent). Negative for abdominal pain, constipation and diarrhea.   Endocrine: Negative for polydipsia, polyphagia and polyuria.   Neurological: Positive for dizziness and numbness. Negative for headaches.   Psychiatric/Behavioral: Negative for confusion and decreased concentration. The patient is not nervous/anxious.        Diabetes Management Status  Statin: Taking  ACE/ARB: Not taking    Screening or Prevention Patient's value Goal Complete/Controlled?   HgA1C Testing and Control   Lab Results   Component Value Date    HGBA1C 7.9 (H) 05/01/2020      Annually/Less than 8% No   Lipid profile : 05/01/2020 Annually No   LDL  control Lab Results   Component Value Date    LDLCALC 68.8 2020    Annually/Less than 100 mg/dl  No   Nephropathy screening Lab Results   Component Value Date    MICALBCREAT 37.2 (H) 2020     Lab Results   Component Value Date    PROTEINUA Negative 2015    Annually No   Blood pressure BP Readings from Last 1 Encounters:   20 118/74    Less than 140/90 Yes   Dilated retinal exam : 2020 Annually Yes, [facility], will request records   Foot exam   : 2017 Annually No     ACTIVITY LEVEL: Rarely Active. Discussed activities, benefits, methods, and precautions.  MEAL PLANNING: Patient reports number of meals per day to be 3 and number of snacks per day to be 2.   Patient is encouraged to carb count and consume no more than 45 - 60 grams of carbohydrates in each meal, and 1800 k / richard per day.      BLOOD GLUCOSE TESTIN times daily  SOCIAL HISTORY: .  Former smoker.     Objective:      Physical Exam  Constitutional:       Appearance: She is well-developed.   HENT:      Head: Normocephalic and atraumatic.   Eyes:      Pupils: Pupils are equal, round, and reactive to light.   Cardiovascular:      Rate and Rhythm: Normal rate and regular rhythm.      Pulses:           Dorsalis pedis pulses are 2+ on the right side and 2+ on the left side.   Pulmonary:      Effort: Pulmonary effort is normal. No respiratory distress.      Breath sounds: Normal breath sounds.   Musculoskeletal: Normal range of motion.   Feet:      Right foot:      Protective Sensation: 6 sites tested. 6 sites sensed.      Skin integrity: No ulcer, callus or dry skin.      Left foot:      Protective Sensation: 6 sites tested. 6 sites sensed.      Skin integrity: No ulcer, blister, callus or dry skin.   Skin:     General: Skin is warm and dry.      Findings: No rash.   Psychiatric:         Behavior: Behavior normal.         Thought Content: Thought content normal.         Judgment: Judgment normal.        Assessment / Plan:     1.) Type 2 diabetes mellitus with diabetic neuropathy, with long-term current use of insulin  Comments:  - Condition improving. D/C Ozempic.  Will see if stopping the GLP-1 decreases the incidence of diaphoresis, dizziness, or nausea.  In the meantime, patient  will schedule an appointment with women's  health for further evaluation of diaphoresis.  She is already scheduled to see PCP.     - Increase Toujeo 30 units daily.  We discussed starting DPP- 4 inhibitor.  I explained MOA and potential side effects.  Start Januvia 100 mg daily.      2.) Dyslipidemia - continue meds a prescribed.    Additional Plan Details:    1.) Continue monitoring blood sugar 2 x daily, fasting and ac dinner or at bedtime. Discussed ADA goal for fasting blood sugar, 80 - 130 mg/dL; pp blood sugars below 180 mg/dl. Also, discussed prevention of hypoglycemia and the need to adjust goals to higher levels if persistent hypoglycemia.  Reminded to bring BG records or meter to each visit for review.  2.) Return to clinic in 3 months for follow up. The patient was explained the above plan and given opportunity to ask questions.  She understands, chooses and consents to this plan and accepts all the risks, which include but are not limited to the risks mentioned above. She understands the alternative of having no testing, interventions or treatments at this time. She left content and without further questions.     Renetta Whitlock, OMAR-C, CDE    A total of 30 minutes was spent in face to face time, of which over 50 % was spent in counseling patient on disease process, complications, treatment, and side effects of medications.

## 2020-06-16 NOTE — TELEPHONE ENCOUNTER
----- Message from Myra Santa sent at 6/16/2020  7:00 AM CDT -----  Regarding: Portal  Pt made contact via [facility] Portal as follows:    Appointment Request From: Bianca Weldon    With Provider: Renetta Whitlock, PhD, NP-C [[facility] - Diabetes Management]    Preferred Date Range: 6/16/2020 - 6/17/2020    Preferred Times: Tuesday Afternoon, Wednesday Morning    Reason for visit: Sick with diabetes nausea and dizzy light headed    Comments:  Nauseated with dizzy and faint feeling. I have diabetes and this has been going on for 3 days. Not getting any better.    Thank you.

## 2020-06-17 ENCOUNTER — PATIENT OUTREACH (OUTPATIENT)
Dept: ADMINISTRATIVE | Facility: OTHER | Age: 55
End: 2020-06-17

## 2020-06-17 ENCOUNTER — OFFICE VISIT (OUTPATIENT)
Dept: DIABETES | Facility: CLINIC | Age: 55
End: 2020-06-17
Payer: MEDICARE

## 2020-06-17 VITALS
HEIGHT: 58 IN | SYSTOLIC BLOOD PRESSURE: 118 MMHG | DIASTOLIC BLOOD PRESSURE: 74 MMHG | WEIGHT: 173.94 LBS | BODY MASS INDEX: 36.51 KG/M2

## 2020-06-17 DIAGNOSIS — E78.5 DYSLIPIDEMIA: ICD-10-CM

## 2020-06-17 DIAGNOSIS — Z79.4 TYPE 2 DIABETES MELLITUS WITH MICROALBUMINURIA, WITH LONG-TERM CURRENT USE OF INSULIN: Primary | ICD-10-CM

## 2020-06-17 DIAGNOSIS — R80.9 TYPE 2 DIABETES MELLITUS WITH MICROALBUMINURIA, WITH LONG-TERM CURRENT USE OF INSULIN: Primary | ICD-10-CM

## 2020-06-17 DIAGNOSIS — I10 ESSENTIAL HYPERTENSION: ICD-10-CM

## 2020-06-17 DIAGNOSIS — E66.01 SEVERE OBESITY (BMI 35.0-35.9 WITH COMORBIDITY): ICD-10-CM

## 2020-06-17 DIAGNOSIS — E11.29 TYPE 2 DIABETES MELLITUS WITH MICROALBUMINURIA, WITH LONG-TERM CURRENT USE OF INSULIN: Primary | ICD-10-CM

## 2020-06-17 LAB — GLUCOSE SERPL-MCNC: 176 MG/DL (ref 70–110)

## 2020-06-17 PROCEDURE — 99999 PR PBB SHADOW E&M-EST. PATIENT-LVL IV: CPT | Mod: PBBFAC,,, | Performed by: NURSE PRACTITIONER

## 2020-06-17 PROCEDURE — 82962 POCT GLUCOSE, HAND-HELD DEVICE: ICD-10-PCS | Mod: S$GLB,,, | Performed by: NURSE PRACTITIONER

## 2020-06-17 PROCEDURE — 3051F HG A1C>EQUAL 7.0%<8.0%: CPT | Mod: CPTII,S$GLB,, | Performed by: NURSE PRACTITIONER

## 2020-06-17 PROCEDURE — 82962 GLUCOSE BLOOD TEST: CPT | Mod: S$GLB,,, | Performed by: NURSE PRACTITIONER

## 2020-06-17 PROCEDURE — 99214 PR OFFICE/OUTPT VISIT, EST, LEVL IV, 30-39 MIN: ICD-10-PCS | Mod: S$GLB,,, | Performed by: NURSE PRACTITIONER

## 2020-06-17 PROCEDURE — 3051F PR MOST RECENT HEMOGLOBIN A1C LEVEL 7.0 - < 8.0%: ICD-10-PCS | Mod: CPTII,S$GLB,, | Performed by: NURSE PRACTITIONER

## 2020-06-17 PROCEDURE — 99214 OFFICE O/P EST MOD 30 MIN: CPT | Mod: S$GLB,,, | Performed by: NURSE PRACTITIONER

## 2020-06-17 PROCEDURE — 99999 PR PBB SHADOW E&M-EST. PATIENT-LVL IV: ICD-10-PCS | Mod: PBBFAC,,, | Performed by: NURSE PRACTITIONER

## 2020-06-18 ENCOUNTER — TELEPHONE (OUTPATIENT)
Dept: FAMILY MEDICINE | Facility: CLINIC | Age: 55
End: 2020-06-18

## 2020-06-18 ENCOUNTER — CLINICAL SUPPORT (OUTPATIENT)
Dept: DIABETES | Facility: CLINIC | Age: 55
End: 2020-06-18
Payer: MEDICARE

## 2020-06-18 VITALS — WEIGHT: 175.63 LBS | HEIGHT: 58 IN | BODY MASS INDEX: 36.86 KG/M2

## 2020-06-18 DIAGNOSIS — E11.22 TYPE 2 DIABETES MELLITUS WITH CHRONIC KIDNEY DISEASE, WITH LONG-TERM CURRENT USE OF INSULIN, UNSPECIFIED CKD STAGE: Primary | ICD-10-CM

## 2020-06-18 DIAGNOSIS — Z79.4 TYPE 2 DIABETES MELLITUS WITH CHRONIC KIDNEY DISEASE, WITH LONG-TERM CURRENT USE OF INSULIN, UNSPECIFIED CKD STAGE: Primary | ICD-10-CM

## 2020-06-18 PROCEDURE — 99999 PR PBB SHADOW E&M-EST. PATIENT-LVL I: CPT | Mod: PBBFAC,,, | Performed by: DIETITIAN, REGISTERED

## 2020-06-18 PROCEDURE — G0108 PR DIAB MANAGE TRN  PER INDIV: ICD-10-PCS | Mod: S$GLB,,, | Performed by: DIETITIAN, REGISTERED

## 2020-06-18 PROCEDURE — 99999 PR PBB SHADOW E&M-EST. PATIENT-LVL I: ICD-10-PCS | Mod: PBBFAC,,, | Performed by: DIETITIAN, REGISTERED

## 2020-06-18 PROCEDURE — G0108 DIAB MANAGE TRN  PER INDIV: HCPCS | Mod: S$GLB,,, | Performed by: DIETITIAN, REGISTERED

## 2020-06-18 NOTE — PROGRESS NOTES
"Diabetes Education  Author: Marisa Carrillo RD, CDE  Date: 6/18/2020    Diabetes Care Management Summary  Diabetes Education Record Assessment/Progress: Comprehensive/Group  Current Diabetes Risk Level: Low     Referring Provider: Scotty Figueroa MD  55 y.o. female in clinic today for follow-up in diabetes education. Patient was not able to tolerate Ozempic. She is no longer taking and started Januvia 100mg QD. She is continuing to take Toujeo, 50u daily.  Patient's A1c increased from 6.5 to 7.9%. She is working on changes in her diet, decreasing carb and calorie intake.     Weight: 79.6 kg (175 lb 9.5 oz)   Height: 4' 10" (147.3 cm)   Body mass index is 36.7 kg/m².      Diabetes Type  Diabetes Type : Type II    Diabetes History  Diabetes Diagnosis: 5-10 years  Current Treatment: Oral Medication, Insulin(Januvia // Toujeo)  Reviewed Problem List with Patient: No     Hemoglobin A1C   Date Value Ref Range Status   05/01/2020 7.9 (H) 4.0 - 5.6 % Final     Comment:     ADA Screening Guidelines:  5.7-6.4%  Consistent with prediabetes  >or=6.5%  Consistent with diabetes  High levels of fetal hemoglobin interfere with the HbA1C  assay. Heterozygous hemoglobin variants (HbS, HgC, etc)do  not significantly interfere with this assay.   However, presence of multiple variants may affect accuracy.     02/03/2020 6.1 <=6.5 % Final   08/08/2017 6.5 (H) 4.0 - 5.6 % Final     Comment:     According to ADA guidelines, hemoglobin A1c <7.0% represents  optimal control in non-pregnant diabetic patients. Different  metrics may apply to specific patient populations.   Standards of Medical Care in Diabetes-2016.  For the purpose of screening for the presence of diabetes:  <5.7%     Consistent with the absence of diabetes  5.7-6.4%  Consistent with increasing risk for diabetes   (prediabetes)  >or=6.5%  Consistent with diabetes  Currently, no consensus exists for use of hemoglobin A1c  for diagnosis of diabetes for children.  This " Hemoglobin A1c assay has significant interference with fetal   hemoglobin   (HbF). The results are invalid for patients with abnormal amounts of   HbF,   including those with known Hereditary Persistence   of Fetal Hemoglobin. Heterozygous hemoglobin variants (HbAS, HbAC,   HbAD, HbAE, HbA2) do not significantly interfere with this assay;   however, presence of multiple variants in a sample may impact the %   interference.     04/11/2017 6.9 (H) 4.5 - 6.2 % Final     Comment:     According to ADA guidelines, hemoglobin A1C <7.0% represents  optimal control in non-pregnant diabetic patients.  Different  metrics may apply to specific populations.   Standards of Medical Care in Diabetes - 2016.  For the purpose of screening for the presence of diabetes:  <5.7%     Consistent with the absence of diabetes  5.7-6.4%  Consistent with increasing risk for diabetes   (prediabetes)  >or=6.5%  Consistent with diabetes  Currently no consensus exists for use of hemoglobin A1C  for diagnosis of diabetes for children.           Health Maintenance was reviewed today with patient. Discussed with patient importance of routine eye exams, foot exams/foot care, blood work (i.e.: A1c, microalbumin, and lipid), dental visits, yearly flu vaccine, and pneumonia vaccine as indicated by PCP. Patient verbalized understanding.     Health Maintenance Topics with due status: Not Due       Topic Last Completion Date    TETANUS VACCINE 08/11/2014    Colorectal Cancer Screening 07/26/2017    Eye Exam 02/03/2020    Lipid Panel 05/01/2020    Hemoglobin A1c 05/01/2020    Low Dose Statin 06/17/2020    Foot Exam 06/17/2020     Health Maintenance Due   Topic Date Due    Shingles Vaccine (1 of 2) 01/22/2015    LDCT Lung Screen  01/22/2020    Mammogram  06/25/2020       Nutrition  Meal Planning: 3 meals per day  What type of sweetener do you use?: none  What type of beverages do you drink?: milk, water, diet soda/tea(unsweetened almond milk, Coke Zero,  Low calorie Powerade or Gatorade)  Meal Plan 24 Hour Recall - Breakfast: 1/2 cup bran cereal with unsweetened almond milk  Meal Plan 24 Hour Recall - Lunch: meat and broccoli, water  Meal Plan 24 Hour Recall - Dinner: crackers throughout the day because she was nauseated  Meal Plan 24 Hour Recall - Snack: None.    Monitoring   Self Monitoring : Checking at least 4 times per day. FB-150 but this morning it was 170.  Blood Glucose Logs: No  Do you use a personal continuous glucose monitor?: No  In the last month, how often have you had a low blood sugar reaction?: never  What are your symptoms of low blood sugar?: shaky, dizziness  How do you treat low blood sugar?: eat something  Can you tell when your blood sugar is too high?: yes    Exercise   Exercise Type: none    Current Diabetes Treatment   Current Treatment: Oral Medication, Insulin(Januvia // Toujeo)    Social History  Preferred Learning Method: Face to Face  Primary Support: Self  Smoking Status: Ex Smoker  Alcohol Use: Never                                Barriers to Change  Barriers to Change: None  Learning Challenges : None    Readiness to Learn   Readiness to Learn : Acceptance    Cultural Influences  Cultural Influences: No    Diabetes Education Assessment/Progress  Diabetes Disease Process (diabetes disease process and treatment options): Discussion, Individual Session  Nutrition (Incorporating nutritional management into one's lifestyle): Discussion, Individual Session, Comprehends Key Points, Written Materials Provided  Physical Activity (incorporating physical activity into one's lifestyle): Discussion, Individual Session  Medications (states correct name, dose, onset, peak, duration, side effects & timing of meds): Discussion, Individual Session  Monitoring (monitoring blood glucose/other parameters & using results): Discussion, Individual Session, Comprehends Key Points  Acute Complications (preventing, detecting, and treating acute  complications): Discussion, Individual Session, Comprehends Key Points  Chronic Complications (preventing, detecting, and treating chronic complications): Discussion, Individual Session, Comprehends Key Points  Clinical (diabetes, other pertinent medical history, and relevant comorbidities reviewed during visit): Discussion, Individual Session  Cognitive (knowledge of self-management skills, functional health literacy): Discussion, Individual Session  Psychosocial (emotional response to diabetes): Discussion, Individual Session  Diabetes Distress and Support Systems: Not Covered/Deferred  Behavioral (readiness for change, lifestyle practices, self-care behaviors): Individual Session, Discussion    Goals  Patient has selected/evaluated goals during today's session: Yes, selected  Healthy Eating: Set(Patient will follow prescribed meal plan.)  Start Date: 06/18/20  Target Date: 09/18/20  Physical Activity: Set(Patient will start low impact exercise regimen at least 15 minutes, 3 days per week and increase as tolerated.)  Start Date: 06/18/20  Target Date: 09/18/20         Diabetes Care Plan/Intervention  Education Plan/Intervention: Individual Follow-Up DSMT 3 month F/U    Diabetes Meal Plan  Restrictions: Restricted Carbohydrate  Calories: 1400  Carbohydrate Per Meal: 30-45g  Carbohydrate Per Snack : 7-15g    Today's Self-Management Care Plan was developed with the patient's input and is based on barriers identified during today's assessment.    The long and short-term goals in the care plan were written with the patient/caregiver's input. The patient has agreed to work toward these goals to improve her overall diabetes control.      The patient received a copy of today's self-management plan and verbalized understanding of the care plan, goals, and all of today's instructions.      The patient was encouraged to communicate with her physician and care team regarding her condition(s) and treatment.  I provided the  patient with my contact information today and encouraged her to contact me via phone or patient portal as needed.     Education Units of Time   Time Spent: 60 min

## 2020-06-18 NOTE — TELEPHONE ENCOUNTER
----- Message from Marisa Carrillo RD, CDE sent at 6/18/2020  3:15 PM CDT -----  Regarding: Prescription for BG Strips  Ms. Weldon would like to make a change the prescription for her blood glucose strips. She is checking more often and would like to change the number of strips to have enough to check her blood sugar 4 times daily. She receives her strips from [facility], she does not get them from her pharmacy.   Thank you

## 2020-06-19 DIAGNOSIS — Z79.4 TYPE 2 DIABETES MELLITUS WITH MICROALBUMINURIA, WITH LONG-TERM CURRENT USE OF INSULIN: Primary | ICD-10-CM

## 2020-06-19 DIAGNOSIS — E11.29 TYPE 2 DIABETES MELLITUS WITH MICROALBUMINURIA, WITH LONG-TERM CURRENT USE OF INSULIN: Primary | ICD-10-CM

## 2020-06-19 DIAGNOSIS — R80.9 TYPE 2 DIABETES MELLITUS WITH MICROALBUMINURIA, WITH LONG-TERM CURRENT USE OF INSULIN: Primary | ICD-10-CM

## 2020-06-19 NOTE — TELEPHONE ENCOUNTER
Called [facility] at [phone] and asked where to fax rx for test strips. [facility] gets orders from [facility] and then they deliver the supplies to the patients home. She stated that we would have to send order to [facility]. Rx and [facility] form at desk to be completed and signed.

## 2020-06-19 NOTE — TELEPHONE ENCOUNTER
S/w pt. Pt stated that she uses [employer] test strips and she is now testing 4 times daily and needs a new rx sent into [facility] (ph: [phone]). Told pt that I would send message to Kristy Vanegas and let her know once the rx was sent to [facility]. Pt verbalized understanding.

## 2020-06-19 NOTE — TELEPHONE ENCOUNTER
Faxed rx for test strips and completed [facility] Medical necessity form to [facility] at [phone] & [phone] and received confirmations that fax went through. Called pt and notified that the rx had to be faxed to [facility] first and then her insurance will fax it to [facility]. Pt verbalized understanding.

## 2020-06-23 ENCOUNTER — OFFICE VISIT (OUTPATIENT)
Dept: FAMILY MEDICINE | Facility: CLINIC | Age: 55
End: 2020-06-23
Payer: MEDICARE

## 2020-06-23 VITALS
BODY MASS INDEX: 37.14 KG/M2 | HEART RATE: 100 BPM | DIASTOLIC BLOOD PRESSURE: 68 MMHG | OXYGEN SATURATION: 98 % | SYSTOLIC BLOOD PRESSURE: 120 MMHG | TEMPERATURE: 99 F | WEIGHT: 177.69 LBS

## 2020-06-23 DIAGNOSIS — E55.9 VITAMIN D DEFICIENCY: ICD-10-CM

## 2020-06-23 DIAGNOSIS — R80.9 TYPE 2 DIABETES MELLITUS WITH MICROALBUMINURIA, WITH LONG-TERM CURRENT USE OF INSULIN: Primary | ICD-10-CM

## 2020-06-23 DIAGNOSIS — G47.33 OSA ON CPAP: ICD-10-CM

## 2020-06-23 DIAGNOSIS — I10 ESSENTIAL HYPERTENSION: ICD-10-CM

## 2020-06-23 DIAGNOSIS — I95.1 ORTHOSTATIC HYPOTENSION: ICD-10-CM

## 2020-06-23 DIAGNOSIS — E03.9 HYPOTHYROIDISM, UNSPECIFIED TYPE: ICD-10-CM

## 2020-06-23 DIAGNOSIS — E11.29 TYPE 2 DIABETES MELLITUS WITH MICROALBUMINURIA, WITH LONG-TERM CURRENT USE OF INSULIN: Primary | ICD-10-CM

## 2020-06-23 DIAGNOSIS — Z79.4 TYPE 2 DIABETES MELLITUS WITH MICROALBUMINURIA, WITH LONG-TERM CURRENT USE OF INSULIN: Primary | ICD-10-CM

## 2020-06-23 DIAGNOSIS — E66.01 SEVERE OBESITY (BMI 35.0-35.9 WITH COMORBIDITY): ICD-10-CM

## 2020-06-23 PROCEDURE — 3074F SYST BP LT 130 MM HG: CPT | Mod: CPTII,S$GLB,, | Performed by: FAMILY MEDICINE

## 2020-06-23 PROCEDURE — 3051F PR MOST RECENT HEMOGLOBIN A1C LEVEL 7.0 - < 8.0%: ICD-10-PCS | Mod: CPTII,S$GLB,, | Performed by: FAMILY MEDICINE

## 2020-06-23 PROCEDURE — 3008F PR BODY MASS INDEX (BMI) DOCUMENTED: ICD-10-PCS | Mod: CPTII,S$GLB,, | Performed by: FAMILY MEDICINE

## 2020-06-23 PROCEDURE — 3008F BODY MASS INDEX DOCD: CPT | Mod: CPTII,S$GLB,, | Performed by: FAMILY MEDICINE

## 2020-06-23 PROCEDURE — 99999 PR PBB SHADOW E&M-EST. PATIENT-LVL V: ICD-10-PCS | Mod: PBBFAC,,, | Performed by: FAMILY MEDICINE

## 2020-06-23 PROCEDURE — 3074F PR MOST RECENT SYSTOLIC BLOOD PRESSURE < 130 MM HG: ICD-10-PCS | Mod: CPTII,S$GLB,, | Performed by: FAMILY MEDICINE

## 2020-06-23 PROCEDURE — 99999 PR PBB SHADOW E&M-EST. PATIENT-LVL V: CPT | Mod: PBBFAC,,, | Performed by: FAMILY MEDICINE

## 2020-06-23 PROCEDURE — 3051F HG A1C>EQUAL 7.0%<8.0%: CPT | Mod: CPTII,S$GLB,, | Performed by: FAMILY MEDICINE

## 2020-06-23 PROCEDURE — 99214 OFFICE O/P EST MOD 30 MIN: CPT | Mod: S$GLB,,, | Performed by: FAMILY MEDICINE

## 2020-06-23 PROCEDURE — 3078F DIAST BP <80 MM HG: CPT | Mod: CPTII,S$GLB,, | Performed by: FAMILY MEDICINE

## 2020-06-23 PROCEDURE — 3078F PR MOST RECENT DIASTOLIC BLOOD PRESSURE < 80 MM HG: ICD-10-PCS | Mod: CPTII,S$GLB,, | Performed by: FAMILY MEDICINE

## 2020-06-23 PROCEDURE — 99214 PR OFFICE/OUTPT VISIT, EST, LEVL IV, 30-39 MIN: ICD-10-PCS | Mod: S$GLB,,, | Performed by: FAMILY MEDICINE

## 2020-06-23 RX ORDER — ONDANSETRON 4 MG/1
4 TABLET, ORALLY DISINTEGRATING ORAL EVERY 6 HOURS PRN
Qty: 20 TABLET | Refills: 0 | Status: SHIPPED | OUTPATIENT
Start: 2020-06-23 | End: 2020-07-15

## 2020-06-23 RX ORDER — LAMOTRIGINE 200 MG/1
400 TABLET ORAL NIGHTLY
COMMUNITY
Start: 2020-06-18 | End: 2021-06-14

## 2020-06-23 RX ORDER — ARMODAFINIL 250 MG/1
1 TABLET ORAL DAILY
COMMUNITY
Start: 2020-06-18 | End: 2021-04-01

## 2020-06-23 RX ORDER — FLUDROCORTISONE ACETATE 0.1 MG/1
100 TABLET ORAL DAILY
Qty: 90 TABLET | Refills: 1 | Status: SHIPPED | OUTPATIENT
Start: 2020-06-23 | End: 2021-08-27

## 2020-06-23 NOTE — PROGRESS NOTES
Subjective:       Patient ID: Bianca Weldon is a 55 y.o. female.    Chief Complaint: Follow-up      HPI Comments:       Current Outpatient Medications:     aripiprazole (ABILIFY) 20 MG Tab, Take 20 mg by mouth once daily. , Disp: , Rfl:     armodafiniL (NUVIGIL) 250 mg tablet, Take 1 tablet by mouth once daily., Disp: , Rfl:     ascorbic acid (VITAMIN C) 250 MG tablet, Take 250 mg by mouth once daily., Disp: , Rfl:     aspirin (ECOTRIN) 81 MG EC tablet, Take 81 mg by mouth once daily. , Disp: , Rfl:     atorvastatin (LIPITOR) 40 MG tablet, Take 1 tablet (40 mg total) by mouth once daily., Disp: 90 tablet, Rfl: 1    benztropine (COGENTIN) 1 MG tablet, Take 1 mg by mouth once daily. , Disp: , Rfl:     blood sugar diagnostic Strp, 1 strip by Misc.(Non-Drug; Combo Route) route 4 (four) times daily., Disp: 200 each, Rfl: 0    clonazepam (KLONOPIN) 2 MG Tab, Take 2 mg by mouth every evening. , Disp: , Rfl:     cycloSPORINE (RESTASIS) 0.05 % ophthalmic emulsion, 1 drop., Disp: , Rfl:     desvenlafaxine succinate (PRISTIQ) 50 MG Tb24, Take 50 mg by mouth once daily. , Disp: , Rfl:     diclofenac sodium (VOLTAREN) 1 % Gel, Apply 4 g topically., Disp: , Rfl:     estradiol (ESTRACE) 0.01 % (0.1 mg/gram) vaginal cream, Place 1 g vaginally every 7 days., Disp: 4 g, Rfl: 5    fish oil-omega-3 fatty acids 300-1,000 mg capsule, Take by mouth once daily., Disp: , Rfl:     fludrocortisone (FLORINEF) 0.1 mg Tab, Take 1 tablet (100 mcg total) by mouth once daily., Disp: 90 tablet, Rfl: 1    fluticasone furoate-vilanteroL (BREO) 100-25 mcg/dose diskus inhaler, 1 (one) time each day, Disp: , Rfl:     fluticasone propionate (FLONASE) 50 mcg/actuation nasal spray, 2 sprays (100 mcg total) by Each Nostril route once daily., Disp: 16 g, Rfl: 11    hyoscyamine (LEVSIN) 0.125 mg/5 mL Elix, Take 125 mcg by mouth every 4 (four) hours as needed. , Disp: , Rfl:     insulin glargine U-300 conc (TOUJEO MAX U-300 SOLOSTAR) 300  unit/mL (3 mL) InPn, Inject 50 Units into the skin once daily., Disp: 3 Syringe, Rfl: 3    ketoconazole (NIZORAL) 2 % cream, Apply topically 2 (two) times daily. Apply to the affected area twice daily., Disp: 30 g, Rfl: 2    lamoTRIgine (LAMICTAL) 200 MG tablet, Take 1 tablet by mouth 2 (two) times a day., Disp: , Rfl:     levocetirizine (XYZAL) 5 MG tablet, Take 1 tablet (5 mg total) by mouth every evening., Disp: 30 tablet, Rfl: 11    levothyroxine (SYNTHROID) 75 MCG tablet, TAKE 1 TABLET(75 MCG) BY MOUTH EVERY DAY, Disp: 90 tablet, Rfl: 0    losartan (COZAAR) 25 MG tablet, Take 1 tablet (25 mg total) by mouth once daily., Disp: 90 tablet, Rfl: 1    midodrine (PROAMATINE) 10 MG tablet, Take 1 tablet (10 mg total) by mouth once daily., Disp: 90 tablet, Rfl: 1    pantoprazole (PROTONIX) 40 MG tablet, Take 1 tablet (40 mg total) by mouth once daily., Disp: 90 tablet, Rfl: 3    SITagliptin (JANUVIA) 100 MG Tab, Take 1 tablet (100 mg total) by mouth once daily., Disp: 30 tablet, Rfl: 3    triamcinolone acetonide 0.1% (KENALOG) 0.1 % cream, Apply topically 2 (two) times daily. Do not use on face, Disp: 45 Bottle, Rfl: 6    vitamin E 100 UNIT capsule, Take 100 Units by mouth once daily., Disp: , Rfl:     ibuprofen (ADVIL,MOTRIN) 800 MG tablet, TAKE 1 TABLET BY MOUTH EVERY 6 HOURS AS NEEDED (Patient not taking: Reported on 6/23/2020), Disp: 30 tablet, Rfl: 0    methylphenidate HCl (RITALIN) 20 MG tablet, Take 20 mg by mouth once daily. , Disp: , Rfl:     modafinil (PROVIGIL) 200 MG Tab, Take 200 mg by mouth once daily. , Disp: , Rfl:     naproxen sodium (ANAPROX) 550 MG tablet, Take 550 mg by mouth as needed. , Disp: , Rfl:     ondansetron (ZOFRAN-ODT) 4 MG TbDL, Take 1 tablet (4 mg total) by mouth every 6 (six) hours as needed., Disp: 20 tablet, Rfl: 0      Been working aggressively on her blood sugar.  It appears that the Ozempic was causing her to have some worsening diaphoresis and dizziness.  This was  stopped last week in these symptoms seem to be better.  Still having some nausea times.  Wants a refill on the Zofran.  Still taking Toujeo and now on Januvia.    Complains of sore throat and blisters on the tip of her tongue today.  Does not feel sick.  No fever chills or other URI symptoms/cough.    Taking losartan and vitamin-D as prescribed    Review of Systems   Constitutional: Negative for activity change and unexpected weight change.   HENT: Positive for mouth sores and sore throat. Negative for hearing loss, rhinorrhea and trouble swallowing.    Eyes: Negative for discharge and visual disturbance.   Respiratory: Negative for chest tightness and wheezing.    Cardiovascular: Positive for palpitations. Negative for chest pain.   Gastrointestinal: Positive for vomiting. Negative for blood in stool, constipation and diarrhea.   Endocrine: Positive for polydipsia and polyuria.   Genitourinary: Negative for difficulty urinating, dysuria, hematuria and menstrual problem.   Musculoskeletal: Negative for arthralgias, joint swelling and neck pain.   Neurological: Positive for weakness. Negative for headaches.   Psychiatric/Behavioral: Positive for dysphoric mood. Negative for confusion.       Objective:      Vitals:    06/23/20 0914   BP: 120/68   Pulse: 100   Temp: 98.6 °F (37 °C)   TempSrc: Tympanic   SpO2: 98%   Weight: 80.6 kg (177 lb 11.1 oz)   PainSc: 0-No pain     Physical Exam  Vitals signs and nursing note reviewed.   Constitutional:       General: She is not in acute distress.     Appearance: She is well-developed. She is not diaphoretic.   HENT:      Head: Normocephalic.      Mouth/Throat:      Mouth: Mucous membranes are moist.      Tongue: No lesions.      Pharynx: No pharyngeal swelling, oropharyngeal exudate, posterior oropharyngeal erythema or uvula swelling.      Tonsils: No tonsillar exudate or tonsillar abscesses.   Neck:      Musculoskeletal: Neck supple.      Thyroid: No thyromegaly.    Cardiovascular:      Rate and Rhythm: Normal rate and regular rhythm.      Heart sounds: Normal heart sounds. No murmur.   Pulmonary:      Effort: Pulmonary effort is normal.      Breath sounds: Normal breath sounds. No wheezing or rales.   Abdominal:      General: There is no distension.      Palpations: Abdomen is soft.   Lymphadenopathy:      Cervical: No cervical adenopathy.   Skin:     General: Skin is warm and dry.   Neurological:      Mental Status: She is alert and oriented to person, place, and time.   Psychiatric:         Behavior: Behavior normal.         Thought Content: Thought content normal.         Judgment: Judgment normal.         Assessment:       1. Type 2 diabetes mellitus with microalbuminuria, with long-term current use of insulin    2. Essential hypertension    3. Hypothyroidism, unspecified type    4. Severe obesity (BMI 35.0-35.9 with comorbidity)    5. Vitamin D deficiency    6. Orthostatic hypotension    7. DMITRI on CPAP        Plan:   Type 2 diabetes mellitus with microalbuminuria, with long-term current use of insulin  Comments:  Improving blood sugar control at home with new medications.  Blood work scheduled for August.  Follow-up with diabetic education in September.    Essential hypertension  Comments:  Controlled.  Follow-up 6 months    Hypothyroidism, unspecified type  Comments:  Euthyroid    Severe obesity (BMI 35.0-35.9 with comorbidity)  Comments:  Has made changes in her diet for diabetes lately    Vitamin D deficiency  Comments:  Taking vitamin-D daily    Orthostatic hypotension  Comments:  Refill Florinef    DMITRI on CPAP  Comments:  regular use    Other orders  -     fludrocortisone (FLORINEF) 0.1 mg Tab; Take 1 tablet (100 mcg total) by mouth once daily.  Dispense: 90 tablet; Refill: 1  -     ondansetron (ZOFRAN-ODT) 4 MG TbDL; Take 1 tablet (4 mg total) by mouth every 6 (six) hours as needed.  Dispense: 20 tablet; Refill: 0

## 2020-06-29 ENCOUNTER — TELEPHONE (OUTPATIENT)
Dept: OBSTETRICS AND GYNECOLOGY | Facility: CLINIC | Age: 55
End: 2020-06-29

## 2020-06-29 NOTE — TELEPHONE ENCOUNTER
Spoke to the pt and advised her that her urine culture was negative. Pt acknowledged understanding, slee,lpn

## 2020-07-14 ENCOUNTER — TELEPHONE (OUTPATIENT)
Dept: PULMONOLOGY | Facility: CLINIC | Age: 55
End: 2020-07-14

## 2020-07-14 ENCOUNTER — PATIENT OUTREACH (OUTPATIENT)
Dept: ADMINISTRATIVE | Facility: OTHER | Age: 55
End: 2020-07-14

## 2020-07-14 DIAGNOSIS — J32.9 SINOBRONCHITIS: ICD-10-CM

## 2020-07-14 DIAGNOSIS — J44.9 COPD MIXED TYPE: Primary | ICD-10-CM

## 2020-07-14 DIAGNOSIS — J40 SINOBRONCHITIS: ICD-10-CM

## 2020-07-14 DIAGNOSIS — R06.02 SOB (SHORTNESS OF BREATH): Primary | ICD-10-CM

## 2020-07-14 DIAGNOSIS — Z12.31 ENCOUNTER FOR SCREENING MAMMOGRAM FOR MALIGNANT NEOPLASM OF BREAST: Primary | ICD-10-CM

## 2020-07-14 RX ORDER — FLUTICASONE FUROATE AND VILANTEROL 100; 25 UG/1; UG/1
POWDER RESPIRATORY (INHALATION)
Status: CANCELLED | OUTPATIENT
Start: 2020-07-14

## 2020-07-14 RX ORDER — FLUTICASONE FUROATE AND VILANTEROL 100; 25 UG/1; UG/1
POWDER RESPIRATORY (INHALATION)
Qty: 60 EACH | Refills: 11 | Status: SHIPPED | OUTPATIENT
Start: 2020-07-14 | End: 2021-03-08 | Stop reason: SDUPTHER

## 2020-07-14 RX ORDER — IPRATROPIUM BROMIDE AND ALBUTEROL 20; 100 UG/1; UG/1
1 SPRAY, METERED RESPIRATORY (INHALATION) EVERY 6 HOURS PRN
Qty: 4 G | Refills: 6 | Status: SHIPPED | OUTPATIENT
Start: 2020-07-14 | End: 2021-03-08 | Stop reason: SDUPTHER

## 2020-07-14 NOTE — PROGRESS NOTES
Requested updates within Care Everywhere.  Patient's chart was reviewed for overdue MARYLOU topics.  Immunizations reconciled.    Orders placed: mammogram

## 2020-07-15 ENCOUNTER — OFFICE VISIT (OUTPATIENT)
Dept: OBSTETRICS AND GYNECOLOGY | Facility: CLINIC | Age: 55
End: 2020-07-15
Payer: MEDICARE

## 2020-07-15 VITALS
HEIGHT: 58 IN | WEIGHT: 187.38 LBS | BODY MASS INDEX: 39.33 KG/M2 | DIASTOLIC BLOOD PRESSURE: 82 MMHG | SYSTOLIC BLOOD PRESSURE: 130 MMHG

## 2020-07-15 DIAGNOSIS — N95.1 HOT FLASHES DUE TO MENOPAUSE: ICD-10-CM

## 2020-07-15 DIAGNOSIS — Z78.0 MENOPAUSE: ICD-10-CM

## 2020-07-15 DIAGNOSIS — Z01.419 ROUTINE GYNECOLOGICAL EXAMINATION: Primary | ICD-10-CM

## 2020-07-15 DIAGNOSIS — Z12.39 BREAST CANCER SCREENING: ICD-10-CM

## 2020-07-15 PROCEDURE — 3079F PR MOST RECENT DIASTOLIC BLOOD PRESSURE 80-89 MM HG: ICD-10-PCS | Mod: CPTII,S$GLB,, | Performed by: NURSE PRACTITIONER

## 2020-07-15 PROCEDURE — 3008F BODY MASS INDEX DOCD: CPT | Mod: CPTII,S$GLB,, | Performed by: NURSE PRACTITIONER

## 2020-07-15 PROCEDURE — G0101 CA SCREEN;PELVIC/BREAST EXAM: HCPCS | Mod: S$GLB,,, | Performed by: NURSE PRACTITIONER

## 2020-07-15 PROCEDURE — 3079F DIAST BP 80-89 MM HG: CPT | Mod: CPTII,S$GLB,, | Performed by: NURSE PRACTITIONER

## 2020-07-15 PROCEDURE — 99999 PR PBB SHADOW E&M-EST. PATIENT-LVL V: ICD-10-PCS | Mod: PBBFAC,,, | Performed by: NURSE PRACTITIONER

## 2020-07-15 PROCEDURE — 3075F PR MOST RECENT SYSTOLIC BLOOD PRESS GE 130-139MM HG: ICD-10-PCS | Mod: CPTII,S$GLB,, | Performed by: NURSE PRACTITIONER

## 2020-07-15 PROCEDURE — 3075F SYST BP GE 130 - 139MM HG: CPT | Mod: CPTII,S$GLB,, | Performed by: NURSE PRACTITIONER

## 2020-07-15 PROCEDURE — G0101 PR CA SCREEN;PELVIC/BREAST EXAM: ICD-10-PCS | Mod: S$GLB,,, | Performed by: NURSE PRACTITIONER

## 2020-07-15 PROCEDURE — 99999 PR PBB SHADOW E&M-EST. PATIENT-LVL V: CPT | Mod: PBBFAC,,, | Performed by: NURSE PRACTITIONER

## 2020-07-15 PROCEDURE — 3008F PR BODY MASS INDEX (BMI) DOCUMENTED: ICD-10-PCS | Mod: CPTII,S$GLB,, | Performed by: NURSE PRACTITIONER

## 2020-07-15 RX ORDER — MIRABEGRON 50 MG/1
50 TABLET, FILM COATED, EXTENDED RELEASE ORAL 2 TIMES DAILY PRN
COMMUNITY
Start: 2020-04-20

## 2020-07-15 RX ORDER — BENZTROPINE MESYLATE 1 MG/1
0.5 TABLET ORAL 2 TIMES DAILY
COMMUNITY
Start: 2020-06-18 | End: 2021-06-14 | Stop reason: SDUPTHER

## 2020-07-15 RX ORDER — ONDANSETRON 4 MG/1
TABLET, ORALLY DISINTEGRATING ORAL
COMMUNITY
Start: 2020-06-23 | End: 2020-10-07 | Stop reason: SDUPTHER

## 2020-07-15 RX ORDER — NAPROXEN 500 MG/1
TABLET ORAL
COMMUNITY
Start: 2020-06-08 | End: 2020-08-31

## 2020-07-15 NOTE — PROGRESS NOTES
CC: Well woman exam    HPI  Bianca Weldon is a 55 y.o. female  presents for a well woman exam.  LMP: No LMP recorded. Patient has had a hysterectomy.. Desires treatment for hot flashes. Due to history of high cholesterol, orthostatic hypotension, and diabetes not a candidate for HRT.    Past Medical History:   Diagnosis Date    Abnormal Pap smear of cervix     Abnormal Pap smear of vagina     after hyst, repeat every 6 months, per pt, had bx's, unknown results    Acid reflux     Arthritis     Bipolar 1 disorder     Depression     bipolar    Diabetes mellitus     Diabetes mellitus, type 2     Elevated alkaline phosphatase level     GERD (gastroesophageal reflux disease)     Hyperlipidemia     Hypertension     IBS (irritable bowel syndrome)     Interstitial cystitis     Nonalcoholic steatohepatitis     Orthostatic hypotension     Restless legs syndrome     Thyroid disease      Past Surgical History:   Procedure Laterality Date    ADENOIDECTOMY      APPENDECTOMY      julia shoulder surgery      COLONOSCOPY      CYSTOSTOMY W/ BLADDER BIOPSY      interstial cystitis    DILATION AND CURETTAGE OF UTERUS      missed ab    ESOPHAGEAL DILATION      HYSTERECTOMY      TLH, LSO (endometriosis)    OOPHORECTOMY      LSO    TONSILLECTOMY      WISDOM TOOTH EXTRACTION       Social History     Socioeconomic History    Marital status:      Spouse name: Not on file    Number of children: Not on file    Years of education: Not on file    Highest education level: Not on file   Occupational History    Occupation: disabled   Social Needs    Financial resource strain: Very hard    Food insecurity     Worry: Often true     Inability: Sometimes true    Transportation needs     Medical: No     Non-medical: No   Tobacco Use    Smoking status: Former Smoker     Packs/day: 1.50     Years: 33.00     Pack years: 49.50     Quit date: 2017     Years since quitting: 3.5    Smokeless  tobacco: Former User   Substance and Sexual Activity    Alcohol use: No     Frequency: Never    Drug use: No    Sexual activity: Yes     Partners: Male     Birth control/protection: Surgical     Comment: hyst; mut monog   Lifestyle    Physical activity     Days per week: 0 days     Minutes per session: Not on file    Stress: Not on file   Relationships    Social connections     Talks on phone: More than three times a week     Gets together: Once a week     Attends Orthodoxy service: Not on file     Active member of club or organization: Yes     Attends meetings of clubs or organizations: More than 4 times per year     Relationship status:    Other Topics Concern    Are you pregnant or think you may be? No    Breast-feeding No   Social History Narrative    Not on file     Family History   Problem Relation Age of Onset    Diabetes Mellitus Mother     Melanoma Mother     Diabetes Mellitus Father     Colon cancer Father     Heart disease Father 45        CABG    Rheum arthritis Maternal Grandmother     Breast cancer Maternal Grandmother     Thrombophilia Paternal Grandmother         DVTs    Ovarian cancer Neg Hx      OB History        3    Para   2    Term   2            AB   1    Living   2       SAB   1    TAB        Ectopic        Multiple        Live Births                     Current Outpatient Medications:     aripiprazole (ABILIFY) 20 MG Tab, Take 20 mg by mouth once daily. , Disp: , Rfl:     armodafiniL (NUVIGIL) 250 mg tablet, Take 1 tablet by mouth once daily., Disp: , Rfl:     ascorbic acid (VITAMIN C) 250 MG tablet, Take 250 mg by mouth once daily., Disp: , Rfl:     aspirin (ECOTRIN) 81 MG EC tablet, Take 81 mg by mouth once daily. , Disp: , Rfl:     atorvastatin (LIPITOR) 40 MG tablet, Take 1 tablet (40 mg total) by mouth once daily., Disp: 90 tablet, Rfl: 1    benztropine (COGENTIN) 1 MG tablet, , Disp: , Rfl:     blood sugar diagnostic Strp, 1 strip by  Misc.(Non-Drug; Combo Route) route 4 (four) times daily., Disp: 200 each, Rfl: 0    clonazepam (KLONOPIN) 2 MG Tab, Take 2 mg by mouth every evening. , Disp: , Rfl:     cycloSPORINE (RESTASIS) 0.05 % ophthalmic emulsion, 1 drop., Disp: , Rfl:     desvenlafaxine succinate (PRISTIQ) 50 MG Tb24, Take 50 mg by mouth once daily. , Disp: , Rfl:     diclofenac sodium (VOLTAREN) 1 % Gel, Apply 4 g topically., Disp: , Rfl:     docosahexaenoic acid-epa 120-180 mg Cap, Take by mouth., Disp: , Rfl:     fish oil-omega-3 fatty acids 300-1,000 mg capsule, Take by mouth once daily., Disp: , Rfl:     fludrocortisone (FLORINEF) 0.1 mg Tab, Take 1 tablet (100 mcg total) by mouth once daily., Disp: 90 tablet, Rfl: 1    fluticasone furoate-vilanteroL (BREO) 100-25 mcg/dose diskus inhaler, 1 (one) time each day, Disp: 60 each, Rfl: 11    fluticasone propionate (FLONASE) 50 mcg/actuation nasal spray, 2 sprays (100 mcg total) by Each Nostril route once daily., Disp: 16 g, Rfl: 11    hyoscyamine (LEVSIN) 0.125 mg/5 mL Elix, Take 125 mcg by mouth every 4 (four) hours as needed. , Disp: , Rfl:     ibuprofen (ADVIL,MOTRIN) 800 MG tablet, TAKE 1 TABLET BY MOUTH EVERY 6 HOURS AS NEEDED, Disp: 30 tablet, Rfl: 0    insulin glargine U-300 conc (TOUJEO MAX U-300 SOLOSTAR) 300 unit/mL (3 mL) InPn, Inject 50 Units into the skin once daily., Disp: 3 Syringe, Rfl: 3    ipratropium-albuteroL (COMBIVENT RESPIMAT)  mcg/actuation inhaler, Inhale 1 puff into the lungs every 6 (six) hours as needed for Wheezing., Disp: 4 g, Rfl: 6    lamoTRIgine (LAMICTAL) 200 MG tablet, Take 1 tablet by mouth 2 (two) times a day., Disp: , Rfl:     levothyroxine (SYNTHROID) 75 MCG tablet, TAKE 1 TABLET(75 MCG) BY MOUTH EVERY DAY, Disp: 90 tablet, Rfl: 0    losartan (COZAAR) 25 MG tablet, Take 1 tablet (25 mg total) by mouth once daily., Disp: 90 tablet, Rfl: 1    midodrine (PROAMATINE) 10 MG tablet, Take 1 tablet (10 mg total) by mouth once daily.,  "Disp: 90 tablet, Rfl: 1    MYRBETRIQ 50 mg Tb24, , Disp: , Rfl:     naproxen sodium (ANAPROX) 550 MG tablet, Take 550 mg by mouth as needed. , Disp: , Rfl:     ondansetron (ZOFRAN-ODT) 4 MG TbDL, , Disp: , Rfl:     pantoprazole (PROTONIX) 40 MG tablet, Take 1 tablet (40 mg total) by mouth once daily., Disp: 90 tablet, Rfl: 3    SITagliptin (JANUVIA) 100 MG Tab, Take 1 tablet (100 mg total) by mouth once daily., Disp: 30 tablet, Rfl: 3    vitamin E 100 UNIT capsule, Take 100 Units by mouth once daily., Disp: , Rfl:     estradiol (ESTRACE) 0.01 % (0.1 mg/gram) vaginal cream, Place 1 g vaginally every 7 days., Disp: 4 g, Rfl: 5    ketoconazole (NIZORAL) 2 % cream, Apply topically 2 (two) times daily. Apply to the affected area twice daily., Disp: 30 g, Rfl: 2    levocetirizine (XYZAL) 5 MG tablet, Take 1 tablet (5 mg total) by mouth every evening., Disp: 30 tablet, Rfl: 11    naproxen (NAPROSYN) 500 MG tablet, , Disp: , Rfl:     triamcinolone acetonide 0.1% (KENALOG) 0.1 % cream, Apply topically 2 (two) times daily. Do not use on face, Disp: 45 Bottle, Rfl: 6    GYNECOLOGY HISTORY:  Hysterectomy done due to endometriosis    DATA REVIEWED:  Last pap:  No longer indicated     /82   Ht 4' 10" (1.473 m)   Wt 85 kg (187 lb 6.3 oz)   BMI 39.16 kg/m²     Review of Systems   Constitutional: Negative.    HENT: Negative.    Eyes: Negative.    Respiratory: Negative.    Cardiovascular: Negative.    Gastrointestinal: Negative.    Endocrine: Negative.    Genitourinary: Negative.    Musculoskeletal: Negative.    Skin: Negative.    Allergic/Immunologic: Negative.    Neurological: Negative.    Hematological: Negative.    Psychiatric/Behavioral: Negative.        Physical Exam  Constitutional:       Appearance: Normal appearance.   HENT:      Head: Normocephalic.      Nose: Nose normal.      Mouth/Throat:      Mouth: Mucous membranes are moist.   Eyes:      Extraocular Movements: Extraocular movements intact.   Neck: "      Musculoskeletal: Normal range of motion.   Pulmonary:      Effort: Pulmonary effort is normal.   Chest:      Breasts:         Right: Normal.         Left: Normal.   Abdominal:      General: Abdomen is flat.      Palpations: Abdomen is soft.   Genitourinary:     General: Normal vulva.      Exam position: Supine.      Rectum: Normal.      Comments: Uterus and cervix absent  Musculoskeletal: Normal range of motion.   Skin:     General: Skin is warm.   Neurological:      Mental Status: She is alert and oriented to person, place, and time.   Psychiatric:         Mood and Affect: Mood normal.         Behavior: Behavior normal.         Thought Content: Thought content normal.         Judgment: Judgment normal.         Routine gynecological examination    Breast cancer screening    Menopause  -     DXA Bone Density Spine And Hip; Future; Expected date: 07/15/2020    Hot flashes due to menopause    Recommend client take OTC Estroven     Breast cancer Screening  Mammogram scheduled ordered by PCP     Patient was counseled today on A.C.S. Pap guidelines (Q3) and recommendations for yearly pelvic exams, yearly mammograms starting age 40, and clinical breast exams; to see her PCP for other health maintenance.

## 2020-08-19 ENCOUNTER — LAB VISIT (OUTPATIENT)
Dept: LAB | Facility: HOSPITAL | Age: 55
End: 2020-08-19
Attending: NURSE PRACTITIONER
Payer: MEDICARE

## 2020-08-19 DIAGNOSIS — Z79.4 TYPE 2 DIABETES MELLITUS WITH MICROALBUMINURIA, WITH LONG-TERM CURRENT USE OF INSULIN: ICD-10-CM

## 2020-08-19 DIAGNOSIS — E11.29 TYPE 2 DIABETES MELLITUS WITH MICROALBUMINURIA, WITH LONG-TERM CURRENT USE OF INSULIN: ICD-10-CM

## 2020-08-19 DIAGNOSIS — R80.9 TYPE 2 DIABETES MELLITUS WITH MICROALBUMINURIA, WITH LONG-TERM CURRENT USE OF INSULIN: ICD-10-CM

## 2020-08-19 LAB
ALBUMIN SERPL BCP-MCNC: 3.7 G/DL (ref 3.5–5.2)
ALP SERPL-CCNC: 223 U/L (ref 55–135)
ALT SERPL W/O P-5'-P-CCNC: 16 U/L (ref 10–44)
ANION GAP SERPL CALC-SCNC: 12 MMOL/L (ref 8–16)
AST SERPL-CCNC: 17 U/L (ref 10–40)
BILIRUB SERPL-MCNC: 0.3 MG/DL (ref 0.1–1)
BUN SERPL-MCNC: 6 MG/DL (ref 6–20)
CALCIUM SERPL-MCNC: 8.6 MG/DL (ref 8.7–10.5)
CHLORIDE SERPL-SCNC: 106 MMOL/L (ref 95–110)
CO2 SERPL-SCNC: 23 MMOL/L (ref 23–29)
CREAT SERPL-MCNC: 0.9 MG/DL (ref 0.5–1.4)
EST. GFR  (AFRICAN AMERICAN): >60 ML/MIN/1.73 M^2
EST. GFR  (NON AFRICAN AMERICAN): >60 ML/MIN/1.73 M^2
GLUCOSE SERPL-MCNC: 154 MG/DL (ref 70–110)
POTASSIUM SERPL-SCNC: 3.6 MMOL/L (ref 3.5–5.1)
PROT SERPL-MCNC: 6.4 G/DL (ref 6–8.4)
SODIUM SERPL-SCNC: 141 MMOL/L (ref 136–145)

## 2020-08-19 PROCEDURE — 36415 COLL VENOUS BLD VENIPUNCTURE: CPT | Mod: PO

## 2020-08-19 PROCEDURE — 80053 COMPREHEN METABOLIC PANEL: CPT

## 2020-08-19 PROCEDURE — 83036 HEMOGLOBIN GLYCOSYLATED A1C: CPT

## 2020-08-20 ENCOUNTER — PATIENT OUTREACH (OUTPATIENT)
Dept: ADMINISTRATIVE | Facility: OTHER | Age: 55
End: 2020-08-20

## 2020-08-20 LAB
ESTIMATED AVG GLUCOSE: 163 MG/DL (ref 68–131)
HBA1C MFR BLD HPLC: 7.3 % (ref 4–5.6)

## 2020-08-20 NOTE — PROGRESS NOTES
Chart reviewed.   Immunizations: Triggered Imm Registry     Orders placed: n/a  Upcoming appts to satisfy MARYLOU topics: n/a

## 2020-08-22 ENCOUNTER — OFFICE VISIT (OUTPATIENT)
Dept: DIABETES | Facility: CLINIC | Age: 55
End: 2020-08-22
Payer: MEDICARE

## 2020-08-22 DIAGNOSIS — R80.9 TYPE 2 DIABETES MELLITUS WITH MICROALBUMINURIA, WITH LONG-TERM CURRENT USE OF INSULIN: Primary | ICD-10-CM

## 2020-08-22 DIAGNOSIS — Z79.4 TYPE 2 DIABETES MELLITUS WITH MICROALBUMINURIA, WITH LONG-TERM CURRENT USE OF INSULIN: Primary | ICD-10-CM

## 2020-08-22 DIAGNOSIS — E78.5 DYSLIPIDEMIA: ICD-10-CM

## 2020-08-22 DIAGNOSIS — E11.29 TYPE 2 DIABETES MELLITUS WITH MICROALBUMINURIA, WITH LONG-TERM CURRENT USE OF INSULIN: Primary | ICD-10-CM

## 2020-08-22 PROCEDURE — 99213 PR OFFICE/OUTPT VISIT, EST, LEVL III, 20-29 MIN: ICD-10-PCS | Mod: 95,,, | Performed by: NURSE PRACTITIONER

## 2020-08-22 PROCEDURE — 3051F HG A1C>EQUAL 7.0%<8.0%: CPT | Mod: CPTII,95,, | Performed by: NURSE PRACTITIONER

## 2020-08-22 PROCEDURE — 3051F PR MOST RECENT HEMOGLOBIN A1C LEVEL 7.0 - < 8.0%: ICD-10-PCS | Mod: CPTII,95,, | Performed by: NURSE PRACTITIONER

## 2020-08-22 PROCEDURE — 99213 OFFICE O/P EST LOW 20 MIN: CPT | Mod: 95,,, | Performed by: NURSE PRACTITIONER

## 2020-08-22 RX ORDER — GLIMEPIRIDE 2 MG/1
2 TABLET ORAL
Qty: 30 TABLET | Refills: 3 | Status: SHIPPED | OUTPATIENT
Start: 2020-08-22 | End: 2020-10-07 | Stop reason: DRUGHIGH

## 2020-08-22 RX ORDER — ATORVASTATIN CALCIUM 40 MG/1
40 TABLET, FILM COATED ORAL DAILY
Qty: 90 TABLET | Refills: 1 | Status: SHIPPED | OUTPATIENT
Start: 2020-08-22 | End: 2020-12-01

## 2020-08-22 NOTE — PROGRESS NOTES
PCP: Scotty Figueroa MD    Subjective:     Chief Complaint: Diabetes Follow Up    HISTORY OF PRESENT ILLNESS: 55 y.o.  female presenting virtually for diabetes follow up.  Patient has had Type II diabetes since 2014 and has the following complications: neuropathy. She has attended diabetes education in the past. Other pertinent conditions include: HTN, HLD, irritable bowel disease, and bipolar disorder.  Of note, she has a history of cortisone injections for plantar fasciitis.       In the past, she has tried several DM medications: including Invokana ( recurrent yeast infection ); metformin caused GI side effects; and Trulicity caused abdominal pain and discomfort.     Blood glucose testing is performed regularly. Per recall, fasting BGs are 136 - 195 ; pp lunch 200 - 220 s ; ac lunch 100 - 120 s. She denies any recent hospital admissions, emergency room visits, hypoglycemia, or syncope.      Labs Reviewed.       No results found for: CPEPTIDE  No results found for: GLUTAMICACID  No results found for: HUMANINSULIN    DM MEDICATIONS:   Toujeo 36 units daily ( at 9 pm )  Januvia 100 mg daily    Review of Systems   Constitutional: Negative for appetite change, diaphoresis, fatigue and unexpected weight change.   Eyes: Negative for visual disturbance.   Gastrointestinal: Positive for nausea (chronic, intermittent). Negative for abdominal pain, constipation and diarrhea.   Endocrine: Negative for polydipsia, polyphagia and polyuria.   Neurological: Positive for dizziness and numbness. Negative for headaches.   Psychiatric/Behavioral: Negative for confusion and decreased concentration. The patient is not nervous/anxious.        Diabetes Management Status  Statin: Taking  ACE/ARB: Not taking    Screening or Prevention Patient's value Goal Complete/Controlled?   HgA1C Testing and Control   Lab Results   Component Value Date    HGBA1C 7.3 (H) 08/19/2020      Annually/Less than 8% No   Lipid profile : 05/01/2020  Annually No   LDL control Lab Results   Component Value Date    LDLCALC 68.8 2020    Annually/Less than 100 mg/dl  No   Nephropathy screening Lab Results   Component Value Date    MICALBCREAT 37.2 (H) 2020     Lab Results   Component Value Date    PROTEINUA Negative 2015    Annually No   Blood pressure BP Readings from Last 1 Encounters:   07/15/20 130/82    Less than 140/90 Yes   Dilated retinal exam : 2020 Annually Yes, [facility]   Foot exam   : 2020 Annually No     ACTIVITY LEVEL: Rarely Active. Discussed activities, benefits, methods, and precautions.  MEAL PLANNING: Patient reports number of meals per day to be 3 and number of snacks per day to be 2.   Patient is encouraged to carb count and consume no more than 45 - 60 grams of carbohydrates in each meal, and 1800 k / richard per day.      BLOOD GLUCOSE TESTIN times daily  SOCIAL HISTORY: .  Former smoker.     Objective:      Physical Exam  Constitutional:       Appearance: She is well-developed.   HENT:      Head: Normocephalic and atraumatic.   Eyes:      Pupils: Pupils are equal, round, and reactive to light.   Pulmonary:      Effort: No respiratory distress.   Psychiatric:         Behavior: Behavior normal.         Thought Content: Thought content normal.         Judgment: Judgment normal.       Assessment / Plan:     1.) Type 2 diabetes mellitus with microalbuminuria, with long-term current use of insulin  Comments:  - Condition improving. Continue Toujeo 36 units daily.  Continue Januvia 100 mg daily.  We discussed starting sulfonyurea to assist with meal time blood sugars.  I explained MOA and potential side effects of hypoglycemia.  I advised to eat within 30 minutes of taking  medications.  She agrees to take glimepiride 2 mg before lunch, will titrate to 4 mg if needed.    - Option for diabetes medications are limited.  In the past, she has tried several DM medications: including Invokana ( recurrent  yeast infection ); metformin caused GI side effects; and Trulicity caused abdominal pain and discomfort.     2.) Dyslipidemia - continue meds a prescribed.    Additional Plan Details:    1.) Continue monitoring blood sugar 2 x daily, fasting and ac dinner or at bedtime. Discussed ADA goal for fasting blood sugar, 80 - 130 mg/dL; pp blood sugars below 180 mg/dl. Also, discussed prevention of hypoglycemia and the need to adjust goals to higher levels if persistent hypoglycemia.  Reminded to bring BG records or meter to each visit for review.  2.) Return to clinic in 1 month for follow up. The patient was explained the above plan and given opportunity to ask questions.  She understands, chooses and consents to this plan and accepts all the risks, which include but are not limited to the risks mentioned above. She understands the alternative of having no testing, interventions or treatments at this time. She left content and without further questions.     Renetta Whitlock, OMAR-C, CDE      The patient location is: [facility]  The chief complaint leading to consultation is: Type 2 Diabetes  Visit type: audiovisual    Face to Face time with patient: 15 minutes  20 minutes of total time spent on the encounter, which includes face to face time and non-face to face time preparing to see the patient (eg, review of tests), Obtaining and/or reviewing separately obtained history, Documenting clinical information in the electronic or other health record, Independently interpreting results (not separately reported) and communicating results to the patient/family/caregiver, or Care coordination (not separately reported).     Each patient to whom he or she provides medical services by telemedicine is:  (1) informed of the relationship between the physician and patient and the respective role of any other health care provider with respect to management of the patient; and (2) notified that he or she may decline to receive  medical services by telemedicine and may withdraw from such care at any time.

## 2020-08-31 ENCOUNTER — OFFICE VISIT (OUTPATIENT)
Dept: DERMATOLOGY | Facility: CLINIC | Age: 55
End: 2020-08-31
Payer: MEDICARE

## 2020-08-31 DIAGNOSIS — L73.9 FOLLICULITIS: Primary | ICD-10-CM

## 2020-08-31 DIAGNOSIS — L85.3 XEROSIS CUTIS: ICD-10-CM

## 2020-08-31 DIAGNOSIS — L82.1 SEBORRHEIC KERATOSES: ICD-10-CM

## 2020-08-31 PROCEDURE — 87186 SC STD MICRODIL/AGAR DIL: CPT

## 2020-08-31 PROCEDURE — 87077 CULTURE AEROBIC IDENTIFY: CPT

## 2020-08-31 PROCEDURE — 99999 PR PBB SHADOW E&M-EST. PATIENT-LVL IV: ICD-10-PCS | Mod: PBBFAC,,, | Performed by: DERMATOLOGY

## 2020-08-31 PROCEDURE — 87070 CULTURE OTHR SPECIMN AEROBIC: CPT

## 2020-08-31 PROCEDURE — 99999 PR PBB SHADOW E&M-EST. PATIENT-LVL IV: CPT | Mod: PBBFAC,,, | Performed by: DERMATOLOGY

## 2020-08-31 PROCEDURE — 99213 OFFICE O/P EST LOW 20 MIN: CPT | Mod: S$GLB,,, | Performed by: DERMATOLOGY

## 2020-08-31 PROCEDURE — 99213 PR OFFICE/OUTPT VISIT, EST, LEVL III, 20-29 MIN: ICD-10-PCS | Mod: S$GLB,,, | Performed by: DERMATOLOGY

## 2020-08-31 RX ORDER — CLINDAMYCIN PHOSPHATE 11.9 MG/ML
SOLUTION TOPICAL 2 TIMES DAILY PRN
Qty: 60 ML | Refills: 1 | Status: SHIPPED | OUTPATIENT
Start: 2020-08-31 | End: 2020-11-27 | Stop reason: SDUPTHER

## 2020-08-31 RX ORDER — DOXYCYCLINE 100 MG/1
100 CAPSULE ORAL 2 TIMES DAILY
Qty: 28 CAPSULE | Refills: 0 | Status: SHIPPED | OUTPATIENT
Start: 2020-08-31 | End: 2020-09-14

## 2020-08-31 NOTE — PATIENT INSTRUCTIONS
XEROSIS (DRY SKIN)      1. Definition    Xerosis is the term for dry skin.  We all have a natural oil coating over our skin produced by the skin oil glands.  If this oil is removed, the skin becomes dry which can lead to cracking, which can lead to inflammation.  Xerosis is usually a long-term problem that recurs often, especially in the winter.    2. Cause     Long hot baths or showers can remove our natural oil and lead to xerosis.  One should never take more than one bath or shower a day and for no longer than ten minutes.   Use of harsh soaps such as Zest, Dial, Tristanian Spring, Lever and Ivory can worsen and cause xerosis.   Cold winter weather worsens xerosis because the amount of moisture contained in cold air is much less than the amount of moisture in warm air.    3. Treatment     Treatment is intended to restore the natural oil to your skin.  Keep the skin lubricated.     Do not take more than one bath or shower a day.  Use lukewarm water, not hot.  Hot water dries out the skin.     Use a gentle moisturizing soap such as Cetaphil soap, Dove, or Cetaphil Restoraderm cleanser.     When toweling dry, dont rub.  Blot the skin so there is still some water left on the skin.  You should apply a moisturizing cream to all of the skin such as Cerave cream, Cetaphil cream, Restoraderm or Eucerin Original Formula cream.   Alpha hydroxyacid lotions, i.e., AmLactin, also work very well for preventing dry skin, but may burn when used on inflamed or reddened skin.

## 2020-08-31 NOTE — PROGRESS NOTES
Subjective:       Patient ID:  Bianca Weldon is a 55 y.o. female who presents for   Chief Complaint   Patient presents with    Rash     forehead and scalp x 2weeks      History of Present Illness: The patient presents with chief complaint of rash.  Location: forehead and scalp  Duration: 2 weeks   Signs/Symptoms: redness, sore     Prior treatments: tea tree shampoo, head and shoulders shampoo     Area affected is wear CPAP straps go.      Review of Systems     Objective:    Physical Exam   Constitutional: She appears well-developed and well-nourished. No distress.   Neurological: She is alert and oriented to person, place, and time.   Psychiatric: She has a normal mood and affect.   Skin:   Areas Examined (abnormalities noted in diagram):   Scalp / Hair Palpated and Inspected  Head / Face Inspection Performed  Neck Inspection Performed  RUE Inspected  LUE Inspection Performed                   Diagram Legend     Erythematous scaling macule/papule c/w actinic keratosis       Vascular papule c/w angioma      Pigmented verrucoid papule/plaque c/w seborrheic keratosis      Yellow umbilicated papule c/w sebaceous hyperplasia      Irregularly shaped tan macule c/w lentigo     1-2 mm smooth white papules consistent with Milia      Movable subcutaneous cyst with punctum c/w epidermal inclusion cyst      Subcutaneous movable cyst c/w pilar cyst      Firm pink to brown papule c/w dermatofibroma      Pedunculated fleshy papule(s) c/w skin tag(s)      Evenly pigmented macule c/w junctional nevus     Mildly variegated pigmented, slightly irregular-bordered macule c/w mildly atypical nevus      Flesh colored to evenly pigmented papule c/w intradermal nevus       Pink pearly papule/plaque c/w basal cell carcinoma      Erythematous hyperkeratotic cursted plaque c/w SCC      Surgical scar with no sign of skin cancer recurrence      Open and closed comedones      Inflammatory papules and pustules      Verrucoid papule consistent  consistent with wart     Erythematous eczematous patches and plaques     Dystrophic onycholytic nail with subungual debris c/w onychomycosis     Umbilicated papule    Erythematous-base heme-crusted tan verrucoid plaque consistent with inflamed seborrheic keratosis     Erythematous Silvery Scaling Plaque c/w Psoriasis     See annotation      Assessment / Plan:        Folliculitis  -     Abscess Culture, f/u results  -     doxycycline (VIBRAMYCIN) 100 MG Cap; Take 1 capsule (100 mg total) by mouth 2 (two) times daily. for 14 days  Dispense: 28 capsule; Refill: 0  -     clindamycin (CLEOCIN T) 1 % external solution; Apply topically 2 (two) times daily as needed. For acne/folliculitis on face and scalp  Dispense: 60 mL; Refill: 1    Recommend cleaning CPAP straps with alcohol wipes nightly    Xerosis cutis  Counseled on gentle skin care and trigger avoidance, handout provided  Recommended Cerave cream BID    Seborrheic keratoses  reassurance               Follow up if symptoms worsen or fail to improve.

## 2020-09-03 LAB — BACTERIA SPEC AEROBE CULT: ABNORMAL

## 2020-09-08 DIAGNOSIS — R80.9 TYPE 2 DIABETES MELLITUS WITH MICROALBUMINURIA, WITH LONG-TERM CURRENT USE OF INSULIN: Primary | ICD-10-CM

## 2020-09-08 DIAGNOSIS — Z79.4 TYPE 2 DIABETES MELLITUS WITH MICROALBUMINURIA, WITH LONG-TERM CURRENT USE OF INSULIN: Primary | ICD-10-CM

## 2020-09-08 DIAGNOSIS — E11.29 TYPE 2 DIABETES MELLITUS WITH MICROALBUMINURIA, WITH LONG-TERM CURRENT USE OF INSULIN: Primary | ICD-10-CM

## 2020-09-10 ENCOUNTER — HOSPITAL ENCOUNTER (OUTPATIENT)
Dept: RADIOLOGY | Facility: HOSPITAL | Age: 55
Discharge: HOME | End: 2020-09-10
Attending: FAMILY MEDICINE
Payer: MEDICARE

## 2020-09-10 VITALS — BODY MASS INDEX: 39.33 KG/M2 | WEIGHT: 187.38 LBS | HEIGHT: 58 IN

## 2020-09-10 DIAGNOSIS — Z12.31 ENCOUNTER FOR SCREENING MAMMOGRAM FOR MALIGNANT NEOPLASM OF BREAST: ICD-10-CM

## 2020-09-10 PROCEDURE — 77067 SCR MAMMO BI INCL CAD: CPT | Mod: 26,,, | Performed by: RADIOLOGY

## 2020-09-10 PROCEDURE — 77063 BREAST TOMOSYNTHESIS BI: CPT | Mod: 26,,, | Performed by: RADIOLOGY

## 2020-09-10 PROCEDURE — 77067 SCR MAMMO BI INCL CAD: CPT | Mod: TC

## 2020-09-10 PROCEDURE — 77063 MAMMO DIGITAL SCREENING BILAT WITH TOMO: ICD-10-PCS | Mod: 26,,, | Performed by: RADIOLOGY

## 2020-09-10 PROCEDURE — 77067 MAMMO DIGITAL SCREENING BILAT WITH TOMO: ICD-10-PCS | Mod: 26,,, | Performed by: RADIOLOGY

## 2020-09-15 ENCOUNTER — TELEPHONE (OUTPATIENT)
Dept: PULMONOLOGY | Facility: CLINIC | Age: 55
End: 2020-09-15

## 2020-09-15 NOTE — TELEPHONE ENCOUNTER
Spoke with pt. Pt stated that she is having breathing issues and that her inhalers are not working as well anymore. Appt scheduled.

## 2020-09-15 NOTE — TELEPHONE ENCOUNTER
----- Message from Angelika Champagne sent at 9/15/2020  3:05 PM CDT -----  Contact: self/[phone]  Patient would like to consult with nurse regarding a sooner visit. Patient is having difficulty breathing. Patient also states that the inhalers Do Not seem to be working. Please call Patient back at [phone].    Thank You/JULIANNA

## 2020-09-16 ENCOUNTER — TELEPHONE (OUTPATIENT)
Dept: PULMONOLOGY | Facility: CLINIC | Age: 55
End: 2020-09-16

## 2020-09-16 DIAGNOSIS — R06.02 SOB (SHORTNESS OF BREATH): Primary | ICD-10-CM

## 2020-09-16 NOTE — TELEPHONE ENCOUNTER
Spoke with pt. Pt was calling to see if she could come in early. I advised her that she could. Pt verbalized understanding.

## 2020-09-16 NOTE — TELEPHONE ENCOUNTER
----- Message from Christine Bryant sent at 9/16/2020  9:33 AM CDT -----  Contact: self [phone]  Patient would like to consult with nurse regarding coming early to her appt on tomorrow. Please call back at [phone]. Thanks

## 2020-09-17 ENCOUNTER — HOSPITAL ENCOUNTER (OUTPATIENT)
Dept: RADIOLOGY | Facility: HOSPITAL | Age: 55
Discharge: HOME | End: 2020-09-17
Attending: INTERNAL MEDICINE
Payer: MEDICARE

## 2020-09-17 ENCOUNTER — TELEPHONE (OUTPATIENT)
Dept: PULMONOLOGY | Facility: CLINIC | Age: 55
End: 2020-09-17

## 2020-09-17 ENCOUNTER — CLINICAL SUPPORT (OUTPATIENT)
Dept: PULMONOLOGY | Facility: CLINIC | Age: 55
End: 2020-09-17
Payer: MEDICARE

## 2020-09-17 ENCOUNTER — HOSPITAL ENCOUNTER (OUTPATIENT)
Dept: CARDIOLOGY | Facility: HOSPITAL | Age: 55
Discharge: HOME | End: 2020-09-17
Attending: INTERNAL MEDICINE
Payer: MEDICARE

## 2020-09-17 ENCOUNTER — OFFICE VISIT (OUTPATIENT)
Dept: CARDIOLOGY | Facility: CLINIC | Age: 55
End: 2020-09-17
Payer: MEDICARE

## 2020-09-17 ENCOUNTER — OFFICE VISIT (OUTPATIENT)
Dept: SLEEP MEDICINE | Facility: CLINIC | Age: 55
End: 2020-09-17
Payer: MEDICARE

## 2020-09-17 VITALS
RESPIRATION RATE: 18 BRPM | HEIGHT: 58 IN | WEIGHT: 181.88 LBS | OXYGEN SATURATION: 97 % | HEART RATE: 97 BPM | DIASTOLIC BLOOD PRESSURE: 80 MMHG | BODY MASS INDEX: 38.18 KG/M2 | SYSTOLIC BLOOD PRESSURE: 132 MMHG

## 2020-09-17 VITALS — WEIGHT: 181.88 LBS | BODY MASS INDEX: 38.18 KG/M2 | HEIGHT: 58 IN

## 2020-09-17 VITALS
BODY MASS INDEX: 37.83 KG/M2 | OXYGEN SATURATION: 97 % | DIASTOLIC BLOOD PRESSURE: 72 MMHG | HEART RATE: 84 BPM | WEIGHT: 181 LBS | SYSTOLIC BLOOD PRESSURE: 128 MMHG

## 2020-09-17 DIAGNOSIS — R80.9 TYPE 2 DIABETES MELLITUS WITH MICROALBUMINURIA, WITH LONG-TERM CURRENT USE OF INSULIN: ICD-10-CM

## 2020-09-17 DIAGNOSIS — Z79.4 TYPE 2 DIABETES MELLITUS WITH MICROALBUMINURIA, WITH LONG-TERM CURRENT USE OF INSULIN: ICD-10-CM

## 2020-09-17 DIAGNOSIS — E78.5 DYSLIPIDEMIA: ICD-10-CM

## 2020-09-17 DIAGNOSIS — G47.33 OSA ON CPAP: ICD-10-CM

## 2020-09-17 DIAGNOSIS — M21.372 FOOT DROP, LEFT: ICD-10-CM

## 2020-09-17 DIAGNOSIS — I10 ESSENTIAL HYPERTENSION: ICD-10-CM

## 2020-09-17 DIAGNOSIS — R06.09 DOE (DYSPNEA ON EXERTION): ICD-10-CM

## 2020-09-17 DIAGNOSIS — U07.1 COVID-19: ICD-10-CM

## 2020-09-17 DIAGNOSIS — E11.29 TYPE 2 DIABETES MELLITUS WITH MICROALBUMINURIA, WITH LONG-TERM CURRENT USE OF INSULIN: ICD-10-CM

## 2020-09-17 DIAGNOSIS — G47.429 NARCOLEPSY DUE TO UNDERLYING CONDITION WITHOUT CATAPLEXY: ICD-10-CM

## 2020-09-17 DIAGNOSIS — F17.200 TOBACCO DEPENDENCE: ICD-10-CM

## 2020-09-17 DIAGNOSIS — R06.02 SOB (SHORTNESS OF BREATH): ICD-10-CM

## 2020-09-17 DIAGNOSIS — R06.09 DOE (DYSPNEA ON EXERTION): Primary | ICD-10-CM

## 2020-09-17 DIAGNOSIS — R07.9 CHEST PAIN, UNSPECIFIED TYPE: Primary | ICD-10-CM

## 2020-09-17 PROBLEM — G47.419 NARCOLEPSY: Status: ACTIVE | Noted: 2020-09-17

## 2020-09-17 PROCEDURE — 3075F SYST BP GE 130 - 139MM HG: CPT | Mod: CPTII,S$GLB,, | Performed by: INTERNAL MEDICINE

## 2020-09-17 PROCEDURE — 93005 ELECTROCARDIOGRAM TRACING: CPT

## 2020-09-17 PROCEDURE — 3008F BODY MASS INDEX DOCD: CPT | Mod: CPTII,S$GLB,, | Performed by: INTERNAL MEDICINE

## 2020-09-17 PROCEDURE — 71046 X-RAY EXAM CHEST 2 VIEWS: CPT | Mod: TC

## 2020-09-17 PROCEDURE — 99999 PR PBB SHADOW E&M-EST. PATIENT-LVL I: ICD-10-PCS | Mod: PBBFAC,,,

## 2020-09-17 PROCEDURE — 3051F HG A1C>EQUAL 7.0%<8.0%: CPT | Mod: CPTII,S$GLB,, | Performed by: INTERNAL MEDICINE

## 2020-09-17 PROCEDURE — 93010 EKG 12-LEAD: ICD-10-PCS | Mod: ,,, | Performed by: INTERNAL MEDICINE

## 2020-09-17 PROCEDURE — 99205 PR OFFICE/OUTPT VISIT, NEW, LEVL V, 60-74 MIN: ICD-10-PCS | Mod: S$GLB,,, | Performed by: INTERNAL MEDICINE

## 2020-09-17 PROCEDURE — 99214 PR OFFICE/OUTPT VISIT, EST, LEVL IV, 30-39 MIN: ICD-10-PCS | Mod: 25,S$GLB,, | Performed by: INTERNAL MEDICINE

## 2020-09-17 PROCEDURE — 99999 PR PBB SHADOW E&M-EST. PATIENT-LVL I: CPT | Mod: PBBFAC,,,

## 2020-09-17 PROCEDURE — 3008F PR BODY MASS INDEX (BMI) DOCUMENTED: ICD-10-PCS | Mod: CPTII,S$GLB,, | Performed by: INTERNAL MEDICINE

## 2020-09-17 PROCEDURE — 3079F PR MOST RECENT DIASTOLIC BLOOD PRESSURE 80-89 MM HG: ICD-10-PCS | Mod: CPTII,S$GLB,, | Performed by: INTERNAL MEDICINE

## 2020-09-17 PROCEDURE — 3074F SYST BP LT 130 MM HG: CPT | Mod: CPTII,S$GLB,, | Performed by: INTERNAL MEDICINE

## 2020-09-17 PROCEDURE — 94618 PULMONARY STRESS TESTING: ICD-10-PCS | Mod: S$GLB,,, | Performed by: INTERNAL MEDICINE

## 2020-09-17 PROCEDURE — 94618 PULMONARY STRESS TESTING: CPT | Mod: S$GLB,,, | Performed by: INTERNAL MEDICINE

## 2020-09-17 PROCEDURE — 99214 OFFICE O/P EST MOD 30 MIN: CPT | Mod: 25,S$GLB,, | Performed by: INTERNAL MEDICINE

## 2020-09-17 PROCEDURE — 3078F PR MOST RECENT DIASTOLIC BLOOD PRESSURE < 80 MM HG: ICD-10-PCS | Mod: CPTII,S$GLB,, | Performed by: INTERNAL MEDICINE

## 2020-09-17 PROCEDURE — 3079F DIAST BP 80-89 MM HG: CPT | Mod: CPTII,S$GLB,, | Performed by: INTERNAL MEDICINE

## 2020-09-17 PROCEDURE — 3075F PR MOST RECENT SYSTOLIC BLOOD PRESS GE 130-139MM HG: ICD-10-PCS | Mod: CPTII,S$GLB,, | Performed by: INTERNAL MEDICINE

## 2020-09-17 PROCEDURE — 3074F PR MOST RECENT SYSTOLIC BLOOD PRESSURE < 130 MM HG: ICD-10-PCS | Mod: CPTII,S$GLB,, | Performed by: INTERNAL MEDICINE

## 2020-09-17 PROCEDURE — 93010 ELECTROCARDIOGRAM REPORT: CPT | Mod: ,,, | Performed by: INTERNAL MEDICINE

## 2020-09-17 PROCEDURE — 99999 PR PBB SHADOW E&M-EST. PATIENT-LVL V: ICD-10-PCS | Mod: PBBFAC,,, | Performed by: INTERNAL MEDICINE

## 2020-09-17 PROCEDURE — 3078F DIAST BP <80 MM HG: CPT | Mod: CPTII,S$GLB,, | Performed by: INTERNAL MEDICINE

## 2020-09-17 PROCEDURE — 99999 PR PBB SHADOW E&M-EST. PATIENT-LVL V: CPT | Mod: PBBFAC,,, | Performed by: INTERNAL MEDICINE

## 2020-09-17 PROCEDURE — 71046 X-RAY EXAM CHEST 2 VIEWS: CPT | Mod: 26,,, | Performed by: RADIOLOGY

## 2020-09-17 PROCEDURE — 71046 XR CHEST PA AND LATERAL: ICD-10-PCS | Mod: 26,,, | Performed by: RADIOLOGY

## 2020-09-17 PROCEDURE — 99205 OFFICE O/P NEW HI 60 MIN: CPT | Mod: S$GLB,,, | Performed by: INTERNAL MEDICINE

## 2020-09-17 PROCEDURE — 3051F PR MOST RECENT HEMOGLOBIN A1C LEVEL 7.0 - < 8.0%: ICD-10-PCS | Mod: CPTII,S$GLB,, | Performed by: INTERNAL MEDICINE

## 2020-09-17 RX ORDER — NAPROXEN 500 MG/1
TABLET ORAL
COMMUNITY
Start: 2020-09-03 | End: 2020-11-30

## 2020-09-17 RX ORDER — CHOLECALCIFEROL (VITAMIN D3) 25 MCG
2000 TABLET ORAL DAILY
COMMUNITY
End: 2023-02-20

## 2020-09-17 NOTE — PROCEDURES
"[facility]  Six Minute Walk     SUMMARY     Ordering Provider: Dr. Best   Interpreting Provider: Dr. Best  Performing nurse/tech/RT: JOHANN Redding  Diagnosis: (EDGAR)  Height: 4' 10" (147.3 cm)  Weight: 82.5 kg (181 lb 14.1 oz)  BMI (Calculated): 38   Patient Race:             Phase Oxygen Assessment Supplemental O2 Heart   Rate Blood Pressure Elliott Dyspnea Scale Rating   Resting 99 % Room Air 74 bpm 149/79 3   Exercise        Minute        1 99 % Room Air 107 bpm     2 98 % Room Air 126 bpm     3 99 % Room Air 127 bpm     4 99 % Room Air 130 bpm     5 99 % Room Air 130 bpm     6  97 % Room Air 130 bpm 162/87 5-6   Recovery        Minute        1 98 % Room Air 112 bpm     2 99 % Room Air 92 bpm     3 98 % Room Air 82 bpm     4 98 % Room Air 81 bpm 142/75 4     Six Minute Walk Summary  6MWT Status: completed without stopping  Patient Reported: Dyspnea, Leg pain, Chest pain     Interpretation:  Did the patient stop or pause?: No             Total Time Walked (Calculated): 360 seconds  Final Partial Lap Distance (feet): 175 feet  Total Distance Meters (Calculated): 480.06 meters  Predicted Distance Meters (Calculated): 470.98 meters  Percentage of Predicted (Calculated): 101.93  Peak VO2 (Calculated): 18.38  Mets: 5.25  Has The Patient Had a Previous Six Minute Walk Test?: No       Previous 6MWT Results  Has The Patient Had a Previous Six Minute Walk Test?: No           CLINICAL INTERPRETATION:  Six minute walk distance is 480.06m (101.93 % predicted) with somewhat heavy dyspnea.  During exercise, there was no significant desaturation while breathing room air.  Heart rate increased significantly with walking.  Hypertension was present prior to exercise.  This may represent an abnormal cardiovascular response to exercise.  The patient reported non-pulmonary symptoms during exercise.  CHEST PAIN  The patient did complete the study, walking 360 seconds of the 360 second test.  The patient " may benefit from cardiology eval  No previous study performed.  Based upon age and body mass index, exercise capacity is normal.

## 2020-09-17 NOTE — PROGRESS NOTES
Patient Active Problem List   Diagnosis    Vitamin D deficiency    IBS (irritable bowel syndrome)    Hypothyroid    Essential hypertension    Dyslipidemia    BMI 38.0-38.9,adult    Migraines    Asthmatic bronchitis , chronic    Uncontrolled diabetes mellitus type 2 without complications    Family history of ischemic heart disease    Tobacco dependence    Type 2 diabetes mellitus with microalbuminuria, with long-term current use of insulin    DMITRI on CPAP    Narcolepsy      Immunization History   Administered Date(s) Administered    Hepatitis B, Adult 08/11/2014, 10/13/2014, 01/22/2015    Influenza 10/29/2019    Influenza - Quadrivalent 11/02/2016, 09/11/2017    Influenza - Quadrivalent - PF *Preferred* (6 months and older) 10/26/2018    Pneumococcal Polysaccharide - 23 Valent 08/11/2014, 12/20/2018    Tdap 08/11/2014      Social History     Tobacco Use   Smoking Status Former Smoker    Packs/day: 1.50    Years: 33.00    Pack years: 49.50    Quit date: 1/5/2017    Years since quitting: 3.7   Smokeless Tobacco Former User        Subjective:      Bianca Weldon is a 55 y.o. female with known Chronic bronchitis   Last seen  05/11/2018  Also has DMITRI on CPAP and ? Narcolepsy on NUVIGIL follow by [facility]  New symptoms: several months, worse 2-3 weeks, someone sitting on chest, worse with activity, better with rest  No cough No wheezing, No sputum  Now using CPAP and fans during day.  Avoid physical activity  Taking BREO and Combivent. Last PFT were normal.   Worse with heat, mask  Immunizations are current.  Weight gain from 159lb to 181 Lb  I have reviewed the patient's medical history in detail and updated the computerized patient record.         The following portions of the patient's history were reviewed and updated as appropriate:   She  has a past medical history of Abnormal Pap smear of cervix, Abnormal Pap smear of vagina, Acid reflux, Arthritis, Bipolar 1 disorder, Depression, Diabetes  mellitus, Diabetes mellitus, type 2, Elevated alkaline phosphatase level, GERD (gastroesophageal reflux disease), Hyperlipidemia, Hypertension, IBS (irritable bowel syndrome), Interstitial cystitis, Nonalcoholic steatohepatitis, Orthostatic hypotension, Restless legs syndrome, and Thyroid disease.  She does not have any pertinent problems on file.  She  has a past surgical history that includes Appendectomy; Tonsillectomy; Cystostomy w/ bladder biopsy; Hysterectomy (2001); Oophorectomy (2001); Dilation and curettage of uterus; Adenoidectomy; [place] tooth extraction; Colonoscopy; Esophageal dilation; and julia shoulder surgery.  Her family history includes Breast cancer in her maternal grandmother; Colon cancer in her father; Diabetes Mellitus in her father and mother; Heart disease (age of onset: 45) in her father; Melanoma in her mother; Rheum arthritis in her maternal grandmother; Thrombophilia in her paternal grandmother.  She  reports that she quit smoking about 3 years ago. She has a 49.50 pack-year smoking history. She has quit using smokeless tobacco. She reports that she does not drink alcohol or use drugs.  She has a current medication list which includes the following prescription(s): aripiprazole, armodafinil, ascorbic acid (vitamin c), aspirin, atorvastatin, benztropine, blood sugar diagnostic, clindamycin, clonazepam, cyclosporine, pristiq, diclofenac sodium, docosahexaenoic acid-epa, fish oil-omega-3 fatty acids, fludrocortisone, fluticasone furoate-vilanterol, fluticasone propionate, glimepiride, hyoscyamine, ibuprofen, insulin glargine u-300 conc, combivent respimat, januvia, lamotrigine, levothyroxine, losartan, midodrine, myrbetriq, ondansetron, pantoprazole, vitamin e, estradiol, ketoconazole, levocetirizine, naproxen, triamcinolone acetonide 0.1%, and sitagliptin.  Current Outpatient Medications on File Prior to Visit   Medication Sig Dispense Refill    aripiprazole (ABILIFY) 20 MG Tab Take 20 mg  by mouth once daily.       armodafiniL (NUVIGIL) 250 mg tablet Take 1 tablet by mouth once daily.      ascorbic acid (VITAMIN C) 250 MG tablet Take 250 mg by mouth once daily.      aspirin (ECOTRIN) 81 MG EC tablet Take 81 mg by mouth once daily.       atorvastatin (LIPITOR) 40 MG tablet Take 1 tablet (40 mg total) by mouth once daily. 90 tablet 1    benztropine (COGENTIN) 1 MG tablet       blood sugar diagnostic Strp 1 strip by Misc.(Non-Drug; Combo Route) route 4 (four) times daily. 200 each 0    clindamycin (CLEOCIN T) 1 % external solution Apply topically 2 (two) times daily as needed. For acne/folliculitis on face and scalp 60 mL 1    clonazepam (KLONOPIN) 2 MG Tab Take 2 mg by mouth every evening.       cycloSPORINE (RESTASIS) 0.05 % ophthalmic emulsion 1 drop.      desvenlafaxine succinate (PRISTIQ) 50 MG Tb24 Take 50 mg by mouth once daily.       diclofenac sodium (VOLTAREN) 1 % Gel Apply 4 g topically.      docosahexaenoic acid-epa 120-180 mg Cap Take by mouth.      fish oil-omega-3 fatty acids 300-1,000 mg capsule Take by mouth once daily.      fludrocortisone (FLORINEF) 0.1 mg Tab Take 1 tablet (100 mcg total) by mouth once daily. 90 tablet 1    fluticasone furoate-vilanteroL (BREO) 100-25 mcg/dose diskus inhaler 1 (one) time each day 60 each 11    fluticasone propionate (FLONASE) 50 mcg/actuation nasal spray 2 sprays (100 mcg total) by Each Nostril route once daily. 16 g 11    glimepiride (AMARYL) 2 MG tablet Take 1 tablet (2 mg total) by mouth before breakfast. 30 tablet 3    hyoscyamine (LEVSIN) 0.125 mg/5 mL Elix Take 125 mcg by mouth every 4 (four) hours as needed.       ibuprofen (ADVIL,MOTRIN) 800 MG tablet TAKE 1 TABLET BY MOUTH EVERY 6 HOURS AS NEEDED 30 tablet 0    insulin glargine U-300 conc (TOUJEO MAX U-300 SOLOSTAR) 300 unit/mL (3 mL) InPn Inject 50 Units into the skin once daily. 3 Syringe 3    ipratropium-albuteroL (COMBIVENT RESPIMAT)  mcg/actuation inhaler  "Inhale 1 puff into the lungs every 6 (six) hours as needed for Wheezing. 4 g 6    JANUVIA 100 mg Tab TAKE 1 TABLET(100 MG) BY MOUTH EVERY DAY 30 tablet 3    lamoTRIgine (LAMICTAL) 200 MG tablet Take 1 tablet by mouth 2 (two) times a day.      levothyroxine (SYNTHROID) 75 MCG tablet TAKE 1 TABLET(75 MCG) BY MOUTH EVERY DAY 90 tablet 0    losartan (COZAAR) 25 MG tablet Take 1 tablet (25 mg total) by mouth once daily. 90 tablet 1    midodrine (PROAMATINE) 10 MG tablet Take 1 tablet (10 mg total) by mouth once daily. 90 tablet 1    MYRBETRIQ 50 mg Tb24       ondansetron (ZOFRAN-ODT) 4 MG TbDL       pantoprazole (PROTONIX) 40 MG tablet Take 1 tablet (40 mg total) by mouth once daily. 90 tablet 3    vitamin E 100 UNIT capsule Take 100 Units by mouth once daily.      estradiol (ESTRACE) 0.01 % (0.1 mg/gram) vaginal cream Place 1 g vaginally every 7 days. 4 g 5    ketoconazole (NIZORAL) 2 % cream Apply topically 2 (two) times daily. Apply to the affected area twice daily. 30 g 2    levocetirizine (XYZAL) 5 MG tablet Take 1 tablet (5 mg total) by mouth every evening. 30 tablet 11    naproxen (NAPROSYN) 500 MG tablet       triamcinolone acetonide 0.1% (KENALOG) 0.1 % cream Apply topically 2 (two) times daily. Do not use on face 45 Bottle 6    [DISCONTINUED] SITagliptin (JANUVIA) 100 MG Tab Take 1 tablet (100 mg total) by mouth once daily. 30 tablet 3     No current facility-administered medications on file prior to visit.      She is allergic to bactrim [sulfamethoxazole-trimethoprim]; macrodantin [nitrofurantoin macrocrystalline]; canagliflozin; and metformin..     Review of Systems   Constitutional: Positive for malaise/fatigue.   Respiratory: Positive for shortness of breath.    Cardiovascular: Positive for chest pain (chest pressure).   All other systems reviewed and are negative.     Objective:      /80   Pulse 97   Resp 18   Ht 4' 10" (1.473 m)   Wt 82.5 kg (181 lb 14.1 oz)   SpO2 97%   BMI " 38.01 kg/m²        Physical Exam  Vitals signs and nursing note reviewed.   Constitutional:       General: She is not in acute distress.     Appearance: She is well-developed. She is not diaphoretic.   HENT:      Head: Normocephalic.      Nose: No mucosal edema or rhinorrhea.      Mouth/Throat:      Pharynx: No oropharyngeal exudate.   Eyes:      General:         Left eye: No discharge.      Conjunctiva/sclera: Conjunctivae normal.      Pupils: Pupils are equal, round, and reactive to light.   Neck:      Musculoskeletal: Normal range of motion and neck supple.      Thyroid: No thyromegaly.      Vascular: No JVD.      Trachea: No tracheal deviation.   Cardiovascular:      Rate and Rhythm: Normal rate and regular rhythm.      Pulses: Normal pulses.      Heart sounds: Normal heart sounds. No murmur.   Pulmonary:      Effort: Pulmonary effort is normal. No respiratory distress.      Breath sounds: Normal breath sounds. No wheezing.   Abdominal:      General: Bowel sounds are normal. There is no distension.      Palpations: Abdomen is soft.      Tenderness: There is no abdominal tenderness.   Musculoskeletal: Normal range of motion.   Skin:     General: Skin is warm and dry.   Neurological:      Mental Status: She is alert and oriented to person, place, and time.      Cranial Nerves: No cranial nerve deficit.             PFT  Normal spirometry. Normal airflow.  Since study 11/02/2016: FEV1 improved by 25%.  (Physician 05/10/2018 03:05PM, Dr. MILAN Best / Final: 05/10/2018 03:05PM, Dr. MILAN Best)    FEV1 1.97L( 93%), FVC 2.39L( 89%), FEV1/FVC 83  FVC improved by 13%  FEV1 improved by 25%        Lab Results   Component Value Date    PREFVC 2.39 (L) 05/10/2018    [id number] 2.24 05/10/2018    PREPEF 4.87 (L) 05/10/2018    [id number] 7.32 05/10/2018    [id number] 82 05/10/2018    POSTFVC 2.37 (L) 05/10/2018    POSTFEV1 2 05/10/2018    [id number] 2.41 05/10/2018    POSTPEF 4.68 (L) 05/10/2018    [id number] 7.14 05/10/2018     "[id number] 3.68 05/10/2018    EDGHCFW2AAX 84 05/10/2018    QCYBJHJ7ETR 79.55 05/10/2018    PREDICTEDFVC 2.68 05/10/2018    PREDICTEDFEV 2.11 05/10/2018     CXR today  EXAMINATION:  XR CHEST PA AND LATERAL     CLINICAL HISTORY:  Shortness of breath     TECHNIQUE:  PA and lateral views of the chest were performed.     COMPARISON:  May 10, 2018     FINDINGS:  Heart and pulmonary vasculature stable.  Lungs symmetrically aerated and clear.  Stable smooth elevation right hemidiaphragm.  Trachea stable in position allowing for minimal underlying scoliosis and slight rotation.  CP angles sharp.  Degenerative change throughout the spine and shoulders.  Smooth accentuated kyphosis in the T-spine.     Impression:     Stable exam acute    ECHO 2019   Normal left ventricle cavity size. Normal (55-65%) ejection fraction.  · No significant valve disease.      EKG  Normal sinus rhythm  Nonspecific ST and T wave abnormality  Abnormal ECG  When compared with ECG of 17-SEP-2020 09:11,  Sinus rhythm has replaced Electronic atrial pacemaker  Vent. rate has decreased BY 37 BPM  Nonspecific T wave abnormality has replaced inverted T waves in Inferior leads  QT has shortened    6MW Test  Height: 4' 10" (147.3 cm)  Weight: 82.5 kg (181 lb 14.1 oz)  BMI (Calculated): 38  Predicted Distance: 320.23  Interpretation  Predicted Distance Meters (Calculated): 470.98 meters   Total Time Walked (Calculated): 360 seconds   Final Partial Lap Distance (feet): 175 feet   Total Distance Meters (Calculated): 480.06 meters   Predicted Distance Meters (Calculated): 470.98 meters   Percentage of Predicted (Calculated): 101.93   Peak VO2 (Calculated): 18.38   Mets: 5.25   Assessment:       Problem List Items Addressed This Visit     RESOLVED: Foot drop, left    Essential hypertension     STABLE COZAAR,          Dyslipidemia     STABLE: LIPITOR         BMI 38.0-38.9,adult     General weight loss/lifestyle modification strategies discussed (elicit support from " others; identify saboteurs; non-food rewards).  Diet interventions: low calorie (1000 kCal/d) deficit diet          Tobacco dependence     Quit smoking 2018         Type 2 diabetes mellitus with microalbuminuria, with long-term current use of insulin     STABLE; NAZARIO ROBERTO TOUJEO         DMITRI on CPAP     Subjectively reports adherence  Followed at [facility]         Narcolepsy     STABLE ON NUVIGIL  Followed by [facility]           Other Visit Diagnoses     EDGAR (dyspnea on exertion)    -  Primary    Relevant Orders    EKG 12-lead    D dimer, quantitative    Complete PFT with bronchodilator    Stress test, pulmonary    Ambulatory referral/consult to Cardiology        Suspect anginal equivalent  6 min walk test normal exercise capacity but her chest pain  Patient has a cardiology appointment this afternoon  Plan:      Follow up in about 4 weeks (around 10/15/2020), or EKG and Labs today, 6MWD today, PFT, follow with cardiology, for scheduled follow review results Ms Lejeune/Ms Yarbrough.    This note was prepared using voice recognition system and is likely to have sound alike errors that may have been overlooked even after proof reading.  Please call me with any questions    Discussed diagnosis, its evaluation, treatment and usual course. All questions answered.    Thank you for the courtesy of participating in the care of this patient    Steve Best MD

## 2020-09-17 NOTE — PROGRESS NOTES
Subjective:   Patient ID:  Bianca Weldon is a 55 y.o. female who presents for evaluation of No chief complaint on file.      55-year-old  female with history of bipolar disease and anxiety with hypertension, dyslipidemia, obstructive sleep apnea, diabetes type 2, ex-smoker presents today clinic for evaluation of dyspnea and chest pain.  Patient states that she started having dyspnea on exertion in the last 6 months with moderate to heavy levels of exertion.  Some of these episodes of exertional dyspnea have been associated with exertion and retrosternal 5/10 chest pain, that radiates to her back, feels sharp and pressure-like in the same time, associated with anxiety.  She denies any orthopnea, PND or lower extremity swelling.  Denies any palpitations, dizziness or syncope.  Denies any  symptoms of bleeding  Reports she had a normal ultrasound of her heart a year ago with Dr. Robert      Past Medical History:   Diagnosis Date    Abnormal Pap smear of cervix     Abnormal Pap smear of vagina     after hyst, repeat every 6 months, per pt, had bx's, unknown results    Acid reflux     Arthritis     Bipolar 1 disorder     Depression     bipolar    Diabetes mellitus     Diabetes mellitus, type 2     Elevated alkaline phosphatase level     GERD (gastroesophageal reflux disease)     Hyperlipidemia     Hypertension     IBS (irritable bowel syndrome)     Interstitial cystitis     Nonalcoholic steatohepatitis     Orthostatic hypotension     Restless legs syndrome     Thyroid disease        Past Surgical History:   Procedure Laterality Date    ADENOIDECTOMY      APPENDECTOMY      julia shoulder surgery      COLONOSCOPY      CYSTOSTOMY W/ BLADDER BIOPSY      interstial cystitis    DILATION AND CURETTAGE OF UTERUS      missed ab    ESOPHAGEAL DILATION      HYSTERECTOMY  2001    [facility], LSO (endometriosis)    OOPHORECTOMY  2001    LSO    TONSILLECTOMY      WISDOM TOOTH EXTRACTION         Social  History     Tobacco Use    Smoking status: Former Smoker     Packs/day: 1.50     Years: 33.00     Pack years: 49.50     Quit date: 1/5/2017     Years since quitting: 3.7    Smokeless tobacco: Former User   Substance Use Topics    Alcohol use: No     Frequency: Never    Drug use: No       Family History   Problem Relation Age of Onset    Diabetes Mellitus Mother     Melanoma Mother     Diabetes Mellitus Father     Colon cancer Father     Heart disease Father 45        CABG    Rheum arthritis Maternal Grandmother     Breast cancer Maternal Grandmother     Thrombophilia Paternal Grandmother         DVTs    Ovarian cancer Neg Hx        Review of Systems   Constitution: Negative for fever and malaise/fatigue.   HENT: Negative for sore throat.    Eyes: Negative for blurred vision.   Cardiovascular: Positive for chest pain and dyspnea on exertion. Negative for cyanosis, irregular heartbeat, leg swelling, near-syncope, orthopnea, palpitations, paroxysmal nocturnal dyspnea and syncope.   Respiratory: Negative for cough and hemoptysis.    Hematologic/Lymphatic: Negative for bleeding problem.   Skin: Negative for rash.   Musculoskeletal: Negative for back pain and falls.   Gastrointestinal: Negative for abdominal pain.   Genitourinary: Negative for nocturia.   Neurological: Negative for dizziness, focal weakness, headaches, light-headedness and loss of balance.   Psychiatric/Behavioral: Negative for altered mental status and substance abuse.       Current Outpatient Medications on File Prior to Visit   Medication Sig    aripiprazole (ABILIFY) 20 MG Tab Take 20 mg by mouth once daily.     armodafiniL (NUVIGIL) 250 mg tablet Take 1 tablet by mouth once daily.    ascorbic acid (VITAMIN C) 250 MG tablet Take 250 mg by mouth once daily.    aspirin (ECOTRIN) 81 MG EC tablet Take 81 mg by mouth once daily.     atorvastatin (LIPITOR) 40 MG tablet Take 1 tablet (40 mg total) by mouth once daily.    benztropine  (COGENTIN) 1 MG tablet     blood sugar diagnostic Strp 1 strip by Misc.(Non-Drug; Combo Route) route 4 (four) times daily.    clindamycin (CLEOCIN T) 1 % external solution Apply topically 2 (two) times daily as needed. For acne/folliculitis on face and scalp    clonazepam (KLONOPIN) 2 MG Tab Take 2 mg by mouth every evening.     cycloSPORINE (RESTASIS) 0.05 % ophthalmic emulsion 1 drop.    desvenlafaxine succinate (PRISTIQ) 50 MG Tb24 Take 50 mg by mouth once daily.     diclofenac sodium (VOLTAREN) 1 % Gel Apply 4 g topically.    docosahexaenoic acid-epa 120-180 mg Cap Take by mouth.    fish oil-omega-3 fatty acids 300-1,000 mg capsule Take by mouth once daily.    fludrocortisone (FLORINEF) 0.1 mg Tab Take 1 tablet (100 mcg total) by mouth once daily.    fluticasone furoate-vilanteroL (BREO) 100-25 mcg/dose diskus inhaler 1 (one) time each day    fluticasone propionate (FLONASE) 50 mcg/actuation nasal spray 2 sprays (100 mcg total) by Each Nostril route once daily.    glimepiride (AMARYL) 2 MG tablet Take 1 tablet (2 mg total) by mouth before breakfast.    hyoscyamine (LEVSIN) 0.125 mg/5 mL Elix Take 125 mcg by mouth every 4 (four) hours as needed.     ibuprofen (ADVIL,MOTRIN) 800 MG tablet TAKE 1 TABLET BY MOUTH EVERY 6 HOURS AS NEEDED    insulin glargine U-300 conc (TOUJEO MAX U-300 SOLOSTAR) 300 unit/mL (3 mL) InPn Inject 50 Units into the skin once daily.    ipratropium-albuteroL (COMBIVENT RESPIMAT)  mcg/actuation inhaler Inhale 1 puff into the lungs every 6 (six) hours as needed for Wheezing.    JANUVIA 100 mg Tab TAKE 1 TABLET(100 MG) BY MOUTH EVERY DAY    lamoTRIgine (LAMICTAL) 200 MG tablet Take 1 tablet by mouth 2 (two) times a day.    levocetirizine (XYZAL) 5 MG tablet Take 1 tablet (5 mg total) by mouth every evening.    levothyroxine (SYNTHROID) 75 MCG tablet TAKE 1 TABLET(75 MCG) BY MOUTH EVERY DAY    losartan (COZAAR) 25 MG tablet Take 1 tablet (25 mg total) by mouth once  daily.    midodrine (PROAMATINE) 10 MG tablet Take 1 tablet (10 mg total) by mouth once daily. (Patient taking differently: Take 5 mg by mouth once daily. )    MYRBETRIQ 50 mg Tb24     naproxen (NAPROSYN) 500 MG tablet     ondansetron (ZOFRAN-ODT) 4 MG TbDL     pantoprazole (PROTONIX) 40 MG tablet Take 1 tablet (40 mg total) by mouth once daily.    vitamin D (VITAMIN D3) 1000 units Tab Take 2,000 Units by mouth once daily.    vitamin E 100 UNIT capsule Take 100 Units by mouth once daily.    [DISCONTINUED] SITagliptin (JANUVIA) 100 MG Tab Take 1 tablet (100 mg total) by mouth once daily.    estradiol (ESTRACE) 0.01 % (0.1 mg/gram) vaginal cream Place 1 g vaginally every 7 days.    ketoconazole (NIZORAL) 2 % cream Apply topically 2 (two) times daily. Apply to the affected area twice daily.    triamcinolone acetonide 0.1% (KENALOG) 0.1 % cream Apply topically 2 (two) times daily. Do not use on face     No current facility-administered medications on file prior to visit.        Objective:   Objective:  Wt Readings from Last 3 Encounters:   09/17/20 82.1 kg (181 lb)   09/17/20 82.5 kg (181 lb 14.1 oz)   09/17/20 82.5 kg (181 lb 14.1 oz)     Temp Readings from Last 3 Encounters:   06/23/20 98.6 °F (37 °C) (Tympanic)   02/28/20 99.1 °F (37.3 °C) (Tympanic)   08/16/17 98.3 °F (36.8 °C) (Tympanic)     BP Readings from Last 3 Encounters:   09/17/20 128/72   09/17/20 132/80   07/15/20 130/82     Pulse Readings from Last 3 Encounters:   09/17/20 84   09/17/20 97   06/23/20 100       Physical Exam   Constitutional: She is oriented to person, place, and time. She appears well-developed and well-nourished.   HENT:   Head: Normocephalic and atraumatic.   Eyes: Conjunctivae are normal. No scleral icterus.   Neck: Normal range of motion. Neck supple.   Cardiovascular: Normal rate, regular rhythm, normal heart sounds and intact distal pulses.   No murmur heard.  Pulmonary/Chest: No respiratory distress. She has no wheezes.  She has no rales. She exhibits no tenderness.   Abdominal: Soft. Bowel sounds are normal. She exhibits no distension. There is no guarding.   Musculoskeletal: Normal range of motion.   Neurological: She is alert and oriented to person, place, and time.   Skin: Skin is warm.   Psychiatric: She has a normal mood and affect.   Vitals reviewed.      Lab Results   Component Value Date    CHOL 141 05/01/2020    CHOL 110 (L) 08/08/2017    CHOL 102 (L) 08/04/2016     Lab Results   Component Value Date    HDL 51 05/01/2020    HDL 42 08/08/2017    HDL 41 08/04/2016     Lab Results   Component Value Date    LDLCALC 68.8 05/01/2020    LDLCALC 52.2 (L) 08/08/2017    LDLCALC 41.8 (L) 08/04/2016     Lab Results   Component Value Date    TRIG 106 05/01/2020    TRIG 79 08/08/2017    TRIG 96 08/04/2016     Lab Results   Component Value Date    CHOLHDL 36.2 05/01/2020    CHOLHDL 38.2 08/08/2017    CHOLHDL 40.2 08/04/2016       Chemistry        Component Value Date/Time     08/19/2020 0805    K 3.6 08/19/2020 0805     08/19/2020 0805    CO2 23 08/19/2020 0805    BUN 6 08/19/2020 0805    CREATININE 0.9 08/19/2020 0805     (H) 08/19/2020 0805        Component Value Date/Time    CALCIUM 8.6 (L) 08/19/2020 0805    ALKPHOS 223 (H) 08/19/2020 0805    AST 17 08/19/2020 0805    ALT 16 08/19/2020 0805    BILITOT 0.3 08/19/2020 0805    ESTGFRAFRICA >60.0 08/19/2020 0805    EGFRNONAA >60.0 08/19/2020 0805          Lab Results   Component Value Date    TSH 0.580 05/01/2020     Lab Results   Component Value Date    INR 1.0 03/09/2018     Lab Results   Component Value Date    WBC 7.39 05/01/2020    HGB 14.5 05/01/2020    HCT 44.8 05/01/2020    MCV 88 05/01/2020     05/01/2020     BNP  @LABRCNTIP(BNP,BNPTRIAGEBLO)@  CrCl cannot be calculated (Patient's most recent lab result is older than the maximum 7 days allowed.).     Imaging:  ======  No results found for this or any previous visit.  No results found for this or any  previous visit.  No results found for this or any previous visit.  Results for orders placed during the hospital encounter of 09/17/20   X-Ray Chest PA And Lateral    Narrative EXAMINATION:  XR CHEST PA AND LATERAL    CLINICAL HISTORY:  Shortness of breath    TECHNIQUE:  PA and lateral views of the chest were performed.    COMPARISON:  May 10, 2018    FINDINGS:  Heart and pulmonary vasculature stable.  Lungs symmetrically aerated and clear.  Stable smooth elevation right hemidiaphragm.  Trachea stable in position allowing for minimal underlying scoliosis and slight rotation.  CP angles sharp.  Degenerative change throughout the spine and shoulders.  Smooth accentuated kyphosis in the T-spine.      Impression Stable exam acute      Electronically signed by: Lauro Tellez MD  Date:    09/17/2020  Time:    07:54     No results found for this or any previous visit.  No procedure found.    Diagnostic Results:  ECG: Reviewed    The 10-year ASCVD risk score (Sirena TERI Jr., et al., 2013) is: 3.8%    Values used to calculate the score:      Age: 55 years      Sex: Female      Is Non- : No      Diabetic: Yes      Tobacco smoker: No      Systolic Blood Pressure: 128 mmHg      Is BP treated: Yes      HDL Cholesterol: 51 mg/dL      Total Cholesterol: 141 mg/dL    Assessment and Plan:   Chest pain, unspecified type  Comments:  Atypical chest pain.  Intermediate risk patient.  Will perform a treadmill exercise nuclear stress test.  ECG in the clinic today reviewed nonspecific ST T wave unchanged from last ECG in 2017  Orders:  -     Nuclear Stress - Cardiology Interpreted; Future    Essential hypertension  Comments:  Controlled, no changes in her management, not on medications    Dyslipidemia  Comments:  Continue with Lipitor 40 mg LDL of 68        Follow up in 4 weeks

## 2020-09-30 ENCOUNTER — LAB VISIT (OUTPATIENT)
Dept: LAB | Facility: HOSPITAL | Age: 55
End: 2020-09-30
Attending: NURSE PRACTITIONER
Payer: MEDICARE

## 2020-09-30 DIAGNOSIS — E11.29 TYPE 2 DIABETES MELLITUS WITH MICROALBUMINURIA, WITH LONG-TERM CURRENT USE OF INSULIN: ICD-10-CM

## 2020-09-30 DIAGNOSIS — Z79.4 TYPE 2 DIABETES MELLITUS WITH MICROALBUMINURIA, WITH LONG-TERM CURRENT USE OF INSULIN: ICD-10-CM

## 2020-09-30 DIAGNOSIS — R80.9 TYPE 2 DIABETES MELLITUS WITH MICROALBUMINURIA, WITH LONG-TERM CURRENT USE OF INSULIN: ICD-10-CM

## 2020-09-30 LAB
ALBUMIN SERPL BCP-MCNC: 3.8 G/DL (ref 3.5–5.2)
ALP SERPL-CCNC: 190 U/L (ref 55–135)
ALT SERPL W/O P-5'-P-CCNC: 24 U/L (ref 10–44)
ANION GAP SERPL CALC-SCNC: 11 MMOL/L (ref 8–16)
AST SERPL-CCNC: 23 U/L (ref 10–40)
BILIRUB SERPL-MCNC: 0.4 MG/DL (ref 0.1–1)
BUN SERPL-MCNC: 10 MG/DL (ref 6–20)
CALCIUM SERPL-MCNC: 8.3 MG/DL (ref 8.7–10.5)
CHLORIDE SERPL-SCNC: 102 MMOL/L (ref 95–110)
CO2 SERPL-SCNC: 26 MMOL/L (ref 23–29)
CREAT SERPL-MCNC: 0.8 MG/DL (ref 0.5–1.4)
EST. GFR  (AFRICAN AMERICAN): >60 ML/MIN/1.73 M^2
EST. GFR  (NON AFRICAN AMERICAN): >60 ML/MIN/1.73 M^2
ESTIMATED AVG GLUCOSE: 154 MG/DL (ref 68–131)
GLUCOSE SERPL-MCNC: 293 MG/DL (ref 70–110)
HBA1C MFR BLD HPLC: 7 % (ref 4–5.6)
POTASSIUM SERPL-SCNC: 4 MMOL/L (ref 3.5–5.1)
PROT SERPL-MCNC: 6.8 G/DL (ref 6–8.4)
SODIUM SERPL-SCNC: 139 MMOL/L (ref 136–145)

## 2020-09-30 PROCEDURE — 83036 HEMOGLOBIN GLYCOSYLATED A1C: CPT

## 2020-09-30 PROCEDURE — 36415 COLL VENOUS BLD VENIPUNCTURE: CPT | Mod: PO

## 2020-09-30 PROCEDURE — 80053 COMPREHEN METABOLIC PANEL: CPT

## 2020-10-06 NOTE — PROGRESS NOTES
PCP: Scotty Figueroa MD    Subjective:     Chief Complaint: Diabetes Follow Up    HISTORY OF PRESENT ILLNESS: 55 y.o.  female presenting for diabetes follow up. Patient has had Type II diabetes since 2014 and has the following complications: neuropathy. She has attended diabetes education in the past. Other pertinent conditions: HTN, HLD, IBD, ISELA, and bipolar disorder ( followed by psychiatry ). Of note, she has a history of cortisone injections for plantar fasciitis.       In the past, she has tried several DM medications: including Invokana ( recurrent yeast infection ); metformin caused GI side effects; and Trulicity caused abdominal pain and discomfort.     Blood glucose testing is performed regularly. Per recall, fasting BGs are 120 s - 130 s;  hs 130 s - 150 s. She denies any recent hospital admissions, emergency room visits, hypoglycemia, or syncope.      Blood Glucose in clinic: 286 mg/dl at 8:39 am    Labs Reviewed.       No results found for: CPEPTIDE  No results found for: GLUTAMICACID  No results found for: HUMANINSULIN    DM MEDICATIONS:   Toujeo 36 units daily ( at 9 pm )  Januvia 100 mg daily  Glimepiride 2 mg before lunch     Review of Systems   Constitutional: Negative for appetite change, diaphoresis, fatigue and unexpected weight change.   Eyes: Negative for visual disturbance.   Gastrointestinal: Positive for nausea (chronic, intermittent). Negative for abdominal pain, constipation and diarrhea.   Endocrine: Negative for polydipsia, polyphagia and polyuria.   Neurological: Positive for dizziness and numbness. Negative for headaches.   Psychiatric/Behavioral: Negative for confusion and decreased concentration. The patient is not nervous/anxious.        Diabetes Management Status  Statin: Taking  ACE/ARB: Not taking    Screening or Prevention Patient's value Goal Complete/Controlled?   HgA1C Testing and Control   Lab Results   Component Value Date    HGBA1C 7.0 (H) 09/30/2020       Annually/Less than 8% No   Lipid profile : 2020 Annually No   LDL control Lab Results   Component Value Date    LDLCALC 68.8 2020    Annually/Less than 100 mg/dl  No   Nephropathy screening Lab Results   Component Value Date    MICALBCREAT 37.2 (H) 2020     Lab Results   Component Value Date    PROTEINUA Negative 2015    Annually No   Blood pressure BP Readings from Last 1 Encounters:   10/07/20 130/80    Less than 140/90 Yes   Dilated retinal exam : 2020 Annually Yes, [facility]   Foot exam   : 2020 Annually Yes     ACTIVITY LEVEL: Rarely Active. Discussed activities, benefits, methods, and precautions.  MEAL PLANNING: Patient reports number of meals per day to be 3 and number of snacks per day to be 2.   Patient is encouraged to carb count and consume no more than 45 - 60 grams of carbohydrates in each meal, and 1800 k / richard per day.      BLOOD GLUCOSE TESTIN times daily  SOCIAL HISTORY: .  Former smoker.     Objective:      Physical Exam  Constitutional:       Appearance: She is well-developed.   HENT:      Head: Normocephalic and atraumatic.   Eyes:      Pupils: Pupils are equal, round, and reactive to light.   Neck:      Musculoskeletal: Normal range of motion.   Cardiovascular:      Rate and Rhythm: Normal rate and regular rhythm.      Pulses:           Dorsalis pedis pulses are 2+ on the right side and 2+ on the left side.   Pulmonary:      Effort: Pulmonary effort is normal. No respiratory distress.      Breath sounds: Normal breath sounds.   Musculoskeletal: Normal range of motion.   Feet:      Right foot:      Protective Sensation: 6 sites tested. 6 sites sensed.      Skin integrity: No ulcer, callus or dry skin.      Left foot:      Protective Sensation: 6 sites tested. 6 sites sensed.      Skin integrity: No ulcer, blister, callus or dry skin.   Skin:     General: Skin is warm and dry.      Findings: No rash.   Psychiatric:         Behavior:  Behavior normal.         Thought Content: Thought content normal.         Judgment: Judgment normal.       Assessment / Plan:     1.) Type 2 diabetes mellitus with microalbuminuria, with long-term current use of insulin  Comments:  - Condition improving. Continue Toujeo 36 units daily.  Continue Januvia 100 mg daily. Increase glimepiride 4 mg before lunch.  I explained MOA and potential side effects of hypoglycemia.  I advised to eat  within 30 minutes of taking medications.      - Option for diabetes medications are limited.  In the past, she has tried several DM medications: including Invokana ( recurrent yeast infection ); metformin caused GI side effects; and Trulicity caused  abdominal pain and discomfort.     2.) Dyslipidemia - continue meds a prescribed.    Additional Plan Details:    1.) Continue monitoring blood sugar 2 x daily, fasting and ac dinner or at bedtime. Discussed ADA goal for fasting blood sugar, 80 - 130 mg/dL; pp blood sugars below 180 mg/dl. Also, discussed prevention of hypoglycemia and the need to adjust goals to higher levels if persistent hypoglycemia.  Reminded to bring BG records or meter to each visit for review.  2.) Return to clinic in 3 months for follow up. The patient was explained the above plan and given opportunity to ask questions.  She understands, chooses and consents to this plan and accepts all the risks, which include but are not limited to the risks mentioned above. She understands the alternative of having no testing, interventions or treatments at this time. She left content and without further questions.     Renetta Whitlock, OMAR-C, CDE

## 2020-10-07 ENCOUNTER — OFFICE VISIT (OUTPATIENT)
Dept: DIABETES | Facility: CLINIC | Age: 55
End: 2020-10-07
Payer: MEDICARE

## 2020-10-07 VITALS
DIASTOLIC BLOOD PRESSURE: 80 MMHG | BODY MASS INDEX: 37.94 KG/M2 | WEIGHT: 181.56 LBS | SYSTOLIC BLOOD PRESSURE: 130 MMHG

## 2020-10-07 DIAGNOSIS — E78.5 DYSLIPIDEMIA: ICD-10-CM

## 2020-10-07 DIAGNOSIS — E11.29 TYPE 2 DIABETES MELLITUS WITH MICROALBUMINURIA, WITH LONG-TERM CURRENT USE OF INSULIN: Primary | ICD-10-CM

## 2020-10-07 DIAGNOSIS — I10 ESSENTIAL HYPERTENSION: ICD-10-CM

## 2020-10-07 DIAGNOSIS — Z79.4 TYPE 2 DIABETES MELLITUS WITH MICROALBUMINURIA, WITH LONG-TERM CURRENT USE OF INSULIN: Primary | ICD-10-CM

## 2020-10-07 DIAGNOSIS — R80.9 TYPE 2 DIABETES MELLITUS WITH MICROALBUMINURIA, WITH LONG-TERM CURRENT USE OF INSULIN: Primary | ICD-10-CM

## 2020-10-07 LAB — GLUCOSE SERPL-MCNC: 286 MG/DL (ref 70–110)

## 2020-10-07 PROCEDURE — 3051F HG A1C>EQUAL 7.0%<8.0%: CPT | Mod: CPTII,S$GLB,, | Performed by: NURSE PRACTITIONER

## 2020-10-07 PROCEDURE — 82962 GLUCOSE BLOOD TEST: CPT | Mod: S$GLB,,, | Performed by: NURSE PRACTITIONER

## 2020-10-07 PROCEDURE — 3044F HG A1C LEVEL LT 7.0%: CPT | Mod: CPTII,S$GLB,, | Performed by: NURSE PRACTITIONER

## 2020-10-07 PROCEDURE — 82962 POCT GLUCOSE, HAND-HELD DEVICE: ICD-10-PCS | Mod: S$GLB,,, | Performed by: NURSE PRACTITIONER

## 2020-10-07 PROCEDURE — 99214 PR OFFICE/OUTPT VISIT, EST, LEVL IV, 30-39 MIN: ICD-10-PCS | Mod: S$GLB,,, | Performed by: NURSE PRACTITIONER

## 2020-10-07 PROCEDURE — 99999 PR PBB SHADOW E&M-EST. PATIENT-LVL V: CPT | Mod: PBBFAC,,, | Performed by: NURSE PRACTITIONER

## 2020-10-07 PROCEDURE — 3008F PR BODY MASS INDEX (BMI) DOCUMENTED: ICD-10-PCS | Mod: CPTII,S$GLB,, | Performed by: NURSE PRACTITIONER

## 2020-10-07 PROCEDURE — 3008F BODY MASS INDEX DOCD: CPT | Mod: CPTII,S$GLB,, | Performed by: NURSE PRACTITIONER

## 2020-10-07 PROCEDURE — 3044F PR MOST RECENT HEMOGLOBIN A1C LEVEL <7.0%: ICD-10-PCS | Mod: CPTII,S$GLB,, | Performed by: NURSE PRACTITIONER

## 2020-10-07 PROCEDURE — 3051F PR MOST RECENT HEMOGLOBIN A1C LEVEL 7.0 - < 8.0%: ICD-10-PCS | Mod: CPTII,S$GLB,, | Performed by: NURSE PRACTITIONER

## 2020-10-07 PROCEDURE — 99999 PR PBB SHADOW E&M-EST. PATIENT-LVL V: ICD-10-PCS | Mod: PBBFAC,,, | Performed by: NURSE PRACTITIONER

## 2020-10-07 PROCEDURE — 99214 OFFICE O/P EST MOD 30 MIN: CPT | Mod: S$GLB,,, | Performed by: NURSE PRACTITIONER

## 2020-10-07 RX ORDER — GLIMEPIRIDE 4 MG/1
4 TABLET ORAL
Qty: 90 TABLET | Refills: 1 | Status: SHIPPED | OUTPATIENT
Start: 2020-10-07 | End: 2020-12-18 | Stop reason: SDUPTHER

## 2020-10-07 RX ORDER — LORAZEPAM 1 MG/1
1 TABLET ORAL 2 TIMES DAILY PRN
COMMUNITY
End: 2021-06-14 | Stop reason: SDUPTHER

## 2020-10-07 RX ORDER — ONDANSETRON 4 MG/1
4 TABLET, ORALLY DISINTEGRATING ORAL EVERY 6 HOURS PRN
Qty: 20 TABLET | Refills: 1 | Status: SHIPPED | OUTPATIENT
Start: 2020-10-07 | End: 2021-03-06 | Stop reason: SDUPTHER

## 2020-10-07 NOTE — Clinical Note
I am seeing patient today in clinic and she would like a refill on Zofran.  Do you mind sending this refill to [facility]?  Thanks!

## 2020-11-21 ENCOUNTER — PATIENT MESSAGE (OUTPATIENT)
Dept: DERMATOLOGY | Facility: CLINIC | Age: 55
End: 2020-11-21

## 2020-11-24 ENCOUNTER — PATIENT OUTREACH (OUTPATIENT)
Dept: ADMINISTRATIVE | Facility: OTHER | Age: 55
End: 2020-11-24

## 2020-11-27 ENCOUNTER — OFFICE VISIT (OUTPATIENT)
Dept: DERMATOLOGY | Facility: CLINIC | Age: 55
End: 2020-11-27
Payer: MEDICARE

## 2020-11-27 DIAGNOSIS — L73.9 FOLLICULITIS: Primary | ICD-10-CM

## 2020-11-27 PROCEDURE — 99214 OFFICE O/P EST MOD 30 MIN: CPT | Mod: S$GLB,,, | Performed by: STUDENT IN AN ORGANIZED HEALTH CARE EDUCATION/TRAINING PROGRAM

## 2020-11-27 PROCEDURE — 99214 PR OFFICE/OUTPT VISIT, EST, LEVL IV, 30-39 MIN: ICD-10-PCS | Mod: S$GLB,,, | Performed by: STUDENT IN AN ORGANIZED HEALTH CARE EDUCATION/TRAINING PROGRAM

## 2020-11-27 PROCEDURE — 99999 PR PBB SHADOW E&M-EST. PATIENT-LVL III: ICD-10-PCS | Mod: PBBFAC,,, | Performed by: STUDENT IN AN ORGANIZED HEALTH CARE EDUCATION/TRAINING PROGRAM

## 2020-11-27 PROCEDURE — 99999 PR PBB SHADOW E&M-EST. PATIENT-LVL III: CPT | Mod: PBBFAC,,, | Performed by: STUDENT IN AN ORGANIZED HEALTH CARE EDUCATION/TRAINING PROGRAM

## 2020-11-27 RX ORDER — DULOXETIN HYDROCHLORIDE 30 MG/1
60 CAPSULE, DELAYED RELEASE ORAL DAILY
COMMUNITY
Start: 2020-10-20 | End: 2021-04-18

## 2020-11-27 RX ORDER — DESVENLAFAXINE 100 MG/1
TABLET, EXTENDED RELEASE ORAL
COMMUNITY
Start: 2020-11-05 | End: 2021-04-01

## 2020-11-27 RX ORDER — GLIMEPIRIDE 2 MG/1
2 TABLET ORAL DAILY
COMMUNITY
Start: 2020-11-16 | End: 2020-12-16

## 2020-11-27 RX ORDER — DOXYCYCLINE 100 MG/1
1 TABLET ORAL 2 TIMES DAILY
Qty: 14 TABLET | Refills: 2 | Status: SHIPPED | OUTPATIENT
Start: 2020-11-27 | End: 2021-02-24 | Stop reason: ALTCHOICE

## 2020-11-27 RX ORDER — CLINDAMYCIN PHOSPHATE 11.9 MG/ML
SOLUTION TOPICAL 2 TIMES DAILY PRN
Qty: 60 ML | Refills: 5 | Status: SHIPPED | OUTPATIENT
Start: 2020-11-27 | End: 2021-08-11 | Stop reason: SDUPTHER

## 2020-11-27 RX ORDER — LAMOTRIGINE 200 MG/1
200 TABLET ORAL
COMMUNITY
Start: 2020-10-20 | End: 2021-04-01

## 2020-11-27 RX ORDER — GLIPIZIDE 10 MG/1
10 TABLET, FILM COATED, EXTENDED RELEASE ORAL
COMMUNITY
End: 2021-02-28 | Stop reason: ALTCHOICE

## 2020-11-27 RX ORDER — SODIUM, POTASSIUM,MAG SULFATES 17.5-3.13G
SOLUTION, RECONSTITUTED, ORAL ORAL
COMMUNITY
Start: 2020-11-04 | End: 2021-04-01

## 2020-11-27 NOTE — PROGRESS NOTES
Subjective:       Patient ID:  Bianca Weldon is a 55 y.o. female who presents for   Chief Complaint   Patient presents with    Sores     in head and face, sx flared up , x 1 week ago      History of Present Illness: The patient presents with follow up of folliculitis  Location: forehead and scalp    LOV 8/31/20 with Dr. Pineda. Culture came back + for staph aureus. Patient reports complete resolution with oral doxycycline but rash returned after stopping. Areas where she wears her CPAP are affected.      Review of Systems   Skin: Positive for itching and rash.   Hematologic/Lymphatic: Does not bruise/bleed easily.        Objective:    Physical Exam   Constitutional: She appears well-developed and well-nourished. No distress.   Neurological: She is alert and oriented to person, place, and time.   Psychiatric: She has a normal mood and affect.   Skin:   Areas Examined (abnormalities noted in diagram):   Scalp / Hair Palpated and Inspected  Head / Face Inspection Performed  Neck Inspection Performed              Diagram Legend     Erythematous scaling macule/papule c/w actinic keratosis       Vascular papule c/w angioma      Pigmented verrucoid papule/plaque c/w seborrheic keratosis      Yellow umbilicated papule c/w sebaceous hyperplasia      Irregularly shaped tan macule c/w lentigo     1-2 mm smooth white papules consistent with Milia      Movable subcutaneous cyst with punctum c/w epidermal inclusion cyst      Subcutaneous movable cyst c/w pilar cyst      Firm pink to brown papule c/w dermatofibroma      Pedunculated fleshy papule(s) c/w skin tag(s)      Evenly pigmented macule c/w junctional nevus     Mildly variegated pigmented, slightly irregular-bordered macule c/w mildly atypical nevus      Flesh colored to evenly pigmented papule c/w intradermal nevus       Pink pearly papule/plaque c/w basal cell carcinoma      Erythematous hyperkeratotic cursted plaque c/w SCC      Surgical scar with no sign of skin  cancer recurrence      Open and closed comedones      Inflammatory papules and pustules      Verrucoid papule consistent consistent with wart     Erythematous eczematous patches and plaques     Dystrophic onycholytic nail with subungual debris c/w onychomycosis     Umbilicated papule    Erythematous-base heme-crusted tan verrucoid plaque consistent with inflamed seborrheic keratosis     Erythematous Silvery Scaling Plaque c/w Psoriasis     See annotation      Assessment / Plan:        Folliculitis  -     doxycycline monohydrate 100 mg Tab; Take 1 tablet (100 mg total) by mouth 2 (two) times a day.  Dispense: 14 tablet; Refill: 2  -     clindamycin (CLEOCIN T) 1 % external solution; Apply topically 2 (two) times daily as needed. For acne/folliculitis on face and scalp  Dispense: 60 mL; Refill: 5             Follow up in about 6 months (around 5/27/2021).

## 2020-11-30 ENCOUNTER — HOSPITAL ENCOUNTER (OUTPATIENT)
Dept: RADIOLOGY | Facility: HOSPITAL | Age: 55
Discharge: HOME | End: 2020-11-30
Attending: FAMILY MEDICINE
Payer: MEDICARE

## 2020-11-30 ENCOUNTER — OFFICE VISIT (OUTPATIENT)
Dept: FAMILY MEDICINE | Facility: CLINIC | Age: 55
End: 2020-11-30
Payer: MEDICARE

## 2020-11-30 ENCOUNTER — TELEPHONE (OUTPATIENT)
Dept: FAMILY MEDICINE | Facility: CLINIC | Age: 55
End: 2020-11-30

## 2020-11-30 VITALS
WEIGHT: 183.06 LBS | OXYGEN SATURATION: 97 % | TEMPERATURE: 98 F | BODY MASS INDEX: 38.42 KG/M2 | DIASTOLIC BLOOD PRESSURE: 80 MMHG | HEIGHT: 58 IN | SYSTOLIC BLOOD PRESSURE: 122 MMHG | RESPIRATION RATE: 16 BRPM | HEART RATE: 100 BPM

## 2020-11-30 DIAGNOSIS — E66.01 SEVERE OBESITY (BMI 35.0-35.9 WITH COMORBIDITY): ICD-10-CM

## 2020-11-30 DIAGNOSIS — M25.561 ACUTE PAIN OF RIGHT KNEE: Primary | ICD-10-CM

## 2020-11-30 DIAGNOSIS — R80.9 TYPE 2 DIABETES MELLITUS WITH MICROALBUMINURIA, WITH LONG-TERM CURRENT USE OF INSULIN: ICD-10-CM

## 2020-11-30 DIAGNOSIS — Z79.4 TYPE 2 DIABETES MELLITUS WITH MICROALBUMINURIA, WITH LONG-TERM CURRENT USE OF INSULIN: ICD-10-CM

## 2020-11-30 DIAGNOSIS — E03.9 HYPOTHYROIDISM, UNSPECIFIED TYPE: ICD-10-CM

## 2020-11-30 DIAGNOSIS — I10 ESSENTIAL HYPERTENSION: ICD-10-CM

## 2020-11-30 DIAGNOSIS — R07.89 ATYPICAL CHEST PAIN: ICD-10-CM

## 2020-11-30 DIAGNOSIS — M25.561 ACUTE PAIN OF RIGHT KNEE: ICD-10-CM

## 2020-11-30 DIAGNOSIS — E11.29 TYPE 2 DIABETES MELLITUS WITH MICROALBUMINURIA, WITH LONG-TERM CURRENT USE OF INSULIN: ICD-10-CM

## 2020-11-30 PROCEDURE — 3051F HG A1C>EQUAL 7.0%<8.0%: CPT | Mod: CPTII,S$GLB,, | Performed by: FAMILY MEDICINE

## 2020-11-30 PROCEDURE — 1125F AMNT PAIN NOTED PAIN PRSNT: CPT | Mod: S$GLB,,, | Performed by: FAMILY MEDICINE

## 2020-11-30 PROCEDURE — 3051F PR MOST RECENT HEMOGLOBIN A1C LEVEL 7.0 - < 8.0%: ICD-10-PCS | Mod: CPTII,S$GLB,, | Performed by: FAMILY MEDICINE

## 2020-11-30 PROCEDURE — 73562 X-RAY EXAM OF KNEE 3: CPT | Mod: TC,50,PO

## 2020-11-30 PROCEDURE — 99999 PR PBB SHADOW E&M-EST. PATIENT-LVL V: ICD-10-PCS | Mod: PBBFAC,,, | Performed by: FAMILY MEDICINE

## 2020-11-30 PROCEDURE — 99999 PR PBB SHADOW E&M-EST. PATIENT-LVL V: CPT | Mod: PBBFAC,,, | Performed by: FAMILY MEDICINE

## 2020-11-30 PROCEDURE — 3074F SYST BP LT 130 MM HG: CPT | Mod: CPTII,S$GLB,, | Performed by: FAMILY MEDICINE

## 2020-11-30 PROCEDURE — 73562 X-RAY EXAM OF KNEE 3: CPT | Mod: 26,50,, | Performed by: RADIOLOGY

## 2020-11-30 PROCEDURE — 1125F PR PAIN SEVERITY QUANTIFIED, PAIN PRESENT: ICD-10-PCS | Mod: S$GLB,,, | Performed by: FAMILY MEDICINE

## 2020-11-30 PROCEDURE — 73562 XR KNEE ORTHO BILAT: ICD-10-PCS | Mod: 26,50,, | Performed by: RADIOLOGY

## 2020-11-30 PROCEDURE — 3074F PR MOST RECENT SYSTOLIC BLOOD PRESSURE < 130 MM HG: ICD-10-PCS | Mod: CPTII,S$GLB,, | Performed by: FAMILY MEDICINE

## 2020-11-30 PROCEDURE — 3008F BODY MASS INDEX DOCD: CPT | Mod: CPTII,S$GLB,, | Performed by: FAMILY MEDICINE

## 2020-11-30 PROCEDURE — 99214 PR OFFICE/OUTPT VISIT, EST, LEVL IV, 30-39 MIN: ICD-10-PCS | Mod: S$GLB,,, | Performed by: FAMILY MEDICINE

## 2020-11-30 PROCEDURE — 99214 OFFICE O/P EST MOD 30 MIN: CPT | Mod: S$GLB,,, | Performed by: FAMILY MEDICINE

## 2020-11-30 PROCEDURE — 3079F DIAST BP 80-89 MM HG: CPT | Mod: CPTII,S$GLB,, | Performed by: FAMILY MEDICINE

## 2020-11-30 PROCEDURE — 3079F PR MOST RECENT DIASTOLIC BLOOD PRESSURE 80-89 MM HG: ICD-10-PCS | Mod: CPTII,S$GLB,, | Performed by: FAMILY MEDICINE

## 2020-11-30 PROCEDURE — 3008F PR BODY MASS INDEX (BMI) DOCUMENTED: ICD-10-PCS | Mod: CPTII,S$GLB,, | Performed by: FAMILY MEDICINE

## 2020-11-30 RX ORDER — MELOXICAM 7.5 MG/1
7.5 TABLET ORAL DAILY
Qty: 30 TABLET | Refills: 1 | Status: SHIPPED | OUTPATIENT
Start: 2020-11-30 | End: 2020-12-08 | Stop reason: ALTCHOICE

## 2020-11-30 NOTE — TELEPHONE ENCOUNTER
Dr. Figueroa would like for pt to follow up with Dr. Gray sooner than end of January if possible. Please contact pt to reschedule sooner if available.

## 2020-11-30 NOTE — TELEPHONE ENCOUNTER
LEONARD Chávez, LPN   Caller: Unspecified (Today, 11:02 AM)             Good Morning, I do apologize but Dr. Gray is completley booked and will be out of the office for 2 weeks at the end of the month. If you would like the patient can see one of the other providers that we have at the [facility] for a sooner appointment.

## 2020-11-30 NOTE — PROGRESS NOTES
Subjective:       Patient ID: Bianca Weldon is a 55 y.o. female.    Chief Complaint: Knee Pain      HPI Comments:       Current Outpatient Medications:     aripiprazole (ABILIFY) 20 MG Tab, Take 20 mg by mouth once daily. , Disp: , Rfl:     armodafiniL (NUVIGIL) 250 mg tablet, Take 1 tablet by mouth once daily., Disp: , Rfl:     ascorbic acid (VITAMIN C) 250 MG tablet, Take 250 mg by mouth once daily., Disp: , Rfl:     aspirin (ECOTRIN) 81 MG EC tablet, Take 81 mg by mouth once daily. , Disp: , Rfl:     atorvastatin (LIPITOR) 40 MG tablet, Take 1 tablet (40 mg total) by mouth once daily., Disp: 90 tablet, Rfl: 1    benztropine (COGENTIN) 1 MG tablet, , Disp: , Rfl:     blood sugar diagnostic Strp, 1 strip by Misc.(Non-Drug; Combo Route) route 4 (four) times daily., Disp: 200 each, Rfl: 0    clindamycin (CLEOCIN T) 1 % external solution, Apply topically 2 (two) times daily as needed. For acne/folliculitis on face and scalp, Disp: 60 mL, Rfl: 5    cycloSPORINE (RESTASIS) 0.05 % ophthalmic emulsion, 1 drop., Disp: , Rfl:     docosahexaenoic acid-epa 120-180 mg Cap, Take by mouth., Disp: , Rfl:     doxycycline monohydrate 100 mg Tab, Take 1 tablet (100 mg total) by mouth 2 (two) times a day., Disp: 14 tablet, Rfl: 2    DULoxetine (CYMBALTA) 30 MG capsule, Take 30 mg by mouth., Disp: , Rfl:     estradiol (ESTRACE) 0.01 % (0.1 mg/gram) vaginal cream, Place 1 g vaginally every 7 days., Disp: 4 g, Rfl: 5    fish oil-omega-3 fatty acids 300-1,000 mg capsule, Take by mouth once daily., Disp: , Rfl:     fludrocortisone (FLORINEF) 0.1 mg Tab, Take 1 tablet (100 mcg total) by mouth once daily., Disp: 90 tablet, Rfl: 1    fluticasone furoate-vilanteroL (BREO) 100-25 mcg/dose diskus inhaler, 1 (one) time each day, Disp: 60 each, Rfl: 11    fluticasone propionate (FLONASE) 50 mcg/actuation nasal spray, 2 sprays (100 mcg total) by Each Nostril route once daily., Disp: 16 g, Rfl: 11    glimepiride (AMARYL) 2 MG  tablet, , Disp: , Rfl:     glimepiride (AMARYL) 4 MG tablet, Take 1 tablet (4 mg total) by mouth before breakfast., Disp: 90 tablet, Rfl: 1    glipiZIDE (GLUCOTROL) 10 MG TR24, Take 5 mg by mouth., Disp: , Rfl:     hyoscyamine (LEVSIN) 0.125 mg/5 mL Elix, Take 125 mcg by mouth every 4 (four) hours as needed. , Disp: , Rfl:     insulin glargine U-300 conc (TOUJEO MAX U-300 SOLOSTAR) 300 unit/mL (3 mL) InPn, Inject 50 Units into the skin once daily., Disp: 3 Syringe, Rfl: 3    ipratropium-albuteroL (COMBIVENT RESPIMAT)  mcg/actuation inhaler, Inhale 1 puff into the lungs every 6 (six) hours as needed for Wheezing., Disp: 4 g, Rfl: 6    JANUVIA 100 mg Tab, TAKE 1 TABLET(100 MG) BY MOUTH EVERY DAY, Disp: 90 tablet, Rfl: 0    lamoTRIgine (LAMICTAL) 200 MG tablet, Take 1 tablet by mouth 2 (two) times a day., Disp: , Rfl:     lamoTRIgine (LAMICTAL) 200 MG tablet, Take 200 mg by mouth., Disp: , Rfl:     levothyroxine (SYNTHROID) 75 MCG tablet, TAKE 1 TABLET(75 MCG) BY MOUTH EVERY DAY, Disp: 90 tablet, Rfl: 0    LORazepam (ATIVAN) 1 MG tablet, Take 1 mg by mouth every 6 (six) hours as needed for Anxiety., Disp: , Rfl:     losartan (COZAAR) 25 MG tablet, TAKE 1 TABLET(25 MG) BY MOUTH EVERY DAY, Disp: 90 tablet, Rfl: 1    midodrine (PROAMATINE) 10 MG tablet, Take 1 tablet (10 mg total) by mouth once daily. (Patient taking differently: Take 5 mg by mouth once daily. ), Disp: 90 tablet, Rfl: 1    MYRBETRIQ 50 mg Tb24, , Disp: , Rfl:     ondansetron (ZOFRAN-ODT) 4 MG TbDL, Take 1 tablet (4 mg total) by mouth every 6 (six) hours as needed., Disp: 20 tablet, Rfl: 1    pantoprazole (PROTONIX) 40 MG tablet, Take 1 tablet (40 mg total) by mouth once daily., Disp: 90 tablet, Rfl: 3    SUPREP BOWEL PREP KIT 17.5-3.13-1.6 gram SolR, , Disp: , Rfl:     vitamin D (VITAMIN D3) 1000 units Tab, Take 2,000 Units by mouth once daily., Disp: , Rfl:     vitamin E 100 UNIT capsule, Take 100 Units by mouth once daily., Disp:  , Rfl:     clonazepam (KLONOPIN) 2 MG Tab, Take 2 mg by mouth every evening. , Disp: , Rfl:     desvenlafaxine succinate (PRISTIQ) 100 MG Tb24, , Disp: , Rfl:     desvenlafaxine succinate (PRISTIQ) 50 MG Tb24, Take 50 mg by mouth once daily. , Disp: , Rfl:     ketoconazole (NIZORAL) 2 % cream, Apply topically 2 (two) times daily. Apply to the affected area twice daily., Disp: 30 g, Rfl: 2    levocetirizine (XYZAL) 5 MG tablet, Take 1 tablet (5 mg total) by mouth every evening., Disp: 30 tablet, Rfl: 11    meloxicam (MOBIC) 7.5 MG tablet, Take 1 tablet (7.5 mg total) by mouth once daily., Disp: 30 tablet, Rfl: 1    triamcinolone acetonide 0.1% (KENALOG) 0.1 % cream, Apply topically 2 (two) times daily. Do not use on face, Disp: 45 Bottle, Rfl: 6      Complains of continuous right medial knee pain for the last few weeks.  No injury.  No previous problems.  Taking ibuprofen prescription strength with some relief.  Hurts more when walking and standing.  Notices some swelling.  No warmth erythema.     Was seen by cardiology for chest pain.  He had ordered a stress test was canceled.  She says he rerouted her to psychiatry because he thought she had anxiety.  She now sees Dr. Cameron and is on Cymbalta and Ativan.  This seems to be helping her anxiety and chest pain.    Diagnosed with osteopenia.  Taking calcium and vitamin-D.    Pl[place] have [place] [facility] [place].  Sign today for the results to be sent here.    Review of Systems   Constitutional: Negative for activity change, appetite change and fever.   HENT: Negative for sore throat.    Respiratory: Negative for cough and shortness of breath.    Cardiovascular: Negative for chest pain.   Gastrointestinal: Negative for abdominal pain, diarrhea and nausea.   Genitourinary: Negative for difficulty urinating.   Musculoskeletal: Positive for gait problem. Negative for arthralgias and myalgias.   Neurological: Negative for dizziness and  "headaches.       Objective:      Vitals:    11/30/20 1016   BP: 122/80   Pulse: 100   Resp: 16   Temp: 98.2 °F (36.8 °C)   TempSrc: Temporal   SpO2: 97%   Weight: 83 kg (183 lb 1.5 oz)   Height: 4' 10" (1.473 m)   PainSc:   8   PainLoc: Knee     Physical Exam  Vitals signs and nursing note reviewed.   Constitutional:       General: She is not in acute distress.     Appearance: She is well-developed. She is not diaphoretic.      Comments: Limping due to knee pain   HENT:      Head: Normocephalic.      Mouth/Throat:      Pharynx: No oropharyngeal exudate.   Neck:      Musculoskeletal: Neck supple.      Thyroid: No thyromegaly.   Cardiovascular:      Rate and Rhythm: Normal rate and regular rhythm.      Heart sounds: Normal heart sounds. No murmur.   Pulmonary:      Effort: Pulmonary effort is normal.      Breath sounds: Normal breath sounds. No wheezing or rales.   Abdominal:      General: There is no distension.      Palpations: Abdomen is soft.   Musculoskeletal:      Right knee: She exhibits swelling. She exhibits normal range of motion, no deformity, no erythema, no LCL laxity, normal patellar mobility, normal meniscus and no MCL laxity. Tenderness found. Medial joint line tenderness noted. No lateral joint line, no MCL, no LCL and no patellar tendon tenderness noted.   Lymphadenopathy:      Cervical: No cervical adenopathy.   Skin:     General: Skin is warm and dry.   Neurological:      Mental Status: She is alert and oriented to person, place, and time.   Psychiatric:         Behavior: Behavior normal.         Thought Content: Thought content normal.         Judgment: Judgment normal.         Assessment:       1. Acute pain of right knee    2. Type 2 diabetes mellitus with microalbuminuria, with long-term current use of insulin    3. Essential hypertension    4. Hypothyroidism, unspecified type    5. Severe obesity (BMI 35.0-35.9 with comorbidity)    6. Atypical chest pain        Plan:   Acute pain of right " knee  Comments:  Most likely degenerative.  X-rays today.  Orthopedic consult.  Trial of Mobic daily  Orders:  -     X-ray Knee Ortho Bilateral; Future; Expected date: 11/30/2020  -     Ambulatory referral/consult to Orthopedics; Future; Expected date: 12/07/2020  -     meloxicam (MOBIC) 7.5 MG tablet; Take 1 tablet (7.5 mg total) by mouth once daily.  Dispense: 30 tablet; Refill: 1    Type 2 diabetes mellitus with microalbuminuria, with long-term current use of insulin  Comments:  Controlled.  Continue current approach    Essential hypertension  Comments:  Controlled    Hypothyroidism, unspecified type  Comments:  Stable on medication    Severe obesity (BMI 35.0-35.9 with comorbidity)  Comments:  Significant weight gain    Atypical chest pain  Comments:  nuclear stress test cancelled in sept.

## 2020-11-30 NOTE — TELEPHONE ENCOUNTER
S/w pt. appt chico, jahaira scheduled with Marisa Rodríguez for 12/8/20 at 10:20am. Pt verbalized understanding and would like to know her xray results. Told pt that I would discuss with Dr. Figueroa and return her call. Pt verbalized understanding.

## 2020-11-30 NOTE — TELEPHONE ENCOUNTER
MD Ramandeep Iniguez LPN   Caller: Unspecified (Today, 11:02 AM)             Yes this is okay with me.  Please explain to her that this will be a different provider than the 1 I mentioned to her by name.  And explain to her why the change (more timely appointment), and that I am okay with the change

## 2020-12-02 ENCOUNTER — PATIENT OUTREACH (OUTPATIENT)
Dept: ADMINISTRATIVE | Facility: HOSPITAL | Age: 55
End: 2020-12-02

## 2020-12-08 ENCOUNTER — HOSPITAL ENCOUNTER (OUTPATIENT)
Dept: RADIOLOGY | Facility: HOSPITAL | Age: 55
Discharge: HOME | End: 2020-12-08
Attending: PHYSICIAN ASSISTANT
Payer: MEDICARE

## 2020-12-08 ENCOUNTER — OFFICE VISIT (OUTPATIENT)
Dept: ORTHOPEDICS | Facility: CLINIC | Age: 55
End: 2020-12-08
Payer: MEDICARE

## 2020-12-08 VITALS
SYSTOLIC BLOOD PRESSURE: 139 MMHG | DIASTOLIC BLOOD PRESSURE: 74 MMHG | HEART RATE: 87 BPM | HEIGHT: 58 IN | WEIGHT: 183 LBS | BODY MASS INDEX: 38.41 KG/M2

## 2020-12-08 DIAGNOSIS — M94.261 CHONDROMALACIA OF RIGHT KNEE: Primary | ICD-10-CM

## 2020-12-08 DIAGNOSIS — M25.561 PAIN IN BOTH KNEES, UNSPECIFIED CHRONICITY: ICD-10-CM

## 2020-12-08 DIAGNOSIS — M25.562 PAIN IN BOTH KNEES, UNSPECIFIED CHRONICITY: ICD-10-CM

## 2020-12-08 DIAGNOSIS — M25.561 PAIN IN BOTH KNEES, UNSPECIFIED CHRONICITY: Primary | ICD-10-CM

## 2020-12-08 DIAGNOSIS — M25.562 PAIN IN BOTH KNEES, UNSPECIFIED CHRONICITY: Primary | ICD-10-CM

## 2020-12-08 DIAGNOSIS — M25.561 ACUTE PAIN OF RIGHT KNEE: ICD-10-CM

## 2020-12-08 PROCEDURE — 3078F PR MOST RECENT DIASTOLIC BLOOD PRESSURE < 80 MM HG: ICD-10-PCS | Mod: CPTII,S$GLB,, | Performed by: PHYSICIAN ASSISTANT

## 2020-12-08 PROCEDURE — 1125F AMNT PAIN NOTED PAIN PRSNT: CPT | Mod: S$GLB,,, | Performed by: PHYSICIAN ASSISTANT

## 2020-12-08 PROCEDURE — 3078F DIAST BP <80 MM HG: CPT | Mod: CPTII,S$GLB,, | Performed by: PHYSICIAN ASSISTANT

## 2020-12-08 PROCEDURE — 1125F PR PAIN SEVERITY QUANTIFIED, PAIN PRESENT: ICD-10-PCS | Mod: S$GLB,,, | Performed by: PHYSICIAN ASSISTANT

## 2020-12-08 PROCEDURE — 73565 XR KNEE AP STANDING BILATERAL: ICD-10-PCS | Mod: 26,,, | Performed by: RADIOLOGY

## 2020-12-08 PROCEDURE — 73565 X-RAY EXAM OF KNEES: CPT | Mod: 26,,, | Performed by: RADIOLOGY

## 2020-12-08 PROCEDURE — 99999 PR PBB SHADOW E&M-EST. PATIENT-LVL V: CPT | Mod: PBBFAC,,, | Performed by: PHYSICIAN ASSISTANT

## 2020-12-08 PROCEDURE — 3008F PR BODY MASS INDEX (BMI) DOCUMENTED: ICD-10-PCS | Mod: CPTII,S$GLB,, | Performed by: PHYSICIAN ASSISTANT

## 2020-12-08 PROCEDURE — 3075F PR MOST RECENT SYSTOLIC BLOOD PRESS GE 130-139MM HG: ICD-10-PCS | Mod: CPTII,S$GLB,, | Performed by: PHYSICIAN ASSISTANT

## 2020-12-08 PROCEDURE — 3008F BODY MASS INDEX DOCD: CPT | Mod: CPTII,S$GLB,, | Performed by: PHYSICIAN ASSISTANT

## 2020-12-08 PROCEDURE — 3075F SYST BP GE 130 - 139MM HG: CPT | Mod: CPTII,S$GLB,, | Performed by: PHYSICIAN ASSISTANT

## 2020-12-08 PROCEDURE — 99999 PR PBB SHADOW E&M-EST. PATIENT-LVL V: ICD-10-PCS | Mod: PBBFAC,,, | Performed by: PHYSICIAN ASSISTANT

## 2020-12-08 PROCEDURE — 99203 PR OFFICE/OUTPT VISIT, NEW, LEVL III, 30-44 MIN: ICD-10-PCS | Mod: S$GLB,,, | Performed by: PHYSICIAN ASSISTANT

## 2020-12-08 PROCEDURE — 99203 OFFICE O/P NEW LOW 30 MIN: CPT | Mod: S$GLB,,, | Performed by: PHYSICIAN ASSISTANT

## 2020-12-08 PROCEDURE — 73565 X-RAY EXAM OF KNEES: CPT | Mod: TC

## 2020-12-08 RX ORDER — MELOXICAM 7.5 MG/1
7.5 TABLET ORAL 2 TIMES DAILY WITH MEALS
Qty: 60 TABLET | Refills: 0 | Status: SHIPPED | OUTPATIENT
Start: 2020-12-08 | End: 2021-01-05

## 2020-12-08 RX ORDER — LURASIDONE HYDROCHLORIDE 60 MG/1
80 TABLET, FILM COATED ORAL NIGHTLY
COMMUNITY
Start: 2020-12-01 | End: 2021-06-14

## 2020-12-08 RX ORDER — PEN NEEDLE, DIABETIC 30 GX3/16"
NEEDLE, DISPOSABLE MISCELLANEOUS
COMMUNITY
Start: 2020-12-01 | End: 2022-10-16 | Stop reason: SDUPTHER

## 2020-12-08 NOTE — PROGRESS NOTES
Subjective:      Patient ID: Bianca Weldon is a 55 y.o. female.    Chief Complaint: Pain of the Right Knee      HPI: Bianca Weldon  is a 55 y.o. female who c/o Pain of the Right Knee   for duration of 2 or 3 weeks.  She denies any inciting injury.  She points medially as to where the pain is located.  At worst 9/10.  She complains of a constant aching pain in quality.  She also has occasional sharp pain as well as intermittent burning.  Improved with warm Epson salt soaks, they put her on a prescription of Mobic 7.5 which she does not find the 2-take does.  Worsened with prolonged weight-bearing as well as pressure she is diabetic.      Past Medical History:   Diagnosis Date    Abnormal Pap smear of cervix     Abnormal Pap smear of vagina     after hyst, repeat every 6 months, per pt, had bx's, unknown results    Acid reflux     Arthritis     Bipolar 1 disorder     Depression     bipolar    Diabetes mellitus     Diabetes mellitus, type 2     Elevated alkaline phosphatase level     GERD (gastroesophageal reflux disease)     Hyperlipidemia     Hypertension     IBS (irritable bowel syndrome)     Interstitial cystitis     Nonalcoholic steatohepatitis     Orthostatic hypotension     Restless legs syndrome     Thyroid disease      Past Surgical History:   Procedure Laterality Date    ADENOIDECTOMY      APPENDECTOMY      julia shoulder surgery      COLONOSCOPY      CYSTOSTOMY W/ BLADDER BIOPSY      interstial cystitis    DILATION AND CURETTAGE OF UTERUS      missed ab    ESOPHAGEAL DILATION      HYSTERECTOMY  2001    TLH, LSO (endometriosis)    OOPHORECTOMY  2001    LSO    TONSILLECTOMY      WISDOM TOOTH EXTRACTION       Family History   Problem Relation Age of Onset    Diabetes Mellitus Mother     Melanoma Mother     Diabetes Mellitus Father     Colon cancer Father     Heart disease Father 45        CABG    Rheum arthritis Maternal Grandmother     Breast cancer Maternal Grandmother      Thrombophilia Paternal Grandmother         DVTs    Ovarian cancer Neg Hx      Social History     Socioeconomic History    Marital status:      Spouse name: Not on file    Number of children: Not on file    Years of education: Not on file    Highest education level: Not on file   Occupational History    Occupation: disabled   Social Needs    Financial resource strain: Very hard    Food insecurity     Worry: Often true     Inability: Sometimes true    Transportation needs     Medical: No     Non-medical: No   Tobacco Use    Smoking status: Former Smoker     Packs/day: 1.50     Years: 33.00     Pack years: 49.50     Quit date: 1/5/2017     Years since quitting: 3.9    Smokeless tobacco: Never Used   Substance and Sexual Activity    Alcohol use: No     Frequency: Never    Drug use: No    Sexual activity: Yes     Partners: Male     Birth control/protection: Surgical     Comment: hyst; mut monog   Lifestyle    Physical activity     Days per week: 0 days     Minutes per session: Not on file    Stress: Not on file   Relationships    Social connections     Talks on phone: More than three times a week     Gets together: Once a week     Attends Tenriism service: Not on file     Active member of club or organization: Yes     Attends meetings of clubs or organizations: More than 4 times per year     Relationship status:    Other Topics Concern    Are you pregnant or think you may be? No    Breast-feeding No   Social History Narrative    Not on file     Medication List with Changes/Refills   New Medications    MELOXICAM (MOBIC) 7.5 MG TABLET    Take 1 tablet (7.5 mg total) by mouth 2 (two) times daily with meals. Take with food.  DC if develop GI side effects   Current Medications    ARIPIPRAZOLE (ABILIFY) 20 MG TAB    Take 20 mg by mouth once daily.     ARMODAFINIL (NUVIGIL) 250 MG TABLET    Take 1 tablet by mouth once daily.    ASCORBIC ACID (VITAMIN C) 250 MG TABLET    Take 250 mg by mouth  once daily.    ASPIRIN (ECOTRIN) 81 MG EC TABLET    Take 81 mg by mouth once daily.     ATORVASTATIN (LIPITOR) 40 MG TABLET    TAKE 1 TABLET(40 MG) BY MOUTH EVERY DAY    BENZTROPINE (COGENTIN) 1 MG TABLET    Take 0.5 mg by mouth 2 (two) times daily.     BLOOD SUGAR DIAGNOSTIC STRP    1 strip by Misc.(Non-Drug; Combo Route) route 4 (four) times daily.    CLINDAMYCIN (CLEOCIN T) 1 % EXTERNAL SOLUTION    Apply topically 2 (two) times daily as needed. For acne/folliculitis on face and scalp    CLONAZEPAM (KLONOPIN) 2 MG TAB    Take 2 mg by mouth every evening.     CYCLOSPORINE (RESTASIS) 0.05 % OPHTHALMIC EMULSION    1 drop.    DESVENLAFAXINE SUCCINATE (PRISTIQ) 100 MG TB24        DESVENLAFAXINE SUCCINATE (PRISTIQ) 50 MG TB24    Take 50 mg by mouth once daily.     DOCOSAHEXAENOIC ACID--180 MG CAP    Take by mouth.    DOXYCYCLINE MONOHYDRATE 100 MG TAB    Take 1 tablet (100 mg total) by mouth 2 (two) times a day.    DULOXETINE (CYMBALTA) 30 MG CAPSULE    Take 60 mg by mouth once daily.     ESTRADIOL (ESTRACE) 0.01 % (0.1 MG/GRAM) VAGINAL CREAM    Place 1 g vaginally every 7 days.    FISH OIL-OMEGA-3 FATTY ACIDS 300-1,000 MG CAPSULE    Take by mouth once daily.    FLUDROCORTISONE (FLORINEF) 0.1 MG TAB    Take 1 tablet (100 mcg total) by mouth once daily.    FLUTICASONE FUROATE-VILANTEROL (BREO) 100-25 MCG/DOSE DISKUS INHALER    1 (one) time each day    FLUTICASONE PROPIONATE (FLONASE) 50 MCG/ACTUATION NASAL SPRAY    2 sprays (100 mcg total) by Each Nostril route once daily.    GLIMEPIRIDE (AMARYL) 2 MG TABLET    Take 2 mg by mouth once daily.     GLIMEPIRIDE (AMARYL) 4 MG TABLET    Take 1 tablet (4 mg total) by mouth before breakfast.    GLIPIZIDE (GLUCOTROL) 10 MG TR24    Take 10 mg by mouth daily with breakfast.     HYOSCYAMINE (LEVSIN) 0.125 MG/5 ML ELIX    Take 125 mcg by mouth every 4 (four) hours as needed.     INSULIN GLARGINE U-300 CONC (TOUJEO MAX U-300 SOLOSTAR) 300 UNIT/ML (3 ML) INPN    Inject 50 Units  "into the skin once daily.    IPRATROPIUM-ALBUTEROL (COMBIVENT RESPIMAT)  MCG/ACTUATION INHALER    Inhale 1 puff into the lungs every 6 (six) hours as needed for Wheezing.    JANUVIA 100 MG TAB    TAKE 1 TABLET(100 MG) BY MOUTH EVERY DAY    KETOCONAZOLE (NIZORAL) 2 % CREAM    Apply topically 2 (two) times daily. Apply to the affected area twice daily.    LAMOTRIGINE (LAMICTAL) 200 MG TABLET    Take 1 tablet by mouth 2 (two) times a day.    LAMOTRIGINE (LAMICTAL) 200 MG TABLET    Take 200 mg by mouth.    LEVOCETIRIZINE (XYZAL) 5 MG TABLET    Take 1 tablet (5 mg total) by mouth every evening.    LEVOTHYROXINE (SYNTHROID) 75 MCG TABLET    TAKE 1 TABLET(75 MCG) BY MOUTH EVERY DAY    LORAZEPAM (ATIVAN) 1 MG TABLET    Take 1 mg by mouth every 6 (six) hours as needed for Anxiety.    LOSARTAN (COZAAR) 25 MG TABLET    TAKE 1 TABLET(25 MG) BY MOUTH EVERY DAY    LURASIDONE (LATUDA) 60 MG TAB TABLET    Take 60 mg by mouth once daily.    MIDODRINE (PROAMATINE) 10 MG TABLET    Take 1 tablet (10 mg total) by mouth once daily.    MYRBETRIQ 50 MG TB24    Take 50 mg by mouth 2 (two) times a day.     ONDANSETRON (ZOFRAN-ODT) 4 MG TBDL    Take 1 tablet (4 mg total) by mouth every 6 (six) hours as needed.    PANTOPRAZOLE (PROTONIX) 40 MG TABLET    Take 1 tablet (40 mg total) by mouth once daily.    PEN NEEDLE, DIABETIC 31 GAUGE X 3/16" NDLE    USE DAILY    SUPREP BOWEL PREP KIT 17.5-3.13-1.6 GRAM SOLR        TRIAMCINOLONE ACETONIDE 0.1% (KENALOG) 0.1 % CREAM    Apply topically 2 (two) times daily. Do not use on face    VITAMIN D (VITAMIN D3) 1000 UNITS TAB    Take 2,000 Units by mouth once daily.    VITAMIN E 100 UNIT CAPSULE    Take 100 Units by mouth once daily.   Discontinued Medications    MELOXICAM (MOBIC) 7.5 MG TABLET    Take 1 tablet (7.5 mg total) by mouth once daily.     Review of patient's allergies indicates:   Allergen Reactions    Bactrim [sulfamethoxazole-trimethoprim] Swelling, Other (See Comments) and Anaphylaxis "    Macrodantin [nitrofurantoin macrocrystalline] Swelling, Other (See Comments) and Anaphylaxis    Canagliflozin      Yeast infections    Metformin Diarrhea     diarrhea  diarrhea  diarrhea       Review of Systems   Constitution: Negative for fever.   Cardiovascular: Negative for chest pain.   Respiratory: Negative for cough and shortness of breath.    Skin: Negative for rash.   Musculoskeletal: Positive for joint pain. Negative for joint swelling and stiffness.   Gastrointestinal: Negative for heartburn.   Neurological: Negative for headaches and numbness.         Objective:        General    Nursing note and vitals reviewed.  Constitutional: She is oriented to person, place, and time. She appears well-developed and well-nourished.   HENT:   Head: Normocephalic and atraumatic.   Eyes: EOM are normal.   Cardiovascular: Normal rate and regular rhythm.    Pulmonary/Chest: Effort normal.   Neurological: She is alert and oriented to person, place, and time.   Psychiatric: She has a normal mood and affect. Her behavior is normal.     General Musculoskeletal Exam   Gait: normal       Right Knee Exam     Inspection   Erythema: absent  Swelling: absent  Effusion: absent  Deformity: absent  Bruising: absent    Tenderness   The patient is tender to palpation of the medial joint line and condyle.    Crepitus   The patient has crepitus of the patella.    Range of Motion   Extension: normal   Flexion: normal     Tests   Meniscus   Dasha:  Medial - negative Lateral - negative  Ligament Examination Lachman: normal (-1 to 2mm) PCL-Posterior Drawer: normal (0 to 2mm)     MCL - Valgus: normal (0 to 2mm)  LCL - Varus: normal  Patella   Patellar apprehension: negative  Patellar Tracking: normal  Patellar Grind: negative    Other   Meniscal Cyst: absent  Popliteal (Baker's) Cyst: absent  Sensation: normal    Comments:  Comp soft, cap refill < 2 sec.    Left Knee Exam     Range of Motion   Extension: normal   Flexion: normal      Tests   Stability Lachman: normal (-1 to 2mm) PCL-Posterior Drawer: normal (0 to 2mm)  MCL - Valgus: normal (0 to 2mm)  LCL - Varus: normal (0 to 2mm)    Other   Sensation: normal    Muscle Strength   Right Lower Extremity   Quadriceps:  5/5   Hamstrin/5   Left Lower Extremity   Quadriceps:  5/5   Hamstrin/5     Vascular Exam       Edema  Right Lower Leg: absent  Left Lower Leg: absent              Xray images and report were reviewed today.  I agree with the radiologist's interpretation.    X-ray Knee Ortho Bilateral  Narrative: EXAMINATION:  XR KNEE ORTHO BILAT    CLINICAL HISTORY:  Pain in right knee    TECHNIQUE:  Standing PA, lateral, and Merchant views were obtained of the bilateral knees.    COMPARISON:  2013    FINDINGS:  Right knee: There is no radiographic evidence of acute osseous, articular, or soft tissue abnormality. Joint spaces are well preserved    Left knee:  There is no radiographic evidence of acute osseous, articular, or soft tissue abnormality. Joint spaces are well preserved  Impression: No acute findings    Electronically signed by: Nishant Potts MD  Date:    2020  Time:    11:48        Assessment:       Encounter Diagnoses   Name Primary?    Acute pain of right knee     Chondromalacia of right knee Yes          Plan:       Bianca was seen today for pain.    Diagnoses and all orders for this visit:    Chondromalacia of right knee  -     meloxicam (MOBIC) 7.5 MG tablet; Take 1 tablet (7.5 mg total) by mouth 2 (two) times daily with meals. Take with food.  DC if develop GI side effects    Acute pain of right knee  Comments:  Most likely degenerative.  X-rays today.  Orthopedic consult.  Trial of Mobic daily  Orders:  -     Ambulatory referral/consult to Orthopedics  -     meloxicam (MOBIC) 7.5 MG tablet; Take 1 tablet (7.5 mg total) by mouth 2 (two) times daily with meals. Take with food.  DC if develop GI side effects        Bianca JOHANA Shiraz is a new pt who comes in  today for the above problems.  I would like her to get a PA standing flexion view of the knee on her way out.  I put her in to a knee brace and given her home exercise program.  Physical therapy ache with respect we will try to avoid a corticosteroid injection due to severe blood sugar elevations with previous injections.  I will increase her Mobic prescription to 15 mg daily with food.  She will hold off on ibuprofen.  I will see her back in the office for re-eval.  She verbalizes understanding and agrees.    Follow up in about 6 weeks (around 1/19/2021) for no xray at [facility].          The patient understands, chooses and consents to this plan and accepts all   the risks which include but are not limited to the risks mentioned above.     Disclaimer: This note was prepared using a voice recognition system and is likely to have sound alike errors within the text.

## 2020-12-08 NOTE — LETTER
December 8, 2020      Scotty Figueroa MD  [address] [facility] [id number]           [facility]  [address] [place] [id number]  Phone: [phone]  Fax: [phone]          Patient: Bianca Weldon   MR Number: [id number]   YOB: 1965   Date of Visit: 12/8/2020       Dear Dr. Scotty Figueroa:    Thank you for referring Bianca Weldon to me for evaluation. Attached you will find relevant portions of my assessment and plan of care.    If you have questions, please do not hesitate to call me. I look forward to following Bianca Weldon along with you.    Sincerely,    Marisa Rodríguez PA-C    Enclosure  CC:  No Recipients    If you would like to receive this communication electronically, please contact [email or web address] or [phone] to request more information on [facility] access.    For providers and/or their staff who would like to refer a patient to [facility], please contact us through our one-stop-shop provider referral line, [facility] , at [phone].    If you feel you have received this communication in error or would no longer like to receive these types of communications, please e-mail [email or web address]

## 2020-12-10 ENCOUNTER — PATIENT MESSAGE (OUTPATIENT)
Dept: ORTHOPEDICS | Facility: CLINIC | Age: 55
End: 2020-12-10

## 2020-12-11 ENCOUNTER — PATIENT MESSAGE (OUTPATIENT)
Dept: OTHER | Facility: OTHER | Age: 55
End: 2020-12-11

## 2020-12-16 DIAGNOSIS — E11.29 TYPE 2 DIABETES MELLITUS WITH MICROALBUMINURIA, WITH LONG-TERM CURRENT USE OF INSULIN: ICD-10-CM

## 2020-12-16 DIAGNOSIS — R80.9 TYPE 2 DIABETES MELLITUS WITH MICROALBUMINURIA, WITH LONG-TERM CURRENT USE OF INSULIN: ICD-10-CM

## 2020-12-16 DIAGNOSIS — Z79.4 TYPE 2 DIABETES MELLITUS WITH MICROALBUMINURIA, WITH LONG-TERM CURRENT USE OF INSULIN: ICD-10-CM

## 2020-12-16 RX ORDER — INSULIN GLARGINE 300 U/ML
50 INJECTION, SOLUTION SUBCUTANEOUS DAILY
Qty: 3 SYRINGE | Refills: 3 | Status: SHIPPED | OUTPATIENT
Start: 2020-12-16 | End: 2021-02-24 | Stop reason: SDUPTHER

## 2020-12-18 DIAGNOSIS — M25.561 ACUTE PAIN OF RIGHT KNEE: ICD-10-CM

## 2020-12-18 DIAGNOSIS — M94.261 CHONDROMALACIA OF RIGHT KNEE: ICD-10-CM

## 2020-12-18 RX ORDER — FLUDROCORTISONE ACETATE 0.1 MG/1
100 TABLET ORAL DAILY
Qty: 90 TABLET | Refills: 1 | Status: CANCELLED | OUTPATIENT
Start: 2020-12-18

## 2020-12-18 RX ORDER — LEVOTHYROXINE SODIUM 75 UG/1
75 TABLET ORAL DAILY
Qty: 90 TABLET | Refills: 1 | Status: CANCELLED | OUTPATIENT
Start: 2020-12-18

## 2020-12-18 RX ORDER — LOSARTAN POTASSIUM 25 MG/1
25 TABLET ORAL DAILY
Qty: 90 TABLET | Refills: 1 | Status: CANCELLED | OUTPATIENT
Start: 2020-12-18

## 2020-12-18 RX ORDER — PANTOPRAZOLE SODIUM 40 MG/1
40 TABLET, DELAYED RELEASE ORAL DAILY
Qty: 90 TABLET | Refills: 3 | Status: CANCELLED | OUTPATIENT
Start: 2020-12-18

## 2020-12-18 RX ORDER — ONDANSETRON 4 MG/1
4 TABLET, ORALLY DISINTEGRATING ORAL EVERY 6 HOURS PRN
Qty: 20 TABLET | Refills: 1 | Status: CANCELLED | OUTPATIENT
Start: 2020-12-18

## 2020-12-18 RX ORDER — MELOXICAM 7.5 MG/1
7.5 TABLET ORAL 2 TIMES DAILY WITH MEALS
Qty: 60 TABLET | Refills: 0 | OUTPATIENT
Start: 2020-12-18

## 2020-12-18 NOTE — TELEPHONE ENCOUNTER
Patient notified and verbalized understanding    Refused Prescriptions     meloxicam (MOBIC) 7.5 MG tablet         Sig: Take 1 tablet (7.5 mg total) by mouth 2 (two) times daily with meals. Take with food.  DC if develop GI side effects    Disp:  60 tablet    Refills:  0    Start: 12/18/2020    Class: Normal    Refused by: Marisa Rodríguez PA-C    Refusal reason: Other (See comments) (request generated by accident by patient)    For: Acute pain of right knee, Chondromalacia of right knee        Fill requested from: [id number] - [place], [place] - [address] AT [facility] [address]

## 2020-12-22 ENCOUNTER — OFFICE VISIT (OUTPATIENT)
Dept: FAMILY MEDICINE | Facility: CLINIC | Age: 55
End: 2020-12-22
Payer: MEDICARE

## 2020-12-22 VITALS
WEIGHT: 180.13 LBS | BODY MASS INDEX: 37.81 KG/M2 | TEMPERATURE: 99 F | DIASTOLIC BLOOD PRESSURE: 80 MMHG | HEIGHT: 58 IN | SYSTOLIC BLOOD PRESSURE: 134 MMHG | HEART RATE: 90 BPM | OXYGEN SATURATION: 98 % | RESPIRATION RATE: 16 BRPM

## 2020-12-22 DIAGNOSIS — E03.9 HYPOTHYROIDISM, UNSPECIFIED TYPE: ICD-10-CM

## 2020-12-22 DIAGNOSIS — M25.561 ACUTE PAIN OF RIGHT KNEE: ICD-10-CM

## 2020-12-22 DIAGNOSIS — R80.9 TYPE 2 DIABETES MELLITUS WITH MICROALBUMINURIA, WITH LONG-TERM CURRENT USE OF INSULIN: Primary | ICD-10-CM

## 2020-12-22 DIAGNOSIS — Z23 NEED FOR VACCINATION: ICD-10-CM

## 2020-12-22 DIAGNOSIS — Z79.4 TYPE 2 DIABETES MELLITUS WITH MICROALBUMINURIA, WITH LONG-TERM CURRENT USE OF INSULIN: Primary | ICD-10-CM

## 2020-12-22 DIAGNOSIS — K63.5 POLYP OF COLON, UNSPECIFIED PART OF COLON, UNSPECIFIED TYPE: ICD-10-CM

## 2020-12-22 DIAGNOSIS — J44.89 ASTHMATIC BRONCHITIS , CHRONIC: ICD-10-CM

## 2020-12-22 DIAGNOSIS — E66.01 SEVERE OBESITY (BMI 35.0-35.9 WITH COMORBIDITY): ICD-10-CM

## 2020-12-22 DIAGNOSIS — E11.29 TYPE 2 DIABETES MELLITUS WITH MICROALBUMINURIA, WITH LONG-TERM CURRENT USE OF INSULIN: Primary | ICD-10-CM

## 2020-12-22 DIAGNOSIS — I10 ESSENTIAL HYPERTENSION: ICD-10-CM

## 2020-12-22 PROCEDURE — 90750 ZOSTER RECOMBINANT VACCINE: ICD-10-PCS | Mod: S$GLB,,, | Performed by: FAMILY MEDICINE

## 2020-12-22 PROCEDURE — 3075F SYST BP GE 130 - 139MM HG: CPT | Mod: CPTII,S$GLB,, | Performed by: FAMILY MEDICINE

## 2020-12-22 PROCEDURE — 3008F PR BODY MASS INDEX (BMI) DOCUMENTED: ICD-10-PCS | Mod: CPTII,S$GLB,, | Performed by: FAMILY MEDICINE

## 2020-12-22 PROCEDURE — 90686 IIV4 VACC NO PRSV 0.5 ML IM: CPT | Mod: S$GLB,,, | Performed by: FAMILY MEDICINE

## 2020-12-22 PROCEDURE — 3079F PR MOST RECENT DIASTOLIC BLOOD PRESSURE 80-89 MM HG: ICD-10-PCS | Mod: CPTII,S$GLB,, | Performed by: FAMILY MEDICINE

## 2020-12-22 PROCEDURE — 1126F PR PAIN SEVERITY QUANTIFIED, NO PAIN PRESENT: ICD-10-PCS | Mod: S$GLB,,, | Performed by: FAMILY MEDICINE

## 2020-12-22 PROCEDURE — 1126F AMNT PAIN NOTED NONE PRSNT: CPT | Mod: S$GLB,,, | Performed by: FAMILY MEDICINE

## 2020-12-22 PROCEDURE — 99214 OFFICE O/P EST MOD 30 MIN: CPT | Mod: 25,S$GLB,, | Performed by: FAMILY MEDICINE

## 2020-12-22 PROCEDURE — G0008 FLU VACCINE (QUAD) GREATER THAN OR EQUAL TO 3YO PRESERVATIVE FREE IM: ICD-10-PCS | Mod: S$GLB,,, | Performed by: FAMILY MEDICINE

## 2020-12-22 PROCEDURE — 99999 PR PBB SHADOW E&M-EST. PATIENT-LVL V: ICD-10-PCS | Mod: PBBFAC,,, | Performed by: FAMILY MEDICINE

## 2020-12-22 PROCEDURE — 3051F HG A1C>EQUAL 7.0%<8.0%: CPT | Mod: CPTII,S$GLB,, | Performed by: FAMILY MEDICINE

## 2020-12-22 PROCEDURE — 90686 FLU VACCINE (QUAD) GREATER THAN OR EQUAL TO 3YO PRESERVATIVE FREE IM: ICD-10-PCS | Mod: S$GLB,,, | Performed by: FAMILY MEDICINE

## 2020-12-22 PROCEDURE — G0008 ADMIN INFLUENZA VIRUS VAC: HCPCS | Mod: S$GLB,,, | Performed by: FAMILY MEDICINE

## 2020-12-22 PROCEDURE — 99214 PR OFFICE/OUTPT VISIT, EST, LEVL IV, 30-39 MIN: ICD-10-PCS | Mod: 25,S$GLB,, | Performed by: FAMILY MEDICINE

## 2020-12-22 PROCEDURE — 3051F PR MOST RECENT HEMOGLOBIN A1C LEVEL 7.0 - < 8.0%: ICD-10-PCS | Mod: CPTII,S$GLB,, | Performed by: FAMILY MEDICINE

## 2020-12-22 PROCEDURE — 3075F PR MOST RECENT SYSTOLIC BLOOD PRESS GE 130-139MM HG: ICD-10-PCS | Mod: CPTII,S$GLB,, | Performed by: FAMILY MEDICINE

## 2020-12-22 PROCEDURE — 3008F BODY MASS INDEX DOCD: CPT | Mod: CPTII,S$GLB,, | Performed by: FAMILY MEDICINE

## 2020-12-22 PROCEDURE — 90750 HZV VACC RECOMBINANT IM: CPT | Mod: S$GLB,,, | Performed by: FAMILY MEDICINE

## 2020-12-22 PROCEDURE — 99999 PR PBB SHADOW E&M-EST. PATIENT-LVL V: CPT | Mod: PBBFAC,,, | Performed by: FAMILY MEDICINE

## 2020-12-22 PROCEDURE — 99499 RISK ADDL DX/OHS AUDIT: ICD-10-PCS | Mod: S$GLB,,, | Performed by: FAMILY MEDICINE

## 2020-12-22 PROCEDURE — 99499 UNLISTED E&M SERVICE: CPT | Mod: S$GLB,,, | Performed by: FAMILY MEDICINE

## 2020-12-22 PROCEDURE — 3079F DIAST BP 80-89 MM HG: CPT | Mod: CPTII,S$GLB,, | Performed by: FAMILY MEDICINE

## 2020-12-22 PROCEDURE — 90472 ZOSTER RECOMBINANT VACCINE: ICD-10-PCS | Mod: S$GLB,,, | Performed by: FAMILY MEDICINE

## 2020-12-22 PROCEDURE — 90472 IMMUNIZATION ADMIN EACH ADD: CPT | Mod: S$GLB,,, | Performed by: FAMILY MEDICINE

## 2020-12-22 NOTE — PROGRESS NOTES
Subjective:       Patient ID: Bianca Weldon is a 55 y.o. female.    Chief Complaint: Follow-up      HPI Comments:       Current Outpatient Medications:     aripiprazole (ABILIFY) 20 MG Tab, Take 20 mg by mouth once daily. , Disp: , Rfl:     armodafiniL (NUVIGIL) 250 mg tablet, Take 1 tablet by mouth once daily., Disp: , Rfl:     ascorbic acid (VITAMIN C) 250 MG tablet, Take 250 mg by mouth once daily., Disp: , Rfl:     aspirin (ECOTRIN) 81 MG EC tablet, Take 81 mg by mouth once daily. , Disp: , Rfl:     atorvastatin (LIPITOR) 40 MG tablet, Take 1 tablet (40 mg total) by mouth once daily., Disp: 90 tablet, Rfl: 1    benztropine (COGENTIN) 1 MG tablet, Take 0.5 mg by mouth 2 (two) times daily. , Disp: , Rfl:     blood sugar diagnostic Strp, 1 strip by Misc.(Non-Drug; Combo Route) route 4 (four) times daily., Disp: 200 each, Rfl: 0    clindamycin (CLEOCIN T) 1 % external solution, Apply topically 2 (two) times daily as needed. For acne/folliculitis on face and scalp, Disp: 60 mL, Rfl: 5    clonazepam (KLONOPIN) 2 MG Tab, Take 2 mg by mouth every evening. , Disp: , Rfl:     cycloSPORINE (RESTASIS) 0.05 % ophthalmic emulsion, 1 drop., Disp: , Rfl:     desvenlafaxine succinate (PRISTIQ) 100 MG Tb24, , Disp: , Rfl:     desvenlafaxine succinate (PRISTIQ) 50 MG Tb24, Take 50 mg by mouth once daily. , Disp: , Rfl:     docosahexaenoic acid-epa 120-180 mg Cap, Take by mouth., Disp: , Rfl:     doxycycline monohydrate 100 mg Tab, Take 1 tablet (100 mg total) by mouth 2 (two) times a day., Disp: 14 tablet, Rfl: 2    DULoxetine (CYMBALTA) 30 MG capsule, Take 60 mg by mouth once daily. , Disp: , Rfl:     fish oil-omega-3 fatty acids 300-1,000 mg capsule, Take by mouth once daily., Disp: , Rfl:     fludrocortisone (FLORINEF) 0.1 mg Tab, Take 1 tablet (100 mcg total) by mouth once daily., Disp: 90 tablet, Rfl: 1    fluticasone furoate-vilanteroL (BREO) 100-25 mcg/dose diskus inhaler, 1 (one) time each day, Disp: 60  each, Rfl: 11    fluticasone propionate (FLONASE) 50 mcg/actuation nasal spray, 2 sprays (100 mcg total) by Each Nostril route once daily., Disp: 16 g, Rfl: 11    glimepiride (AMARYL) 4 MG tablet, Take 1 tablet (4 mg total) by mouth before breakfast., Disp: 90 tablet, Rfl: 1    glipiZIDE (GLUCOTROL) 10 MG TR24, Take 10 mg by mouth daily with breakfast. , Disp: , Rfl:     hyoscyamine (LEVSIN) 0.125 mg/5 mL Elix, Take 125 mcg by mouth every 4 (four) hours as needed. , Disp: , Rfl:     insulin glargine U-300 conc (TOUJEO MAX U-300 SOLOSTAR) 300 unit/mL (3 mL) InPn, Inject 50 Units into the skin once daily., Disp: 3 Syringe, Rfl: 3    ipratropium-albuteroL (COMBIVENT RESPIMAT)  mcg/actuation inhaler, Inhale 1 puff into the lungs every 6 (six) hours as needed for Wheezing., Disp: 4 g, Rfl: 6    ketoconazole (NIZORAL) 2 % cream, Apply topically 2 (two) times daily. Apply to the affected area twice daily., Disp: 30 g, Rfl: 2    lamoTRIgine (LAMICTAL) 200 MG tablet, Take 1 tablet by mouth 2 (two) times a day., Disp: , Rfl:     lamoTRIgine (LAMICTAL) 200 MG tablet, Take 200 mg by mouth., Disp: , Rfl:     levocetirizine (XYZAL) 5 MG tablet, Take 1 tablet (5 mg total) by mouth every evening., Disp: 30 tablet, Rfl: 11    levothyroxine (SYNTHROID) 75 MCG tablet, TAKE 1 TABLET(75 MCG) BY MOUTH EVERY DAY, Disp: 90 tablet, Rfl: 0    LORazepam (ATIVAN) 1 MG tablet, Take 1 mg by mouth every 6 (six) hours as needed for Anxiety., Disp: , Rfl:     losartan (COZAAR) 25 MG tablet, TAKE 1 TABLET(25 MG) BY MOUTH EVERY DAY, Disp: 90 tablet, Rfl: 1    lurasidone (LATUDA) 60 mg Tab tablet, Take 60 mg by mouth once daily., Disp: , Rfl:     meloxicam (MOBIC) 7.5 MG tablet, Take 1 tablet (7.5 mg total) by mouth 2 (two) times daily with meals. Take with food.  DC if develop GI side effects, Disp: 60 tablet, Rfl: 0    midodrine (PROAMATINE) 10 MG tablet, Take 1 tablet (10 mg total) by mouth once daily. (Patient taking  "differently: Take 5 mg by mouth once daily. ), Disp: 90 tablet, Rfl: 1    MYRBETRIQ 50 mg Tb24, Take 50 mg by mouth 2 (two) times a day. , Disp: , Rfl:     ondansetron (ZOFRAN-ODT) 4 MG TbDL, Take 1 tablet (4 mg total) by mouth every 6 (six) hours as needed., Disp: 20 tablet, Rfl: 1    pantoprazole (PROTONIX) 40 MG tablet, Take 1 tablet (40 mg total) by mouth once daily., Disp: 90 tablet, Rfl: 3    pen needle, diabetic 31 gauge x 3/16" Ndle, USE DAILY, Disp: , Rfl:     SITagliptin (JANUVIA) 100 MG Tab, Take 1 tablet (100 mg total) by mouth once daily., Disp: 90 tablet, Rfl: 0    SUPREP BOWEL PREP KIT 17.5-3.13-1.6 gram SolR, , Disp: , Rfl:     vitamin D (VITAMIN D3) 1000 units Tab, Take 2,000 Units by mouth once daily., Disp: , Rfl:     vitamin E 100 UNIT capsule, Take 100 Units by mouth once daily., Disp: , Rfl:     estradiol (ESTRACE) 0.01 % (0.1 mg/gram) vaginal cream, Place 1 g vaginally every 7 days., Disp: 4 g, Rfl: 5    triamcinolone acetonide 0.1% (KENALOG) 0.1 % cream, Apply topically 2 (two) times daily. Do not use on face, Disp: 45 Bottle, Rfl: 6    Six month follow-up visit.  Generally doing okay.  Had to have her colonoscopy canceled because of some concerns that the endoscopies had about a previous experience, where she had apparently developed a pneumothorax during a hemorrhoidectomy.  He wants to review all those records to decide about future scopes.    Saw orthopedics for her knee.  Home exercises.  She will use physical therapy if needed.  Wears her brace and has increased her Mobic.  Half to avoid steroid shots because in the past they start upper diabetes too much.    Cardiology postpone her stress test when her symptoms went away for through improved anxiety treatment.  No further chest discomfort.    Wants to get her shingles vaccine and a flu vaccine today    Diabetes  She has type 2 diabetes mellitus. No MedicAlert identification noted. The initial diagnosis of diabetes was made 10 " years ago. Hypoglycemia symptoms include mood changes, nervousness/anxiousness, sleepiness and sweats. Pertinent negatives for hypoglycemia include no confusion, dizziness, headaches, hunger, pallor, seizures, speech difficulty or tremors. Associated symptoms include fatigue. Pertinent negatives for diabetes include no blurred vision, no chest pain, no foot paresthesias, no foot ulcerations, no polydipsia, no polyphagia, no polyuria, no visual change, no weakness and no weight loss. Pertinent negatives for hypoglycemia complications include no blackouts, no hospitalization, no nocturnal hypoglycemia, no required assistance and no required glucagon injection. Diabetic complications include autonomic neuropathy and nephropathy. Pertinent negatives for diabetic complications include no CVA, heart disease, impotence, peripheral neuropathy, PVD or retinopathy. Risk factors for coronary artery disease include dyslipidemia, family history, hypertension, obesity, sedentary lifestyle, stress and diabetes mellitus. Current diabetic treatment includes insulin injections and oral agent (monotherapy). She is compliant with treatment most of the time. She is currently taking insulin at bedtime. Insulin injections are given by patient. Rotation sites for injection include the abdominal wall. Her weight is fluctuating minimally. She is following a diabetic, generally healthy and low salt diet. Meal planning includes carbohydrate counting. She has had a previous visit with a dietitian. She participates in exercise intermittently. She monitors blood glucose at home 3-4 x per day. She monitors urine at home <1 x per month. Blood glucose monitoring compliance is good. Her home blood glucose trend is fluctuating minimally. She sees a podiatrist.Eye exam is current.     Review of Systems   Constitutional: Positive for fatigue. Negative for activity change, appetite change, fever and weight loss.   HENT: Negative for sore throat.    Eyes:  "Negative for blurred vision.   Respiratory: Negative for cough and shortness of breath.    Cardiovascular: Negative for chest pain.   Gastrointestinal: Negative for abdominal pain, diarrhea and nausea.   Endocrine: Negative for polydipsia, polyphagia and polyuria.   Genitourinary: Negative for difficulty urinating and impotence.   Musculoskeletal: Negative for arthralgias and myalgias.   Skin: Negative for pallor.   Neurological: Negative for dizziness, tremors, seizures, speech difficulty, weakness and headaches.   Psychiatric/Behavioral: Negative for confusion. The patient is nervous/anxious.        Objective:      Vitals:    12/22/20 0913   BP: 134/80   Pulse: 90   Resp: 16   Temp: 99 °F (37.2 °C)   TempSrc: Temporal   SpO2: 98%   Weight: 81.7 kg (180 lb 1.9 oz)   Height: 4' 10" (1.473 m)   PainSc: 0-No pain     Physical Exam  Vitals signs and nursing note reviewed.   Constitutional:       General: She is not in acute distress.     Appearance: She is well-developed. She is not diaphoretic.   HENT:      Head: Normocephalic.   Neck:      Musculoskeletal: Neck supple.      Thyroid: No thyromegaly.   Cardiovascular:      Rate and Rhythm: Normal rate and regular rhythm.      Heart sounds: Normal heart sounds. No murmur.   Pulmonary:      Effort: Pulmonary effort is normal.      Breath sounds: Normal breath sounds. No wheezing or rales.   Abdominal:      General: There is no distension.      Palpations: Abdomen is soft.   Lymphadenopathy:      Cervical: No cervical adenopathy.   Skin:     General: Skin is warm and dry.   Neurological:      Mental Status: She is alert and oriented to person, place, and time.   Psychiatric:         Behavior: Behavior normal.         Thought Content: Thought content normal.         Judgment: Judgment normal.         Assessment:       1. Type 2 diabetes mellitus with microalbuminuria, with long-term current use of insulin    2. Essential hypertension    3. Hypothyroidism, unspecified type  "   4. Severe obesity (BMI 35.0-35.9 with comorbidity)    5. Acute pain of right knee    6. Polyp of colon, unspecified part of colon, unspecified type    7. Need for vaccination    8. Asthmatic bronchitis , chronic        Plan:   Type 2 diabetes mellitus with microalbuminuria, with long-term current use of insulin  Comments:  Improved controlled.  Says she will coordinate with Renetta Whitlock for enhanced glycemic control, if steroid injections are needed for her knee pain    Essential hypertension  Comments:  Controlled    Hypothyroidism, unspecified type  Comments:  Controlled on medication    Severe obesity (BMI 35.0-35.9 with comorbidity)    Acute pain of right knee  Comments:  Now on Mobic.  Conservative management with exercises  and/or PT    Polyp of colon, unspecified part of colon, unspecified type  Comments:  Next colonoscopy being planned with outside provider    Need for vaccination  Comments:  Shingrix today.  Influenza today.  Shingrix again in 2 months with nurse visit  Orders:  -     (In Office Administered) Zoster Recombinant Vaccine; Future; Expected date: 02/22/2021  -     (In Office Administered) Zoster Recombinant Vaccine    Asthmatic bronchitis , chronic  Comments:  sees pulmonology. Inhalers as needed

## 2020-12-29 ENCOUNTER — PATIENT MESSAGE (OUTPATIENT)
Dept: DIABETES | Facility: CLINIC | Age: 55
End: 2020-12-29

## 2020-12-29 ENCOUNTER — PATIENT MESSAGE (OUTPATIENT)
Dept: FAMILY MEDICINE | Facility: CLINIC | Age: 55
End: 2020-12-29

## 2021-01-05 DIAGNOSIS — Z12.2 ENCOUNTER FOR SCREENING FOR MALIGNANT NEOPLASM OF RESPIRATORY ORGANS: ICD-10-CM

## 2021-01-05 DIAGNOSIS — F17.200 TOBACCO DEPENDENCE: Primary | ICD-10-CM

## 2021-01-19 ENCOUNTER — OFFICE VISIT (OUTPATIENT)
Dept: ORTHOPEDICS | Facility: CLINIC | Age: 56
End: 2021-01-19
Payer: MEDICARE

## 2021-01-19 VITALS
HEART RATE: 76 BPM | SYSTOLIC BLOOD PRESSURE: 143 MMHG | DIASTOLIC BLOOD PRESSURE: 82 MMHG | BODY MASS INDEX: 37.79 KG/M2 | WEIGHT: 180 LBS | HEIGHT: 58 IN

## 2021-01-19 DIAGNOSIS — M25.561 ACUTE PAIN OF RIGHT KNEE: Primary | ICD-10-CM

## 2021-01-19 DIAGNOSIS — M23.91 INTERNAL DERANGEMENT OF MULTIPLE SITES OF RIGHT KNEE: ICD-10-CM

## 2021-01-19 PROCEDURE — 3079F DIAST BP 80-89 MM HG: CPT | Mod: CPTII,S$GLB,, | Performed by: PHYSICIAN ASSISTANT

## 2021-01-19 PROCEDURE — 99213 PR OFFICE/OUTPT VISIT, EST, LEVL III, 20-29 MIN: ICD-10-PCS | Mod: S$GLB,,, | Performed by: PHYSICIAN ASSISTANT

## 2021-01-19 PROCEDURE — 1125F PR PAIN SEVERITY QUANTIFIED, PAIN PRESENT: ICD-10-PCS | Mod: S$GLB,,, | Performed by: PHYSICIAN ASSISTANT

## 2021-01-19 PROCEDURE — 99999 PR PBB SHADOW E&M-EST. PATIENT-LVL V: CPT | Mod: PBBFAC,,, | Performed by: PHYSICIAN ASSISTANT

## 2021-01-19 PROCEDURE — 99999 PR PBB SHADOW E&M-EST. PATIENT-LVL V: ICD-10-PCS | Mod: PBBFAC,,, | Performed by: PHYSICIAN ASSISTANT

## 2021-01-19 PROCEDURE — 99213 OFFICE O/P EST LOW 20 MIN: CPT | Mod: S$GLB,,, | Performed by: PHYSICIAN ASSISTANT

## 2021-01-19 PROCEDURE — 3008F BODY MASS INDEX DOCD: CPT | Mod: CPTII,S$GLB,, | Performed by: PHYSICIAN ASSISTANT

## 2021-01-19 PROCEDURE — 1125F AMNT PAIN NOTED PAIN PRSNT: CPT | Mod: S$GLB,,, | Performed by: PHYSICIAN ASSISTANT

## 2021-01-19 PROCEDURE — 3008F PR BODY MASS INDEX (BMI) DOCUMENTED: ICD-10-PCS | Mod: CPTII,S$GLB,, | Performed by: PHYSICIAN ASSISTANT

## 2021-01-19 PROCEDURE — 3079F PR MOST RECENT DIASTOLIC BLOOD PRESSURE 80-89 MM HG: ICD-10-PCS | Mod: CPTII,S$GLB,, | Performed by: PHYSICIAN ASSISTANT

## 2021-01-19 PROCEDURE — 3077F SYST BP >= 140 MM HG: CPT | Mod: CPTII,S$GLB,, | Performed by: PHYSICIAN ASSISTANT

## 2021-01-19 PROCEDURE — 3077F PR MOST RECENT SYSTOLIC BLOOD PRESSURE >= 140 MM HG: ICD-10-PCS | Mod: CPTII,S$GLB,, | Performed by: PHYSICIAN ASSISTANT

## 2021-01-20 ENCOUNTER — HOSPITAL ENCOUNTER (OUTPATIENT)
Dept: RADIOLOGY | Facility: HOSPITAL | Age: 56
Discharge: HOME | End: 2021-01-20
Attending: FAMILY MEDICINE
Payer: MEDICARE

## 2021-01-20 DIAGNOSIS — F17.200 TOBACCO DEPENDENCE: ICD-10-CM

## 2021-01-20 DIAGNOSIS — Z12.2 ENCOUNTER FOR SCREENING FOR MALIGNANT NEOPLASM OF RESPIRATORY ORGANS: ICD-10-CM

## 2021-01-20 PROCEDURE — 71271 CT THORAX LUNG CANCER SCR C-: CPT | Mod: TC

## 2021-01-20 PROCEDURE — 71271 CT CHEST LUNG SCREENING LOW DOSE: ICD-10-PCS | Mod: 26,,, | Performed by: RADIOLOGY

## 2021-01-20 PROCEDURE — 71271 CT THORAX LUNG CANCER SCR C-: CPT | Mod: 26,,, | Performed by: RADIOLOGY

## 2021-02-01 ENCOUNTER — HOSPITAL ENCOUNTER (OUTPATIENT)
Dept: RADIOLOGY | Facility: HOSPITAL | Age: 56
Discharge: HOME | End: 2021-02-01
Attending: PHYSICIAN ASSISTANT
Payer: MEDICARE

## 2021-02-01 DIAGNOSIS — M25.561 ACUTE PAIN OF RIGHT KNEE: ICD-10-CM

## 2021-02-01 PROCEDURE — 73721 MRI JNT OF LWR EXTRE W/O DYE: CPT | Mod: TC,RT

## 2021-02-02 ENCOUNTER — PATIENT MESSAGE (OUTPATIENT)
Dept: ORTHOPEDICS | Facility: CLINIC | Age: 56
End: 2021-02-02

## 2021-02-24 ENCOUNTER — OFFICE VISIT (OUTPATIENT)
Dept: DIABETES | Facility: CLINIC | Age: 56
End: 2021-02-24
Payer: MEDICARE

## 2021-02-24 ENCOUNTER — LAB VISIT (OUTPATIENT)
Dept: LAB | Facility: HOSPITAL | Age: 56
End: 2021-02-24
Attending: NURSE PRACTITIONER
Payer: MEDICARE

## 2021-02-24 VITALS
WEIGHT: 179.25 LBS | HEIGHT: 58 IN | HEART RATE: 87 BPM | BODY MASS INDEX: 37.63 KG/M2 | DIASTOLIC BLOOD PRESSURE: 74 MMHG | SYSTOLIC BLOOD PRESSURE: 112 MMHG

## 2021-02-24 DIAGNOSIS — R80.9 TYPE 2 DIABETES MELLITUS WITH MICROALBUMINURIA, WITH LONG-TERM CURRENT USE OF INSULIN: Primary | ICD-10-CM

## 2021-02-24 DIAGNOSIS — E11.29 TYPE 2 DIABETES MELLITUS WITH MICROALBUMINURIA, WITH LONG-TERM CURRENT USE OF INSULIN: Primary | ICD-10-CM

## 2021-02-24 DIAGNOSIS — E78.5 DYSLIPIDEMIA: ICD-10-CM

## 2021-02-24 DIAGNOSIS — Z79.4 TYPE 2 DIABETES MELLITUS WITH MICROALBUMINURIA, WITH LONG-TERM CURRENT USE OF INSULIN: ICD-10-CM

## 2021-02-24 DIAGNOSIS — Z79.4 TYPE 2 DIABETES MELLITUS WITH MICROALBUMINURIA, WITH LONG-TERM CURRENT USE OF INSULIN: Primary | ICD-10-CM

## 2021-02-24 DIAGNOSIS — I10 ESSENTIAL HYPERTENSION: ICD-10-CM

## 2021-02-24 DIAGNOSIS — R80.9 TYPE 2 DIABETES MELLITUS WITH MICROALBUMINURIA, WITH LONG-TERM CURRENT USE OF INSULIN: ICD-10-CM

## 2021-02-24 DIAGNOSIS — E11.29 TYPE 2 DIABETES MELLITUS WITH MICROALBUMINURIA, WITH LONG-TERM CURRENT USE OF INSULIN: ICD-10-CM

## 2021-02-24 LAB — GLUCOSE SERPL-MCNC: 117 MG/DL (ref 70–110)

## 2021-02-24 PROCEDURE — 99214 OFFICE O/P EST MOD 30 MIN: CPT | Mod: S$GLB,,, | Performed by: NURSE PRACTITIONER

## 2021-02-24 PROCEDURE — 99214 PR OFFICE/OUTPT VISIT, EST, LEVL IV, 30-39 MIN: ICD-10-PCS | Mod: S$GLB,,, | Performed by: NURSE PRACTITIONER

## 2021-02-24 PROCEDURE — 36415 COLL VENOUS BLD VENIPUNCTURE: CPT | Mod: PO

## 2021-02-24 PROCEDURE — 99999 PR PBB SHADOW E&M-EST. PATIENT-LVL V: CPT | Mod: PBBFAC,,, | Performed by: NURSE PRACTITIONER

## 2021-02-24 PROCEDURE — 3051F HG A1C>EQUAL 7.0%<8.0%: CPT | Mod: CPTII,S$GLB,, | Performed by: NURSE PRACTITIONER

## 2021-02-24 PROCEDURE — 1125F PR PAIN SEVERITY QUANTIFIED, PAIN PRESENT: ICD-10-PCS | Mod: S$GLB,,, | Performed by: NURSE PRACTITIONER

## 2021-02-24 PROCEDURE — 83036 HEMOGLOBIN GLYCOSYLATED A1C: CPT

## 2021-02-24 PROCEDURE — 82962 POCT GLUCOSE, HAND-HELD DEVICE: ICD-10-PCS | Mod: S$GLB,,, | Performed by: NURSE PRACTITIONER

## 2021-02-24 PROCEDURE — 99999 PR PBB SHADOW E&M-EST. PATIENT-LVL V: ICD-10-PCS | Mod: PBBFAC,,, | Performed by: NURSE PRACTITIONER

## 2021-02-24 PROCEDURE — 3008F PR BODY MASS INDEX (BMI) DOCUMENTED: ICD-10-PCS | Mod: CPTII,S$GLB,, | Performed by: NURSE PRACTITIONER

## 2021-02-24 PROCEDURE — 82962 GLUCOSE BLOOD TEST: CPT | Mod: S$GLB,,, | Performed by: NURSE PRACTITIONER

## 2021-02-24 PROCEDURE — 3008F BODY MASS INDEX DOCD: CPT | Mod: CPTII,S$GLB,, | Performed by: NURSE PRACTITIONER

## 2021-02-24 PROCEDURE — 3051F PR MOST RECENT HEMOGLOBIN A1C LEVEL 7.0 - < 8.0%: ICD-10-PCS | Mod: CPTII,S$GLB,, | Performed by: NURSE PRACTITIONER

## 2021-02-24 PROCEDURE — 80053 COMPREHEN METABOLIC PANEL: CPT

## 2021-02-24 PROCEDURE — 1125F AMNT PAIN NOTED PAIN PRSNT: CPT | Mod: S$GLB,,, | Performed by: NURSE PRACTITIONER

## 2021-02-24 PROCEDURE — 99499 RISK ADDL DX/OHS AUDIT: ICD-10-PCS | Mod: S$GLB,,, | Performed by: NURSE PRACTITIONER

## 2021-02-24 PROCEDURE — 99499 UNLISTED E&M SERVICE: CPT | Mod: S$GLB,,, | Performed by: NURSE PRACTITIONER

## 2021-02-24 RX ORDER — INSULIN GLARGINE 300 U/ML
50 INJECTION, SOLUTION SUBCUTANEOUS DAILY
Qty: 3 SYRINGE | Refills: 6 | Status: SHIPPED | OUTPATIENT
Start: 2021-02-24 | End: 2021-05-26

## 2021-02-25 LAB
ALBUMIN SERPL BCP-MCNC: 3.8 G/DL (ref 3.5–5.2)
ALP SERPL-CCNC: 165 U/L (ref 55–135)
ALT SERPL W/O P-5'-P-CCNC: 18 U/L (ref 10–44)
ANION GAP SERPL CALC-SCNC: 10 MMOL/L (ref 8–16)
AST SERPL-CCNC: 18 U/L (ref 10–40)
BILIRUB SERPL-MCNC: 0.4 MG/DL (ref 0.1–1)
BUN SERPL-MCNC: 10 MG/DL (ref 6–20)
CALCIUM SERPL-MCNC: 8.5 MG/DL (ref 8.7–10.5)
CHLORIDE SERPL-SCNC: 102 MMOL/L (ref 95–110)
CO2 SERPL-SCNC: 25 MMOL/L (ref 23–29)
CREAT SERPL-MCNC: 1 MG/DL (ref 0.5–1.4)
EST. GFR  (AFRICAN AMERICAN): >60 ML/MIN/1.73 M^2
EST. GFR  (NON AFRICAN AMERICAN): >60 ML/MIN/1.73 M^2
ESTIMATED AVG GLUCOSE: 154 MG/DL (ref 68–131)
GLUCOSE SERPL-MCNC: 125 MG/DL (ref 70–110)
HBA1C MFR BLD: 7 % (ref 4–5.6)
POTASSIUM SERPL-SCNC: 3.9 MMOL/L (ref 3.5–5.1)
PROT SERPL-MCNC: 6.3 G/DL (ref 6–8.4)
SODIUM SERPL-SCNC: 137 MMOL/L (ref 136–145)

## 2021-03-01 LAB
LEFT EYE DM RETINOPATHY: NEGATIVE
RIGHT EYE DM RETINOPATHY: NEGATIVE

## 2021-03-06 DIAGNOSIS — Z79.4 TYPE 2 DIABETES MELLITUS WITH MICROALBUMINURIA, WITH LONG-TERM CURRENT USE OF INSULIN: ICD-10-CM

## 2021-03-06 DIAGNOSIS — J06.9 VIRAL UPPER RESPIRATORY TRACT INFECTION: ICD-10-CM

## 2021-03-06 DIAGNOSIS — R80.9 TYPE 2 DIABETES MELLITUS WITH MICROALBUMINURIA, WITH LONG-TERM CURRENT USE OF INSULIN: ICD-10-CM

## 2021-03-06 DIAGNOSIS — E11.29 TYPE 2 DIABETES MELLITUS WITH MICROALBUMINURIA, WITH LONG-TERM CURRENT USE OF INSULIN: ICD-10-CM

## 2021-03-08 DIAGNOSIS — J44.9 COPD MIXED TYPE: ICD-10-CM

## 2021-03-08 RX ORDER — LEVOCETIRIZINE DIHYDROCHLORIDE 5 MG/1
5 TABLET, FILM COATED ORAL NIGHTLY
Qty: 30 TABLET | Refills: 11 | Status: SHIPPED | OUTPATIENT
Start: 2021-03-08 | End: 2023-09-20 | Stop reason: SDUPTHER

## 2021-03-08 RX ORDER — FLUTICASONE FUROATE AND VILANTEROL 100; 25 UG/1; UG/1
POWDER RESPIRATORY (INHALATION)
Qty: 60 EACH | Refills: 11 | Status: SHIPPED | OUTPATIENT
Start: 2021-03-08 | End: 2021-08-27

## 2021-03-08 RX ORDER — IPRATROPIUM BROMIDE AND ALBUTEROL 20; 100 UG/1; UG/1
1 SPRAY, METERED RESPIRATORY (INHALATION) EVERY 6 HOURS PRN
Qty: 4 G | Refills: 6 | Status: SHIPPED | OUTPATIENT
Start: 2021-03-08 | End: 2021-03-26

## 2021-03-12 RX ORDER — FLUTICASONE PROPIONATE 50 MCG
2 SPRAY, SUSPENSION (ML) NASAL DAILY
Qty: 16 G | Refills: 11 | Status: SHIPPED | OUTPATIENT
Start: 2021-03-12 | End: 2022-02-02

## 2021-03-12 RX ORDER — LOSARTAN POTASSIUM 25 MG/1
25 TABLET ORAL DAILY
Qty: 90 TABLET | Refills: 1 | Status: SHIPPED | OUTPATIENT
Start: 2021-03-12 | End: 2021-08-16 | Stop reason: SDUPTHER

## 2021-03-12 RX ORDER — FLUDROCORTISONE ACETATE 0.1 MG/1
100 TABLET ORAL DAILY
Qty: 90 TABLET | Refills: 1 | OUTPATIENT
Start: 2021-03-12

## 2021-03-12 RX ORDER — LEVOTHYROXINE SODIUM 75 UG/1
75 TABLET ORAL DAILY
Qty: 90 TABLET | Refills: 0 | Status: SHIPPED | OUTPATIENT
Start: 2021-03-12 | End: 2021-05-30

## 2021-03-12 RX ORDER — ONDANSETRON 4 MG/1
4 TABLET, ORALLY DISINTEGRATING ORAL EVERY 6 HOURS PRN
Qty: 20 TABLET | Refills: 1 | Status: SHIPPED | OUTPATIENT
Start: 2021-03-12 | End: 2021-08-13 | Stop reason: SDUPTHER

## 2021-03-12 RX ORDER — PANTOPRAZOLE SODIUM 40 MG/1
40 TABLET, DELAYED RELEASE ORAL DAILY
Qty: 90 TABLET | Refills: 3 | Status: SHIPPED | OUTPATIENT
Start: 2021-03-12 | End: 2022-03-14

## 2021-03-12 RX ORDER — MIDODRINE HYDROCHLORIDE 10 MG/1
10 TABLET ORAL DAILY
Qty: 90 TABLET | Refills: 1 | OUTPATIENT
Start: 2021-03-12

## 2021-03-13 ENCOUNTER — PATIENT MESSAGE (OUTPATIENT)
Dept: FAMILY MEDICINE | Facility: CLINIC | Age: 56
End: 2021-03-13

## 2021-03-13 DIAGNOSIS — G90.3 NEUROGENIC ORTHOSTATIC HYPOTENSION: Primary | ICD-10-CM

## 2021-03-15 RX ORDER — FLUDROCORTISONE ACETATE 0.1 MG/1
100 TABLET ORAL DAILY
Qty: 90 TABLET | Refills: 1 | Status: CANCELLED | OUTPATIENT
Start: 2021-03-15

## 2021-03-15 RX ORDER — MIDODRINE HYDROCHLORIDE 10 MG/1
10 TABLET ORAL DAILY
Qty: 90 TABLET | Refills: 1 | Status: CANCELLED | OUTPATIENT
Start: 2021-03-15

## 2021-03-25 ENCOUNTER — PATIENT OUTREACH (OUTPATIENT)
Dept: ADMINISTRATIVE | Facility: HOSPITAL | Age: 56
End: 2021-03-25

## 2021-04-01 ENCOUNTER — PATIENT OUTREACH (OUTPATIENT)
Dept: ADMINISTRATIVE | Facility: OTHER | Age: 56
End: 2021-04-01

## 2021-04-01 ENCOUNTER — OFFICE VISIT (OUTPATIENT)
Dept: ORTHOPEDICS | Facility: CLINIC | Age: 56
End: 2021-04-01
Payer: MEDICARE

## 2021-04-01 VITALS
BODY MASS INDEX: 37.57 KG/M2 | DIASTOLIC BLOOD PRESSURE: 79 MMHG | SYSTOLIC BLOOD PRESSURE: 140 MMHG | HEART RATE: 106 BPM | HEIGHT: 58 IN | WEIGHT: 179 LBS

## 2021-04-01 DIAGNOSIS — M77.12 LATERAL EPICONDYLITIS OF LEFT ELBOW: ICD-10-CM

## 2021-04-01 DIAGNOSIS — Z01.818 PREOP TESTING: Primary | ICD-10-CM

## 2021-04-01 DIAGNOSIS — S83.241A ACUTE MEDIAL MENISCUS TEAR, RIGHT, INITIAL ENCOUNTER: Primary | ICD-10-CM

## 2021-04-01 PROCEDURE — 3077F SYST BP >= 140 MM HG: CPT | Mod: CPTII,S$GLB,, | Performed by: ORTHOPAEDIC SURGERY

## 2021-04-01 PROCEDURE — 99999 PR PBB SHADOW E&M-EST. PATIENT-LVL V: ICD-10-PCS | Mod: PBBFAC,,, | Performed by: ORTHOPAEDIC SURGERY

## 2021-04-01 PROCEDURE — 1125F PR PAIN SEVERITY QUANTIFIED, PAIN PRESENT: ICD-10-PCS | Mod: S$GLB,,, | Performed by: ORTHOPAEDIC SURGERY

## 2021-04-01 PROCEDURE — 3008F BODY MASS INDEX DOCD: CPT | Mod: CPTII,S$GLB,, | Performed by: ORTHOPAEDIC SURGERY

## 2021-04-01 PROCEDURE — 3077F PR MOST RECENT SYSTOLIC BLOOD PRESSURE >= 140 MM HG: ICD-10-PCS | Mod: CPTII,S$GLB,, | Performed by: ORTHOPAEDIC SURGERY

## 2021-04-01 PROCEDURE — 99999 PR PBB SHADOW E&M-EST. PATIENT-LVL V: CPT | Mod: PBBFAC,,, | Performed by: ORTHOPAEDIC SURGERY

## 2021-04-01 PROCEDURE — 3078F DIAST BP <80 MM HG: CPT | Mod: CPTII,S$GLB,, | Performed by: ORTHOPAEDIC SURGERY

## 2021-04-01 PROCEDURE — 99214 PR OFFICE/OUTPT VISIT, EST, LEVL IV, 30-39 MIN: ICD-10-PCS | Mod: S$GLB,,, | Performed by: ORTHOPAEDIC SURGERY

## 2021-04-01 PROCEDURE — 3078F PR MOST RECENT DIASTOLIC BLOOD PRESSURE < 80 MM HG: ICD-10-PCS | Mod: CPTII,S$GLB,, | Performed by: ORTHOPAEDIC SURGERY

## 2021-04-01 PROCEDURE — 3008F PR BODY MASS INDEX (BMI) DOCUMENTED: ICD-10-PCS | Mod: CPTII,S$GLB,, | Performed by: ORTHOPAEDIC SURGERY

## 2021-04-01 PROCEDURE — 1125F AMNT PAIN NOTED PAIN PRSNT: CPT | Mod: S$GLB,,, | Performed by: ORTHOPAEDIC SURGERY

## 2021-04-01 PROCEDURE — 99214 OFFICE O/P EST MOD 30 MIN: CPT | Mod: S$GLB,,, | Performed by: ORTHOPAEDIC SURGERY

## 2021-04-01 RX ORDER — SOLRIAMFETOL 150 MG/1
1 TABLET, FILM COATED ORAL DAILY
COMMUNITY
Start: 2021-03-30 | End: 2022-12-29 | Stop reason: SDUPTHER

## 2021-04-01 RX ORDER — NABUMETONE 750 MG/1
750 TABLET, FILM COATED ORAL 2 TIMES DAILY
Qty: 60 TABLET | Refills: 1 | Status: SHIPPED | OUTPATIENT
Start: 2021-04-01 | End: 2021-04-26 | Stop reason: ALTCHOICE

## 2021-04-05 ENCOUNTER — HOSPITAL ENCOUNTER (OUTPATIENT)
Dept: RADIOLOGY | Facility: HOSPITAL | Age: 56
Discharge: HOME | End: 2021-04-05
Attending: ORTHOPAEDIC SURGERY
Payer: MEDICARE

## 2021-04-05 ENCOUNTER — CLINICAL SUPPORT (OUTPATIENT)
Dept: CARDIOLOGY | Facility: CLINIC | Age: 56
End: 2021-04-05
Payer: MEDICARE

## 2021-04-05 DIAGNOSIS — Z01.818 PREOP TESTING: ICD-10-CM

## 2021-04-05 PROCEDURE — 71046 X-RAY EXAM CHEST 2 VIEWS: CPT | Mod: TC,PO

## 2021-04-05 PROCEDURE — 93005 ELECTROCARDIOGRAM TRACING: CPT | Mod: S$GLB,,, | Performed by: ORTHOPAEDIC SURGERY

## 2021-04-05 PROCEDURE — 71046 XR CHEST PA AND LATERAL PRE-OP: ICD-10-PCS | Mod: 26,,, | Performed by: RADIOLOGY

## 2021-04-05 PROCEDURE — 71046 X-RAY EXAM CHEST 2 VIEWS: CPT | Mod: 26,,, | Performed by: RADIOLOGY

## 2021-04-05 PROCEDURE — 93010 EKG 12-LEAD: ICD-10-PCS | Mod: S$GLB,,, | Performed by: INTERNAL MEDICINE

## 2021-04-05 PROCEDURE — 93005 EKG 12-LEAD: ICD-10-PCS | Mod: S$GLB,,, | Performed by: ORTHOPAEDIC SURGERY

## 2021-04-05 PROCEDURE — 93010 ELECTROCARDIOGRAM REPORT: CPT | Mod: S$GLB,,, | Performed by: INTERNAL MEDICINE

## 2021-04-06 ENCOUNTER — PATIENT MESSAGE (OUTPATIENT)
Dept: FAMILY MEDICINE | Facility: CLINIC | Age: 56
End: 2021-04-06

## 2021-04-07 ENCOUNTER — OFFICE VISIT (OUTPATIENT)
Dept: FAMILY MEDICINE | Facility: CLINIC | Age: 56
End: 2021-04-07
Payer: MEDICARE

## 2021-04-07 VITALS
BODY MASS INDEX: 37 KG/M2 | RESPIRATION RATE: 16 BRPM | OXYGEN SATURATION: 98 % | HEIGHT: 58 IN | DIASTOLIC BLOOD PRESSURE: 80 MMHG | WEIGHT: 176.25 LBS | SYSTOLIC BLOOD PRESSURE: 126 MMHG | TEMPERATURE: 97 F | HEART RATE: 97 BPM

## 2021-04-07 DIAGNOSIS — E78.5 DYSLIPIDEMIA: ICD-10-CM

## 2021-04-07 DIAGNOSIS — S83.241S OTHER TEAR OF MEDIAL MENISCUS OF RIGHT KNEE AS CURRENT INJURY, SEQUELA: ICD-10-CM

## 2021-04-07 DIAGNOSIS — E03.9 HYPOTHYROIDISM, UNSPECIFIED TYPE: ICD-10-CM

## 2021-04-07 DIAGNOSIS — K63.5 POLYP OF COLON, UNSPECIFIED PART OF COLON, UNSPECIFIED TYPE: ICD-10-CM

## 2021-04-07 DIAGNOSIS — Z01.818 PREOP EXAMINATION: Primary | ICD-10-CM

## 2021-04-07 DIAGNOSIS — G47.33 OSA ON CPAP: ICD-10-CM

## 2021-04-07 DIAGNOSIS — I10 ESSENTIAL HYPERTENSION: ICD-10-CM

## 2021-04-07 DIAGNOSIS — Z79.4 TYPE 2 DIABETES MELLITUS WITH MICROALBUMINURIA, WITH LONG-TERM CURRENT USE OF INSULIN: ICD-10-CM

## 2021-04-07 DIAGNOSIS — Z23 NEED FOR VACCINATION: ICD-10-CM

## 2021-04-07 DIAGNOSIS — E11.29 TYPE 2 DIABETES MELLITUS WITH MICROALBUMINURIA, WITH LONG-TERM CURRENT USE OF INSULIN: ICD-10-CM

## 2021-04-07 DIAGNOSIS — R80.9 TYPE 2 DIABETES MELLITUS WITH MICROALBUMINURIA, WITH LONG-TERM CURRENT USE OF INSULIN: ICD-10-CM

## 2021-04-07 DIAGNOSIS — R94.31 ABNORMAL EKG: ICD-10-CM

## 2021-04-07 PROCEDURE — 99999 PR PBB SHADOW E&M-EST. PATIENT-LVL III: CPT | Mod: PBBFAC,,, | Performed by: FAMILY MEDICINE

## 2021-04-07 PROCEDURE — 3008F PR BODY MASS INDEX (BMI) DOCUMENTED: ICD-10-PCS | Mod: CPTII,S$GLB,, | Performed by: FAMILY MEDICINE

## 2021-04-07 PROCEDURE — 99499 RISK ADDL DX/OHS AUDIT: ICD-10-PCS | Mod: S$GLB,,, | Performed by: FAMILY MEDICINE

## 2021-04-07 PROCEDURE — 3051F PR MOST RECENT HEMOGLOBIN A1C LEVEL 7.0 - < 8.0%: ICD-10-PCS | Mod: CPTII,S$GLB,, | Performed by: FAMILY MEDICINE

## 2021-04-07 PROCEDURE — 90471 ZOSTER RECOMBINANT VACCINE: ICD-10-PCS | Mod: S$GLB,,, | Performed by: FAMILY MEDICINE

## 2021-04-07 PROCEDURE — 90471 IMMUNIZATION ADMIN: CPT | Mod: S$GLB,,, | Performed by: FAMILY MEDICINE

## 2021-04-07 PROCEDURE — 1126F AMNT PAIN NOTED NONE PRSNT: CPT | Mod: S$GLB,,, | Performed by: FAMILY MEDICINE

## 2021-04-07 PROCEDURE — 99499 UNLISTED E&M SERVICE: CPT | Mod: S$GLB,,, | Performed by: FAMILY MEDICINE

## 2021-04-07 PROCEDURE — 3008F BODY MASS INDEX DOCD: CPT | Mod: CPTII,S$GLB,, | Performed by: FAMILY MEDICINE

## 2021-04-07 PROCEDURE — 3074F SYST BP LT 130 MM HG: CPT | Mod: CPTII,S$GLB,, | Performed by: FAMILY MEDICINE

## 2021-04-07 PROCEDURE — 90750 HZV VACC RECOMBINANT IM: CPT | Mod: S$GLB,,, | Performed by: FAMILY MEDICINE

## 2021-04-07 PROCEDURE — 1126F PR PAIN SEVERITY QUANTIFIED, NO PAIN PRESENT: ICD-10-PCS | Mod: S$GLB,,, | Performed by: FAMILY MEDICINE

## 2021-04-07 PROCEDURE — 3074F PR MOST RECENT SYSTOLIC BLOOD PRESSURE < 130 MM HG: ICD-10-PCS | Mod: CPTII,S$GLB,, | Performed by: FAMILY MEDICINE

## 2021-04-07 PROCEDURE — 99999 PR PBB SHADOW E&M-EST. PATIENT-LVL III: ICD-10-PCS | Mod: PBBFAC,,, | Performed by: FAMILY MEDICINE

## 2021-04-07 PROCEDURE — 3079F PR MOST RECENT DIASTOLIC BLOOD PRESSURE 80-89 MM HG: ICD-10-PCS | Mod: CPTII,S$GLB,, | Performed by: FAMILY MEDICINE

## 2021-04-07 PROCEDURE — 99214 PR OFFICE/OUTPT VISIT, EST, LEVL IV, 30-39 MIN: ICD-10-PCS | Mod: 25,S$GLB,, | Performed by: FAMILY MEDICINE

## 2021-04-07 PROCEDURE — 3079F DIAST BP 80-89 MM HG: CPT | Mod: CPTII,S$GLB,, | Performed by: FAMILY MEDICINE

## 2021-04-07 PROCEDURE — 99214 OFFICE O/P EST MOD 30 MIN: CPT | Mod: 25,S$GLB,, | Performed by: FAMILY MEDICINE

## 2021-04-07 PROCEDURE — 3051F HG A1C>EQUAL 7.0%<8.0%: CPT | Mod: CPTII,S$GLB,, | Performed by: FAMILY MEDICINE

## 2021-04-07 PROCEDURE — 90750 ZOSTER RECOMBINANT VACCINE: ICD-10-PCS | Mod: S$GLB,,, | Performed by: FAMILY MEDICINE

## 2021-04-08 ENCOUNTER — OFFICE VISIT (OUTPATIENT)
Dept: CARDIOLOGY | Facility: CLINIC | Age: 56
End: 2021-04-08
Payer: MEDICARE

## 2021-04-08 VITALS
SYSTOLIC BLOOD PRESSURE: 136 MMHG | DIASTOLIC BLOOD PRESSURE: 80 MMHG | HEART RATE: 97 BPM | OXYGEN SATURATION: 98 % | BODY MASS INDEX: 36.72 KG/M2 | WEIGHT: 175.69 LBS

## 2021-04-08 DIAGNOSIS — R07.9 CHEST PAIN, UNSPECIFIED TYPE: Primary | ICD-10-CM

## 2021-04-08 PROCEDURE — 99215 OFFICE O/P EST HI 40 MIN: CPT | Mod: S$GLB,,, | Performed by: INTERNAL MEDICINE

## 2021-04-08 PROCEDURE — 99999 PR PBB SHADOW E&M-EST. PATIENT-LVL IV: CPT | Mod: PBBFAC,,, | Performed by: INTERNAL MEDICINE

## 2021-04-08 PROCEDURE — 99999 PR PBB SHADOW E&M-EST. PATIENT-LVL IV: ICD-10-PCS | Mod: PBBFAC,,, | Performed by: INTERNAL MEDICINE

## 2021-04-08 PROCEDURE — 3008F BODY MASS INDEX DOCD: CPT | Mod: CPTII,S$GLB,, | Performed by: INTERNAL MEDICINE

## 2021-04-08 PROCEDURE — 3008F PR BODY MASS INDEX (BMI) DOCUMENTED: ICD-10-PCS | Mod: CPTII,S$GLB,, | Performed by: INTERNAL MEDICINE

## 2021-04-08 PROCEDURE — 99215 PR OFFICE/OUTPT VISIT, EST, LEVL V, 40-54 MIN: ICD-10-PCS | Mod: S$GLB,,, | Performed by: INTERNAL MEDICINE

## 2021-04-08 RX ORDER — LANOLIN ALCOHOL/MO/W.PET/CERES
1000 CREAM (GRAM) TOPICAL DAILY
COMMUNITY
End: 2023-02-20

## 2021-04-12 ENCOUNTER — HOSPITAL ENCOUNTER (OUTPATIENT)
Dept: RADIOLOGY | Facility: HOSPITAL | Age: 56
Discharge: HOME | End: 2021-04-12
Attending: INTERNAL MEDICINE
Payer: MEDICARE

## 2021-04-12 ENCOUNTER — HOSPITAL ENCOUNTER (OUTPATIENT)
Dept: CARDIOLOGY | Facility: HOSPITAL | Age: 56
Discharge: HOME | End: 2021-04-12
Attending: INTERNAL MEDICINE
Payer: MEDICARE

## 2021-04-12 DIAGNOSIS — R07.9 CHEST PAIN, UNSPECIFIED TYPE: ICD-10-CM

## 2021-04-12 LAB
CV STRESS BASE HR: 85 BPM
DIASTOLIC BLOOD PRESSURE: 71 MMHG
NUC REST EJECTION FRACTION: 76
NUC STRESS EJECTION FRACTION: 63 %
OHS CV CPX 85 PERCENT MAX PREDICTED HEART RATE MALE: 133
OHS CV CPX MAX PREDICTED HEART RATE: 157
OHS CV CPX PATIENT IS FEMALE: 1
OHS CV CPX PATIENT IS MALE: 0
OHS CV CPX PEAK DIASTOLIC BLOOD PRESSURE: 65 MMHG
OHS CV CPX PEAK HEAR RATE: 114 BPM
OHS CV CPX PEAK RATE PRESSURE PRODUCT: NORMAL
OHS CV CPX PEAK SYSTOLIC BLOOD PRESSURE: 123 MMHG
OHS CV CPX PERCENT MAX PREDICTED HEART RATE ACHIEVED: 73
OHS CV CPX RATE PRESSURE PRODUCT PRESENTING: NORMAL
STRESS ECHO POST EXERCISE DUR SEC: 50 SECONDS
SYSTOLIC BLOOD PRESSURE: 120 MMHG

## 2021-04-12 PROCEDURE — 63600175 PHARM REV CODE 636 W HCPCS: Performed by: INTERNAL MEDICINE

## 2021-04-12 PROCEDURE — 78452 STRESS TEST WITH MYOCARDIAL PERFUSION (CUPID ONLY): ICD-10-PCS | Mod: 26,,, | Performed by: INTERNAL MEDICINE

## 2021-04-12 PROCEDURE — 78452 HT MUSCLE IMAGE SPECT MULT: CPT | Mod: 26,,, | Performed by: INTERNAL MEDICINE

## 2021-04-12 PROCEDURE — 93016 STRESS TEST WITH MYOCARDIAL PERFUSION (CUPID ONLY): ICD-10-PCS | Mod: ,,, | Performed by: INTERNAL MEDICINE

## 2021-04-12 PROCEDURE — 78452 HT MUSCLE IMAGE SPECT MULT: CPT

## 2021-04-12 PROCEDURE — 93016 CV STRESS TEST SUPVJ ONLY: CPT | Mod: ,,, | Performed by: INTERNAL MEDICINE

## 2021-04-12 PROCEDURE — A9502 TC99M TETROFOSMIN: HCPCS

## 2021-04-12 PROCEDURE — 93018 STRESS TEST WITH MYOCARDIAL PERFUSION (CUPID ONLY): ICD-10-PCS | Mod: ,,, | Performed by: INTERNAL MEDICINE

## 2021-04-12 PROCEDURE — 93018 CV STRESS TEST I&R ONLY: CPT | Mod: ,,, | Performed by: INTERNAL MEDICINE

## 2021-04-12 PROCEDURE — 93017 CV STRESS TEST TRACING ONLY: CPT

## 2021-04-12 RX ORDER — REGADENOSON 0.08 MG/ML
0.4 INJECTION, SOLUTION INTRAVENOUS ONCE
Status: COMPLETED | OUTPATIENT
Start: 2021-04-12 | End: 2021-04-12

## 2021-04-12 RX ADMIN — REGADENOSON 0.4 MG: 0.08 INJECTION, SOLUTION INTRAVENOUS at 09:04

## 2021-04-13 ENCOUNTER — TELEPHONE (OUTPATIENT)
Dept: CARDIOLOGY | Facility: CLINIC | Age: 56
End: 2021-04-13

## 2021-04-13 ENCOUNTER — PATIENT MESSAGE (OUTPATIENT)
Dept: CARDIOLOGY | Facility: CLINIC | Age: 56
End: 2021-04-13

## 2021-04-13 ENCOUNTER — PATIENT OUTREACH (OUTPATIENT)
Dept: ADMINISTRATIVE | Facility: HOSPITAL | Age: 56
End: 2021-04-13

## 2021-04-19 ENCOUNTER — PATIENT MESSAGE (OUTPATIENT)
Dept: SURGERY | Facility: HOSPITAL | Age: 56
End: 2021-04-19

## 2021-04-22 ENCOUNTER — OFFICE VISIT (OUTPATIENT)
Dept: FAMILY MEDICINE | Facility: CLINIC | Age: 56
End: 2021-04-22
Payer: MEDICARE

## 2021-04-22 VITALS
SYSTOLIC BLOOD PRESSURE: 122 MMHG | DIASTOLIC BLOOD PRESSURE: 82 MMHG | TEMPERATURE: 97 F | OXYGEN SATURATION: 95 % | BODY MASS INDEX: 35.62 KG/M2 | WEIGHT: 170.44 LBS | HEART RATE: 104 BPM

## 2021-04-22 DIAGNOSIS — J06.9 RECENT UPPER RESPIRATORY TRACT INFECTION: Primary | ICD-10-CM

## 2021-04-22 DIAGNOSIS — Z79.4 TYPE 2 DIABETES MELLITUS WITH MICROALBUMINURIA, WITH LONG-TERM CURRENT USE OF INSULIN: ICD-10-CM

## 2021-04-22 DIAGNOSIS — G47.33 OSA ON CPAP: ICD-10-CM

## 2021-04-22 DIAGNOSIS — S83.241S OTHER TEAR OF MEDIAL MENISCUS OF RIGHT KNEE AS CURRENT INJURY, SEQUELA: ICD-10-CM

## 2021-04-22 DIAGNOSIS — R80.9 TYPE 2 DIABETES MELLITUS WITH MICROALBUMINURIA, WITH LONG-TERM CURRENT USE OF INSULIN: ICD-10-CM

## 2021-04-22 DIAGNOSIS — E78.5 DYSLIPIDEMIA: ICD-10-CM

## 2021-04-22 DIAGNOSIS — E11.29 TYPE 2 DIABETES MELLITUS WITH MICROALBUMINURIA, WITH LONG-TERM CURRENT USE OF INSULIN: ICD-10-CM

## 2021-04-22 DIAGNOSIS — E03.9 HYPOTHYROIDISM, UNSPECIFIED TYPE: ICD-10-CM

## 2021-04-22 PROCEDURE — 3074F PR MOST RECENT SYSTOLIC BLOOD PRESSURE < 130 MM HG: ICD-10-PCS | Mod: CPTII,S$GLB,, | Performed by: FAMILY MEDICINE

## 2021-04-22 PROCEDURE — 3079F DIAST BP 80-89 MM HG: CPT | Mod: CPTII,S$GLB,, | Performed by: FAMILY MEDICINE

## 2021-04-22 PROCEDURE — 3008F BODY MASS INDEX DOCD: CPT | Mod: CPTII,S$GLB,, | Performed by: FAMILY MEDICINE

## 2021-04-22 PROCEDURE — 1126F AMNT PAIN NOTED NONE PRSNT: CPT | Mod: S$GLB,,, | Performed by: FAMILY MEDICINE

## 2021-04-22 PROCEDURE — 3051F HG A1C>EQUAL 7.0%<8.0%: CPT | Mod: CPTII,S$GLB,, | Performed by: FAMILY MEDICINE

## 2021-04-22 PROCEDURE — 99214 OFFICE O/P EST MOD 30 MIN: CPT | Mod: S$GLB,,, | Performed by: FAMILY MEDICINE

## 2021-04-22 PROCEDURE — 3051F PR MOST RECENT HEMOGLOBIN A1C LEVEL 7.0 - < 8.0%: ICD-10-PCS | Mod: CPTII,S$GLB,, | Performed by: FAMILY MEDICINE

## 2021-04-22 PROCEDURE — 3079F PR MOST RECENT DIASTOLIC BLOOD PRESSURE 80-89 MM HG: ICD-10-PCS | Mod: CPTII,S$GLB,, | Performed by: FAMILY MEDICINE

## 2021-04-22 PROCEDURE — 1126F PR PAIN SEVERITY QUANTIFIED, NO PAIN PRESENT: ICD-10-PCS | Mod: S$GLB,,, | Performed by: FAMILY MEDICINE

## 2021-04-22 PROCEDURE — 3074F SYST BP LT 130 MM HG: CPT | Mod: CPTII,S$GLB,, | Performed by: FAMILY MEDICINE

## 2021-04-22 PROCEDURE — 3008F PR BODY MASS INDEX (BMI) DOCUMENTED: ICD-10-PCS | Mod: CPTII,S$GLB,, | Performed by: FAMILY MEDICINE

## 2021-04-22 PROCEDURE — 99214 PR OFFICE/OUTPT VISIT, EST, LEVL IV, 30-39 MIN: ICD-10-PCS | Mod: S$GLB,,, | Performed by: FAMILY MEDICINE

## 2021-04-22 PROCEDURE — 99999 PR PBB SHADOW E&M-EST. PATIENT-LVL V: ICD-10-PCS | Mod: PBBFAC,,, | Performed by: FAMILY MEDICINE

## 2021-04-22 PROCEDURE — 99999 PR PBB SHADOW E&M-EST. PATIENT-LVL V: CPT | Mod: PBBFAC,,, | Performed by: FAMILY MEDICINE

## 2021-04-24 ENCOUNTER — LAB VISIT (OUTPATIENT)
Dept: OTOLARYNGOLOGY | Facility: CLINIC | Age: 56
End: 2021-04-24
Payer: MEDICARE

## 2021-04-24 DIAGNOSIS — Z01.818 PREOP TESTING: ICD-10-CM

## 2021-04-24 LAB — SARS-COV-2 RNA RESP QL NAA+PROBE: NOT DETECTED

## 2021-04-24 PROCEDURE — U0005 INFEC AGEN DETEC AMPLI PROBE: HCPCS | Performed by: ORTHOPAEDIC SURGERY

## 2021-04-24 PROCEDURE — U0003 INFECTIOUS AGENT DETECTION BY NUCLEIC ACID (DNA OR RNA); SEVERE ACUTE RESPIRATORY SYNDROME CORONAVIRUS 2 (SARS-COV-2) (CORONAVIRUS DISEASE [COVID-19]), AMPLIFIED PROBE TECHNIQUE, MAKING USE OF HIGH THROUGHPUT TECHNOLOGIES AS DESCRIBED BY CMS-2020-01-R: HCPCS | Performed by: ORTHOPAEDIC SURGERY

## 2021-04-27 ENCOUNTER — HOSPITAL ENCOUNTER (OUTPATIENT)
Facility: HOSPITAL | Age: 56
Discharge: HOME | End: 2021-04-27
Attending: ORTHOPAEDIC SURGERY | Admitting: ORTHOPAEDIC SURGERY
Payer: MEDICARE

## 2021-04-27 ENCOUNTER — ANESTHESIA (OUTPATIENT)
Dept: SURGERY | Facility: HOSPITAL | Age: 56
End: 2021-04-27
Payer: MEDICARE

## 2021-04-27 ENCOUNTER — ANESTHESIA EVENT (OUTPATIENT)
Dept: SURGERY | Facility: HOSPITAL | Age: 56
End: 2021-04-27
Payer: MEDICARE

## 2021-04-27 DIAGNOSIS — S83.241D ACUTE MEDIAL MENISCUS TEAR OF RIGHT KNEE, SUBSEQUENT ENCOUNTER: Primary | ICD-10-CM

## 2021-04-27 PROBLEM — S83.241A ACUTE MEDIAL MENISCUS TEAR OF RIGHT KNEE: Status: ACTIVE | Noted: 2021-04-27

## 2021-04-27 LAB
POCT GLUCOSE: 156 MG/DL (ref 70–110)
POCT GLUCOSE: 225 MG/DL (ref 70–110)

## 2021-04-27 PROCEDURE — 36000710: Performed by: ORTHOPAEDIC SURGERY

## 2021-04-27 PROCEDURE — 29881 ARTHRS KNE SRG MNISECTMY M/L: CPT | Mod: RT,,, | Performed by: ORTHOPAEDIC SURGERY

## 2021-04-27 PROCEDURE — 25000003 PHARM REV CODE 250: Performed by: NURSE ANESTHETIST, CERTIFIED REGISTERED

## 2021-04-27 PROCEDURE — 25000003 PHARM REV CODE 250: Performed by: ORTHOPAEDIC SURGERY

## 2021-04-27 PROCEDURE — 63600175 PHARM REV CODE 636 W HCPCS: Performed by: NURSE ANESTHETIST, CERTIFIED REGISTERED

## 2021-04-27 PROCEDURE — 25000003 PHARM REV CODE 250: Performed by: ANESTHESIOLOGY

## 2021-04-27 PROCEDURE — 27201423 OPTIME MED/SURG SUP & DEVICES STERILE SUPPLY: Performed by: ORTHOPAEDIC SURGERY

## 2021-04-27 PROCEDURE — 29881 PR KNEE SCOPE SINGLE MENISECECTOMY: ICD-10-PCS | Mod: RT,,, | Performed by: ORTHOPAEDIC SURGERY

## 2021-04-27 PROCEDURE — 71000015 HC POSTOP RECOV 1ST HR: Performed by: ORTHOPAEDIC SURGERY

## 2021-04-27 PROCEDURE — 37000009 HC ANESTHESIA EA ADD 15 MINS: Performed by: ORTHOPAEDIC SURGERY

## 2021-04-27 PROCEDURE — 63600175 PHARM REV CODE 636 W HCPCS: Performed by: ANESTHESIOLOGY

## 2021-04-27 PROCEDURE — 36000711: Performed by: ORTHOPAEDIC SURGERY

## 2021-04-27 PROCEDURE — 63600175 PHARM REV CODE 636 W HCPCS: Performed by: ORTHOPAEDIC SURGERY

## 2021-04-27 PROCEDURE — 82962 GLUCOSE BLOOD TEST: CPT | Performed by: ORTHOPAEDIC SURGERY

## 2021-04-27 PROCEDURE — 37000008 HC ANESTHESIA 1ST 15 MINUTES: Performed by: ORTHOPAEDIC SURGERY

## 2021-04-27 PROCEDURE — 71000033 HC RECOVERY, INTIAL HOUR: Performed by: ORTHOPAEDIC SURGERY

## 2021-04-27 RX ORDER — CHLORHEXIDINE GLUCONATE ORAL RINSE 1.2 MG/ML
10 SOLUTION DENTAL
Status: DISCONTINUED | OUTPATIENT
Start: 2021-04-27 | End: 2022-04-06

## 2021-04-27 RX ORDER — ONDANSETRON 2 MG/ML
4 INJECTION INTRAMUSCULAR; INTRAVENOUS EVERY 12 HOURS PRN
Status: DISCONTINUED | OUTPATIENT
Start: 2021-04-27 | End: 2021-04-27 | Stop reason: HOSPADM

## 2021-04-27 RX ORDER — HYDROCODONE BITARTRATE AND ACETAMINOPHEN 5; 325 MG/1; MG/1
1 TABLET ORAL EVERY 8 HOURS PRN
Qty: 21 TABLET | Refills: 0 | Status: SHIPPED | OUTPATIENT
Start: 2021-04-27 | End: 2021-08-20 | Stop reason: ALTCHOICE

## 2021-04-27 RX ORDER — MIDAZOLAM HYDROCHLORIDE 1 MG/ML
INJECTION, SOLUTION INTRAMUSCULAR; INTRAVENOUS
Status: DISCONTINUED | OUTPATIENT
Start: 2021-04-27 | End: 2021-04-27

## 2021-04-27 RX ORDER — OXYCODONE AND ACETAMINOPHEN 5; 325 MG/1; MG/1
1 TABLET ORAL
Status: DISCONTINUED | OUTPATIENT
Start: 2021-04-27 | End: 2021-04-27 | Stop reason: HOSPADM

## 2021-04-27 RX ORDER — HYDROMORPHONE HYDROCHLORIDE 2 MG/ML
0.2 INJECTION, SOLUTION INTRAMUSCULAR; INTRAVENOUS; SUBCUTANEOUS EVERY 5 MIN PRN
Status: DISCONTINUED | OUTPATIENT
Start: 2021-04-27 | End: 2021-04-27 | Stop reason: HOSPADM

## 2021-04-27 RX ORDER — CEFAZOLIN SODIUM 2 G/50ML
2 SOLUTION INTRAVENOUS
Status: COMPLETED | OUTPATIENT
Start: 2021-04-27 | End: 2021-04-27

## 2021-04-27 RX ORDER — PROPOFOL 10 MG/ML
VIAL (ML) INTRAVENOUS
Status: DISCONTINUED | OUTPATIENT
Start: 2021-04-27 | End: 2021-04-27

## 2021-04-27 RX ORDER — DEXAMETHASONE SODIUM PHOSPHATE 4 MG/ML
INJECTION, SOLUTION INTRA-ARTICULAR; INTRALESIONAL; INTRAMUSCULAR; INTRAVENOUS; SOFT TISSUE
Status: DISCONTINUED | OUTPATIENT
Start: 2021-04-27 | End: 2021-04-27

## 2021-04-27 RX ORDER — ROCURONIUM BROMIDE 10 MG/ML
INJECTION, SOLUTION INTRAVENOUS
Status: DISCONTINUED | OUTPATIENT
Start: 2021-04-27 | End: 2021-04-27

## 2021-04-27 RX ORDER — FENTANYL CITRATE 50 UG/ML
INJECTION, SOLUTION INTRAMUSCULAR; INTRAVENOUS
Status: DISCONTINUED | OUTPATIENT
Start: 2021-04-27 | End: 2021-04-27

## 2021-04-27 RX ORDER — ONDANSETRON 2 MG/ML
INJECTION INTRAMUSCULAR; INTRAVENOUS
Status: DISCONTINUED | OUTPATIENT
Start: 2021-04-27 | End: 2021-04-27

## 2021-04-27 RX ORDER — SODIUM CHLORIDE 9 MG/ML
INJECTION, SOLUTION INTRAVENOUS CONTINUOUS
Status: DISCONTINUED | OUTPATIENT
Start: 2021-04-27 | End: 2022-04-06

## 2021-04-27 RX ORDER — SCOLOPAMINE TRANSDERMAL SYSTEM 1 MG/1
1 PATCH, EXTENDED RELEASE TRANSDERMAL
Status: DISCONTINUED | OUTPATIENT
Start: 2021-04-27 | End: 2021-04-27 | Stop reason: HOSPADM

## 2021-04-27 RX ORDER — ONDANSETRON 2 MG/ML
4 INJECTION INTRAMUSCULAR; INTRAVENOUS DAILY PRN
Status: DISCONTINUED | OUTPATIENT
Start: 2021-04-27 | End: 2021-04-27 | Stop reason: HOSPADM

## 2021-04-27 RX ORDER — SUCCINYLCHOLINE CHLORIDE 20 MG/ML
INJECTION INTRAMUSCULAR; INTRAVENOUS
Status: DISCONTINUED | OUTPATIENT
Start: 2021-04-27 | End: 2021-04-27

## 2021-04-27 RX ORDER — KETOROLAC TROMETHAMINE 30 MG/ML
15 INJECTION, SOLUTION INTRAMUSCULAR; INTRAVENOUS EVERY 8 HOURS PRN
Status: DISCONTINUED | OUTPATIENT
Start: 2021-04-27 | End: 2021-04-27 | Stop reason: HOSPADM

## 2021-04-27 RX ORDER — BUPIVACAINE HYDROCHLORIDE 2.5 MG/ML
INJECTION, SOLUTION EPIDURAL; INFILTRATION; INTRACAUDAL
Status: DISCONTINUED | OUTPATIENT
Start: 2021-04-27 | End: 2021-04-27 | Stop reason: HOSPADM

## 2021-04-27 RX ORDER — LIDOCAINE HYDROCHLORIDE 20 MG/ML
INJECTION INTRAVENOUS
Status: DISCONTINUED | OUTPATIENT
Start: 2021-04-27 | End: 2021-04-27

## 2021-04-27 RX ORDER — HYDROCODONE BITARTRATE AND ACETAMINOPHEN 5; 325 MG/1; MG/1
1 TABLET ORAL EVERY 4 HOURS PRN
Status: DISCONTINUED | OUTPATIENT
Start: 2021-04-27 | End: 2021-04-27 | Stop reason: HOSPADM

## 2021-04-27 RX ADMIN — MIDAZOLAM HYDROCHLORIDE 2 MG: 1 INJECTION, SOLUTION INTRAMUSCULAR; INTRAVENOUS at 01:04

## 2021-04-27 RX ADMIN — ROCURONIUM BROMIDE 5 MG: 10 INJECTION, SOLUTION INTRAVENOUS at 01:04

## 2021-04-27 RX ADMIN — ONDANSETRON 4 MG: 2 INJECTION, SOLUTION INTRAMUSCULAR; INTRAVENOUS at 02:04

## 2021-04-27 RX ADMIN — OXYCODONE HYDROCHLORIDE AND ACETAMINOPHEN 1 TABLET: 5; 325 TABLET ORAL at 02:04

## 2021-04-27 RX ADMIN — LIDOCAINE HYDROCHLORIDE 100 MG: 20 INJECTION, SOLUTION INTRAVENOUS at 01:04

## 2021-04-27 RX ADMIN — FENTANYL CITRATE 100 MCG: 50 INJECTION, SOLUTION INTRAMUSCULAR; INTRAVENOUS at 01:04

## 2021-04-27 RX ADMIN — PROPOFOL 150 MG: 10 INJECTION, EMULSION INTRAVENOUS at 01:04

## 2021-04-27 RX ADMIN — CEFAZOLIN SODIUM 2 G: 2 SOLUTION INTRAVENOUS at 02:04

## 2021-04-27 RX ADMIN — DEXAMETHASONE SODIUM PHOSPHATE 8 MG: 4 INJECTION, SOLUTION INTRAMUSCULAR; INTRAVENOUS at 02:04

## 2021-04-27 RX ADMIN — SUCCINYLCHOLINE CHLORIDE 140 MG: 20 INJECTION, SOLUTION INTRAMUSCULAR; INTRAVENOUS at 01:04

## 2021-04-27 RX ADMIN — CHLORHEXIDINE GLUCONATE 0.12% ORAL RINSE 10 ML: 1.2 LIQUID ORAL at 12:04

## 2021-04-27 RX ADMIN — PROPOFOL 50 MG: 10 INJECTION, EMULSION INTRAVENOUS at 02:04

## 2021-04-27 RX ADMIN — SUCCINYLCHOLINE CHLORIDE 60 MG: 20 INJECTION, SOLUTION INTRAMUSCULAR; INTRAVENOUS at 02:04

## 2021-04-27 RX ADMIN — SODIUM CHLORIDE, POTASSIUM CHLORIDE, SODIUM LACTATE AND CALCIUM CHLORIDE: 600; 310; 30; 20 INJECTION, SOLUTION INTRAVENOUS at 01:04

## 2021-04-27 RX ADMIN — SCOPALAMINE 1 PATCH: 1 PATCH, EXTENDED RELEASE TRANSDERMAL at 12:04

## 2021-04-27 RX ADMIN — KETOROLAC TROMETHAMINE 15 MG: 30 INJECTION, SOLUTION INTRAMUSCULAR at 02:04

## 2021-05-04 VITALS
HEIGHT: 58 IN | DIASTOLIC BLOOD PRESSURE: 75 MMHG | WEIGHT: 171.06 LBS | OXYGEN SATURATION: 100 % | BODY MASS INDEX: 35.91 KG/M2 | RESPIRATION RATE: 84 BRPM | SYSTOLIC BLOOD PRESSURE: 143 MMHG | TEMPERATURE: 97 F | HEART RATE: 77 BPM

## 2021-05-05 DIAGNOSIS — E11.9 TYPE 2 DIABETES MELLITUS WITHOUT COMPLICATION: ICD-10-CM

## 2021-05-11 ENCOUNTER — TELEPHONE (OUTPATIENT)
Dept: PSYCHIATRY | Facility: CLINIC | Age: 56
End: 2021-05-11

## 2021-05-11 ENCOUNTER — OFFICE VISIT (OUTPATIENT)
Dept: ORTHOPEDICS | Facility: CLINIC | Age: 56
End: 2021-05-11
Payer: MEDICARE

## 2021-05-11 VITALS
DIASTOLIC BLOOD PRESSURE: 75 MMHG | HEIGHT: 58 IN | BODY MASS INDEX: 35.91 KG/M2 | SYSTOLIC BLOOD PRESSURE: 135 MMHG | HEART RATE: 93 BPM | WEIGHT: 171.06 LBS | TEMPERATURE: 99 F

## 2021-05-11 DIAGNOSIS — M94.261 CHONDROMALACIA OF RIGHT KNEE: ICD-10-CM

## 2021-05-11 DIAGNOSIS — Z09 POSTOP CHECK: ICD-10-CM

## 2021-05-11 DIAGNOSIS — S83.241A ACUTE MEDIAL MENISCUS TEAR, RIGHT, INITIAL ENCOUNTER: Primary | ICD-10-CM

## 2021-05-11 PROCEDURE — 99024 PR POST-OP FOLLOW-UP VISIT: ICD-10-PCS | Mod: S$GLB,,, | Performed by: PHYSICIAN ASSISTANT

## 2021-05-11 PROCEDURE — 3008F PR BODY MASS INDEX (BMI) DOCUMENTED: ICD-10-PCS | Mod: CPTII,S$GLB,, | Performed by: PHYSICIAN ASSISTANT

## 2021-05-11 PROCEDURE — 3008F BODY MASS INDEX DOCD: CPT | Mod: CPTII,S$GLB,, | Performed by: PHYSICIAN ASSISTANT

## 2021-05-11 PROCEDURE — 99999 PR PBB SHADOW E&M-EST. PATIENT-LVL V: ICD-10-PCS | Mod: PBBFAC,,, | Performed by: PHYSICIAN ASSISTANT

## 2021-05-11 PROCEDURE — 1126F AMNT PAIN NOTED NONE PRSNT: CPT | Mod: S$GLB,,, | Performed by: PHYSICIAN ASSISTANT

## 2021-05-11 PROCEDURE — 99024 POSTOP FOLLOW-UP VISIT: CPT | Mod: S$GLB,,, | Performed by: PHYSICIAN ASSISTANT

## 2021-05-11 PROCEDURE — 99999 PR PBB SHADOW E&M-EST. PATIENT-LVL V: CPT | Mod: PBBFAC,,, | Performed by: PHYSICIAN ASSISTANT

## 2021-05-11 PROCEDURE — 1126F PR PAIN SEVERITY QUANTIFIED, NO PAIN PRESENT: ICD-10-PCS | Mod: S$GLB,,, | Performed by: PHYSICIAN ASSISTANT

## 2021-05-11 RX ORDER — DULOXETIN HYDROCHLORIDE 60 MG/1
60 CAPSULE, DELAYED RELEASE ORAL DAILY
COMMUNITY
Start: 2021-04-25 | End: 2021-06-14 | Stop reason: SDUPTHER

## 2021-05-26 ENCOUNTER — OFFICE VISIT (OUTPATIENT)
Dept: DIABETES | Facility: CLINIC | Age: 56
End: 2021-05-26
Payer: MEDICARE

## 2021-05-26 VITALS
BODY MASS INDEX: 35.73 KG/M2 | HEIGHT: 58 IN | WEIGHT: 170.19 LBS | HEART RATE: 87 BPM | DIASTOLIC BLOOD PRESSURE: 85 MMHG | SYSTOLIC BLOOD PRESSURE: 134 MMHG

## 2021-05-26 DIAGNOSIS — R80.9 TYPE 2 DIABETES MELLITUS WITH MICROALBUMINURIA, WITH LONG-TERM CURRENT USE OF INSULIN: Primary | ICD-10-CM

## 2021-05-26 DIAGNOSIS — E11.29 TYPE 2 DIABETES MELLITUS WITH MICROALBUMINURIA, WITH LONG-TERM CURRENT USE OF INSULIN: Primary | ICD-10-CM

## 2021-05-26 DIAGNOSIS — I10 ESSENTIAL HYPERTENSION: ICD-10-CM

## 2021-05-26 DIAGNOSIS — Z79.4 TYPE 2 DIABETES MELLITUS WITH MICROALBUMINURIA, WITH LONG-TERM CURRENT USE OF INSULIN: Primary | ICD-10-CM

## 2021-05-26 DIAGNOSIS — E78.5 DYSLIPIDEMIA: ICD-10-CM

## 2021-05-26 LAB — GLUCOSE SERPL-MCNC: 156 MG/DL (ref 70–110)

## 2021-05-26 PROCEDURE — 99999 PR PBB SHADOW E&M-EST. PATIENT-LVL V: ICD-10-PCS | Mod: PBBFAC,,, | Performed by: NURSE PRACTITIONER

## 2021-05-26 PROCEDURE — 99214 PR OFFICE/OUTPT VISIT, EST, LEVL IV, 30-39 MIN: ICD-10-PCS | Mod: S$GLB,,, | Performed by: NURSE PRACTITIONER

## 2021-05-26 PROCEDURE — 3008F BODY MASS INDEX DOCD: CPT | Mod: CPTII,S$GLB,, | Performed by: NURSE PRACTITIONER

## 2021-05-26 PROCEDURE — 3051F HG A1C>EQUAL 7.0%<8.0%: CPT | Mod: CPTII,S$GLB,, | Performed by: NURSE PRACTITIONER

## 2021-05-26 PROCEDURE — 3051F PR MOST RECENT HEMOGLOBIN A1C LEVEL 7.0 - < 8.0%: ICD-10-PCS | Mod: CPTII,S$GLB,, | Performed by: NURSE PRACTITIONER

## 2021-05-26 PROCEDURE — 82962 POCT GLUCOSE, HAND-HELD DEVICE: ICD-10-PCS | Mod: S$GLB,,, | Performed by: NURSE PRACTITIONER

## 2021-05-26 PROCEDURE — 99214 OFFICE O/P EST MOD 30 MIN: CPT | Mod: S$GLB,,, | Performed by: NURSE PRACTITIONER

## 2021-05-26 PROCEDURE — 99999 PR PBB SHADOW E&M-EST. PATIENT-LVL V: CPT | Mod: PBBFAC,,, | Performed by: NURSE PRACTITIONER

## 2021-05-26 PROCEDURE — 82962 GLUCOSE BLOOD TEST: CPT | Mod: S$GLB,,, | Performed by: NURSE PRACTITIONER

## 2021-05-26 PROCEDURE — 1126F AMNT PAIN NOTED NONE PRSNT: CPT | Mod: S$GLB,,, | Performed by: NURSE PRACTITIONER

## 2021-05-26 PROCEDURE — 1126F PR PAIN SEVERITY QUANTIFIED, NO PAIN PRESENT: ICD-10-PCS | Mod: S$GLB,,, | Performed by: NURSE PRACTITIONER

## 2021-05-26 PROCEDURE — 3008F PR BODY MASS INDEX (BMI) DOCUMENTED: ICD-10-PCS | Mod: CPTII,S$GLB,, | Performed by: NURSE PRACTITIONER

## 2021-05-26 RX ORDER — INSULIN GLARGINE 300 U/ML
50 INJECTION, SOLUTION SUBCUTANEOUS DAILY
Qty: 6 PEN | Refills: 1 | Status: SHIPPED | OUTPATIENT
Start: 2021-05-26 | End: 2021-12-06 | Stop reason: SDUPTHER

## 2021-05-27 ENCOUNTER — OFFICE VISIT (OUTPATIENT)
Dept: ORTHOPEDICS | Facility: CLINIC | Age: 56
End: 2021-05-27
Payer: MEDICARE

## 2021-05-27 ENCOUNTER — LAB VISIT (OUTPATIENT)
Dept: LAB | Facility: HOSPITAL | Age: 56
End: 2021-05-27
Attending: NURSE PRACTITIONER
Payer: MEDICARE

## 2021-05-27 VITALS
HEIGHT: 58 IN | BODY MASS INDEX: 35.68 KG/M2 | DIASTOLIC BLOOD PRESSURE: 79 MMHG | WEIGHT: 170 LBS | HEART RATE: 84 BPM | SYSTOLIC BLOOD PRESSURE: 123 MMHG

## 2021-05-27 DIAGNOSIS — R80.9 TYPE 2 DIABETES MELLITUS WITH MICROALBUMINURIA, WITH LONG-TERM CURRENT USE OF INSULIN: ICD-10-CM

## 2021-05-27 DIAGNOSIS — Z79.4 TYPE 2 DIABETES MELLITUS WITH MICROALBUMINURIA, WITH LONG-TERM CURRENT USE OF INSULIN: ICD-10-CM

## 2021-05-27 DIAGNOSIS — E11.29 TYPE 2 DIABETES MELLITUS WITH MICROALBUMINURIA, WITH LONG-TERM CURRENT USE OF INSULIN: ICD-10-CM

## 2021-05-27 DIAGNOSIS — S83.241D ACUTE MEDIAL MENISCUS TEAR OF RIGHT KNEE, SUBSEQUENT ENCOUNTER: ICD-10-CM

## 2021-05-27 DIAGNOSIS — M94.261 CHONDROMALACIA OF RIGHT KNEE: ICD-10-CM

## 2021-05-27 DIAGNOSIS — Z98.890 S/P RIGHT KNEE ARTHROSCOPY: Primary | ICD-10-CM

## 2021-05-27 LAB
ALBUMIN SERPL BCP-MCNC: 3.9 G/DL (ref 3.5–5.2)
ALP SERPL-CCNC: 150 U/L (ref 55–135)
ALT SERPL W/O P-5'-P-CCNC: 17 U/L (ref 10–44)
ANION GAP SERPL CALC-SCNC: 12 MMOL/L (ref 8–16)
AST SERPL-CCNC: 17 U/L (ref 10–40)
BILIRUB SERPL-MCNC: 0.7 MG/DL (ref 0.1–1)
BUN SERPL-MCNC: 9 MG/DL (ref 6–20)
CALCIUM SERPL-MCNC: 9.5 MG/DL (ref 8.7–10.5)
CHLORIDE SERPL-SCNC: 106 MMOL/L (ref 95–110)
CHOLEST SERPL-MCNC: 129 MG/DL (ref 120–199)
CHOLEST/HDLC SERPL: 2.8 {RATIO} (ref 2–5)
CO2 SERPL-SCNC: 23 MMOL/L (ref 23–29)
CREAT SERPL-MCNC: 1 MG/DL (ref 0.5–1.4)
EST. GFR  (AFRICAN AMERICAN): >60 ML/MIN/1.73 M^2
EST. GFR  (NON AFRICAN AMERICAN): >60 ML/MIN/1.73 M^2
ESTIMATED AVG GLUCOSE: 174 MG/DL (ref 68–131)
GLUCOSE SERPL-MCNC: 171 MG/DL (ref 70–110)
HBA1C MFR BLD: 7.7 % (ref 4–5.6)
HDLC SERPL-MCNC: 46 MG/DL (ref 40–75)
HDLC SERPL: 35.7 % (ref 20–50)
LDLC SERPL CALC-MCNC: 65.4 MG/DL (ref 63–159)
NONHDLC SERPL-MCNC: 83 MG/DL
POTASSIUM SERPL-SCNC: 4 MMOL/L (ref 3.5–5.1)
PROT SERPL-MCNC: 6.7 G/DL (ref 6–8.4)
SODIUM SERPL-SCNC: 141 MMOL/L (ref 136–145)
TRIGL SERPL-MCNC: 88 MG/DL (ref 30–150)

## 2021-05-27 PROCEDURE — 1126F PR PAIN SEVERITY QUANTIFIED, NO PAIN PRESENT: ICD-10-PCS | Mod: S$GLB,,, | Performed by: ORTHOPAEDIC SURGERY

## 2021-05-27 PROCEDURE — 3051F HG A1C>EQUAL 7.0%<8.0%: CPT | Mod: CPTII,S$GLB,, | Performed by: ORTHOPAEDIC SURGERY

## 2021-05-27 PROCEDURE — 83036 HEMOGLOBIN GLYCOSYLATED A1C: CPT | Performed by: NURSE PRACTITIONER

## 2021-05-27 PROCEDURE — 80061 LIPID PANEL: CPT | Performed by: NURSE PRACTITIONER

## 2021-05-27 PROCEDURE — 99499 UNLISTED E&M SERVICE: CPT | Mod: S$GLB,,, | Performed by: ORTHOPAEDIC SURGERY

## 2021-05-27 PROCEDURE — 3008F PR BODY MASS INDEX (BMI) DOCUMENTED: ICD-10-PCS | Mod: CPTII,S$GLB,, | Performed by: ORTHOPAEDIC SURGERY

## 2021-05-27 PROCEDURE — 99499 RISK ADDL DX/OHS AUDIT: ICD-10-PCS | Mod: S$GLB,,, | Performed by: ORTHOPAEDIC SURGERY

## 2021-05-27 PROCEDURE — 99999 PR PBB SHADOW E&M-EST. PATIENT-LVL III: CPT | Mod: PBBFAC,,, | Performed by: ORTHOPAEDIC SURGERY

## 2021-05-27 PROCEDURE — 36415 COLL VENOUS BLD VENIPUNCTURE: CPT | Performed by: NURSE PRACTITIONER

## 2021-05-27 PROCEDURE — 1126F AMNT PAIN NOTED NONE PRSNT: CPT | Mod: S$GLB,,, | Performed by: ORTHOPAEDIC SURGERY

## 2021-05-27 PROCEDURE — 99999 PR PBB SHADOW E&M-EST. PATIENT-LVL III: ICD-10-PCS | Mod: PBBFAC,,, | Performed by: ORTHOPAEDIC SURGERY

## 2021-05-27 PROCEDURE — 80053 COMPREHEN METABOLIC PANEL: CPT | Performed by: NURSE PRACTITIONER

## 2021-05-27 PROCEDURE — 99024 POSTOP FOLLOW-UP VISIT: CPT | Mod: S$GLB,,, | Performed by: ORTHOPAEDIC SURGERY

## 2021-05-27 PROCEDURE — 3008F BODY MASS INDEX DOCD: CPT | Mod: CPTII,S$GLB,, | Performed by: ORTHOPAEDIC SURGERY

## 2021-05-27 PROCEDURE — 3051F PR MOST RECENT HEMOGLOBIN A1C LEVEL 7.0 - < 8.0%: ICD-10-PCS | Mod: CPTII,S$GLB,, | Performed by: ORTHOPAEDIC SURGERY

## 2021-05-27 PROCEDURE — 99024 PR POST-OP FOLLOW-UP VISIT: ICD-10-PCS | Mod: S$GLB,,, | Performed by: ORTHOPAEDIC SURGERY

## 2021-05-29 ENCOUNTER — PATIENT MESSAGE (OUTPATIENT)
Dept: DIABETES | Facility: CLINIC | Age: 56
End: 2021-05-29

## 2021-06-07 NOTE — PROGRESS NOTES
Successful contact with patient regarding tobacco cessation quit #1. Pt states, she remained tobacco free for 3 months, she currently smoke one pack of cigarettes per day, and returned to the program for quit #2 on 5/23/2019.  Pt informed of her benefit status, future telephone follow ups,  and contact information. Will update the tobacco cessation smart form for 6 and 12 months on quit #1 and resolve the episode.  
Male

## 2021-06-14 ENCOUNTER — OFFICE VISIT (OUTPATIENT)
Dept: PSYCHIATRY | Facility: CLINIC | Age: 56
End: 2021-06-14
Payer: COMMERCIAL

## 2021-06-14 VITALS
WEIGHT: 166.88 LBS | DIASTOLIC BLOOD PRESSURE: 88 MMHG | HEART RATE: 95 BPM | BODY MASS INDEX: 34.88 KG/M2 | SYSTOLIC BLOOD PRESSURE: 138 MMHG

## 2021-06-14 DIAGNOSIS — M25.561 ACUTE PAIN OF RIGHT KNEE: ICD-10-CM

## 2021-06-14 DIAGNOSIS — F31.9 BIPOLAR 1 DISORDER: Primary | ICD-10-CM

## 2021-06-14 DIAGNOSIS — F41.1 GAD (GENERALIZED ANXIETY DISORDER): ICD-10-CM

## 2021-06-14 DIAGNOSIS — M94.261 CHONDROMALACIA OF RIGHT KNEE: ICD-10-CM

## 2021-06-14 PROCEDURE — 3075F PR MOST RECENT SYSTOLIC BLOOD PRESS GE 130-139MM HG: ICD-10-PCS | Mod: CPTII,S$GLB,, | Performed by: PSYCHIATRY & NEUROLOGY

## 2021-06-14 PROCEDURE — 3075F SYST BP GE 130 - 139MM HG: CPT | Mod: CPTII,S$GLB,, | Performed by: PSYCHIATRY & NEUROLOGY

## 2021-06-14 PROCEDURE — 3008F BODY MASS INDEX DOCD: CPT | Mod: CPTII,S$GLB,, | Performed by: PSYCHIATRY & NEUROLOGY

## 2021-06-14 PROCEDURE — 99999 PR PBB SHADOW E&M-EST. PATIENT-LVL III: CPT | Mod: PBBFAC,,, | Performed by: PSYCHIATRY & NEUROLOGY

## 2021-06-14 PROCEDURE — 90792 PSYCH DIAG EVAL W/MED SRVCS: CPT | Mod: S$GLB,,, | Performed by: PSYCHIATRY & NEUROLOGY

## 2021-06-14 PROCEDURE — 3008F PR BODY MASS INDEX (BMI) DOCUMENTED: ICD-10-PCS | Mod: CPTII,S$GLB,, | Performed by: PSYCHIATRY & NEUROLOGY

## 2021-06-14 PROCEDURE — 90792 PR PSYCHIATRIC DIAGNOSTIC EVALUATION W/MEDICAL SERVICES: ICD-10-PCS | Mod: S$GLB,,, | Performed by: PSYCHIATRY & NEUROLOGY

## 2021-06-14 PROCEDURE — 3079F PR MOST RECENT DIASTOLIC BLOOD PRESSURE 80-89 MM HG: ICD-10-PCS | Mod: CPTII,S$GLB,, | Performed by: PSYCHIATRY & NEUROLOGY

## 2021-06-14 PROCEDURE — 99999 PR PBB SHADOW E&M-EST. PATIENT-LVL III: ICD-10-PCS | Mod: PBBFAC,,, | Performed by: PSYCHIATRY & NEUROLOGY

## 2021-06-14 PROCEDURE — 4010F PR ACE/ARB THEARPY RXD/TAKEN: ICD-10-PCS | Mod: CPTII,S$GLB,, | Performed by: PSYCHIATRY & NEUROLOGY

## 2021-06-14 PROCEDURE — 99499 UNLISTED E&M SERVICE: CPT | Mod: S$GLB,,, | Performed by: PSYCHIATRY & NEUROLOGY

## 2021-06-14 PROCEDURE — 4010F ACE/ARB THERAPY RXD/TAKEN: CPT | Mod: CPTII,S$GLB,, | Performed by: PSYCHIATRY & NEUROLOGY

## 2021-06-14 PROCEDURE — 3079F DIAST BP 80-89 MM HG: CPT | Mod: CPTII,S$GLB,, | Performed by: PSYCHIATRY & NEUROLOGY

## 2021-06-14 PROCEDURE — 99499 RISK ADDL DX/OHS AUDIT: ICD-10-PCS | Mod: S$GLB,,, | Performed by: PSYCHIATRY & NEUROLOGY

## 2021-06-14 RX ORDER — LAMOTRIGINE 200 MG/1
200 TABLET ORAL 2 TIMES DAILY
Qty: 60 TABLET | Refills: 3 | Status: SHIPPED | OUTPATIENT
Start: 2021-06-14 | End: 2021-08-20 | Stop reason: ALTCHOICE

## 2021-06-14 RX ORDER — LORAZEPAM 1 MG/1
1 TABLET ORAL 2 TIMES DAILY PRN
Qty: 60 TABLET | Refills: 2 | Status: SHIPPED | OUTPATIENT
Start: 2021-06-14

## 2021-06-14 RX ORDER — BENZTROPINE MESYLATE 1 MG/1
0.5 TABLET ORAL 2 TIMES DAILY PRN
Qty: 60 TABLET | Refills: 2 | Status: SHIPPED | OUTPATIENT
Start: 2021-06-14 | End: 2021-08-20

## 2021-06-14 RX ORDER — DULOXETIN HYDROCHLORIDE 60 MG/1
60 CAPSULE, DELAYED RELEASE ORAL DAILY
Qty: 60 CAPSULE | Refills: 3 | Status: SHIPPED | OUTPATIENT
Start: 2021-06-14 | End: 2021-11-10
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
## 2021-06-19 ENCOUNTER — PATIENT MESSAGE (OUTPATIENT)
Dept: FAMILY MEDICINE | Facility: CLINIC | Age: 56
End: 2021-06-19

## 2021-06-22 ENCOUNTER — OFFICE VISIT (OUTPATIENT)
Dept: FAMILY MEDICINE | Facility: CLINIC | Age: 56
End: 2021-06-22
Payer: MEDICARE

## 2021-06-22 VITALS
BODY MASS INDEX: 35.17 KG/M2 | DIASTOLIC BLOOD PRESSURE: 82 MMHG | TEMPERATURE: 96 F | OXYGEN SATURATION: 98 % | SYSTOLIC BLOOD PRESSURE: 122 MMHG | HEART RATE: 104 BPM | WEIGHT: 167.56 LBS | HEIGHT: 58 IN

## 2021-06-22 DIAGNOSIS — B86 SCABIES: Primary | ICD-10-CM

## 2021-06-22 PROCEDURE — 99214 PR OFFICE/OUTPT VISIT, EST, LEVL IV, 30-39 MIN: ICD-10-PCS | Mod: S$GLB,,, | Performed by: NURSE PRACTITIONER

## 2021-06-22 PROCEDURE — 1126F AMNT PAIN NOTED NONE PRSNT: CPT | Mod: S$GLB,,, | Performed by: NURSE PRACTITIONER

## 2021-06-22 PROCEDURE — 99214 OFFICE O/P EST MOD 30 MIN: CPT | Mod: S$GLB,,, | Performed by: NURSE PRACTITIONER

## 2021-06-22 PROCEDURE — 3008F BODY MASS INDEX DOCD: CPT | Mod: CPTII,S$GLB,, | Performed by: NURSE PRACTITIONER

## 2021-06-22 PROCEDURE — 1126F PR PAIN SEVERITY QUANTIFIED, NO PAIN PRESENT: ICD-10-PCS | Mod: S$GLB,,, | Performed by: NURSE PRACTITIONER

## 2021-06-22 PROCEDURE — 99999 PR PBB SHADOW E&M-EST. PATIENT-LVL V: CPT | Mod: PBBFAC,,, | Performed by: NURSE PRACTITIONER

## 2021-06-22 PROCEDURE — 3008F PR BODY MASS INDEX (BMI) DOCUMENTED: ICD-10-PCS | Mod: CPTII,S$GLB,, | Performed by: NURSE PRACTITIONER

## 2021-06-22 PROCEDURE — 99999 PR PBB SHADOW E&M-EST. PATIENT-LVL V: ICD-10-PCS | Mod: PBBFAC,,, | Performed by: NURSE PRACTITIONER

## 2021-06-22 RX ORDER — IVERMECTIN 3 MG/1
3 TABLET ORAL ONCE
Qty: 1 TABLET | Refills: 0 | Status: SHIPPED | OUTPATIENT
Start: 2021-06-22 | End: 2021-06-22

## 2021-06-22 RX ORDER — HYDROXYZINE PAMOATE 25 MG/1
25 CAPSULE ORAL NIGHTLY PRN
Qty: 20 CAPSULE | Refills: 0 | Status: SHIPPED | OUTPATIENT
Start: 2021-06-22 | End: 2022-12-28 | Stop reason: ALTCHOICE

## 2021-06-22 RX ORDER — PERMETHRIN 50 MG/G
CREAM TOPICAL ONCE
Qty: 60 G | Refills: 0 | Status: SHIPPED | OUTPATIENT
Start: 2021-06-22 | End: 2021-06-22

## 2021-06-28 ENCOUNTER — PATIENT MESSAGE (OUTPATIENT)
Dept: FAMILY MEDICINE | Facility: CLINIC | Age: 56
End: 2021-06-28

## 2021-06-29 RX ORDER — IVERMECTIN 3 MG/1
3 TABLET ORAL ONCE
Qty: 1 TABLET | Refills: 0 | Status: SHIPPED | OUTPATIENT
Start: 2021-06-29 | End: 2021-06-29

## 2021-08-11 ENCOUNTER — PATIENT MESSAGE (OUTPATIENT)
Dept: ADMINISTRATIVE | Facility: OTHER | Age: 56
End: 2021-08-11

## 2021-08-11 RX ORDER — ONDANSETRON 4 MG/1
4 TABLET, ORALLY DISINTEGRATING ORAL EVERY 6 HOURS PRN
Qty: 20 TABLET | Refills: 1 | Status: CANCELLED | OUTPATIENT
Start: 2021-08-11

## 2021-08-12 ENCOUNTER — PATIENT MESSAGE (OUTPATIENT)
Dept: DERMATOLOGY | Facility: CLINIC | Age: 56
End: 2021-08-12

## 2021-08-15 ENCOUNTER — PATIENT MESSAGE (OUTPATIENT)
Dept: ADMINISTRATIVE | Facility: OTHER | Age: 56
End: 2021-08-15

## 2021-08-17 RX ORDER — ONDANSETRON 4 MG/1
4 TABLET, ORALLY DISINTEGRATING ORAL EVERY 6 HOURS PRN
Qty: 20 TABLET | Refills: 1 | Status: SHIPPED | OUTPATIENT
Start: 2021-08-17 | End: 2021-10-01

## 2021-08-18 RX ORDER — LOSARTAN POTASSIUM 25 MG/1
25 TABLET ORAL DAILY
Qty: 90 TABLET | Refills: 1 | Status: SHIPPED | OUTPATIENT
Start: 2021-08-18 | End: 2022-06-10

## 2021-08-18 RX ORDER — LEVOTHYROXINE SODIUM 75 UG/1
75 TABLET ORAL DAILY
Qty: 90 TABLET | Refills: 1 | Status: SHIPPED | OUTPATIENT
Start: 2021-08-18 | End: 2021-09-07 | Stop reason: SDUPTHER

## 2021-08-27 ENCOUNTER — LAB VISIT (OUTPATIENT)
Dept: LAB | Facility: HOSPITAL | Age: 56
End: 2021-08-27
Attending: FAMILY MEDICINE
Payer: MEDICAID

## 2021-08-27 ENCOUNTER — OFFICE VISIT (OUTPATIENT)
Dept: FAMILY MEDICINE | Facility: CLINIC | Age: 56
End: 2021-08-27
Payer: MEDICARE

## 2021-08-27 VITALS
OXYGEN SATURATION: 97 % | WEIGHT: 160.81 LBS | HEART RATE: 100 BPM | TEMPERATURE: 98 F | DIASTOLIC BLOOD PRESSURE: 80 MMHG | SYSTOLIC BLOOD PRESSURE: 130 MMHG | HEIGHT: 58 IN | RESPIRATION RATE: 16 BRPM | BODY MASS INDEX: 33.75 KG/M2

## 2021-08-27 DIAGNOSIS — Z79.4 TYPE 2 DIABETES MELLITUS WITH MICROALBUMINURIA, WITH LONG-TERM CURRENT USE OF INSULIN: ICD-10-CM

## 2021-08-27 DIAGNOSIS — G47.33 OSA ON CPAP: ICD-10-CM

## 2021-08-27 DIAGNOSIS — E11.29 TYPE 2 DIABETES MELLITUS WITH MICROALBUMINURIA, WITH LONG-TERM CURRENT USE OF INSULIN: ICD-10-CM

## 2021-08-27 DIAGNOSIS — E11.29 TYPE 2 DIABETES MELLITUS WITH MICROALBUMINURIA, WITH LONG-TERM CURRENT USE OF INSULIN: Primary | ICD-10-CM

## 2021-08-27 DIAGNOSIS — R80.9 TYPE 2 DIABETES MELLITUS WITH MICROALBUMINURIA, WITH LONG-TERM CURRENT USE OF INSULIN: Primary | ICD-10-CM

## 2021-08-27 DIAGNOSIS — R80.9 TYPE 2 DIABETES MELLITUS WITH MICROALBUMINURIA, WITH LONG-TERM CURRENT USE OF INSULIN: ICD-10-CM

## 2021-08-27 DIAGNOSIS — E03.9 HYPOTHYROIDISM, UNSPECIFIED TYPE: ICD-10-CM

## 2021-08-27 DIAGNOSIS — I10 ESSENTIAL HYPERTENSION: ICD-10-CM

## 2021-08-27 DIAGNOSIS — Z79.4 TYPE 2 DIABETES MELLITUS WITH MICROALBUMINURIA, WITH LONG-TERM CURRENT USE OF INSULIN: Primary | ICD-10-CM

## 2021-08-27 LAB — TSH SERPL DL<=0.005 MIU/L-ACNC: 5.88 UIU/ML (ref 0.4–4)

## 2021-08-27 PROCEDURE — 3051F HG A1C>EQUAL 7.0%<8.0%: CPT | Mod: CPTII,S$GLB,, | Performed by: FAMILY MEDICINE

## 2021-08-27 PROCEDURE — 99499 RISK ADDL DX/OHS AUDIT: ICD-10-PCS | Mod: S$GLB,,, | Performed by: FAMILY MEDICINE

## 2021-08-27 PROCEDURE — 83036 HEMOGLOBIN GLYCOSYLATED A1C: CPT | Performed by: FAMILY MEDICINE

## 2021-08-27 PROCEDURE — 3008F PR BODY MASS INDEX (BMI) DOCUMENTED: ICD-10-PCS | Mod: CPTII,S$GLB,, | Performed by: FAMILY MEDICINE

## 2021-08-27 PROCEDURE — 3075F PR MOST RECENT SYSTOLIC BLOOD PRESS GE 130-139MM HG: ICD-10-PCS | Mod: CPTII,S$GLB,, | Performed by: FAMILY MEDICINE

## 2021-08-27 PROCEDURE — 99999 PR PBB SHADOW E&M-EST. PATIENT-LVL III: ICD-10-PCS | Mod: PBBFAC,,, | Performed by: FAMILY MEDICINE

## 2021-08-27 PROCEDURE — 3051F PR MOST RECENT HEMOGLOBIN A1C LEVEL 7.0 - < 8.0%: ICD-10-PCS | Mod: CPTII,S$GLB,, | Performed by: FAMILY MEDICINE

## 2021-08-27 PROCEDURE — 1125F AMNT PAIN NOTED PAIN PRSNT: CPT | Mod: CPTII,S$GLB,, | Performed by: FAMILY MEDICINE

## 2021-08-27 PROCEDURE — 84443 ASSAY THYROID STIM HORMONE: CPT | Performed by: FAMILY MEDICINE

## 2021-08-27 PROCEDURE — 99499 UNLISTED E&M SERVICE: CPT | Mod: S$GLB,,, | Performed by: FAMILY MEDICINE

## 2021-08-27 PROCEDURE — 99999 PR PBB SHADOW E&M-EST. PATIENT-LVL III: CPT | Mod: PBBFAC,,, | Performed by: FAMILY MEDICINE

## 2021-08-27 PROCEDURE — 3008F BODY MASS INDEX DOCD: CPT | Mod: CPTII,S$GLB,, | Performed by: FAMILY MEDICINE

## 2021-08-27 PROCEDURE — 99214 PR OFFICE/OUTPT VISIT, EST, LEVL IV, 30-39 MIN: ICD-10-PCS | Mod: S$GLB,,, | Performed by: FAMILY MEDICINE

## 2021-08-27 PROCEDURE — 84439 ASSAY OF FREE THYROXINE: CPT | Performed by: FAMILY MEDICINE

## 2021-08-27 PROCEDURE — 3079F PR MOST RECENT DIASTOLIC BLOOD PRESSURE 80-89 MM HG: ICD-10-PCS | Mod: CPTII,S$GLB,, | Performed by: FAMILY MEDICINE

## 2021-08-27 PROCEDURE — 3075F SYST BP GE 130 - 139MM HG: CPT | Mod: CPTII,S$GLB,, | Performed by: FAMILY MEDICINE

## 2021-08-27 PROCEDURE — 1125F PR PAIN SEVERITY QUANTIFIED, PAIN PRESENT: ICD-10-PCS | Mod: CPTII,S$GLB,, | Performed by: FAMILY MEDICINE

## 2021-08-27 PROCEDURE — 99214 OFFICE O/P EST MOD 30 MIN: CPT | Mod: S$GLB,,, | Performed by: FAMILY MEDICINE

## 2021-08-27 PROCEDURE — 3079F DIAST BP 80-89 MM HG: CPT | Mod: CPTII,S$GLB,, | Performed by: FAMILY MEDICINE

## 2021-08-27 RX ORDER — DULOXETIN HYDROCHLORIDE 20 MG/1
20 CAPSULE, DELAYED RELEASE ORAL
COMMUNITY
Start: 2021-07-28 | End: 2021-11-10

## 2021-08-27 RX ORDER — LAMOTRIGINE 200 MG/1
1 TABLET ORAL 2 TIMES DAILY
COMMUNITY
Start: 2021-02-04

## 2021-08-27 RX ORDER — VALACYCLOVIR HYDROCHLORIDE 1 G/1
1000 TABLET, FILM COATED ORAL 2 TIMES DAILY
Qty: 14 TABLET | Refills: 0 | Status: SHIPPED | OUTPATIENT
Start: 2021-08-27 | End: 2021-11-03 | Stop reason: SDUPTHER

## 2021-08-28 LAB
ESTIMATED AVG GLUCOSE: 160 MG/DL (ref 68–131)
HBA1C MFR BLD: 7.2 % (ref 4–5.6)

## 2021-08-29 LAB — T4 FREE SERPL-MCNC: 0.84 NG/DL (ref 0.71–1.51)

## 2021-08-31 ENCOUNTER — PATIENT MESSAGE (OUTPATIENT)
Dept: DIABETES | Facility: CLINIC | Age: 56
End: 2021-08-31

## 2021-09-01 ENCOUNTER — TELEPHONE (OUTPATIENT)
Dept: DIABETES | Facility: CLINIC | Age: 56
End: 2021-09-01

## 2021-09-02 ENCOUNTER — PATIENT MESSAGE (OUTPATIENT)
Dept: FAMILY MEDICINE | Facility: CLINIC | Age: 56
End: 2021-09-02

## 2021-09-05 ENCOUNTER — PATIENT MESSAGE (OUTPATIENT)
Dept: FAMILY MEDICINE | Facility: CLINIC | Age: 56
End: 2021-09-05

## 2021-09-07 RX ORDER — LEVOTHYROXINE SODIUM 88 UG/1
88 TABLET ORAL DAILY
Qty: 90 TABLET | Refills: 1 | Status: SHIPPED | OUTPATIENT
Start: 2021-09-07 | End: 2022-03-04

## 2021-10-06 ENCOUNTER — PATIENT MESSAGE (OUTPATIENT)
Dept: FAMILY MEDICINE | Facility: CLINIC | Age: 56
End: 2021-10-06

## 2021-10-06 RX ORDER — MIRABEGRON 50 MG/1
TABLET, FILM COATED, EXTENDED RELEASE ORAL
Qty: 30 TABLET | Refills: 0 | OUTPATIENT
Start: 2021-10-06

## 2021-10-06 RX ORDER — HYOSCYAMINE SULFATE 0.12 MG/1
TABLET SUBLINGUAL
Qty: 180 TABLET | Refills: 0 | OUTPATIENT
Start: 2021-10-06

## 2021-10-06 RX ORDER — ESTRADIOL 0.1 MG/G
CREAM VAGINAL
Qty: 42.5 G | Refills: 0 | OUTPATIENT
Start: 2021-10-06

## 2021-10-06 RX ORDER — CLONAZEPAM 2 MG/1
TABLET ORAL
Qty: 30 TABLET | Refills: 0 | OUTPATIENT
Start: 2021-10-06

## 2021-10-07 ENCOUNTER — OFFICE VISIT (OUTPATIENT)
Dept: OTOLARYNGOLOGY | Facility: CLINIC | Age: 56
End: 2021-10-07
Payer: MEDICARE

## 2021-10-07 VITALS
HEART RATE: 73 BPM | HEIGHT: 58 IN | TEMPERATURE: 98 F | SYSTOLIC BLOOD PRESSURE: 135 MMHG | BODY MASS INDEX: 32.57 KG/M2 | DIASTOLIC BLOOD PRESSURE: 86 MMHG | WEIGHT: 155.19 LBS | RESPIRATION RATE: 16 BRPM

## 2021-10-07 DIAGNOSIS — R09.89 OTHER SPECIFIED SYMPTOMS AND SIGNS INVOLVING THE CIRCULATORY AND RESPIRATORY SYSTEMS: ICD-10-CM

## 2021-10-07 DIAGNOSIS — H93.A1 PULSATILE TINNITUS OF RIGHT EAR: Primary | ICD-10-CM

## 2021-10-07 DIAGNOSIS — H61.22 LEFT EAR IMPACTED CERUMEN: ICD-10-CM

## 2021-10-07 DIAGNOSIS — H93.A1 PULSATILE TINNITUS, RIGHT EAR: ICD-10-CM

## 2021-10-07 DIAGNOSIS — H91.90 PERCEIVED HEARING CHANGES: ICD-10-CM

## 2021-10-07 PROCEDURE — 1159F MED LIST DOCD IN RCRD: CPT | Mod: CPTII,S$GLB,, | Performed by: PHYSICIAN ASSISTANT

## 2021-10-07 PROCEDURE — 3066F NEPHROPATHY DOC TX: CPT | Mod: CPTII,S$GLB,, | Performed by: PHYSICIAN ASSISTANT

## 2021-10-07 PROCEDURE — 3061F PR NEG MICROALBUMINURIA RESULT DOCUMENTED/REVIEW: ICD-10-PCS | Mod: CPTII,S$GLB,, | Performed by: PHYSICIAN ASSISTANT

## 2021-10-07 PROCEDURE — 3051F PR MOST RECENT HEMOGLOBIN A1C LEVEL 7.0 - < 8.0%: ICD-10-PCS | Mod: CPTII,S$GLB,, | Performed by: PHYSICIAN ASSISTANT

## 2021-10-07 PROCEDURE — 69210 REMOVE IMPACTED EAR WAX UNI: CPT | Mod: S$GLB,,, | Performed by: PHYSICIAN ASSISTANT

## 2021-10-07 PROCEDURE — 69210 PR REMOVAL IMPACTED CERUMEN REQUIRING INSTRUMENTATION, UNILATERAL: ICD-10-PCS | Mod: S$GLB,,, | Performed by: PHYSICIAN ASSISTANT

## 2021-10-07 PROCEDURE — 3075F PR MOST RECENT SYSTOLIC BLOOD PRESS GE 130-139MM HG: ICD-10-PCS | Mod: CPTII,S$GLB,, | Performed by: PHYSICIAN ASSISTANT

## 2021-10-07 PROCEDURE — 99999 PR PBB SHADOW E&M-EST. PATIENT-LVL V: ICD-10-PCS | Mod: PBBFAC,,, | Performed by: PHYSICIAN ASSISTANT

## 2021-10-07 PROCEDURE — 1159F PR MEDICATION LIST DOCUMENTED IN MEDICAL RECORD: ICD-10-PCS | Mod: CPTII,S$GLB,, | Performed by: PHYSICIAN ASSISTANT

## 2021-10-07 PROCEDURE — 1160F PR REVIEW ALL MEDS BY PRESCRIBER/CLIN PHARMACIST DOCUMENTED: ICD-10-PCS | Mod: CPTII,S$GLB,, | Performed by: PHYSICIAN ASSISTANT

## 2021-10-07 PROCEDURE — 3075F SYST BP GE 130 - 139MM HG: CPT | Mod: CPTII,S$GLB,, | Performed by: PHYSICIAN ASSISTANT

## 2021-10-07 PROCEDURE — 3066F PR DOCUMENTATION OF TREATMENT FOR NEPHROPATHY: ICD-10-PCS | Mod: CPTII,S$GLB,, | Performed by: PHYSICIAN ASSISTANT

## 2021-10-07 PROCEDURE — 4010F ACE/ARB THERAPY RXD/TAKEN: CPT | Mod: CPTII,S$GLB,, | Performed by: PHYSICIAN ASSISTANT

## 2021-10-07 PROCEDURE — 3051F HG A1C>EQUAL 7.0%<8.0%: CPT | Mod: CPTII,S$GLB,, | Performed by: PHYSICIAN ASSISTANT

## 2021-10-07 PROCEDURE — 3079F DIAST BP 80-89 MM HG: CPT | Mod: CPTII,S$GLB,, | Performed by: PHYSICIAN ASSISTANT

## 2021-10-07 PROCEDURE — 3079F PR MOST RECENT DIASTOLIC BLOOD PRESSURE 80-89 MM HG: ICD-10-PCS | Mod: CPTII,S$GLB,, | Performed by: PHYSICIAN ASSISTANT

## 2021-10-07 PROCEDURE — 99204 OFFICE O/P NEW MOD 45 MIN: CPT | Mod: 25,S$GLB,, | Performed by: PHYSICIAN ASSISTANT

## 2021-10-07 PROCEDURE — 4010F PR ACE/ARB THEARPY RXD/TAKEN: ICD-10-PCS | Mod: CPTII,S$GLB,, | Performed by: PHYSICIAN ASSISTANT

## 2021-10-07 PROCEDURE — 3008F BODY MASS INDEX DOCD: CPT | Mod: CPTII,S$GLB,, | Performed by: PHYSICIAN ASSISTANT

## 2021-10-07 PROCEDURE — 3061F NEG MICROALBUMINURIA REV: CPT | Mod: CPTII,S$GLB,, | Performed by: PHYSICIAN ASSISTANT

## 2021-10-07 PROCEDURE — 1160F RVW MEDS BY RX/DR IN RCRD: CPT | Mod: CPTII,S$GLB,, | Performed by: PHYSICIAN ASSISTANT

## 2021-10-07 PROCEDURE — 3008F PR BODY MASS INDEX (BMI) DOCUMENTED: ICD-10-PCS | Mod: CPTII,S$GLB,, | Performed by: PHYSICIAN ASSISTANT

## 2021-10-07 PROCEDURE — 99999 PR PBB SHADOW E&M-EST. PATIENT-LVL V: CPT | Mod: PBBFAC,,, | Performed by: PHYSICIAN ASSISTANT

## 2021-10-07 PROCEDURE — 99204 PR OFFICE/OUTPT VISIT, NEW, LEVL IV, 45-59 MIN: ICD-10-PCS | Mod: 25,S$GLB,, | Performed by: PHYSICIAN ASSISTANT

## 2021-10-08 ENCOUNTER — TELEPHONE (OUTPATIENT)
Dept: RADIOLOGY | Facility: HOSPITAL | Age: 56
End: 2021-10-08

## 2021-10-08 ENCOUNTER — CLINICAL SUPPORT (OUTPATIENT)
Dept: AUDIOLOGY | Facility: CLINIC | Age: 56
End: 2021-10-08
Payer: MEDICARE

## 2021-10-08 DIAGNOSIS — H93.A1 PULSATILE TINNITUS, RIGHT EAR: ICD-10-CM

## 2021-10-08 DIAGNOSIS — H90.3 SENSORINEURAL HEARING LOSS, BILATERAL: Primary | ICD-10-CM

## 2021-10-08 PROCEDURE — 92557 PR COMPREHENSIVE HEARING TEST: ICD-10-PCS | Mod: S$GLB,,, | Performed by: AUDIOLOGIST-HEARING AID FITTER

## 2021-10-08 PROCEDURE — 92567 TYMPANOMETRY: CPT | Mod: S$GLB,,, | Performed by: AUDIOLOGIST-HEARING AID FITTER

## 2021-10-08 PROCEDURE — 92557 COMPREHENSIVE HEARING TEST: CPT | Mod: S$GLB,,, | Performed by: AUDIOLOGIST-HEARING AID FITTER

## 2021-10-08 PROCEDURE — 92567 PR TYMPA2METRY: ICD-10-PCS | Mod: S$GLB,,, | Performed by: AUDIOLOGIST-HEARING AID FITTER

## 2021-10-11 ENCOUNTER — HOSPITAL ENCOUNTER (OUTPATIENT)
Dept: RADIOLOGY | Facility: HOSPITAL | Age: 56
Discharge: HOME | End: 2021-10-11
Attending: PHYSICIAN ASSISTANT
Payer: MEDICARE

## 2021-10-11 ENCOUNTER — TELEPHONE (OUTPATIENT)
Dept: OTOLARYNGOLOGY | Facility: CLINIC | Age: 56
End: 2021-10-11

## 2021-10-11 DIAGNOSIS — H93.A1 PULSATILE TINNITUS OF RIGHT EAR: ICD-10-CM

## 2021-10-11 DIAGNOSIS — R09.89 OTHER SPECIFIED SYMPTOMS AND SIGNS INVOLVING THE CIRCULATORY AND RESPIRATORY SYSTEMS: ICD-10-CM

## 2021-10-11 PROCEDURE — 93880 EXTRACRANIAL BILAT STUDY: CPT | Mod: TC

## 2021-10-11 PROCEDURE — 93880 EXTRACRANIAL BILAT STUDY: CPT | Mod: 26,,, | Performed by: RADIOLOGY

## 2021-10-11 PROCEDURE — 93880 US CAROTID BILATERAL: ICD-10-PCS | Mod: 26,,, | Performed by: RADIOLOGY

## 2021-10-13 ENCOUNTER — PATIENT OUTREACH (OUTPATIENT)
Dept: ADMINISTRATIVE | Facility: OTHER | Age: 56
End: 2021-10-13

## 2021-10-13 ENCOUNTER — PATIENT MESSAGE (OUTPATIENT)
Dept: OTOLARYNGOLOGY | Facility: CLINIC | Age: 56
End: 2021-10-13
Payer: MEDICARE

## 2021-10-13 DIAGNOSIS — Z12.31 ENCOUNTER FOR SCREENING MAMMOGRAM FOR MALIGNANT NEOPLASM OF BREAST: Primary | ICD-10-CM

## 2021-10-14 ENCOUNTER — OFFICE VISIT (OUTPATIENT)
Dept: CARDIOLOGY | Facility: CLINIC | Age: 56
End: 2021-10-14
Payer: MEDICARE

## 2021-10-14 VITALS
DIASTOLIC BLOOD PRESSURE: 82 MMHG | BODY MASS INDEX: 32.81 KG/M2 | WEIGHT: 156.94 LBS | OXYGEN SATURATION: 98 % | SYSTOLIC BLOOD PRESSURE: 126 MMHG | HEART RATE: 82 BPM

## 2021-10-14 DIAGNOSIS — R07.9 CHEST PAIN, UNSPECIFIED TYPE: Primary | ICD-10-CM

## 2021-10-14 PROCEDURE — 3061F NEG MICROALBUMINURIA REV: CPT | Mod: CPTII,S$GLB,, | Performed by: INTERNAL MEDICINE

## 2021-10-14 PROCEDURE — 3074F PR MOST RECENT SYSTOLIC BLOOD PRESSURE < 130 MM HG: ICD-10-PCS | Mod: CPTII,S$GLB,, | Performed by: INTERNAL MEDICINE

## 2021-10-14 PROCEDURE — 3008F PR BODY MASS INDEX (BMI) DOCUMENTED: ICD-10-PCS | Mod: CPTII,S$GLB,, | Performed by: INTERNAL MEDICINE

## 2021-10-14 PROCEDURE — 99999 PR PBB SHADOW E&M-EST. PATIENT-LVL IV: ICD-10-PCS | Mod: PBBFAC,,, | Performed by: INTERNAL MEDICINE

## 2021-10-14 PROCEDURE — 3066F PR DOCUMENTATION OF TREATMENT FOR NEPHROPATHY: ICD-10-PCS | Mod: CPTII,S$GLB,, | Performed by: INTERNAL MEDICINE

## 2021-10-14 PROCEDURE — 3008F BODY MASS INDEX DOCD: CPT | Mod: CPTII,S$GLB,, | Performed by: INTERNAL MEDICINE

## 2021-10-14 PROCEDURE — 1159F MED LIST DOCD IN RCRD: CPT | Mod: CPTII,S$GLB,, | Performed by: INTERNAL MEDICINE

## 2021-10-14 PROCEDURE — 3079F PR MOST RECENT DIASTOLIC BLOOD PRESSURE 80-89 MM HG: ICD-10-PCS | Mod: CPTII,S$GLB,, | Performed by: INTERNAL MEDICINE

## 2021-10-14 PROCEDURE — 3066F NEPHROPATHY DOC TX: CPT | Mod: CPTII,S$GLB,, | Performed by: INTERNAL MEDICINE

## 2021-10-14 PROCEDURE — 3051F HG A1C>EQUAL 7.0%<8.0%: CPT | Mod: CPTII,S$GLB,, | Performed by: INTERNAL MEDICINE

## 2021-10-14 PROCEDURE — 3051F PR MOST RECENT HEMOGLOBIN A1C LEVEL 7.0 - < 8.0%: ICD-10-PCS | Mod: CPTII,S$GLB,, | Performed by: INTERNAL MEDICINE

## 2021-10-14 PROCEDURE — 4010F PR ACE/ARB THEARPY RXD/TAKEN: ICD-10-PCS | Mod: CPTII,S$GLB,, | Performed by: INTERNAL MEDICINE

## 2021-10-14 PROCEDURE — 3061F PR NEG MICROALBUMINURIA RESULT DOCUMENTED/REVIEW: ICD-10-PCS | Mod: CPTII,S$GLB,, | Performed by: INTERNAL MEDICINE

## 2021-10-14 PROCEDURE — 99213 PR OFFICE/OUTPT VISIT, EST, LEVL III, 20-29 MIN: ICD-10-PCS | Mod: S$GLB,,, | Performed by: INTERNAL MEDICINE

## 2021-10-14 PROCEDURE — 99213 OFFICE O/P EST LOW 20 MIN: CPT | Mod: S$GLB,,, | Performed by: INTERNAL MEDICINE

## 2021-10-14 PROCEDURE — 99999 PR PBB SHADOW E&M-EST. PATIENT-LVL IV: CPT | Mod: PBBFAC,,, | Performed by: INTERNAL MEDICINE

## 2021-10-14 PROCEDURE — 3074F SYST BP LT 130 MM HG: CPT | Mod: CPTII,S$GLB,, | Performed by: INTERNAL MEDICINE

## 2021-10-14 PROCEDURE — 1159F PR MEDICATION LIST DOCUMENTED IN MEDICAL RECORD: ICD-10-PCS | Mod: CPTII,S$GLB,, | Performed by: INTERNAL MEDICINE

## 2021-10-14 PROCEDURE — 4010F ACE/ARB THERAPY RXD/TAKEN: CPT | Mod: CPTII,S$GLB,, | Performed by: INTERNAL MEDICINE

## 2021-10-14 PROCEDURE — 3079F DIAST BP 80-89 MM HG: CPT | Mod: CPTII,S$GLB,, | Performed by: INTERNAL MEDICINE

## 2021-10-18 ENCOUNTER — PATIENT MESSAGE (OUTPATIENT)
Dept: RADIOLOGY | Facility: HOSPITAL | Age: 56
End: 2021-10-18
Payer: MEDICARE

## 2021-10-18 ENCOUNTER — PATIENT MESSAGE (OUTPATIENT)
Dept: CARDIOLOGY | Facility: CLINIC | Age: 56
End: 2021-10-18
Payer: MEDICARE

## 2021-10-19 ENCOUNTER — HOSPITAL ENCOUNTER (OUTPATIENT)
Dept: RADIOLOGY | Facility: HOSPITAL | Age: 56
Discharge: HOME | End: 2021-10-19
Attending: PHYSICIAN ASSISTANT
Payer: MEDICARE

## 2021-10-19 DIAGNOSIS — H93.A1 PULSATILE TINNITUS, RIGHT EAR: ICD-10-CM

## 2021-10-19 DIAGNOSIS — H91.90 PERCEIVED HEARING CHANGES: ICD-10-CM

## 2021-10-19 PROCEDURE — 70544 MR ANGIOGRAPHY HEAD W/O DYE: CPT | Mod: TC

## 2021-10-27 ENCOUNTER — PATIENT MESSAGE (OUTPATIENT)
Dept: OTHER | Facility: OTHER | Age: 56
End: 2021-10-27
Payer: MEDICARE

## 2021-11-03 ENCOUNTER — PATIENT MESSAGE (OUTPATIENT)
Dept: FAMILY MEDICINE | Facility: CLINIC | Age: 56
End: 2021-11-03
Payer: MEDICARE

## 2021-11-03 RX ORDER — VALACYCLOVIR HYDROCHLORIDE 1 G/1
1000 TABLET, FILM COATED ORAL 2 TIMES DAILY
Qty: 14 TABLET | Refills: 0 | Status: SHIPPED | OUTPATIENT
Start: 2021-11-03 | End: 2022-01-31 | Stop reason: SDUPTHER

## 2021-11-10 ENCOUNTER — OFFICE VISIT (OUTPATIENT)
Dept: FAMILY MEDICINE | Facility: CLINIC | Age: 56
End: 2021-11-10
Payer: MEDICARE

## 2021-11-10 VITALS
DIASTOLIC BLOOD PRESSURE: 80 MMHG | RESPIRATION RATE: 16 BRPM | HEART RATE: 100 BPM | WEIGHT: 155.63 LBS | TEMPERATURE: 98 F | OXYGEN SATURATION: 99 % | HEIGHT: 58 IN | SYSTOLIC BLOOD PRESSURE: 130 MMHG | BODY MASS INDEX: 32.67 KG/M2

## 2021-11-10 DIAGNOSIS — G47.33 OSA ON CPAP: ICD-10-CM

## 2021-11-10 DIAGNOSIS — R80.9 TYPE 2 DIABETES MELLITUS WITH MICROALBUMINURIA, WITH LONG-TERM CURRENT USE OF INSULIN: ICD-10-CM

## 2021-11-10 DIAGNOSIS — I10 ESSENTIAL HYPERTENSION: ICD-10-CM

## 2021-11-10 DIAGNOSIS — E03.9 HYPOTHYROIDISM, UNSPECIFIED TYPE: ICD-10-CM

## 2021-11-10 DIAGNOSIS — E11.29 TYPE 2 DIABETES MELLITUS WITH MICROALBUMINURIA, WITH LONG-TERM CURRENT USE OF INSULIN: ICD-10-CM

## 2021-11-10 DIAGNOSIS — E78.5 DYSLIPIDEMIA: ICD-10-CM

## 2021-11-10 DIAGNOSIS — Z79.899 POLYPHARMACY: ICD-10-CM

## 2021-11-10 DIAGNOSIS — Z79.4 TYPE 2 DIABETES MELLITUS WITH MICROALBUMINURIA, WITH LONG-TERM CURRENT USE OF INSULIN: ICD-10-CM

## 2021-11-10 DIAGNOSIS — R11.0 NAUSEA: Primary | ICD-10-CM

## 2021-11-10 PROCEDURE — 3079F PR MOST RECENT DIASTOLIC BLOOD PRESSURE 80-89 MM HG: ICD-10-PCS | Mod: CPTII,S$GLB,, | Performed by: FAMILY MEDICINE

## 2021-11-10 PROCEDURE — 3008F PR BODY MASS INDEX (BMI) DOCUMENTED: ICD-10-PCS | Mod: CPTII,S$GLB,, | Performed by: FAMILY MEDICINE

## 2021-11-10 PROCEDURE — 3075F PR MOST RECENT SYSTOLIC BLOOD PRESS GE 130-139MM HG: ICD-10-PCS | Mod: CPTII,S$GLB,, | Performed by: FAMILY MEDICINE

## 2021-11-10 PROCEDURE — 3051F PR MOST RECENT HEMOGLOBIN A1C LEVEL 7.0 - < 8.0%: ICD-10-PCS | Mod: CPTII,S$GLB,, | Performed by: FAMILY MEDICINE

## 2021-11-10 PROCEDURE — 3075F SYST BP GE 130 - 139MM HG: CPT | Mod: CPTII,S$GLB,, | Performed by: FAMILY MEDICINE

## 2021-11-10 PROCEDURE — 3061F PR NEG MICROALBUMINURIA RESULT DOCUMENTED/REVIEW: ICD-10-PCS | Mod: CPTII,S$GLB,, | Performed by: FAMILY MEDICINE

## 2021-11-10 PROCEDURE — 3079F DIAST BP 80-89 MM HG: CPT | Mod: CPTII,S$GLB,, | Performed by: FAMILY MEDICINE

## 2021-11-10 PROCEDURE — 3061F NEG MICROALBUMINURIA REV: CPT | Mod: CPTII,S$GLB,, | Performed by: FAMILY MEDICINE

## 2021-11-10 PROCEDURE — 3066F PR DOCUMENTATION OF TREATMENT FOR NEPHROPATHY: ICD-10-PCS | Mod: CPTII,S$GLB,, | Performed by: FAMILY MEDICINE

## 2021-11-10 PROCEDURE — 99999 PR PBB SHADOW E&M-EST. PATIENT-LVL V: CPT | Mod: PBBFAC,,, | Performed by: FAMILY MEDICINE

## 2021-11-10 PROCEDURE — 99214 OFFICE O/P EST MOD 30 MIN: CPT | Mod: S$GLB,,, | Performed by: FAMILY MEDICINE

## 2021-11-10 PROCEDURE — 99999 PR PBB SHADOW E&M-EST. PATIENT-LVL V: ICD-10-PCS | Mod: PBBFAC,,, | Performed by: FAMILY MEDICINE

## 2021-11-10 PROCEDURE — 3051F HG A1C>EQUAL 7.0%<8.0%: CPT | Mod: CPTII,S$GLB,, | Performed by: FAMILY MEDICINE

## 2021-11-10 PROCEDURE — 3008F BODY MASS INDEX DOCD: CPT | Mod: CPTII,S$GLB,, | Performed by: FAMILY MEDICINE

## 2021-11-10 PROCEDURE — 4010F PR ACE/ARB THEARPY RXD/TAKEN: ICD-10-PCS | Mod: CPTII,S$GLB,, | Performed by: FAMILY MEDICINE

## 2021-11-10 PROCEDURE — 99214 PR OFFICE/OUTPT VISIT, EST, LEVL IV, 30-39 MIN: ICD-10-PCS | Mod: S$GLB,,, | Performed by: FAMILY MEDICINE

## 2021-11-10 PROCEDURE — 4010F ACE/ARB THERAPY RXD/TAKEN: CPT | Mod: CPTII,S$GLB,, | Performed by: FAMILY MEDICINE

## 2021-11-10 PROCEDURE — 1159F PR MEDICATION LIST DOCUMENTED IN MEDICAL RECORD: ICD-10-PCS | Mod: CPTII,S$GLB,, | Performed by: FAMILY MEDICINE

## 2021-11-10 PROCEDURE — 3066F NEPHROPATHY DOC TX: CPT | Mod: CPTII,S$GLB,, | Performed by: FAMILY MEDICINE

## 2021-11-10 PROCEDURE — 1159F MED LIST DOCD IN RCRD: CPT | Mod: CPTII,S$GLB,, | Performed by: FAMILY MEDICINE

## 2021-11-10 RX ORDER — ONDANSETRON 4 MG/1
4 TABLET, ORALLY DISINTEGRATING ORAL EVERY 6 HOURS PRN
Qty: 20 TABLET | Refills: 1 | Status: SHIPPED | OUTPATIENT
Start: 2021-11-10 | End: 2021-12-06

## 2021-11-22 ENCOUNTER — PATIENT MESSAGE (OUTPATIENT)
Dept: FAMILY MEDICINE | Facility: CLINIC | Age: 56
End: 2021-11-22
Payer: MEDICARE

## 2021-11-27 ENCOUNTER — OFFICE VISIT (OUTPATIENT)
Dept: DIABETES | Facility: CLINIC | Age: 56
End: 2021-11-27
Payer: MEDICARE

## 2021-11-27 DIAGNOSIS — R80.9 TYPE 2 DIABETES MELLITUS WITH MICROALBUMINURIA, WITH LONG-TERM CURRENT USE OF INSULIN: Primary | ICD-10-CM

## 2021-11-27 DIAGNOSIS — Z79.4 TYPE 2 DIABETES MELLITUS WITH MICROALBUMINURIA, WITH LONG-TERM CURRENT USE OF INSULIN: Primary | ICD-10-CM

## 2021-11-27 DIAGNOSIS — E11.29 TYPE 2 DIABETES MELLITUS WITH MICROALBUMINURIA, WITH LONG-TERM CURRENT USE OF INSULIN: Primary | ICD-10-CM

## 2021-11-27 PROCEDURE — 3061F PR NEG MICROALBUMINURIA RESULT DOCUMENTED/REVIEW: ICD-10-PCS | Mod: CPTII,95,, | Performed by: NURSE PRACTITIONER

## 2021-11-27 PROCEDURE — 3066F PR DOCUMENTATION OF TREATMENT FOR NEPHROPATHY: ICD-10-PCS | Mod: CPTII,95,, | Performed by: NURSE PRACTITIONER

## 2021-11-27 PROCEDURE — 99213 PR OFFICE/OUTPT VISIT, EST, LEVL III, 20-29 MIN: ICD-10-PCS | Mod: 95,,, | Performed by: NURSE PRACTITIONER

## 2021-11-27 PROCEDURE — 4010F ACE/ARB THERAPY RXD/TAKEN: CPT | Mod: CPTII,95,, | Performed by: NURSE PRACTITIONER

## 2021-11-27 PROCEDURE — 99213 OFFICE O/P EST LOW 20 MIN: CPT | Mod: 95,,, | Performed by: NURSE PRACTITIONER

## 2021-11-27 PROCEDURE — 3061F NEG MICROALBUMINURIA REV: CPT | Mod: CPTII,95,, | Performed by: NURSE PRACTITIONER

## 2021-11-27 PROCEDURE — 3066F NEPHROPATHY DOC TX: CPT | Mod: CPTII,95,, | Performed by: NURSE PRACTITIONER

## 2021-11-27 PROCEDURE — 4010F PR ACE/ARB THEARPY RXD/TAKEN: ICD-10-PCS | Mod: CPTII,95,, | Performed by: NURSE PRACTITIONER

## 2021-12-06 DIAGNOSIS — E11.29 TYPE 2 DIABETES MELLITUS WITH MICROALBUMINURIA, WITH LONG-TERM CURRENT USE OF INSULIN: ICD-10-CM

## 2021-12-06 DIAGNOSIS — Z79.4 TYPE 2 DIABETES MELLITUS WITH MICROALBUMINURIA, WITH LONG-TERM CURRENT USE OF INSULIN: ICD-10-CM

## 2021-12-06 DIAGNOSIS — R80.9 TYPE 2 DIABETES MELLITUS WITH MICROALBUMINURIA, WITH LONG-TERM CURRENT USE OF INSULIN: ICD-10-CM

## 2021-12-06 RX ORDER — INSULIN GLARGINE 300 U/ML
50 INJECTION, SOLUTION SUBCUTANEOUS DAILY
Qty: 6 PEN | Refills: 1 | Status: SHIPPED | OUTPATIENT
Start: 2021-12-06 | End: 2022-02-02

## 2021-12-07 ENCOUNTER — LAB VISIT (OUTPATIENT)
Dept: LAB | Facility: HOSPITAL | Age: 56
End: 2021-12-07
Attending: FAMILY MEDICINE
Payer: MEDICARE

## 2021-12-07 DIAGNOSIS — R80.9 TYPE 2 DIABETES MELLITUS WITH MICROALBUMINURIA, WITH LONG-TERM CURRENT USE OF INSULIN: ICD-10-CM

## 2021-12-07 DIAGNOSIS — Z79.4 TYPE 2 DIABETES MELLITUS WITH MICROALBUMINURIA, WITH LONG-TERM CURRENT USE OF INSULIN: ICD-10-CM

## 2021-12-07 DIAGNOSIS — E11.29 TYPE 2 DIABETES MELLITUS WITH MICROALBUMINURIA, WITH LONG-TERM CURRENT USE OF INSULIN: ICD-10-CM

## 2021-12-07 LAB
ALBUMIN SERPL BCP-MCNC: 4.2 G/DL (ref 3.5–5.2)
ALP SERPL-CCNC: 171 U/L (ref 55–135)
ALT SERPL W/O P-5'-P-CCNC: 17 U/L (ref 10–44)
ANION GAP SERPL CALC-SCNC: 11 MMOL/L (ref 8–16)
AST SERPL-CCNC: 17 U/L (ref 10–40)
BILIRUB SERPL-MCNC: 0.5 MG/DL (ref 0.1–1)
BUN SERPL-MCNC: 11 MG/DL (ref 6–20)
CALCIUM SERPL-MCNC: 9.4 MG/DL (ref 8.7–10.5)
CHLORIDE SERPL-SCNC: 101 MMOL/L (ref 95–110)
CO2 SERPL-SCNC: 29 MMOL/L (ref 23–29)
CREAT SERPL-MCNC: 0.9 MG/DL (ref 0.5–1.4)
EST. GFR  (AFRICAN AMERICAN): >60 ML/MIN/1.73 M^2
EST. GFR  (NON AFRICAN AMERICAN): >60 ML/MIN/1.73 M^2
ESTIMATED AVG GLUCOSE: 166 MG/DL (ref 68–131)
GLUCOSE SERPL-MCNC: 136 MG/DL (ref 70–110)
HBA1C MFR BLD: 7.4 % (ref 4–5.6)
POTASSIUM SERPL-SCNC: 3.7 MMOL/L (ref 3.5–5.1)
PROT SERPL-MCNC: 6.9 G/DL (ref 6–8.4)
SODIUM SERPL-SCNC: 141 MMOL/L (ref 136–145)

## 2021-12-07 PROCEDURE — 83036 HEMOGLOBIN GLYCOSYLATED A1C: CPT | Performed by: FAMILY MEDICINE

## 2021-12-07 PROCEDURE — 36415 COLL VENOUS BLD VENIPUNCTURE: CPT | Mod: PO | Performed by: NURSE PRACTITIONER

## 2021-12-07 PROCEDURE — 80053 COMPREHEN METABOLIC PANEL: CPT | Performed by: NURSE PRACTITIONER

## 2021-12-09 ENCOUNTER — PATIENT MESSAGE (OUTPATIENT)
Dept: OTHER | Facility: OTHER | Age: 56
End: 2021-12-09
Payer: MEDICARE

## 2021-12-22 ENCOUNTER — HOSPITAL ENCOUNTER (OUTPATIENT)
Dept: RADIOLOGY | Facility: HOSPITAL | Age: 56
Discharge: HOME | End: 2021-12-22
Attending: FAMILY MEDICINE
Payer: MEDICARE

## 2021-12-22 ENCOUNTER — PATIENT MESSAGE (OUTPATIENT)
Dept: FAMILY MEDICINE | Facility: CLINIC | Age: 56
End: 2021-12-22
Payer: MEDICARE

## 2021-12-22 ENCOUNTER — TELEPHONE (OUTPATIENT)
Dept: FAMILY MEDICINE | Facility: CLINIC | Age: 56
End: 2021-12-22
Payer: MEDICARE

## 2021-12-22 DIAGNOSIS — R11.0 NAUSEA: ICD-10-CM

## 2021-12-22 PROCEDURE — 78264 GASTRIC EMPTYING IMG STUDY: CPT | Mod: TC

## 2022-01-07 ENCOUNTER — PATIENT MESSAGE (OUTPATIENT)
Dept: FAMILY MEDICINE | Facility: CLINIC | Age: 57
End: 2022-01-07
Payer: MEDICARE

## 2022-01-13 ENCOUNTER — PATIENT OUTREACH (OUTPATIENT)
Dept: ADMINISTRATIVE | Facility: OTHER | Age: 57
End: 2022-01-13
Payer: MEDICARE

## 2022-01-26 ENCOUNTER — PATIENT MESSAGE (OUTPATIENT)
Dept: FAMILY MEDICINE | Facility: CLINIC | Age: 57
End: 2022-01-26
Payer: MEDICARE

## 2022-01-27 ENCOUNTER — PATIENT MESSAGE (OUTPATIENT)
Dept: INFECTIOUS DISEASES | Facility: HOSPITAL | Age: 57
End: 2022-01-27
Payer: MEDICARE

## 2022-01-27 DIAGNOSIS — U07.1 COVID-19 VIRUS INFECTION: Primary | ICD-10-CM

## 2022-01-31 ENCOUNTER — PATIENT MESSAGE (OUTPATIENT)
Dept: FAMILY MEDICINE | Facility: CLINIC | Age: 57
End: 2022-01-31
Payer: MEDICARE

## 2022-01-31 RX ORDER — VALACYCLOVIR HYDROCHLORIDE 1 G/1
1000 TABLET, FILM COATED ORAL 2 TIMES DAILY
Qty: 14 TABLET | Refills: 3 | Status: SHIPPED | OUTPATIENT
Start: 2022-01-31 | End: 2022-12-28 | Stop reason: ALTCHOICE

## 2022-02-02 DIAGNOSIS — R11.0 NAUSEA: ICD-10-CM

## 2022-02-02 RX ORDER — FLUTICASONE PROPIONATE 50 MCG
SPRAY, SUSPENSION (ML) NASAL
Qty: 48 G | Refills: 3 | Status: SHIPPED | OUTPATIENT
Start: 2022-02-02 | End: 2023-01-23

## 2022-02-02 NOTE — TELEPHONE ENCOUNTER
Care Due:                  Date            Visit Type   Department     Provider  --------------------------------------------------------------------------------                                MYCHART                              FOLLOWUP/OF  [facility] INTERNAL  Last Visit: 11-      FICE VISIT   MEDICINE       Scotty Figueroa  Next Visit: None Scheduled  None         None Found                                                            Last  Test          Frequency    Reason                     Performed    Due Date  --------------------------------------------------------------------------------    CBC.........  12 months..  valACYclovir.............  04- 03-    Powered by [employer]. Reference number: [id number].   2/02/2022 9:41:18 AM CST

## 2022-02-02 NOTE — TELEPHONE ENCOUNTER
Refill Routing Note   Medication(s) are not appropriate for processing by [facility] for the following reason(s):      - Outside of protocol    [facility] action(s):  Route  Approve Medication-related problems identified: Requires labs     Medication Therapy Plan: Route ondansetron; Approve fluticasone   --->Care Gap information included in message below if applicable.   Medication reconciliation completed: No   Automatic Epic Generated Protocol Data:    Orders Placed This Encounter    fluticasone propionate (FLONASE) 50 mcg/actuation nasal spray      Requested Prescriptions   Pending Prescriptions Disp Refills    ondansetron (ZOFRAN-ODT) 4 MG TbDL [Pharmacy Med Name: ondansetron 4 mg disintegrating tablet] 20 tablet 1     Sig: DISSOLVE ONE TABLET BY MOUTH EVERY 6 HOURS AS NEEDED FOR NAUSEA       Gastroenterology: Antiemetics - ondansetron Failed - 2/2/2022  9:40 AM        Failed - This refill cannot be delegated        Passed - Valid encounter within last 6 months     Recent Visits  Date Type Provider Dept   11/10/21 Office Visit Scotty Figueroa MD [facility] Internal Medicine   08/27/21 Office Visit Scotty Figueroa MD [facility] Medicine   04/22/21 Office Visit Scotty Figueroa MD [facility] Medicine   04/07/21 Office Visit Scotty Figueroa MD [facility] Medicine   12/22/20 Office Visit Scotty Figueroa MD [facility] Medicine   11/30/20 Office Visit Scotty Figueroa MD [facility] Medicine   06/23/20 Office Visit Scotty Figueroa MD [facility] Medicine   05/12/20 Office Visit Scotty Figueroa MD [facility] Medicine   04/15/20 Office Visit Scotty Figueroa MD [facility] Internal Medicine   Showing recent visits within past 720 days and meeting all other requirements  Future Appointments  No visits were found meeting these conditions.  Showing future appointments within next 150 days and meeting all other requirements      Future Appointments              Tomorrow [facility] M[facility]  [facility], [facility]    In 2 months Renetta Whitlock, PhD, NPJAYA CAMARGO - Diabetes Mgmt, [facility]                 Signed Prescriptions Disp Refills    fluticasone propionate (FLONASE) 50 mcg/actuation nasal spray 48 g 3     Sig: USE TWO SPRAYS IN EACH NOSTRIL ONCE DAILY       Ear, Nose, and Throat: Nasal Preparations - Corticosteroids Passed - 2/2/2022  9:40 AM        Passed - Patient is at least 18 years old        Passed - Valid encounter within last 15 months     Recent Visits  Date Type Provider Dept   11/10/21 Office Visit Scotty Figueroa MD [facility] Internal Medicine   08/27/21 Office Visit Scotty Figueroa MD [facility] Internal Medicine   04/22/21 Office Visit Scotty Figueroa MD [facility] Internal Medicine   04/07/21 Office Visit Scotty Figueroa MD [facility] Medicine   12/22/20 Office Visit Scotty Figueroa MD [facility] Medicine   11/30/20 Office Visit Scotty Figueroa MD [facility] Medicine   06/23/20 Office Visit Scotty Figueroa MD [facility] Internal Medicine   05/12/20 Office Visit Scotty Figueroa MD [facility] Internal Medicine   04/15/20 Office Visit Scotty Figueroa MD [facility] Internal Medicine   Showing recent visits within past 720 days and meeting all other requirements  Future Appointments  No visits were found meeting these conditions.  Showing future appointments within next 150 days and meeting all other requirements      Future Appointments              Tomorrow [facility], [facility]    In 2 months Renetta Whitlock, PhD, NP-C [facility] - Diabetes [facility], [facility]                      Appointments  past 12m or future 3m with PCP    Date Provider   Last Visit   11/10/2021 Scotty Figueroa MD   Next Visit   Visit date not found Scotty Figueroa MD   ED visits in past 90 days: 0        Note composed:4:47 PM 02/02/2022

## 2022-02-03 RX ORDER — ONDANSETRON 4 MG/1
TABLET, ORALLY DISINTEGRATING ORAL
Qty: 20 TABLET | Refills: 1 | Status: SHIPPED | OUTPATIENT
Start: 2022-02-03 | End: 2022-04-04

## 2022-02-04 ENCOUNTER — PATIENT MESSAGE (OUTPATIENT)
Dept: FAMILY MEDICINE | Facility: CLINIC | Age: 57
End: 2022-02-04
Payer: MEDICARE

## 2022-02-23 ENCOUNTER — PATIENT MESSAGE (OUTPATIENT)
Dept: DIABETES | Facility: CLINIC | Age: 57
End: 2022-02-23
Payer: MEDICARE

## 2022-02-24 ENCOUNTER — PATIENT MESSAGE (OUTPATIENT)
Dept: FAMILY MEDICINE | Facility: CLINIC | Age: 57
End: 2022-02-24
Payer: MEDICARE

## 2022-02-25 NOTE — TELEPHONE ENCOUNTER
MD Carolina Iniguez Staff 23 minutes ago (1:49 PM)         Please reassure her that was probably a positional loss of blood circulation.  And to let me know if it happens again in other circumstances    Message text

## 2022-03-02 ENCOUNTER — PATIENT MESSAGE (OUTPATIENT)
Dept: OTHER | Facility: OTHER | Age: 57
End: 2022-03-02
Payer: MEDICARE

## 2022-03-03 NOTE — TELEPHONE ENCOUNTER
Care Due:                  Date            Visit Type   Department     Provider  --------------------------------------------------------------------------------                                MYCHART                              FOLLOWUP/OF  [facility] INTERNAL  Last Visit: 11-      FICE VISIT   MEDICINE       Scotty Figueroa  Next Visit: None Scheduled  None         None Found                                                            Last  Test          Frequency    Reason                     Performed    Due Date  --------------------------------------------------------------------------------    Lipid Panel.  12 months..  atorvastatin.............  05- 05-    Powered by [employer]. Reference number: [id number].   3/03/2022 8:18:56 AM CST

## 2022-03-04 RX ORDER — DICLOFENAC SODIUM 10 MG/G
GEL TOPICAL
Qty: 100 G | Refills: 3 | Status: SHIPPED | OUTPATIENT
Start: 2022-03-04 | End: 2022-07-01

## 2022-03-04 RX ORDER — LEVOTHYROXINE SODIUM 88 UG/1
88 TABLET ORAL DAILY
Qty: 90 TABLET | Refills: 1 | Status: SHIPPED | OUTPATIENT
Start: 2022-03-04 | End: 2022-06-28 | Stop reason: SDUPTHER

## 2022-03-07 DIAGNOSIS — Z79.4 TYPE 2 DIABETES MELLITUS WITH MICROALBUMINURIA, WITH LONG-TERM CURRENT USE OF INSULIN: Primary | ICD-10-CM

## 2022-03-07 DIAGNOSIS — R80.9 TYPE 2 DIABETES MELLITUS WITH MICROALBUMINURIA, WITH LONG-TERM CURRENT USE OF INSULIN: Primary | ICD-10-CM

## 2022-03-07 DIAGNOSIS — E11.29 TYPE 2 DIABETES MELLITUS WITH MICROALBUMINURIA, WITH LONG-TERM CURRENT USE OF INSULIN: Primary | ICD-10-CM

## 2022-03-11 DIAGNOSIS — E11.29 TYPE 2 DIABETES MELLITUS WITH MICROALBUMINURIA, WITH LONG-TERM CURRENT USE OF INSULIN: Primary | ICD-10-CM

## 2022-03-11 DIAGNOSIS — Z79.4 TYPE 2 DIABETES MELLITUS WITH MICROALBUMINURIA, WITH LONG-TERM CURRENT USE OF INSULIN: Primary | ICD-10-CM

## 2022-03-11 DIAGNOSIS — R80.9 TYPE 2 DIABETES MELLITUS WITH MICROALBUMINURIA, WITH LONG-TERM CURRENT USE OF INSULIN: Primary | ICD-10-CM

## 2022-03-14 DIAGNOSIS — Z79.4 TYPE 2 DIABETES MELLITUS WITH MICROALBUMINURIA, WITH LONG-TERM CURRENT USE OF INSULIN: Primary | ICD-10-CM

## 2022-03-14 DIAGNOSIS — E11.29 TYPE 2 DIABETES MELLITUS WITH MICROALBUMINURIA, WITH LONG-TERM CURRENT USE OF INSULIN: Primary | ICD-10-CM

## 2022-03-14 DIAGNOSIS — R80.9 TYPE 2 DIABETES MELLITUS WITH MICROALBUMINURIA, WITH LONG-TERM CURRENT USE OF INSULIN: Primary | ICD-10-CM

## 2022-03-14 RX ORDER — GLIMEPIRIDE 4 MG/1
TABLET ORAL
Qty: 180 TABLET | Refills: 1 | Status: SHIPPED | OUTPATIENT
Start: 2022-03-14 | End: 2022-08-26

## 2022-03-14 NOTE — TELEPHONE ENCOUNTER
Refill Routing Note   Medication(s) are not appropriate for processing by [facility] for the following reason(s):      - Pt is followed by [facility]    [facility] action(s):  Defer          --->Care Gap information included in message below if applicable.   Medication reconciliation completed: No   Automatic Epic Generated Protocol Data:        Requested Prescriptions   Pending Prescriptions Disp Refills    glimepiride (AMARYL) 4 MG tablet [Pharmacy Med Name: glimepiride 4 mg tablet] 180 tablet 1     Sig: TAKE ONE TABLET BY MOUTH TWICE DAILY BEFORE MEALS       Endocrinology:  Diabetes - Sulfonylureas Passed - 3/7/2022  7:39 AM        Passed - Patient is at least 18 years old        Passed - Valid encounter within last 15 months     Recent Visits  Date Type Provider Dept   11/10/21 Office Visit Scotty Figueroa MD [facility] Internal Medicine   08/27/21 Office Visit Scotty Figueroa MD [facility] Internal Medicine   04/22/21 Office Visit Scotty Figueroa MD [facility] Internal Medicine   04/07/21 Office Visit Scotty Figueroa MD [facility] Internal Medicine   12/22/20 Office Visit Scotty Figueroa MD [facility] Internal Medicine   11/30/20 Office Visit Scotty Figueroa MD [facility] Internal Medicine   06/23/20 Office Visit Scotty Figueroa MD [facility] Internal Medicine   05/12/20 Office Visit Scotty Figueroa MD [facility] Internal Medicine   04/15/20 Office Visit Scotty Figueroa MD [facility] Internal Medicine   Showing recent visits within past 720 days and meeting all other requirements  Future Appointments  No visits were found meeting these conditions.  Showing future appointments within next 150 days and meeting all other requirements      Future Appointments              In 1 week Marisa Carrillo RD, CDE [facility] - Diabetes Education,  [facility]    In 1 month Renetta Whitlock, PhD, NP-C [facility] - Diabetes Mgmt, [facility]                Passed - HBA1C within 180 days     Lab Results   Component Value Date    HGBA1C 7.4 (H)  12/07/2021    HGBA1C 7.2 (H) 08/27/2021    HGBA1C 7.7 (H) 05/27/2021              Passed - Cr is 1.39 or below and within 360 days     Lab Results   Component Value Date    CREATININE 0.9 12/07/2021    CREATININE 1.0 05/27/2021    CREATININE 1.1 04/05/2021              Passed - eGFR is 59 or above and within 360 days     Lab Results   Component Value Date    EGFRNONAA >60.0 12/07/2021    EGFRNONAA >60.0 05/27/2021    EGFRNONAA 56.3 (A) 04/05/2021                      Appointments  past 12m or future 3m with PCP    Date Provider   Last Visit   11/10/2021 Scotty Figueroa MD   Next Visit   3/14/2022 Scotty Figueroa MD   ED visits in past 90 days: 0        Note composed:1:21 PM 03/14/2022

## 2022-03-14 NOTE — TELEPHONE ENCOUNTER
No new care gaps identified.  Powered by [employer]. Reference number: [id number].   3/14/2022 1:22:53 PM CDT

## 2022-04-04 DIAGNOSIS — R11.0 NAUSEA: ICD-10-CM

## 2022-04-04 RX ORDER — ONDANSETRON 4 MG/1
TABLET, ORALLY DISINTEGRATING ORAL
Qty: 20 TABLET | Refills: 1 | Status: SHIPPED | OUTPATIENT
Start: 2022-04-04 | End: 2022-06-02

## 2022-04-04 NOTE — TELEPHONE ENCOUNTER
Care Due:                  Date            Visit Type   Department     Provider  --------------------------------------------------------------------------------                                MYCHART                              FOLLOWUP/OF  [facility] INTERNAL  Last Visit: 11-      FICE VISIT   MEDICINE       Scotty Figueroa  Next Visit: None Scheduled  None         None Found                                                            Last  Test          Frequency    Reason                     Performed    Due Date  --------------------------------------------------------------------------------    CBC.........  12 months..  valACYclovir.............  04- 03-    Powered by [employer]. Reference number: [id number].   4/04/2022 8:25:42 AM CDT

## 2022-04-06 ENCOUNTER — HOSPITAL ENCOUNTER (EMERGENCY)
Facility: HOSPITAL | Age: 57
Discharge: HOME | End: 2022-04-06
Attending: EMERGENCY MEDICINE
Payer: MEDICARE

## 2022-04-06 ENCOUNTER — PATIENT MESSAGE (OUTPATIENT)
Dept: DIABETES | Facility: CLINIC | Age: 57
End: 2022-04-06

## 2022-04-06 ENCOUNTER — OFFICE VISIT (OUTPATIENT)
Dept: DIABETES | Facility: CLINIC | Age: 57
End: 2022-04-06
Payer: MEDICARE

## 2022-04-06 VITALS
SYSTOLIC BLOOD PRESSURE: 122 MMHG | HEIGHT: 58 IN | BODY MASS INDEX: 37.05 KG/M2 | WEIGHT: 176.5 LBS | DIASTOLIC BLOOD PRESSURE: 74 MMHG

## 2022-04-06 VITALS
TEMPERATURE: 98 F | OXYGEN SATURATION: 99 % | BODY MASS INDEX: 35.53 KG/M2 | RESPIRATION RATE: 20 BRPM | WEIGHT: 170 LBS | SYSTOLIC BLOOD PRESSURE: 150 MMHG | DIASTOLIC BLOOD PRESSURE: 90 MMHG | HEART RATE: 100 BPM

## 2022-04-06 DIAGNOSIS — I10 ESSENTIAL HYPERTENSION: ICD-10-CM

## 2022-04-06 DIAGNOSIS — E78.5 DYSLIPIDEMIA: ICD-10-CM

## 2022-04-06 DIAGNOSIS — Z04.1 ENCOUNTER FOR EXAMINATION FOLLOWING MOTOR VEHICLE COLLISION (MVC): ICD-10-CM

## 2022-04-06 DIAGNOSIS — E11.29 TYPE 2 DIABETES MELLITUS WITH MICROALBUMINURIA, WITH LONG-TERM CURRENT USE OF INSULIN: Primary | ICD-10-CM

## 2022-04-06 DIAGNOSIS — Z79.4 TYPE 2 DIABETES MELLITUS WITH MICROALBUMINURIA, WITH LONG-TERM CURRENT USE OF INSULIN: Primary | ICD-10-CM
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
## 2022-04-06 DIAGNOSIS — R80.9 TYPE 2 DIABETES MELLITUS WITH MICROALBUMINURIA, WITH LONG-TERM CURRENT USE OF INSULIN: Primary | ICD-10-CM

## 2022-04-06 LAB — GLUCOSE SERPL-MCNC: 172 MG/DL (ref 70–110)

## 2022-04-06 PROCEDURE — 99214 OFFICE O/P EST MOD 30 MIN: CPT | Mod: S$GLB,,, | Performed by: NURSE PRACTITIONER

## 2022-04-06 PROCEDURE — 99284 EMERGENCY DEPT VISIT MOD MDM: CPT | Mod: 25

## 2022-04-06 PROCEDURE — 4010F ACE/ARB THERAPY RXD/TAKEN: CPT | Mod: CPTII,S$GLB,, | Performed by: NURSE PRACTITIONER

## 2022-04-06 PROCEDURE — 82962 GLUCOSE BLOOD TEST: CPT | Mod: S$GLB,,, | Performed by: NURSE PRACTITIONER

## 2022-04-06 PROCEDURE — 1159F MED LIST DOCD IN RCRD: CPT | Mod: CPTII,S$GLB,, | Performed by: NURSE PRACTITIONER

## 2022-04-06 PROCEDURE — 3051F PR MOST RECENT HEMOGLOBIN A1C LEVEL 7.0 - < 8.0%: ICD-10-PCS | Mod: CPTII,S$GLB,, | Performed by: NURSE PRACTITIONER

## 2022-04-06 PROCEDURE — 1159F PR MEDICATION LIST DOCUMENTED IN MEDICAL RECORD: ICD-10-PCS | Mod: CPTII,S$GLB,, | Performed by: NURSE PRACTITIONER

## 2022-04-06 PROCEDURE — 3078F PR MOST RECENT DIASTOLIC BLOOD PRESSURE < 80 MM HG: ICD-10-PCS | Mod: CPTII,S$GLB,, | Performed by: NURSE PRACTITIONER

## 2022-04-06 PROCEDURE — 3078F DIAST BP <80 MM HG: CPT | Mod: CPTII,S$GLB,, | Performed by: NURSE PRACTITIONER

## 2022-04-06 PROCEDURE — 82962 POCT GLUCOSE, HAND-HELD DEVICE: ICD-10-PCS | Mod: S$GLB,,, | Performed by: NURSE PRACTITIONER

## 2022-04-06 PROCEDURE — 3074F SYST BP LT 130 MM HG: CPT | Mod: CPTII,S$GLB,, | Performed by: NURSE PRACTITIONER

## 2022-04-06 PROCEDURE — 3051F HG A1C>EQUAL 7.0%<8.0%: CPT | Mod: CPTII,S$GLB,, | Performed by: NURSE PRACTITIONER

## 2022-04-06 PROCEDURE — 99999 PR PBB SHADOW E&M-EST. PATIENT-LVL III: CPT | Mod: PBBFAC,,, | Performed by: NURSE PRACTITIONER

## 2022-04-06 PROCEDURE — 3074F PR MOST RECENT SYSTOLIC BLOOD PRESSURE < 130 MM HG: ICD-10-PCS | Mod: CPTII,S$GLB,, | Performed by: NURSE PRACTITIONER

## 2022-04-06 PROCEDURE — 3008F PR BODY MASS INDEX (BMI) DOCUMENTED: ICD-10-PCS | Mod: CPTII,S$GLB,, | Performed by: NURSE PRACTITIONER

## 2022-04-06 PROCEDURE — 4010F PR ACE/ARB THEARPY RXD/TAKEN: ICD-10-PCS | Mod: CPTII,S$GLB,, | Performed by: NURSE PRACTITIONER

## 2022-04-06 PROCEDURE — 99999 PR PBB SHADOW E&M-EST. PATIENT-LVL III: ICD-10-PCS | Mod: PBBFAC,,, | Performed by: NURSE PRACTITIONER

## 2022-04-06 PROCEDURE — 99214 PR OFFICE/OUTPT VISIT, EST, LEVL IV, 30-39 MIN: ICD-10-PCS | Mod: S$GLB,,, | Performed by: NURSE PRACTITIONER

## 2022-04-06 PROCEDURE — 3008F BODY MASS INDEX DOCD: CPT | Mod: CPTII,S$GLB,, | Performed by: NURSE PRACTITIONER

## 2022-04-06 RX ORDER — VORTIOXETINE 20 MG/1
20 TABLET, FILM COATED ORAL
COMMUNITY
End: 2022-08-24

## 2022-04-06 RX ORDER — DEUTETRABENAZINE 9 MG/1
9 TABLET, COATED ORAL 2 TIMES DAILY
COMMUNITY

## 2022-04-06 RX ORDER — DAPAGLIFLOZIN 5 MG/1
5 TABLET, FILM COATED ORAL DAILY
Qty: 30 TABLET | Refills: 0 | Status: SHIPPED | OUTPATIENT
Start: 2022-04-06 | End: 2022-04-30

## 2022-04-06 NOTE — PROGRESS NOTES
Patient, Bianca Weldon (MRN #[id number]), presented with a recorded BMI of 36.88 kg/m^2 and a documented comorbidity(s):  - Diabetes Mellitus Type 2  - Hypertension  - Hyperlipidemia  to which the severe obesity is a contributing factor. This is consistent with the definition of severe obesity (BMI 35.0-39.9) with comorbidity (ICD-10 E66.01, Z68.35). The patient's severe obesity was monitored, evaluated, addressed and/or treated. This addendum to the medical record is made on 04/06/2022.

## 2022-04-06 NOTE — PROGRESS NOTES
"PCP: Scotty Figueroa MD    Subjective:     Chief Complaint: Diabetes Follow Up    HISTORY OF PRESENT ILLNESS: 57 y.o.  female presenting for diabetes follow up. Patient has had Type II diabetes since 2014 and has the following complications: neuropathy, gastroparesis.  She has attended diabetes education in the past. Other pertinent conditions: HTN, HLD, IBD, ISELA, and bipolar disorder ( followed by psychiatry ). Of note, she has a history of cortisone injections for plantar fasciitis.       Past tried and failed medications include: Invokana ( recurrent yeast infection ); metformin caused GI side effects; and Trulicity caused abdominal pain and discomfort.     Blood glucose testing is performed regularly.  Per recall, fasting BGs are 170 s - 180 s. She denies any recent hospital admissions, emergency room visits, hypoglycemia, or syncope.  She is currently enrolled in digital diabetes program.     Blood glucose in clinic: 172 mg/dl     Vitals:    04/06/22 0824   BP: 122/74   Weight: 80 kg (176 lb 7.7 oz)   Height: 4' 10" (1.473 m)   BMI 36.88    Labs Reviewed.       No results found for: CPEPTIDE  No results found for: GLUTAMICACID  No results found for: HUMANINSULIN    DM MEDICATIONS:   Toujeo 46 units daily ( at 9 pm )  Januvia 100 mg daily  Glimepiride 4 mg BID ac ( before lunch and dinner )    Review of Systems   Constitutional: Negative for appetite change, diaphoresis, fatigue and unexpected weight change.   Eyes: Negative for visual disturbance.   Gastrointestinal: Positive for nausea (chronic, intermittent). Negative for abdominal pain, constipation and diarrhea.   Endocrine: Negative for polydipsia, polyphagia and polyuria.   Neurological: Positive for dizziness and numbness. Negative for headaches.   Psychiatric/Behavioral: Negative for confusion and decreased concentration. The patient is not nervous/anxious.        Diabetes Management Status  Statin: Taking  ACE/ARB: Not taking    Screening or " Prevention Patient's value Goal Complete/Controlled?   HgA1C Testing and Control   Lab Results   Component Value Date    HGBA1C 7.4 (H) 2021      Annually/Less than 8% No   Lipid profile : 2021 Annually No   LDL control Lab Results   Component Value Date    LDLCALC 65.4 2021    Annually/Less than 100 mg/dl  No   Nephropathy screening Lab Results   Component Value Date    MICALBCREAT 16.4 2021     Lab Results   Component Value Date    PROTEINUA Negative 2015    Annually No   Blood pressure BP Readings from Last 1 Encounters:   22 122/74    Less than 140/90 Yes   Dilated retinal exam : 2021 Annually Yes, [facility], patient will schedule appointment.   Foot exam   : 2022 Annually Yes     ACTIVITY LEVEL: Rarely Active. Discussed activities, benefits, methods, and precautions.  MEAL PLANNING: Patient reports number of meals per day to be 3 and number of snacks per day to be 2.   Patient is encouraged to carb count and consume no more than 45 - 60 grams of carbohydrates in each meal, and 1800 k / richard per day.      BLOOD GLUCOSE TESTIN times daily  SOCIAL HISTORY: .  Former smoker.  Her mother lives with her, but sister just unexpectedly passed away.     Objective:      Physical Exam  Constitutional:       Appearance: She is well-developed.   HENT:      Head: Normocephalic and atraumatic.   Eyes:      Pupils: Pupils are equal, round, and reactive to light.   Cardiovascular:      Rate and Rhythm: Normal rate and regular rhythm.      Pulses:           Dorsalis pedis pulses are 2+ on the right side and 2+ on the left side.   Pulmonary:      Effort: Pulmonary effort is normal.      Breath sounds: Normal breath sounds.   Musculoskeletal:         General: Normal range of motion.      Cervical back: Normal range of motion.   Feet:      Right foot:      Protective Sensation: 6 sites tested. 6 sites sensed.      Skin integrity: No ulcer, callus or dry skin.       Left foot:      Protective Sensation: 6 sites tested. 6 sites sensed.      Skin integrity: No ulcer, blister, callus or dry skin.   Skin:     General: Skin is warm and dry.      Findings: No rash.   Psychiatric:         Behavior: Behavior normal.         Thought Content: Thought content normal.         Judgment: Judgment normal.       Assessment / Plan:     1.) Type 2 diabetes mellitus with microalbuminuria, with long-term current use of insulin  Comments:  - Condition improving. Continue Toujeo 46 units daily.  Continue Januvia 100 mg daily. Continue glimepiride 4 mg before lunch.  Option for diabetes medications are limited.  In the past, she has tried several DM  medications: including Invokana ( recurrent yeast infection ); metformin caused GI side effects; and Trulicity caused abdominal pain and discomfort.  We discussed trying an alternative SGLT-2 medication.  I  explained MOA and potential side effects.  Start Farxiga 5 mg daily.  I advised to drink at least 64 oz of water per day and monitor for symptoms of bladder or yeast infection.    Orders:  -     Future Labs scheduled: CMP, A1C  - POCT-glucose    2.) Essential hypertension    3.) Dyslipidemia - continue statin therapy    4.) BMI 36.0 - 36.9, adult - encouraged lifestyle changes    Additional Plan Details:    1.) Continue monitoring blood sugar 2 x daily, fasting and ac dinner or at bedtime. Discussed ADA goal for fasting blood sugar, 80 - 130 mg/dL; pp blood sugars below 180 mg/dl. Also, discussed prevention of hypoglycemia and the need to adjust goals to higher levels if persistent hypoglycemia.  Reminded to bring BG records or meter to each visit for review.  2.) Return to clinic in 4 months for follow up. The patient was explained the above plan and given opportunity to ask questions.  She understands, chooses and consents to this plan and accepts all the risks, which include but are not limited to the risks mentioned above. She understands the  alternative of having no testing, interventions or treatments at this time. She left content and without further questions.     Renetta Whitlock, CHANELC, CDE    A total of 30 minutes was spent in face to face time, of which over 50 % was spent in counseling patient on disease process, complications, treatment, and side effects of medications.
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
## 2022-04-12 ENCOUNTER — PES CALL (OUTPATIENT)
Dept: ADMINISTRATIVE | Facility: CLINIC | Age: 57
End: 2022-04-12
Payer: MEDICARE

## 2022-04-26 ENCOUNTER — PATIENT MESSAGE (OUTPATIENT)
Dept: ADMINISTRATIVE | Facility: HOSPITAL | Age: 57
End: 2022-04-26
Payer: MEDICARE

## 2022-04-26 NOTE — LETTER
AUTHORIZATION FOR RELEASE OF   CONFIDENTIAL INFORMATION      We are seeing Bianca Weldon, date of birth 1965, in the clinic at [facility] INTERNAL MEDICINE. Scotty Figueroa MD is the patient's PCP. Bianca Weldon has an outstanding lab/procedure at the time we reviewed her chart. In order to help keep her health information updated, she has authorized us to request the following medical record(s):        (  )  MAMMOGRAM                                      (  )  COLONOSCOPY      (  )  PAP SMEAR                                          (  )  OUTSIDE LAB RESULTS     (  )  DEXA SCAN                                          ( x ) DM EYE EXAM  Today 2022          (  )  FOOT EXAM                                          (  )  ENTIRE RECORD     (  )  OUTSIDE IMMUNIZATIONS                 (  )  _______________         Please fax records to [facility], Brian P Schulte, MD, [phone]     If you have any questions, please contact   Mary Lou PARTIDA [facility] at [phone]        Patient Name: Bianca Weldon  : 1965  Patient Phone #: [phone]

## 2022-05-02 LAB
LEFT EYE DM RETINOPATHY: NEGATIVE
RIGHT EYE DM RETINOPATHY: NEGATIVE

## 2022-05-04 RX ORDER — SITAGLIPTIN 100 MG/1
TABLET, FILM COATED ORAL
Qty: 90 TABLET | Refills: 1 | Status: SHIPPED | OUTPATIENT
Start: 2022-05-04 | End: 2022-10-25

## 2022-05-04 NOTE — TELEPHONE ENCOUNTER
Care Due:                  Date            Visit Type   Department     Provider  --------------------------------------------------------------------------------                                MYCHART                              FOLLOWUP/OF  [facility] INTERNAL  Last Visit: 11-      FICE VISIT   MEDICINE       Scotty Figueroa  Next Visit: None Scheduled  None         None Found                                                            Last  Test          Frequency    Reason                     Performed    Due Date  --------------------------------------------------------------------------------    Lipid Panel.  12 months..  atorvastatin.............  05- 05-    [facility] Embedded Care Gaps. Reference number: [id number]. 5/04/2022   8:36:49 AM CDT  
Refill Routing Note   Medication(s) are not appropriate for processing by [facility] for the following reason(s):      - Patient has been seen in the ED/Hospital since the last PCP visit    [facility] action(s):  Defer          Medication reconciliation completed: No     Appointments  past 12m or future 3m with PCP    Date Provider   Last Visit   11/10/2021 Scotty Figueroa MD   Next Visit   Visit date not found Scotty Figueroa MD   ED visits in past 90 days: 1        Note composed:8:44 AM 05/04/2022           
no deformity, pain or tenderness. no restriction of movement

## 2022-05-05 NOTE — TELEPHONE ENCOUNTER
Duplicate 3/1/2021 eye exam received. Eye exam completed 5/2/2022.     2nd attempt  ARON faxed to [facility]SGarry 1x to request dm eye exam.

## 2022-05-09 NOTE — TELEPHONE ENCOUNTER
No new care gaps identified.  [facility] Embedded Care Gaps. Reference number: [id number]. 5/09/2022   4:47:26 PM CDT

## 2022-05-09 NOTE — TELEPHONE ENCOUNTER
Refill Routing Note   Medication(s) are not appropriate for processing by [facility] for the following reason(s):      - Patient has been seen in the ED/Hospital since the last PCP visit    [facility] action(s):  Defer          Medication reconciliation completed: No     Appointments  past 12m or future 3m with PCP    Date Provider   Last Visit   11/10/2021 Scotty Figueroa MD   Next Visit   Visit date not found Scotty Figueroa MD   ED visits in past 90 days: 1        Note composed:4:47 PM 05/09/2022

## 2022-05-10 RX ORDER — ATORVASTATIN CALCIUM 40 MG/1
TABLET, FILM COATED ORAL
Qty: 90 TABLET | Refills: 1 | Status: SHIPPED | OUTPATIENT
Start: 2022-05-10 | End: 2022-10-25

## 2022-05-23 ENCOUNTER — PATIENT MESSAGE (OUTPATIENT)
Dept: ADMINISTRATIVE | Facility: HOSPITAL | Age: 57
End: 2022-05-23
Payer: MEDICARE

## 2022-06-02 DIAGNOSIS — R11.0 NAUSEA: ICD-10-CM

## 2022-06-02 RX ORDER — ONDANSETRON 4 MG/1
TABLET, ORALLY DISINTEGRATING ORAL
Qty: 20 TABLET | Refills: 1 | Status: SHIPPED | OUTPATIENT
Start: 2022-06-02 | End: 2022-07-29

## 2022-06-02 NOTE — TELEPHONE ENCOUNTER
Care Due:                  Date            Visit Type   Department     Provider  --------------------------------------------------------------------------------                                MYCHART                              FOLLOWUP/OF  [facility] INTERNAL  Last Visit: 11-      FICE VISIT   MEDICINE       Scotty Figueroa  Next Visit: None Scheduled  None         None Found                                                            Last  Test          Frequency    Reason                     Performed    Due Date  --------------------------------------------------------------------------------    TSH.........  12 months..  levothyroxine............  08- 08-    [facility] Embedded Care Gaps. Reference number: [id number]. 6/02/2022   8:06:56 AM CDT

## 2022-06-09 NOTE — TELEPHONE ENCOUNTER
Care Due:                  Date            Visit Type   Department     Provider  --------------------------------------------------------------------------------                                MYCHART                              FOLLOWUP/OF  [facility] INTERNAL  Last Visit: 11-      FICE VISIT   MEDICINE       Scotty Figueroa  Next Visit: None Scheduled  None         None Found                                                            Last  Test          Frequency    Reason                     Performed    Due Date  --------------------------------------------------------------------------------    CBC.........  12 months..  valACYclovir.............  04- 03-    [facility] Embedded Care Gaps. Reference number: [id number]. 6/09/2022   3:59:49 PM CDT

## 2022-06-09 NOTE — TELEPHONE ENCOUNTER
Refill Routing Note   Medication(s) are not appropriate for processing by [facility] for the following reason(s):      - Required vitals are abnormal  - Patient has been seen in the ED/Hospital since the last PCP visit    [facility] action(s):  Route Medication-related problems identified: Requires labs     Medication Therapy Plan: CDMR.LABS(CBC)  Medication reconciliation completed: No     Appointments  past 12m or future 3m with PCP    Date Provider   Last Visit   11/10/2021 Scotty Figueroa MD   Next Visit   Visit date not found Scotty Figueroa MD   ED visits in past 90 days: 1        Note composed:4:19 PM 06/09/2022

## 2022-06-10 RX ORDER — LOSARTAN POTASSIUM 25 MG/1
TABLET ORAL
Qty: 90 TABLET | Refills: 0 | Status: SHIPPED | OUTPATIENT
Start: 2022-06-10 | End: 2022-08-26

## 2022-06-24 ENCOUNTER — OFFICE VISIT (OUTPATIENT)
Dept: FAMILY MEDICINE | Facility: CLINIC | Age: 57
End: 2022-06-24
Payer: MEDICARE

## 2022-06-24 VITALS
SYSTOLIC BLOOD PRESSURE: 122 MMHG | HEIGHT: 58 IN | HEART RATE: 106 BPM | DIASTOLIC BLOOD PRESSURE: 72 MMHG | TEMPERATURE: 99 F | BODY MASS INDEX: 36.28 KG/M2 | WEIGHT: 172.81 LBS | OXYGEN SATURATION: 95 %

## 2022-06-24 DIAGNOSIS — J44.89 ASTHMATIC BRONCHITIS , CHRONIC: ICD-10-CM

## 2022-06-24 DIAGNOSIS — E66.01 MORBID (SEVERE) OBESITY DUE TO EXCESS CALORIES: ICD-10-CM

## 2022-06-24 DIAGNOSIS — Z79.899 POLYPHARMACY: ICD-10-CM

## 2022-06-24 DIAGNOSIS — Z79.4 TYPE 2 DIABETES MELLITUS WITH MICROALBUMINURIA, WITH LONG-TERM CURRENT USE OF INSULIN: Primary | ICD-10-CM

## 2022-06-24 DIAGNOSIS — G47.33 OSA ON CPAP: ICD-10-CM

## 2022-06-24 DIAGNOSIS — E11.29 TYPE 2 DIABETES MELLITUS WITH MICROALBUMINURIA, WITH LONG-TERM CURRENT USE OF INSULIN: Primary | ICD-10-CM

## 2022-06-24 DIAGNOSIS — R80.9 TYPE 2 DIABETES MELLITUS WITH MICROALBUMINURIA, WITH LONG-TERM CURRENT USE OF INSULIN: Primary | ICD-10-CM

## 2022-06-24 DIAGNOSIS — E03.9 HYPOTHYROIDISM, UNSPECIFIED TYPE: ICD-10-CM

## 2022-06-24 DIAGNOSIS — I10 ESSENTIAL HYPERTENSION: ICD-10-CM

## 2022-06-24 DIAGNOSIS — G90.3 NEUROGENIC ORTHOSTATIC HYPOTENSION: ICD-10-CM

## 2022-06-24 DIAGNOSIS — E78.5 DYSLIPIDEMIA: ICD-10-CM

## 2022-06-24 DIAGNOSIS — K58.9 IRRITABLE BOWEL SYNDROME, UNSPECIFIED TYPE: ICD-10-CM

## 2022-06-24 DIAGNOSIS — F25.0 SCHIZOAFFECTIVE DISORDER, BIPOLAR TYPE: ICD-10-CM

## 2022-06-24 DIAGNOSIS — K31.84 GASTROPARESIS: ICD-10-CM

## 2022-06-24 DIAGNOSIS — R74.8 ELEVATED ALKALINE PHOSPHATASE LEVEL: ICD-10-CM

## 2022-06-24 PROCEDURE — 3008F BODY MASS INDEX DOCD: CPT | Mod: CPTII,S$GLB,, | Performed by: FAMILY MEDICINE

## 2022-06-24 PROCEDURE — 3051F PR MOST RECENT HEMOGLOBIN A1C LEVEL 7.0 - < 8.0%: ICD-10-PCS | Mod: CPTII,S$GLB,, | Performed by: FAMILY MEDICINE

## 2022-06-24 PROCEDURE — 3008F PR BODY MASS INDEX (BMI) DOCUMENTED: ICD-10-PCS | Mod: CPTII,S$GLB,, | Performed by: FAMILY MEDICINE

## 2022-06-24 PROCEDURE — 3078F PR MOST RECENT DIASTOLIC BLOOD PRESSURE < 80 MM HG: ICD-10-PCS | Mod: CPTII,S$GLB,, | Performed by: FAMILY MEDICINE

## 2022-06-24 PROCEDURE — 3074F SYST BP LT 130 MM HG: CPT | Mod: CPTII,S$GLB,, | Performed by: FAMILY MEDICINE

## 2022-06-24 PROCEDURE — 99499 RISK ADDL DX/OHS AUDIT: ICD-10-PCS | Mod: S$PBB,,, | Performed by: FAMILY MEDICINE

## 2022-06-24 PROCEDURE — 99499 UNLISTED E&M SERVICE: CPT | Mod: S$PBB,,, | Performed by: FAMILY MEDICINE

## 2022-06-24 PROCEDURE — 99999 PR PBB SHADOW E&M-EST. PATIENT-LVL III: ICD-10-PCS | Mod: PBBFAC,,, | Performed by: FAMILY MEDICINE

## 2022-06-24 PROCEDURE — 99215 OFFICE O/P EST HI 40 MIN: CPT | Mod: S$GLB,,, | Performed by: FAMILY MEDICINE

## 2022-06-24 PROCEDURE — 3078F DIAST BP <80 MM HG: CPT | Mod: CPTII,S$GLB,, | Performed by: FAMILY MEDICINE

## 2022-06-24 PROCEDURE — 99999 PR PBB SHADOW E&M-EST. PATIENT-LVL III: CPT | Mod: PBBFAC,,, | Performed by: FAMILY MEDICINE

## 2022-06-24 PROCEDURE — 4010F PR ACE/ARB THEARPY RXD/TAKEN: ICD-10-PCS | Mod: CPTII,S$GLB,, | Performed by: FAMILY MEDICINE

## 2022-06-24 PROCEDURE — 4010F ACE/ARB THERAPY RXD/TAKEN: CPT | Mod: CPTII,S$GLB,, | Performed by: FAMILY MEDICINE

## 2022-06-24 PROCEDURE — 3051F HG A1C>EQUAL 7.0%<8.0%: CPT | Mod: CPTII,S$GLB,, | Performed by: FAMILY MEDICINE

## 2022-06-24 PROCEDURE — 3074F PR MOST RECENT SYSTOLIC BLOOD PRESSURE < 130 MM HG: ICD-10-PCS | Mod: CPTII,S$GLB,, | Performed by: FAMILY MEDICINE

## 2022-06-24 PROCEDURE — 99215 PR OFFICE/OUTPT VISIT, EST, LEVL V, 40-54 MIN: ICD-10-PCS | Mod: S$GLB,,, | Performed by: FAMILY MEDICINE
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
## 2022-06-27 ENCOUNTER — LAB VISIT (OUTPATIENT)
Dept: LAB | Facility: HOSPITAL | Age: 57
End: 2022-06-27
Attending: FAMILY MEDICINE
Payer: MEDICARE

## 2022-06-27 ENCOUNTER — TELEPHONE (OUTPATIENT)
Dept: GASTROENTEROLOGY | Facility: CLINIC | Age: 57
End: 2022-06-27
Payer: MEDICARE

## 2022-06-27 DIAGNOSIS — E78.5 DYSLIPIDEMIA: ICD-10-CM

## 2022-06-27 DIAGNOSIS — E03.9 HYPOTHYROIDISM, UNSPECIFIED TYPE: ICD-10-CM

## 2022-06-27 LAB
CHOLEST SERPL-MCNC: 113 MG/DL (ref 120–199)
CHOLEST/HDLC SERPL: 2.4 {RATIO} (ref 2–5)
HDLC SERPL-MCNC: 48 MG/DL (ref 40–75)
HDLC SERPL: 42.5 % (ref 20–50)
LDLC SERPL CALC-MCNC: 51.6 MG/DL (ref 63–159)
NONHDLC SERPL-MCNC: 65 MG/DL
T4 FREE SERPL-MCNC: 1.3 NG/DL (ref 0.71–1.51)
TRIGL SERPL-MCNC: 67 MG/DL (ref 30–150)
TSH SERPL DL<=0.005 MIU/L-ACNC: 0.05 UIU/ML (ref 0.4–4)

## 2022-06-27 PROCEDURE — 80061 LIPID PANEL: CPT | Performed by: FAMILY MEDICINE

## 2022-06-27 PROCEDURE — 84439 ASSAY OF FREE THYROXINE: CPT | Performed by: FAMILY MEDICINE

## 2022-06-27 PROCEDURE — 84443 ASSAY THYROID STIM HORMONE: CPT | Performed by: FAMILY MEDICINE

## 2022-06-27 PROCEDURE — 36415 COLL VENOUS BLD VENIPUNCTURE: CPT | Mod: PO | Performed by: FAMILY MEDICINE

## 2022-06-28 RX ORDER — LEVOTHYROXINE SODIUM 75 UG/1
75 TABLET ORAL DAILY
Qty: 90 TABLET | Refills: 1 | Status: SHIPPED | OUTPATIENT
Start: 2022-06-28 | End: 2022-10-28 | Stop reason: SDUPTHER

## 2022-07-05 ENCOUNTER — OFFICE VISIT (OUTPATIENT)
Dept: GASTROENTEROLOGY | Facility: CLINIC | Age: 57
End: 2022-07-05
Payer: MEDICARE

## 2022-07-05 VITALS
HEART RATE: 101 BPM | DIASTOLIC BLOOD PRESSURE: 60 MMHG | SYSTOLIC BLOOD PRESSURE: 120 MMHG | BODY MASS INDEX: 36.12 KG/M2 | WEIGHT: 172.06 LBS | HEIGHT: 58 IN | OXYGEN SATURATION: 97 %

## 2022-07-05 DIAGNOSIS — K31.84 GASTROPARESIS: Primary | ICD-10-CM

## 2022-07-05 DIAGNOSIS — R74.8 ELEVATED ALKALINE PHOSPHATASE LEVEL: ICD-10-CM

## 2022-07-05 DIAGNOSIS — K76.0 NAFLD (NONALCOHOLIC FATTY LIVER DISEASE): ICD-10-CM

## 2022-07-05 PROCEDURE — 3051F HG A1C>EQUAL 7.0%<8.0%: CPT | Mod: CPTII,S$GLB,, | Performed by: INTERNAL MEDICINE

## 2022-07-05 PROCEDURE — 3008F BODY MASS INDEX DOCD: CPT | Mod: CPTII,S$GLB,, | Performed by: INTERNAL MEDICINE

## 2022-07-05 PROCEDURE — 1159F MED LIST DOCD IN RCRD: CPT | Mod: CPTII,S$GLB,, | Performed by: INTERNAL MEDICINE

## 2022-07-05 PROCEDURE — 3051F PR MOST RECENT HEMOGLOBIN A1C LEVEL 7.0 - < 8.0%: ICD-10-PCS | Mod: CPTII,S$GLB,, | Performed by: INTERNAL MEDICINE

## 2022-07-05 PROCEDURE — 3078F DIAST BP <80 MM HG: CPT | Mod: CPTII,S$GLB,, | Performed by: INTERNAL MEDICINE

## 2022-07-05 PROCEDURE — 1160F RVW MEDS BY RX/DR IN RCRD: CPT | Mod: CPTII,S$GLB,, | Performed by: INTERNAL MEDICINE

## 2022-07-05 PROCEDURE — 1160F PR REVIEW ALL MEDS BY PRESCRIBER/CLIN PHARMACIST DOCUMENTED: ICD-10-PCS | Mod: CPTII,S$GLB,, | Performed by: INTERNAL MEDICINE

## 2022-07-05 PROCEDURE — 4010F ACE/ARB THERAPY RXD/TAKEN: CPT | Mod: CPTII,S$GLB,, | Performed by: INTERNAL MEDICINE

## 2022-07-05 PROCEDURE — 3008F PR BODY MASS INDEX (BMI) DOCUMENTED: ICD-10-PCS | Mod: CPTII,S$GLB,, | Performed by: INTERNAL MEDICINE

## 2022-07-05 PROCEDURE — 4010F PR ACE/ARB THEARPY RXD/TAKEN: ICD-10-PCS | Mod: CPTII,S$GLB,, | Performed by: INTERNAL MEDICINE

## 2022-07-05 PROCEDURE — 3074F SYST BP LT 130 MM HG: CPT | Mod: CPTII,S$GLB,, | Performed by: INTERNAL MEDICINE

## 2022-07-05 PROCEDURE — 99203 OFFICE O/P NEW LOW 30 MIN: CPT | Mod: S$GLB,,, | Performed by: INTERNAL MEDICINE

## 2022-07-05 PROCEDURE — 3078F PR MOST RECENT DIASTOLIC BLOOD PRESSURE < 80 MM HG: ICD-10-PCS | Mod: CPTII,S$GLB,, | Performed by: INTERNAL MEDICINE

## 2022-07-05 PROCEDURE — 3074F PR MOST RECENT SYSTOLIC BLOOD PRESSURE < 130 MM HG: ICD-10-PCS | Mod: CPTII,S$GLB,, | Performed by: INTERNAL MEDICINE

## 2022-07-05 PROCEDURE — 1159F PR MEDICATION LIST DOCUMENTED IN MEDICAL RECORD: ICD-10-PCS | Mod: CPTII,S$GLB,, | Performed by: INTERNAL MEDICINE

## 2022-07-05 PROCEDURE — 99999 PR PBB SHADOW E&M-EST. PATIENT-LVL V: ICD-10-PCS | Mod: PBBFAC,,, | Performed by: INTERNAL MEDICINE

## 2022-07-05 PROCEDURE — 99203 PR OFFICE/OUTPT VISIT, NEW, LEVL III, 30-44 MIN: ICD-10-PCS | Mod: S$GLB,,, | Performed by: INTERNAL MEDICINE

## 2022-07-05 PROCEDURE — 99999 PR PBB SHADOW E&M-EST. PATIENT-LVL V: CPT | Mod: PBBFAC,,, | Performed by: INTERNAL MEDICINE
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
## 2022-07-13 ENCOUNTER — PATIENT MESSAGE (OUTPATIENT)
Dept: GASTROENTEROLOGY | Facility: CLINIC | Age: 57
End: 2022-07-13
Payer: MEDICARE

## 2022-07-13 ENCOUNTER — HOSPITAL ENCOUNTER (OUTPATIENT)
Dept: RADIOLOGY | Facility: HOSPITAL | Age: 57
Discharge: HOME | End: 2022-07-13
Attending: INTERNAL MEDICINE
Payer: MEDICARE

## 2022-07-13 DIAGNOSIS — R74.8 ELEVATED ALKALINE PHOSPHATASE LEVEL: ICD-10-CM

## 2022-07-13 PROCEDURE — 76700 US EXAM ABDOM COMPLETE: CPT | Mod: 26,,, | Performed by: RADIOLOGY

## 2022-07-13 PROCEDURE — 76700 US EXAM ABDOM COMPLETE: CPT | Mod: TC

## 2022-07-13 PROCEDURE — 76700 US ABDOMEN COMPLETE: ICD-10-PCS | Mod: 26,,, | Performed by: RADIOLOGY

## 2022-07-30 ENCOUNTER — PATIENT MESSAGE (OUTPATIENT)
Dept: GASTROENTEROLOGY | Facility: CLINIC | Age: 57
End: 2022-07-30
Payer: MEDICARE

## 2022-08-03 ENCOUNTER — PATIENT MESSAGE (OUTPATIENT)
Dept: OTHER | Facility: OTHER | Age: 57
End: 2022-08-03
Payer: MEDICARE

## 2022-08-22 ENCOUNTER — PATIENT MESSAGE (OUTPATIENT)
Dept: ADMINISTRATIVE | Facility: OTHER | Age: 57
End: 2022-08-22
Payer: MEDICARE

## 2022-08-22 ENCOUNTER — PATIENT MESSAGE (OUTPATIENT)
Dept: OTHER | Facility: OTHER | Age: 57
End: 2022-08-22
Payer: MEDICARE

## 2022-08-24 ENCOUNTER — OFFICE VISIT (OUTPATIENT)
Dept: DIABETES | Facility: CLINIC | Age: 57
End: 2022-08-24
Payer: MEDICARE

## 2022-08-24 ENCOUNTER — LAB VISIT (OUTPATIENT)
Dept: LAB | Facility: HOSPITAL | Age: 57
End: 2022-08-24
Attending: NURSE PRACTITIONER
Payer: MEDICARE

## 2022-08-24 VITALS
SYSTOLIC BLOOD PRESSURE: 102 MMHG | HEIGHT: 58 IN | BODY MASS INDEX: 35.3 KG/M2 | WEIGHT: 168.19 LBS | DIASTOLIC BLOOD PRESSURE: 73 MMHG

## 2022-08-24 DIAGNOSIS — R80.9 TYPE 2 DIABETES MELLITUS WITH MICROALBUMINURIA, WITH LONG-TERM CURRENT USE OF INSULIN: ICD-10-CM

## 2022-08-24 DIAGNOSIS — E78.5 DYSLIPIDEMIA: ICD-10-CM

## 2022-08-24 DIAGNOSIS — E11.29 TYPE 2 DIABETES MELLITUS WITH MICROALBUMINURIA, WITH LONG-TERM CURRENT USE OF INSULIN: ICD-10-CM

## 2022-08-24 DIAGNOSIS — I10 ESSENTIAL HYPERTENSION: ICD-10-CM

## 2022-08-24 DIAGNOSIS — Z79.4 TYPE 2 DIABETES MELLITUS WITH MICROALBUMINURIA, WITH LONG-TERM CURRENT USE OF INSULIN: ICD-10-CM

## 2022-08-24 DIAGNOSIS — E11.65 TYPE 2 DIABETES MELLITUS WITH HYPERGLYCEMIA, WITH LONG-TERM CURRENT USE OF INSULIN: Primary | ICD-10-CM

## 2022-08-24 DIAGNOSIS — E11.9 TYPE 2 DIABETES MELLITUS WITHOUT COMPLICATION, WITH LONG-TERM CURRENT USE OF INSULIN: ICD-10-CM

## 2022-08-24 DIAGNOSIS — Z79.4 TYPE 2 DIABETES MELLITUS WITHOUT COMPLICATION, WITH LONG-TERM CURRENT USE OF INSULIN: ICD-10-CM

## 2022-08-24 DIAGNOSIS — Z79.4 TYPE 2 DIABETES MELLITUS WITH HYPERGLYCEMIA, WITH LONG-TERM CURRENT USE OF INSULIN: Primary | ICD-10-CM

## 2022-08-24 LAB
ALBUMIN/CREAT UR: 10.9 UG/MG (ref 0–30)
CREAT UR-MCNC: 174 MG/DL (ref 15–325)
GLUCOSE SERPL-MCNC: 102 MG/DL (ref 70–110)
MICROALBUMIN UR DL<=1MG/L-MCNC: 19 UG/ML

## 2022-08-24 PROCEDURE — 3051F PR MOST RECENT HEMOGLOBIN A1C LEVEL 7.0 - < 8.0%: ICD-10-PCS | Mod: CPTII,S$GLB,, | Performed by: NURSE PRACTITIONER

## 2022-08-24 PROCEDURE — 3008F PR BODY MASS INDEX (BMI) DOCUMENTED: ICD-10-PCS | Mod: CPTII,S$GLB,, | Performed by: NURSE PRACTITIONER

## 2022-08-24 PROCEDURE — 3074F SYST BP LT 130 MM HG: CPT | Mod: CPTII,S$GLB,, | Performed by: NURSE PRACTITIONER

## 2022-08-24 PROCEDURE — 3051F HG A1C>EQUAL 7.0%<8.0%: CPT | Mod: CPTII,S$GLB,, | Performed by: NURSE PRACTITIONER

## 2022-08-24 PROCEDURE — 3078F DIAST BP <80 MM HG: CPT | Mod: CPTII,S$GLB,, | Performed by: NURSE PRACTITIONER

## 2022-08-24 PROCEDURE — 1160F PR REVIEW ALL MEDS BY PRESCRIBER/CLIN PHARMACIST DOCUMENTED: ICD-10-PCS | Mod: CPTII,S$GLB,, | Performed by: NURSE PRACTITIONER

## 2022-08-24 PROCEDURE — 99999 PR PBB SHADOW E&M-EST. PATIENT-LVL V: ICD-10-PCS | Mod: PBBFAC,,, | Performed by: NURSE PRACTITIONER

## 2022-08-24 PROCEDURE — 99214 OFFICE O/P EST MOD 30 MIN: CPT | Mod: S$GLB,,, | Performed by: NURSE PRACTITIONER

## 2022-08-24 PROCEDURE — 99999 PR PBB SHADOW E&M-EST. PATIENT-LVL V: CPT | Mod: PBBFAC,,, | Performed by: NURSE PRACTITIONER

## 2022-08-24 PROCEDURE — 4010F PR ACE/ARB THEARPY RXD/TAKEN: ICD-10-PCS | Mod: CPTII,S$GLB,, | Performed by: NURSE PRACTITIONER

## 2022-08-24 PROCEDURE — 1159F MED LIST DOCD IN RCRD: CPT | Mod: CPTII,S$GLB,, | Performed by: NURSE PRACTITIONER

## 2022-08-24 PROCEDURE — 3008F BODY MASS INDEX DOCD: CPT | Mod: CPTII,S$GLB,, | Performed by: NURSE PRACTITIONER

## 2022-08-24 PROCEDURE — 99214 PR OFFICE/OUTPT VISIT, EST, LEVL IV, 30-39 MIN: ICD-10-PCS | Mod: S$GLB,,, | Performed by: NURSE PRACTITIONER

## 2022-08-24 PROCEDURE — 3074F PR MOST RECENT SYSTOLIC BLOOD PRESSURE < 130 MM HG: ICD-10-PCS | Mod: CPTII,S$GLB,, | Performed by: NURSE PRACTITIONER

## 2022-08-24 PROCEDURE — 3078F PR MOST RECENT DIASTOLIC BLOOD PRESSURE < 80 MM HG: ICD-10-PCS | Mod: CPTII,S$GLB,, | Performed by: NURSE PRACTITIONER

## 2022-08-24 PROCEDURE — 4010F ACE/ARB THERAPY RXD/TAKEN: CPT | Mod: CPTII,S$GLB,, | Performed by: NURSE PRACTITIONER

## 2022-08-24 PROCEDURE — 1159F PR MEDICATION LIST DOCUMENTED IN MEDICAL RECORD: ICD-10-PCS | Mod: CPTII,S$GLB,, | Performed by: NURSE PRACTITIONER

## 2022-08-24 PROCEDURE — 82570 ASSAY OF URINE CREATININE: CPT | Performed by: NURSE PRACTITIONER

## 2022-08-24 PROCEDURE — 1160F RVW MEDS BY RX/DR IN RCRD: CPT | Mod: CPTII,S$GLB,, | Performed by: NURSE PRACTITIONER

## 2022-08-24 PROCEDURE — 82962 POCT GLUCOSE, HAND-HELD DEVICE: ICD-10-PCS | Mod: S$GLB,,, | Performed by: NURSE PRACTITIONER

## 2022-08-24 PROCEDURE — 99215 OFFICE O/P EST HI 40 MIN: CPT | Mod: PBBFAC,PO | Performed by: NURSE PRACTITIONER

## 2022-08-24 PROCEDURE — 82962 GLUCOSE BLOOD TEST: CPT | Mod: S$GLB,,, | Performed by: NURSE PRACTITIONER

## 2022-08-24 RX ORDER — INSULIN GLARGINE 300 U/ML
46 INJECTION, SOLUTION SUBCUTANEOUS DAILY
Qty: 2 PEN | Refills: 6 | Status: SHIPPED | OUTPATIENT
Start: 2022-08-24 | End: 2022-10-14 | Stop reason: SDUPTHER

## 2022-08-24 NOTE — PROGRESS NOTES
"PCP: Scotty Figueroa MD    Subjective:     Chief Complaint: Diabetes Follow Up    HISTORY OF PRESENT ILLNESS: 57 y.o.  female presenting for diabetes follow up. Patient has had Type II diabetes since 2014 and has the following complications: neuropathy, gastroparesis. She has attended diabetes education in the past. Other pertinent conditions: HTN, HLD, IBD, ISELA, and schizoaffective disorder ( followed by psychiatry ). Of note, she has a history of cortisone injections for plantar fasciitis.       Past tried and failed medications include: Invokana ( yeast infection ); metformin caused GI side effects; and Trulicity caused abdominal pain and discomfort.  No longer takes glimepiride due to BG normalization.    Blood glucose testing is performed regularly.  Per recall, fasting BGs are  < 68 > 85 - 110 s; pp meals 140 s - 150 s;  hs 70 - 120 s.  She denies any recent hospital admissions, emergency room visits, or syncope.  She is currently enrolled in digital diabetes program ( see episodes tab for readings ).     Blood glucose in clinic: 102 mg/dl     Vitals:    08/24/22 0859   BP: 102/73   Weight: 76.3 kg (168 lb 3.4 oz)   Height: 4' 10" (1.473 m)   BMI 35.16    Labs Reviewed.       No results found for: CPEPTIDE  No results found for: GLUTAMICACID  No results found for: HUMANINSULIN    DM MEDICATIONS:   Toujeo 46 units daily ( at 9 pm )  Januvia 100 mg daily  Farxiga 5 mg daily    Review of Systems   Constitutional: Negative for appetite change, diaphoresis, fatigue and unexpected weight change.   Eyes: Negative for visual disturbance.   Gastrointestinal: Positive for nausea (chronic, intermittent). Negative for abdominal pain, constipation and diarrhea.   Endocrine: Negative for polydipsia, polyphagia and polyuria.   Genitourinary:        Vaginal itching and irritation   Neurological: Positive for numbness. Negative for headaches.   Psychiatric/Behavioral: Negative for confusion and decreased " concentration. The patient is not nervous/anxious.        Diabetes Management Status  Statin: Taking  ACE/ARB: Not taking    Screening or Prevention Patient's value Goal Complete/Controlled?   HgA1C Testing and Control   Lab Results   Component Value Date    HGBA1C 7.2 (H) 2022      Annually/Less than 8% No   Lipid profile : 2022 Annually No   LDL control Lab Results   Component Value Date    LDLCALC 51.6 (L) 2022    Annually/Less than 100 mg/dl  No   Nephropathy screening Lab Results   Component Value Date    MICALBCREAT 16.4 2021     Lab Results   Component Value Date    PROTEINUA Negative 2015    Annually No   Blood pressure BP Readings from Last 1 Encounters:   22 102/73    Less than 140/90 Yes   Dilated retinal exam : 2022 Annually Yes, [facility]   Foot exam   : 2022 Annually Yes     ACTIVITY LEVEL: Rarely Active. Discussed activities, benefits, methods, and precautions.  MEAL PLANNING: Patient reports number of meals per day to be 3 and number of snacks per day to be 2.   Patient is encouraged to carb count and consume no more than 45 - 60 grams of carbohydrates in each meal, and 1800 k / richard per day.      BLOOD GLUCOSE TESTIN times daily  SOCIAL HISTORY: .  Former smoker.  Her mother lives with her, and sister unexpectedly passed away.     Objective:      Physical Exam  Constitutional:       Appearance: She is well-developed.   HENT:      Head: Normocephalic and atraumatic.   Eyes:      Pupils: Pupils are equal, round, and reactive to light.   Cardiovascular:      Rate and Rhythm: Normal rate and regular rhythm.      Pulses:           Dorsalis pedis pulses are 2+ on the right side and 2+ on the left side.   Pulmonary:      Effort: Pulmonary effort is normal.      Breath sounds: Normal breath sounds.   Musculoskeletal:         General: Normal range of motion.      Cervical back: Normal range of motion.   Feet:      Right foot:       Protective Sensation: 6 sites tested. 6 sites sensed.      Skin integrity: No ulcer, callus or dry skin.      Left foot:      Protective Sensation: 6 sites tested. 6 sites sensed.      Skin integrity: No ulcer, blister, callus or dry skin.   Skin:     General: Skin is warm and dry.      Findings: No rash.   Psychiatric:         Behavior: Behavior normal.         Thought Content: Thought content normal.         Judgment: Judgment normal.       Assessment / Plan:     1.) Type 2 diabetes mellitus without complication, with long-term current use of insulin  Comments:  - Condition improving. Start taking Toujeo 46 units daily ( EVERY MORNING ).  Continue Januvia 100 mg daily.  Option for diabetes medications are limited.  In the past, she has tried several DM  medications:  including Invokana ( recurrent yeast infection ); metformin caused GI side effects; and Trulicity caused abdominal pain and discomfort.   Due to recurrent yeast infection, will discontinue  Farxiga.  She is  scheduled to see GYN provider tomorrow and will inquire about treatment for yeast infection then.    Orders:  -     Future Labs scheduled: A1C, micral  - POCT-glucose  -     insulin glargine U-300 conc (TOUJEO MAX U-300 SOLOSTAR) 300 unit/mL (3 mL) insulin pen; Inject 46 Units into the skin once daily.  Dispense: 2 pen; Refill: 6    2.) Essential hypertension - continue medications as prescribed.    3.) Dyslipidemia - continue statin therapy    Additional Plan Details:    1.) Continue monitoring blood sugar 2 x daily, fasting and ac dinner or at bedtime. Discussed ADA goal for fasting blood sugar, 80 - 130 mg/dL; pp blood sugars below 180 mg/dl. Also, discussed prevention of hypoglycemia and the need to adjust goals to higher levels if persistent hypoglycemia.  Reminded to bring BG records or meter to each visit for review.  2.) Return to clinic in 4 months for follow up. The patient was explained the above plan and given opportunity to ask  questions.  She understands, chooses and consents to this plan and accepts all the risks, which include but are not limited to the risks mentioned above. She understands the alternative of having no testing, interventions or treatments at this time. She left content and without further questions.     Renetta Whitlock, NP-C, CDE    A total of 30 minutes was spent in face to face time, of which over 50 % was spent in counseling patient on disease process, complications, treatment, and side effects of medications.

## 2022-08-25 ENCOUNTER — OFFICE VISIT (OUTPATIENT)
Dept: OBSTETRICS AND GYNECOLOGY | Facility: CLINIC | Age: 57
End: 2022-08-25
Payer: MEDICARE

## 2022-08-25 VITALS
BODY MASS INDEX: 35.68 KG/M2 | SYSTOLIC BLOOD PRESSURE: 94 MMHG | HEIGHT: 58 IN | WEIGHT: 170 LBS | DIASTOLIC BLOOD PRESSURE: 70 MMHG

## 2022-08-25 DIAGNOSIS — B37.31 VULVOVAGINAL CANDIDIASIS: ICD-10-CM

## 2022-08-25 DIAGNOSIS — Z01.419 WELL WOMAN EXAM WITH ROUTINE GYNECOLOGICAL EXAM: Primary | ICD-10-CM

## 2022-08-25 DIAGNOSIS — N63.11 BREAST LUMP ON RIGHT SIDE AT 10 O'CLOCK POSITION: ICD-10-CM

## 2022-08-25 DIAGNOSIS — B37.31 CANDIDIASIS OF VULVA AND VAGINA: ICD-10-CM

## 2022-08-25 PROCEDURE — 3074F SYST BP LT 130 MM HG: CPT | Mod: CPTII,S$GLB,, | Performed by: OBSTETRICS & GYNECOLOGY

## 2022-08-25 PROCEDURE — 99999 PR PBB SHADOW E&M-EST. PATIENT-LVL V: ICD-10-PCS | Mod: PBBFAC,,, | Performed by: OBSTETRICS & GYNECOLOGY

## 2022-08-25 PROCEDURE — 3008F BODY MASS INDEX DOCD: CPT | Mod: CPTII,S$GLB,, | Performed by: OBSTETRICS & GYNECOLOGY

## 2022-08-25 PROCEDURE — G0101 CA SCREEN;PELVIC/BREAST EXAM: HCPCS | Mod: S$GLB,,, | Performed by: OBSTETRICS & GYNECOLOGY

## 2022-08-25 PROCEDURE — 1160F RVW MEDS BY RX/DR IN RCRD: CPT | Mod: CPTII,S$GLB,, | Performed by: OBSTETRICS & GYNECOLOGY

## 2022-08-25 PROCEDURE — 3078F DIAST BP <80 MM HG: CPT | Mod: CPTII,S$GLB,, | Performed by: OBSTETRICS & GYNECOLOGY

## 2022-08-25 PROCEDURE — 99999 PR PBB SHADOW E&M-EST. PATIENT-LVL V: CPT | Mod: PBBFAC,,, | Performed by: OBSTETRICS & GYNECOLOGY

## 2022-08-25 PROCEDURE — 4010F ACE/ARB THERAPY RXD/TAKEN: CPT | Mod: CPTII,S$GLB,, | Performed by: OBSTETRICS & GYNECOLOGY

## 2022-08-25 PROCEDURE — 4010F PR ACE/ARB THEARPY RXD/TAKEN: ICD-10-PCS | Mod: CPTII,S$GLB,, | Performed by: OBSTETRICS & GYNECOLOGY

## 2022-08-25 PROCEDURE — 1159F MED LIST DOCD IN RCRD: CPT | Mod: CPTII,S$GLB,, | Performed by: OBSTETRICS & GYNECOLOGY

## 2022-08-25 PROCEDURE — 87210 SMEAR WET MOUNT SALINE/INK: CPT | Mod: QW,S$GLB,, | Performed by: OBSTETRICS & GYNECOLOGY

## 2022-08-25 PROCEDURE — 3074F PR MOST RECENT SYSTOLIC BLOOD PRESSURE < 130 MM HG: ICD-10-PCS | Mod: CPTII,S$GLB,, | Performed by: OBSTETRICS & GYNECOLOGY

## 2022-08-25 PROCEDURE — 1160F PR REVIEW ALL MEDS BY PRESCRIBER/CLIN PHARMACIST DOCUMENTED: ICD-10-PCS | Mod: CPTII,S$GLB,, | Performed by: OBSTETRICS & GYNECOLOGY

## 2022-08-25 PROCEDURE — G0101 PR CA SCREEN;PELVIC/BREAST EXAM: ICD-10-PCS | Mod: S$GLB,,, | Performed by: OBSTETRICS & GYNECOLOGY

## 2022-08-25 PROCEDURE — 3044F HG A1C LEVEL LT 7.0%: CPT | Mod: CPTII,S$GLB,, | Performed by: OBSTETRICS & GYNECOLOGY

## 2022-08-25 PROCEDURE — 87210 PR  SMEAR,STAIN,WET MNT,INTERP: ICD-10-PCS | Mod: QW,S$GLB,, | Performed by: OBSTETRICS & GYNECOLOGY

## 2022-08-25 PROCEDURE — 3061F NEG MICROALBUMINURIA REV: CPT | Mod: CPTII,S$GLB,, | Performed by: OBSTETRICS & GYNECOLOGY

## 2022-08-25 PROCEDURE — 3066F PR DOCUMENTATION OF TREATMENT FOR NEPHROPATHY: ICD-10-PCS | Mod: CPTII,S$GLB,, | Performed by: OBSTETRICS & GYNECOLOGY

## 2022-08-25 PROCEDURE — 3078F PR MOST RECENT DIASTOLIC BLOOD PRESSURE < 80 MM HG: ICD-10-PCS | Mod: CPTII,S$GLB,, | Performed by: OBSTETRICS & GYNECOLOGY

## 2022-08-25 PROCEDURE — 3061F PR NEG MICROALBUMINURIA RESULT DOCUMENTED/REVIEW: ICD-10-PCS | Mod: CPTII,S$GLB,, | Performed by: OBSTETRICS & GYNECOLOGY

## 2022-08-25 PROCEDURE — 3044F PR MOST RECENT HEMOGLOBIN A1C LEVEL <7.0%: ICD-10-PCS | Mod: CPTII,S$GLB,, | Performed by: OBSTETRICS & GYNECOLOGY

## 2022-08-25 PROCEDURE — 3008F PR BODY MASS INDEX (BMI) DOCUMENTED: ICD-10-PCS | Mod: CPTII,S$GLB,, | Performed by: OBSTETRICS & GYNECOLOGY

## 2022-08-25 PROCEDURE — 1159F PR MEDICATION LIST DOCUMENTED IN MEDICAL RECORD: ICD-10-PCS | Mod: CPTII,S$GLB,, | Performed by: OBSTETRICS & GYNECOLOGY

## 2022-08-25 PROCEDURE — 3066F NEPHROPATHY DOC TX: CPT | Mod: CPTII,S$GLB,, | Performed by: OBSTETRICS & GYNECOLOGY

## 2022-08-25 RX ORDER — BUPROPION HYDROCHLORIDE 300 MG/1
300 TABLET ORAL DAILY
COMMUNITY
Start: 2022-08-18

## 2022-08-25 RX ORDER — CYCLOBENZAPRINE HCL 10 MG
10 TABLET ORAL NIGHTLY
COMMUNITY
Start: 2022-07-12

## 2022-08-25 RX ORDER — FLUCONAZOLE 150 MG/1
150 TABLET ORAL EVERY OTHER DAY
Qty: 3 TABLET | Refills: 0 | Status: SHIPPED | OUTPATIENT
Start: 2022-08-25 | End: 2022-08-30

## 2022-08-25 RX ORDER — CLOTRIMAZOLE AND BETAMETHASONE DIPROPIONATE 10; .64 MG/G; MG/G
CREAM TOPICAL
Qty: 15 G | Refills: 1 | Status: SHIPPED | OUTPATIENT
Start: 2022-08-25 | End: 2022-10-24

## 2022-08-25 RX ORDER — ESTRADIOL 0.1 MG/G
1 CREAM VAGINAL
Qty: 4 G | Refills: 5 | Status: SHIPPED | OUTPATIENT
Start: 2022-08-25 | End: 2023-09-20

## 2022-08-25 RX ORDER — IPRATROPIUM BROMIDE AND ALBUTEROL 20; 100 UG/1; UG/1
1 SPRAY, METERED RESPIRATORY (INHALATION)
COMMUNITY
End: 2022-08-31 | Stop reason: SDUPTHER

## 2022-08-25 RX ORDER — LISINOPRIL 20 MG/1
TABLET ORAL
COMMUNITY
End: 2022-08-26

## 2022-08-25 RX ORDER — MELOXICAM 15 MG/1
15 TABLET ORAL DAILY
COMMUNITY
Start: 2022-07-12 | End: 2023-04-12 | Stop reason: ALTCHOICE

## 2022-08-25 RX ORDER — FLUCONAZOLE 150 MG/1
150 TABLET ORAL WEEKLY
Qty: 4 TABLET | Refills: 5 | Status: SHIPPED | OUTPATIENT
Start: 2022-08-25 | End: 2023-03-21
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
## 2022-08-26 ENCOUNTER — OFFICE VISIT (OUTPATIENT)
Dept: FAMILY MEDICINE | Facility: CLINIC | Age: 57
End: 2022-08-26
Payer: MEDICARE

## 2022-08-26 ENCOUNTER — LAB VISIT (OUTPATIENT)
Dept: LAB | Facility: HOSPITAL | Age: 57
End: 2022-08-26
Attending: FAMILY MEDICINE
Payer: MEDICARE

## 2022-08-26 VITALS
SYSTOLIC BLOOD PRESSURE: 122 MMHG | TEMPERATURE: 98 F | OXYGEN SATURATION: 99 % | HEART RATE: 75 BPM | WEIGHT: 167.31 LBS | BODY MASS INDEX: 35.12 KG/M2 | DIASTOLIC BLOOD PRESSURE: 72 MMHG | HEIGHT: 58 IN

## 2022-08-26 DIAGNOSIS — E66.01 MORBID (SEVERE) OBESITY DUE TO EXCESS CALORIES: ICD-10-CM

## 2022-08-26 DIAGNOSIS — F41.1 GAD (GENERALIZED ANXIETY DISORDER): ICD-10-CM

## 2022-08-26 DIAGNOSIS — I10 ESSENTIAL HYPERTENSION: ICD-10-CM

## 2022-08-26 DIAGNOSIS — R06.09 CHRONIC DYSPNEA: Primary | ICD-10-CM

## 2022-08-26 DIAGNOSIS — G47.33 OSA ON CPAP: ICD-10-CM

## 2022-08-26 DIAGNOSIS — Z79.4 TYPE 2 DIABETES MELLITUS WITH MICROALBUMINURIA, WITH LONG-TERM CURRENT USE OF INSULIN: ICD-10-CM

## 2022-08-26 DIAGNOSIS — R74.8 ELEVATED ALKALINE PHOSPHATASE LEVEL: ICD-10-CM

## 2022-08-26 DIAGNOSIS — E03.9 HYPOTHYROIDISM, UNSPECIFIED TYPE: ICD-10-CM

## 2022-08-26 DIAGNOSIS — E11.29 TYPE 2 DIABETES MELLITUS WITH MICROALBUMINURIA, WITH LONG-TERM CURRENT USE OF INSULIN: ICD-10-CM

## 2022-08-26 DIAGNOSIS — K76.0 NAFLD (NONALCOHOLIC FATTY LIVER DISEASE): ICD-10-CM

## 2022-08-26 DIAGNOSIS — R06.02 SOB (SHORTNESS OF BREATH): Primary | ICD-10-CM

## 2022-08-26 DIAGNOSIS — R80.9 TYPE 2 DIABETES MELLITUS WITH MICROALBUMINURIA, WITH LONG-TERM CURRENT USE OF INSULIN: ICD-10-CM

## 2022-08-26 DIAGNOSIS — E78.5 DYSLIPIDEMIA: ICD-10-CM

## 2022-08-26 LAB — TSH SERPL DL<=0.005 MIU/L-ACNC: 1.79 UIU/ML (ref 0.4–4)

## 2022-08-26 PROCEDURE — 3044F HG A1C LEVEL LT 7.0%: CPT | Mod: CPTII,S$GLB,, | Performed by: FAMILY MEDICINE

## 2022-08-26 PROCEDURE — 3078F DIAST BP <80 MM HG: CPT | Mod: CPTII,S$GLB,, | Performed by: FAMILY MEDICINE

## 2022-08-26 PROCEDURE — 36415 COLL VENOUS BLD VENIPUNCTURE: CPT | Mod: PO | Performed by: FAMILY MEDICINE

## 2022-08-26 PROCEDURE — 99214 OFFICE O/P EST MOD 30 MIN: CPT | Mod: S$GLB,,, | Performed by: FAMILY MEDICINE

## 2022-08-26 PROCEDURE — 1159F PR MEDICATION LIST DOCUMENTED IN MEDICAL RECORD: ICD-10-PCS | Mod: CPTII,S$GLB,, | Performed by: FAMILY MEDICINE

## 2022-08-26 PROCEDURE — 3008F BODY MASS INDEX DOCD: CPT | Mod: CPTII,S$GLB,, | Performed by: FAMILY MEDICINE

## 2022-08-26 PROCEDURE — 99214 PR OFFICE/OUTPT VISIT, EST, LEVL IV, 30-39 MIN: ICD-10-PCS | Mod: S$GLB,,, | Performed by: FAMILY MEDICINE

## 2022-08-26 PROCEDURE — 3044F PR MOST RECENT HEMOGLOBIN A1C LEVEL <7.0%: ICD-10-PCS | Mod: CPTII,S$GLB,, | Performed by: FAMILY MEDICINE

## 2022-08-26 PROCEDURE — 3066F PR DOCUMENTATION OF TREATMENT FOR NEPHROPATHY: ICD-10-PCS | Mod: CPTII,S$GLB,, | Performed by: FAMILY MEDICINE

## 2022-08-26 PROCEDURE — 1159F MED LIST DOCD IN RCRD: CPT | Mod: CPTII,S$GLB,, | Performed by: FAMILY MEDICINE

## 2022-08-26 PROCEDURE — 3074F PR MOST RECENT SYSTOLIC BLOOD PRESSURE < 130 MM HG: ICD-10-PCS | Mod: CPTII,S$GLB,, | Performed by: FAMILY MEDICINE

## 2022-08-26 PROCEDURE — 3066F NEPHROPATHY DOC TX: CPT | Mod: CPTII,S$GLB,, | Performed by: FAMILY MEDICINE

## 2022-08-26 PROCEDURE — 3061F PR NEG MICROALBUMINURIA RESULT DOCUMENTED/REVIEW: ICD-10-PCS | Mod: CPTII,S$GLB,, | Performed by: FAMILY MEDICINE

## 2022-08-26 PROCEDURE — 3008F PR BODY MASS INDEX (BMI) DOCUMENTED: ICD-10-PCS | Mod: CPTII,S$GLB,, | Performed by: FAMILY MEDICINE

## 2022-08-26 PROCEDURE — 99999 PR PBB SHADOW E&M-EST. PATIENT-LVL V: CPT | Mod: PBBFAC,,, | Performed by: FAMILY MEDICINE

## 2022-08-26 PROCEDURE — 3074F SYST BP LT 130 MM HG: CPT | Mod: CPTII,S$GLB,, | Performed by: FAMILY MEDICINE

## 2022-08-26 PROCEDURE — 3061F NEG MICROALBUMINURIA REV: CPT | Mod: CPTII,S$GLB,, | Performed by: FAMILY MEDICINE

## 2022-08-26 PROCEDURE — 4010F ACE/ARB THERAPY RXD/TAKEN: CPT | Mod: CPTII,S$GLB,, | Performed by: FAMILY MEDICINE

## 2022-08-26 PROCEDURE — 84443 ASSAY THYROID STIM HORMONE: CPT | Performed by: FAMILY MEDICINE

## 2022-08-26 PROCEDURE — 99999 PR PBB SHADOW E&M-EST. PATIENT-LVL V: ICD-10-PCS | Mod: PBBFAC,,, | Performed by: FAMILY MEDICINE

## 2022-08-26 PROCEDURE — 4010F PR ACE/ARB THEARPY RXD/TAKEN: ICD-10-PCS | Mod: CPTII,S$GLB,, | Performed by: FAMILY MEDICINE

## 2022-08-26 PROCEDURE — 3078F PR MOST RECENT DIASTOLIC BLOOD PRESSURE < 80 MM HG: ICD-10-PCS | Mod: CPTII,S$GLB,, | Performed by: FAMILY MEDICINE

## 2022-08-26 RX ORDER — FLUDROCORTISONE ACETATE 0.1 MG/1
100 TABLET ORAL DAILY
COMMUNITY
Start: 2022-08-24

## 2022-08-26 RX ORDER — VORTIOXETINE 20 MG/1
1 TABLET, FILM COATED ORAL DAILY
COMMUNITY
Start: 2022-08-24 | End: 2022-08-26

## 2022-08-26 RX ORDER — BENZTROPINE MESYLATE 0.5 MG/1
0.5 TABLET ORAL 2 TIMES DAILY
COMMUNITY
Start: 2022-08-24 | End: 2022-08-26

## 2022-08-26 RX ORDER — DAPAGLIFLOZIN 5 MG/1
5 TABLET, FILM COATED ORAL DAILY
COMMUNITY
Start: 2022-08-24 | End: 2022-10-14 | Stop reason: SINTOL
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
## 2022-08-31 ENCOUNTER — OFFICE VISIT (OUTPATIENT)
Dept: PULMONOLOGY | Facility: CLINIC | Age: 57
End: 2022-08-31
Payer: MEDICARE

## 2022-08-31 ENCOUNTER — CLINICAL SUPPORT (OUTPATIENT)
Dept: PULMONOLOGY | Facility: CLINIC | Age: 57
End: 2022-08-31
Payer: MEDICARE

## 2022-08-31 ENCOUNTER — HOSPITAL ENCOUNTER (OUTPATIENT)
Dept: RADIOLOGY | Facility: HOSPITAL | Age: 57
Discharge: HOME | End: 2022-08-31
Attending: INTERNAL MEDICINE
Payer: MEDICARE

## 2022-08-31 VITALS
OXYGEN SATURATION: 96 % | RESPIRATION RATE: 14 BRPM | WEIGHT: 167.31 LBS | BODY MASS INDEX: 35.12 KG/M2 | HEART RATE: 77 BPM | SYSTOLIC BLOOD PRESSURE: 100 MMHG | DIASTOLIC BLOOD PRESSURE: 62 MMHG | HEIGHT: 58 IN

## 2022-08-31 DIAGNOSIS — R06.02 SOB (SHORTNESS OF BREATH): ICD-10-CM

## 2022-08-31 DIAGNOSIS — G47.429 NARCOLEPSY DUE TO UNDERLYING CONDITION WITHOUT CATAPLEXY: ICD-10-CM

## 2022-08-31 DIAGNOSIS — J45.998 OTHER ASTHMA: ICD-10-CM

## 2022-08-31 DIAGNOSIS — G47.33 OSA ON CPAP: ICD-10-CM

## 2022-08-31 DIAGNOSIS — J44.89 ASTHMATIC BRONCHITIS , CHRONIC: Primary | ICD-10-CM

## 2022-08-31 PROCEDURE — 71046 X-RAY EXAM CHEST 2 VIEWS: CPT | Mod: TC

## 2022-08-31 PROCEDURE — 94060 EVALUATION OF WHEEZING: CPT | Mod: S$GLB,,, | Performed by: INTERNAL MEDICINE

## 2022-08-31 PROCEDURE — 3044F PR MOST RECENT HEMOGLOBIN A1C LEVEL <7.0%: ICD-10-PCS | Mod: CPTII,S$GLB,, | Performed by: INTERNAL MEDICINE

## 2022-08-31 PROCEDURE — 94060 PR EVAL OF BRONCHOSPASM: ICD-10-PCS | Mod: S$GLB,,, | Performed by: INTERNAL MEDICINE

## 2022-08-31 PROCEDURE — 71046 X-RAY EXAM CHEST 2 VIEWS: CPT | Mod: 26,,, | Performed by: RADIOLOGY

## 2022-08-31 PROCEDURE — 4010F PR ACE/ARB THEARPY RXD/TAKEN: ICD-10-PCS | Mod: CPTII,S$GLB,, | Performed by: INTERNAL MEDICINE

## 2022-08-31 PROCEDURE — 4010F ACE/ARB THERAPY RXD/TAKEN: CPT | Mod: CPTII,S$GLB,, | Performed by: INTERNAL MEDICINE

## 2022-08-31 PROCEDURE — 99999 PR PBB SHADOW E&M-EST. PATIENT-LVL V: ICD-10-PCS | Mod: PBBFAC,,, | Performed by: INTERNAL MEDICINE

## 2022-08-31 PROCEDURE — 99999 PR PBB SHADOW E&M-EST. PATIENT-LVL V: CPT | Mod: PBBFAC,,, | Performed by: INTERNAL MEDICINE

## 2022-08-31 PROCEDURE — 3061F NEG MICROALBUMINURIA REV: CPT | Mod: CPTII,S$GLB,, | Performed by: INTERNAL MEDICINE

## 2022-08-31 PROCEDURE — 1159F PR MEDICATION LIST DOCUMENTED IN MEDICAL RECORD: ICD-10-PCS | Mod: CPTII,S$GLB,, | Performed by: INTERNAL MEDICINE

## 2022-08-31 PROCEDURE — 3074F SYST BP LT 130 MM HG: CPT | Mod: CPTII,S$GLB,, | Performed by: INTERNAL MEDICINE

## 2022-08-31 PROCEDURE — 3066F NEPHROPATHY DOC TX: CPT | Mod: CPTII,S$GLB,, | Performed by: INTERNAL MEDICINE

## 2022-08-31 PROCEDURE — 99214 OFFICE O/P EST MOD 30 MIN: CPT | Mod: 25,S$GLB,, | Performed by: INTERNAL MEDICINE

## 2022-08-31 PROCEDURE — 3074F PR MOST RECENT SYSTOLIC BLOOD PRESSURE < 130 MM HG: ICD-10-PCS | Mod: CPTII,S$GLB,, | Performed by: INTERNAL MEDICINE

## 2022-08-31 PROCEDURE — 3008F BODY MASS INDEX DOCD: CPT | Mod: CPTII,S$GLB,, | Performed by: INTERNAL MEDICINE

## 2022-08-31 PROCEDURE — 99214 PR OFFICE/OUTPT VISIT, EST, LEVL IV, 30-39 MIN: ICD-10-PCS | Mod: 25,S$GLB,, | Performed by: INTERNAL MEDICINE

## 2022-08-31 PROCEDURE — 3061F PR NEG MICROALBUMINURIA RESULT DOCUMENTED/REVIEW: ICD-10-PCS | Mod: CPTII,S$GLB,, | Performed by: INTERNAL MEDICINE

## 2022-08-31 PROCEDURE — 3044F HG A1C LEVEL LT 7.0%: CPT | Mod: CPTII,S$GLB,, | Performed by: INTERNAL MEDICINE

## 2022-08-31 PROCEDURE — 1159F MED LIST DOCD IN RCRD: CPT | Mod: CPTII,S$GLB,, | Performed by: INTERNAL MEDICINE

## 2022-08-31 PROCEDURE — 71046 XR CHEST PA AND LATERAL: ICD-10-PCS | Mod: 26,,, | Performed by: RADIOLOGY

## 2022-08-31 PROCEDURE — 3078F PR MOST RECENT DIASTOLIC BLOOD PRESSURE < 80 MM HG: ICD-10-PCS | Mod: CPTII,S$GLB,, | Performed by: INTERNAL MEDICINE

## 2022-08-31 PROCEDURE — 3008F PR BODY MASS INDEX (BMI) DOCUMENTED: ICD-10-PCS | Mod: CPTII,S$GLB,, | Performed by: INTERNAL MEDICINE

## 2022-08-31 PROCEDURE — 3078F DIAST BP <80 MM HG: CPT | Mod: CPTII,S$GLB,, | Performed by: INTERNAL MEDICINE

## 2022-08-31 PROCEDURE — 3066F PR DOCUMENTATION OF TREATMENT FOR NEPHROPATHY: ICD-10-PCS | Mod: CPTII,S$GLB,, | Performed by: INTERNAL MEDICINE

## 2022-08-31 RX ORDER — FLUTICASONE FUROATE AND VILANTEROL 200; 25 UG/1; UG/1
1 POWDER RESPIRATORY (INHALATION) DAILY
Qty: 1 EACH | Refills: 5 | Status: SHIPPED | OUTPATIENT
Start: 2022-08-31 | End: 2022-10-07

## 2022-08-31 RX ORDER — IPRATROPIUM BROMIDE AND ALBUTEROL 20; 100 UG/1; UG/1
2 SPRAY, METERED RESPIRATORY (INHALATION) EVERY 4 HOURS PRN
Qty: 4 G | Refills: 3 | Status: SHIPPED | OUTPATIENT
Start: 2022-08-31 | End: 2023-09-20

## 2022-08-31 NOTE — PROGRESS NOTES
Pulmonary Outpatient Follow Up Visit     Subjective:    This patient is new to me.  Previous records with colleagues including office visits, testing were personally reviewed.  Outside records under care everywhere if available that includes office visits and testing were reviewed personally as well.     Patient ID: Bianca Weldon is a 57 y.o. female.    Chief Complaint: Shortness of Breath (REV JUVE AND CXR/)      HPI        57-year-old female patient presenting for 2 years follow-up f    Complains of coughing intermittent wheezing and SOB.  Not well controlled asthma, has been on Breo 100 mcg 1 puff daily for few years, quit using it for a while and started having relapsing symptoms in April 2022 she resumed Breo 100 mcg.  Currently uses albuterol ATROVENT 2 times on average a day and has nocturnal awakening for albuterol ATROVENT usage on average 3 times a week.           Smoked for 5 years quit in 2018.      Worked as a teacher.      Does have a brother who is a nonsmoker with asthma and a grandson with asthma.      Review of Systems   Constitutional:  Positive for fatigue. Negative for fever and chills.   HENT:  Negative for nosebleeds.    Eyes:  Negative for redness.   Respiratory:  Positive for apnea, snoring, cough, shortness of breath, wheezing, use of rescue inhaler and Paroxysmal Nocturnal Dyspnea. Negative for choking.    Cardiovascular:  Negative for chest pain.   Genitourinary:  Negative for hematuria.   Endocrine:  Negative for cold intolerance.    Musculoskeletal:  Positive for arthralgias.   Gastrointestinal:  Negative for vomiting.   Neurological:  Negative for syncope.   Hematological:  Negative for adenopathy.   Psychiatric/Behavioral:  Positive for sleep disturbance. Negative for confusion.      Outpatient Encounter Medications as of 8/31/2022   Medication Sig Dispense Refill    ascorbic acid, vitamin C, (VITAMIN C) 250 MG tablet Take 250 mg by mouth  once daily.      aspirin (ECOTRIN) 81 MG EC tablet Take 81 mg by mouth once daily.       atorvastatin (LIPITOR) 40 MG tablet TAKE ONE TABLET BY MOUTH EVERY DAY 90 tablet 1    blood sugar diagnostic Strp Inject 1 each into the skin 3 (three) times daily. Dispense preferred on insurance 100 strip 11    clindamycin (CLEOCIN T) 1 % external solution APPLY TO THE AFFECTED AREA TWICE DAILY AS NEEDED FOR ACE/ FOLLICULITIS ON FACE AND SCALP 60 mL 4    clotrimazole-betamethasone 1-0.05% (LOTRISONE) cream Apply to affected area 2 times daily for 5 days 15 g 1    cyanocobalamin (VITAMIN B-12) 1000 MCG tablet Take 1,000 mcg by mouth once daily.       cyclobenzaprine (FLEXERIL) 10 MG tablet Take 10 mg by mouth every evening.      cycloSPORINE (RESTASIS) 0.05 % ophthalmic emulsion Place 1 drop into both eyes 2 (two) times daily as needed.       deutetrabenazine (AUSTEDO) 9 mg Tab Take 9 mg by mouth 2 (two) times daily.      diclofenac sodium (VOLTAREN) 1 % Gel APPLY FOUR GRAMS TOPICALLY EVERY DAY AS DIRECTED 100 g 3    estradioL (ESTRACE) 0.01 % (0.1 mg/gram) vaginal cream Place 1 g vaginally every 7 days. 4 g 5    FARXIGA 5 mg Tab tablet Take 5 mg by mouth once daily.      fluconazole (DIFLUCAN) 150 MG Tab Take 1 tablet (150 mg total) by mouth once a week. After completing 3 dose diflucan therapy 4 tablet 5    fludrocortisone (FLORINEF) 0.1 mg Tab Take 100 mcg by mouth once daily.      fluticasone propionate (FLONASE) 50 mcg/actuation nasal spray USE TWO SPRAYS IN EACH NOSTRIL ONCE DAILY 48 g 3    glimepiride (AMARYL) 4 MG tablet Take 1 tablet (4 mg total) by mouth 2 (two) times daily before meals. 180 tablet 1    hydrOXYzine pamoate (VISTARIL) 25 MG Cap Take 1 capsule (25 mg total) by mouth nightly as needed (itching). 20 capsule 0    insulin glargine U-300 conc (TOUJEO MAX U-300 SOLOSTAR) 300 unit/mL (3 mL) insulin pen Inject 46 Units into the skin once daily. 2 pen 6    JANUVIA 100 mg Tab TAKE ONE TABLET BY MOUTH EVERY DAY 90  "tablet 1    lamoTRIgine (LAMICTAL) 200 MG tablet Take 1 tablet by mouth 2 (two) times daily.      levothyroxine (SYNTHROID) 75 MCG tablet Take 1 tablet (75 mcg total) by mouth once daily. 90 tablet 1    LORazepam (ATIVAN) 1 MG tablet Take 1 tablet (1 mg total) by mouth 2 (two) times daily as needed for Anxiety (takes nightly). 60 tablet 2    losartan (COZAAR) 25 MG tablet Take 1 tablet (25 mg total) by mouth once daily. 90 tablet 2    meloxicam (MOBIC) 15 MG tablet Take 15 mg by mouth once daily.      multivitamin capsule Take 1 capsule by mouth once daily.      MYRBETRIQ 50 mg Tb24 Take 50 mg by mouth 2 (two) times daily as needed.       ondansetron (ZOFRAN-ODT) 4 MG TbDL DISSOLVE 1 TABLET ON THE TONGUE EVERY 6 HOURS AS NEEDED FOR NAUSEA 20 tablet 1    pantoprazole (PROTONIX) 40 MG tablet TAKE 1 TABLET BY MOUTH DAILY 90 tablet 2    pen needle, diabetic 31 gauge x 3/16" Ndle USE DAILY      SUNOSI 150 mg Tab Take 1 tablet by mouth once daily.      valACYclovir (VALTREX) 1000 MG tablet Take 1 tablet (1,000 mg total) by mouth 2 (two) times daily. 14 tablet 3    vitamin D (VITAMIN D3) 1000 units Tab Take 2,000 Units by mouth once daily.      [DISCONTINUED] fluticasone/vilanterol (BREO ELLIPTA INHL) Inhale into the lungs once daily.      [DISCONTINUED] ipratropium-albuteroL (COMBIVENT RESPIMAT)  mcg/actuation inhaler Inhale 1 puff into the lungs as needed for Wheezing. Rescue      buPROPion (WELLBUTRIN XL) 300 MG 24 hr tablet Take 300 mg by mouth once daily.      [] fluconazole (DIFLUCAN) 150 MG Tab Take 1 tablet (150 mg total) by mouth every other day. for 3 doses 3 tablet 0    fluticasone furoate-vilanteroL (BREO ELLIPTA) 200-25 mcg/dose DsDv diskus inhaler Inhale 1 puff into the lungs once daily. Controller 1 each 5    ipratropium-albuteroL (COMBIVENT RESPIMAT)  mcg/actuation inhaler Inhale 2 puffs into the lungs every 4 (four) hours as needed for Wheezing. Rescue 4 g 3    ketoconazole (NIZORAL) 2 % " "cream Apply topically 2 (two) times daily. Apply to the affected area twice daily. (Patient taking differently: Apply topically 2 (two) times daily as needed. Apply to the affected area twice daily.) 30 g 2    levocetirizine (XYZAL) 5 MG tablet Take 1 tablet (5 mg total) by mouth every evening. 30 tablet 11    triamcinolone acetonide 0.1% (KENALOG) 0.1 % cream Apply topically 2 (two) times daily. Do not use on face (Patient taking differently: Apply topically 2 (two) times daily as needed. Do not use on face) 45 Bottle 6     Facility-Administered Encounter Medications as of 8/31/2022   Medication Dose Route Frequency Provider Last Rate Last Admin    nozaseptin (NOZIN) nasal    Each Nostril On Call Procedure Kenrick Gray MD   Given at 04/27/21 1222       Objective:     Vital Signs (Most Recent)  Vital Signs  Pulse: 77  Resp: 14  SpO2: 96 %  BP: 100/62  BP Location: Left arm  Patient Position: Sitting  Pain Score:   4  Pain Loc: Chest (PAIN IN BACK)  Height and Weight  Height: 4' 10" (147.3 cm)  Weight: 75.9 kg (167 lb 5.3 oz)  BSA (Calculated - sq m): 1.76 sq meters  BMI (Calculated): 35  Weight in (lb) to have BMI = 25: 119.4]  Wt Readings from Last 2 Encounters:   08/31/22 75.9 kg (167 lb 5.3 oz)   08/26/22 75.9 kg (167 lb 5.3 oz)       Physical Exam   Constitutional: She is oriented to person, place, and time. She appears well-developed and well-nourished. No distress.   HENT:   Head: Normocephalic.   Cardiovascular: Normal rate.   Pulmonary/Chest: Normal expansion and effort normal. No stridor. No respiratory distress. She exhibits no tenderness.   Abdominal: She exhibits no distension.   Musculoskeletal:         General: No tenderness.      Cervical back: Neck supple.   Lymphadenopathy:     She has no cervical adenopathy.   Neurological: She is alert and oriented to person, place, and time. Gait normal.   Skin: Skin is warm. No cyanosis. Nails show no clubbing.   Psychiatric: She has a normal " mood and affect. Her behavior is normal. Judgment and thought content normal.   Nursing note and vitals reviewed.    Laboratory  Lab Results   Component Value Date    WBC 10.25 04/05/2021    RBC 4.92 04/05/2021    HGB 14.7 04/05/2021    HCT 43.1 04/05/2021    MCV 88 04/05/2021    MCH 29.9 04/05/2021    MCHC 34.1 04/05/2021    RDW 12.5 04/05/2021     04/05/2021    MPV 9.8 04/05/2021    GRAN 6.1 04/05/2021    GRAN 59.1 04/05/2021    LYMPH 3.3 04/05/2021    LYMPH 32.3 04/05/2021    MONO 0.5 04/05/2021    MONO 4.9 04/05/2021    EOS 0.3 04/05/2021    BASO 0.08 04/05/2021    EOSINOPHIL 2.6 04/05/2021    BASOPHIL 0.8 04/05/2021       BMP  Lab Results   Component Value Date     04/06/2022    K 4.1 04/06/2022     04/06/2022    CO2 25 04/06/2022    BUN 11 04/06/2022    CREATININE 1.0 04/06/2022    CALCIUM 9.4 04/06/2022    ANIONGAP 10 04/06/2022    ESTGFRAFRICA >60.0 04/06/2022    EGFRNONAA >60.0 04/06/2022    AST 14 04/06/2022    ALT 12 04/06/2022    PROT 6.9 04/06/2022       No results found for: BNP    Lab Results   Component Value Date    TSH 1.787 08/26/2022       Lab Results   Component Value Date    SEDRATE 8 02/10/2014       Lab Results   Component Value Date    CRP 15.7 (H) 02/10/2014     No results found for: IGE     No results found for: ASPERGILLUS  No results found for: AFUMIGATUSCL     No results found for: ACE     Diagnostic Results:  I have personally reviewed today the following studies:      Chest x-ray 08/31/2022 unremarkable.      Spirometry 08/31/2022 within normal no significant broncho reactivity.          Assessment/Plan:   Asthmatic bronchitis , chronic  -     IgE; Future; Expected date: 08/31/2022  -     fluticasone furoate-vilanteroL (BREO ELLIPTA) 200-25 mcg/dose DsDv diskus inhaler; Inhale 1 puff into the lungs once daily. Controller  Dispense: 1 each; Refill: 5  -     ipratropium-albuteroL (COMBIVENT RESPIMAT)  mcg/actuation inhaler; Inhale 2 puffs into the lungs every 4  (four) hours as needed for Wheezing. Rescue  Dispense: 4 g; Refill: 3    Other asthma  -     IgE; Future; Expected date: 08/31/2022    DMITRI on CPAP    Narcolepsy due to underlying condition without cataplexy    Continue Combivent    Escalated Breo   to 200 mcg 1 puff daily.      Rule out allergic asthma check IgE.      Encouraged patient to continue CPAP treatment staff to upload usage report.      Continue SUNOSI        Follow up in about 3 months (around 11/30/2022).    This note was prepared using voice recognition system and is likely to have sound alike errors that may have been overlooked even after proof reading.  Please call me with any questions    Discussed diagnosis, its evaluation, treatment and usual course. All questions answered.      Patrica Nayak MD

## 2022-09-01 ENCOUNTER — PATIENT OUTREACH (OUTPATIENT)
Dept: ADMINISTRATIVE | Facility: HOSPITAL | Age: 57
End: 2022-09-01
Payer: MEDICARE

## 2022-09-01 LAB
BRPFT: NORMAL
FEF 25 75 CHG: 15.4 %
FEF 25 75 LLN: 1.08
FEF 25 75 POST REF: 106.6 %
FEF 25 75 PRE REF: 92.3 %
FEF 25 75 REF: 2.07
FET100 CHG: 0.2 %
FEV1 CHG: 1.9 %
FEV1 FVC CHG: 1.4 %
FEV1 FVC LLN: 69
FEV1 FVC POST REF: 103.2 %
FEV1 FVC PRE REF: 101.8 %
FEV1 FVC REF: 80
FEV1 LLN: 1.58
FEV1 POST REF: 85.6 %
FEV1 PRE REF: 84 %
FEV1 REF: 2.07
FVC CHG: 0.5 %
FVC LLN: 1.97
FVC POST REF: 82.7 %
FVC PRE REF: 82.3 %
FVC REF: 2.58
PEF CHG: 27.2 %
PEF LLN: 4.15
PEF POST REF: 65.8 %
PEF PRE REF: 51.7 %
PEF REF: 5.55
POST FEF 25 75: 2.2 L/S
POST FET 100: 7.06 SEC
POST FEV1 FVC: 83.08 %
POST FEV1: 1.77 L
POST FVC: 2.13 L
POST PEF: 3.65 L/S
PRE FEF 25 75: 1.91 L/S
PRE FET 100: 7.04 SEC
PRE FEV1 FVC: 81.96 %
PRE FEV1: 1.74 L
PRE FVC: 2.12 L
PRE PEF: 2.87 L/S

## 2022-09-13 ENCOUNTER — PATIENT MESSAGE (OUTPATIENT)
Dept: OBSTETRICS AND GYNECOLOGY | Facility: CLINIC | Age: 57
End: 2022-09-13
Payer: MEDICARE

## 2022-09-15 ENCOUNTER — PES CALL (OUTPATIENT)
Dept: ADMINISTRATIVE | Facility: CLINIC | Age: 57
End: 2022-09-15
Payer: MEDICARE

## 2022-09-22 ENCOUNTER — HOSPITAL ENCOUNTER (OUTPATIENT)
Dept: RADIOLOGY | Facility: HOSPITAL | Age: 57
Discharge: HOME | End: 2022-09-22
Attending: OBSTETRICS & GYNECOLOGY
Payer: MEDICARE

## 2022-09-22 VITALS — BODY MASS INDEX: 34.72 KG/M2 | WEIGHT: 165.38 LBS | HEIGHT: 58 IN

## 2022-09-22 DIAGNOSIS — N63.11 BREAST LUMP ON RIGHT SIDE AT 10 O'CLOCK POSITION: ICD-10-CM

## 2022-09-22 PROCEDURE — 77066 MAMMO DIGITAL DIAGNOSTIC BILAT WITH TOMO: ICD-10-PCS | Mod: 26,,, | Performed by: RADIOLOGY

## 2022-09-22 PROCEDURE — 76642 ULTRASOUND BREAST LIMITED: CPT | Mod: TC,RT

## 2022-09-22 PROCEDURE — 76642 US BREAST RIGHT LIMITED: ICD-10-PCS | Mod: 26,RT,, | Performed by: RADIOLOGY

## 2022-09-22 PROCEDURE — 77066 DX MAMMO INCL CAD BI: CPT | Mod: 26,,, | Performed by: RADIOLOGY

## 2022-09-22 PROCEDURE — 77062 MAMMO DIGITAL DIAGNOSTIC BILAT WITH TOMO: ICD-10-PCS | Mod: 26,,, | Performed by: RADIOLOGY

## 2022-09-22 PROCEDURE — 76642 ULTRASOUND BREAST LIMITED: CPT | Mod: 26,RT,, | Performed by: RADIOLOGY

## 2022-09-22 PROCEDURE — 77066 DX MAMMO INCL CAD BI: CPT | Mod: TC

## 2022-09-22 PROCEDURE — 77062 BREAST TOMOSYNTHESIS BI: CPT | Mod: 26,,, | Performed by: RADIOLOGY

## 2022-10-03 NOTE — ASSESSMENT & PLAN NOTE
Quit smoking 2018   This clinician called client for her 12pm face to face in-office appointment today  She did not  and message was left with my direct call-back number

## 2022-10-07 ENCOUNTER — OFFICE VISIT (OUTPATIENT)
Dept: PULMONOLOGY | Facility: CLINIC | Age: 57
End: 2022-10-07
Payer: MEDICARE

## 2022-10-07 ENCOUNTER — CLINICAL SUPPORT (OUTPATIENT)
Dept: PULMONOLOGY | Facility: CLINIC | Age: 57
End: 2022-10-07
Payer: MEDICARE

## 2022-10-07 VITALS
BODY MASS INDEX: 34.75 KG/M2 | SYSTOLIC BLOOD PRESSURE: 132 MMHG | RESPIRATION RATE: 17 BRPM | HEART RATE: 91 BPM | HEIGHT: 58 IN | WEIGHT: 165.56 LBS | OXYGEN SATURATION: 99 % | DIASTOLIC BLOOD PRESSURE: 80 MMHG

## 2022-10-07 DIAGNOSIS — F17.200 TOBACCO DEPENDENCE: ICD-10-CM

## 2022-10-07 DIAGNOSIS — E78.5 DYSLIPIDEMIA: ICD-10-CM

## 2022-10-07 DIAGNOSIS — J44.89 ASTHMATIC BRONCHITIS , CHRONIC: ICD-10-CM

## 2022-10-07 DIAGNOSIS — J44.89 ASTHMATIC BRONCHITIS , CHRONIC: Primary | ICD-10-CM

## 2022-10-07 DIAGNOSIS — G47.429 NARCOLEPSY DUE TO UNDERLYING CONDITION WITHOUT CATAPLEXY: ICD-10-CM

## 2022-10-07 DIAGNOSIS — Z79.4 TYPE 2 DIABETES MELLITUS WITH MICROALBUMINURIA, WITH LONG-TERM CURRENT USE OF INSULIN: ICD-10-CM

## 2022-10-07 DIAGNOSIS — R80.9 TYPE 2 DIABETES MELLITUS WITH MICROALBUMINURIA, WITH LONG-TERM CURRENT USE OF INSULIN: ICD-10-CM

## 2022-10-07 DIAGNOSIS — E03.9 HYPOTHYROIDISM, UNSPECIFIED TYPE: ICD-10-CM

## 2022-10-07 DIAGNOSIS — G47.33 OSA ON CPAP: ICD-10-CM

## 2022-10-07 DIAGNOSIS — I10 ESSENTIAL HYPERTENSION: ICD-10-CM

## 2022-10-07 DIAGNOSIS — E11.29 TYPE 2 DIABETES MELLITUS WITH MICROALBUMINURIA, WITH LONG-TERM CURRENT USE OF INSULIN: ICD-10-CM

## 2022-10-07 DIAGNOSIS — E66.01 MORBID (SEVERE) OBESITY DUE TO EXCESS CALORIES: ICD-10-CM

## 2022-10-07 PROBLEM — J06.9 UPPER RESPIRATORY TRACT INFECTION: Status: RESOLVED | Noted: 2021-04-22 | Resolved: 2022-10-07

## 2022-10-07 PROCEDURE — 1160F PR REVIEW ALL MEDS BY PRESCRIBER/CLIN PHARMACIST DOCUMENTED: ICD-10-PCS | Mod: CPTII,S$GLB,, | Performed by: INTERNAL MEDICINE

## 2022-10-07 PROCEDURE — 99214 OFFICE O/P EST MOD 30 MIN: CPT | Mod: 25,S$GLB,, | Performed by: INTERNAL MEDICINE

## 2022-10-07 PROCEDURE — 99214 PR OFFICE/OUTPT VISIT, EST, LEVL IV, 30-39 MIN: ICD-10-PCS | Mod: 25,S$GLB,, | Performed by: INTERNAL MEDICINE

## 2022-10-07 PROCEDURE — 95012 NITRIC OXIDE EXP GAS DETER: CPT | Mod: S$GLB,,, | Performed by: INTERNAL MEDICINE

## 2022-10-07 PROCEDURE — 3079F DIAST BP 80-89 MM HG: CPT | Mod: CPTII,S$GLB,, | Performed by: INTERNAL MEDICINE

## 2022-10-07 PROCEDURE — 3008F PR BODY MASS INDEX (BMI) DOCUMENTED: ICD-10-PCS | Mod: CPTII,S$GLB,, | Performed by: INTERNAL MEDICINE

## 2022-10-07 PROCEDURE — 1159F PR MEDICATION LIST DOCUMENTED IN MEDICAL RECORD: ICD-10-PCS | Mod: CPTII,S$GLB,, | Performed by: INTERNAL MEDICINE

## 2022-10-07 PROCEDURE — 3079F PR MOST RECENT DIASTOLIC BLOOD PRESSURE 80-89 MM HG: ICD-10-PCS | Mod: CPTII,S$GLB,, | Performed by: INTERNAL MEDICINE

## 2022-10-07 PROCEDURE — 99999 PR PBB SHADOW E&M-EST. PATIENT-LVL V: CPT | Mod: PBBFAC,,, | Performed by: INTERNAL MEDICINE

## 2022-10-07 PROCEDURE — 1159F MED LIST DOCD IN RCRD: CPT | Mod: CPTII,S$GLB,, | Performed by: INTERNAL MEDICINE

## 2022-10-07 PROCEDURE — 3066F PR DOCUMENTATION OF TREATMENT FOR NEPHROPATHY: ICD-10-PCS | Mod: CPTII,S$GLB,, | Performed by: INTERNAL MEDICINE

## 2022-10-07 PROCEDURE — 4010F PR ACE/ARB THEARPY RXD/TAKEN: ICD-10-PCS | Mod: CPTII,S$GLB,, | Performed by: INTERNAL MEDICINE

## 2022-10-07 PROCEDURE — 3008F BODY MASS INDEX DOCD: CPT | Mod: CPTII,S$GLB,, | Performed by: INTERNAL MEDICINE

## 2022-10-07 PROCEDURE — 1160F RVW MEDS BY RX/DR IN RCRD: CPT | Mod: CPTII,S$GLB,, | Performed by: INTERNAL MEDICINE

## 2022-10-07 PROCEDURE — 4010F ACE/ARB THERAPY RXD/TAKEN: CPT | Mod: CPTII,S$GLB,, | Performed by: INTERNAL MEDICINE

## 2022-10-07 PROCEDURE — 3061F NEG MICROALBUMINURIA REV: CPT | Mod: CPTII,S$GLB,, | Performed by: INTERNAL MEDICINE

## 2022-10-07 PROCEDURE — 99999 PR PBB SHADOW E&M-EST. PATIENT-LVL V: ICD-10-PCS | Mod: PBBFAC,,, | Performed by: INTERNAL MEDICINE

## 2022-10-07 PROCEDURE — 3044F PR MOST RECENT HEMOGLOBIN A1C LEVEL <7.0%: ICD-10-PCS | Mod: CPTII,S$GLB,, | Performed by: INTERNAL MEDICINE

## 2022-10-07 PROCEDURE — 3066F NEPHROPATHY DOC TX: CPT | Mod: CPTII,S$GLB,, | Performed by: INTERNAL MEDICINE

## 2022-10-07 PROCEDURE — 3061F PR NEG MICROALBUMINURIA RESULT DOCUMENTED/REVIEW: ICD-10-PCS | Mod: CPTII,S$GLB,, | Performed by: INTERNAL MEDICINE

## 2022-10-07 PROCEDURE — 3075F SYST BP GE 130 - 139MM HG: CPT | Mod: CPTII,S$GLB,, | Performed by: INTERNAL MEDICINE

## 2022-10-07 PROCEDURE — 3075F PR MOST RECENT SYSTOLIC BLOOD PRESS GE 130-139MM HG: ICD-10-PCS | Mod: CPTII,S$GLB,, | Performed by: INTERNAL MEDICINE

## 2022-10-07 PROCEDURE — 95012 PR NITRIC OXIDE EXPIRED GAS DETERMINATION: ICD-10-PCS | Mod: S$GLB,,, | Performed by: INTERNAL MEDICINE

## 2022-10-07 PROCEDURE — 3044F HG A1C LEVEL LT 7.0%: CPT | Mod: CPTII,S$GLB,, | Performed by: INTERNAL MEDICINE

## 2022-10-07 RX ORDER — ALBUTEROL SULFATE 1.25 MG/3ML
1.25 SOLUTION RESPIRATORY (INHALATION) 2 TIMES DAILY
Qty: 180 ML | Refills: 11 | Status: SHIPPED | OUTPATIENT
Start: 2022-10-07 | End: 2023-10-11

## 2022-10-07 RX ORDER — ARIPIPRAZOLE 15 MG/1
15 TABLET ORAL EVERY MORNING
COMMUNITY
Start: 2022-09-24 | End: 2023-04-12 | Stop reason: ALTCHOICE
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
## 2022-10-07 NOTE — PROCEDURES
"Clinical Guide to Interpretation or FeNO Levels :    FeNO  (ppb) LOW INTERMEDIATE HIGH   ADULT VALUES < 25 25-50          > 50   Th2-driven Inflammation Unlikely Likely Significant     Patients FeNO level at this visit : ___5_ (ppb)     Interpretation of FeNO measurement in adults:   [ X] FENO is less than 25 ppb implies non eosinophilic airway inflammation or the absence of airway inflammation.   Comment: Low FENO (<25 ppb) in adult asthmatics with persistent symptoms suggests other etiologies for these symptoms and a lower likelihood of benefit from adding or increasing inhaled glucocorticoids.     [ ] FENO between 25 and 50 ppb in adults should be interpreted cautiously with reference to the clinical situation (eg, symptomatic, on or off therapy, current smoking).     [ ] FENO greater than 50 ppb in adults  suggests eosinophilic airway inflammation   Comment: High FENO (>50 ppb) in adult asthmatics even with atypical symptoms suggests glucocorticoid responsiveness. High FENO (>50 ppb) can help identify poor adherence or uncontrolled inflammation in asthma patients with otherwise seemingly "controlled" asthma.     Discussion:   ·A FENO less than 25 ppb in adults and less than 20 ppb in children younger than 12 years of age implies noneosinophilic airway inflammation or the absence of airway inflammation.   ·A FENO greater than 50 ppb in adults or greater than 35 ppb in children suggests eosinophilic airway inflammation.   ·Values of FENO between 25 and 50 ppb in adults (20 to 35 ppb in children) should be interpreted cautiously with reference to the clinical situation (eg, symptomatic, on or off therapy, current smoking).   ·A rising FENO with a greater than 20 percent change and more than 25 ppb (20 ppb in children) from a previously stable level suggests increasing eosinophilic airway inflammation, but there are wide inter-individual differences.   ·A decrease in FENO greater than 20 percent for values over 50 " ppb or more than 10 ppb for values less than 50 ppb may be clinically important.   FENO in other respiratory diseases - Several other diseases are associated with altered levels of exhaled NO: low levels of FENO have been noted in cystic fibrosis, current smoking, pulmonary hypertension, hypothermia, primary ciliary dyskinesia, and bronchopulmonary dysplasia. Elevated FENO has been noted in atopy, nonasthmatic eosinophilic bronchitis, COPD exacerbations, noncystic fibrosis bronchiectasis, and viral upper respiratory infections.     REFERENCE:   ATS Board of Directors, December 2004, and by the ERS Executive Committee, June 2004. ATS/ERS Recommendations for Standardized Procedures for the Online and Offline Measurement of Exhaled Lower Respiratory Nitric Oxide and Nasal Nitric Oxide. Guideline 2005

## 2022-10-15 ENCOUNTER — PATIENT MESSAGE (OUTPATIENT)
Dept: DIABETES | Facility: CLINIC | Age: 57
End: 2022-10-15
Payer: MEDICARE

## 2022-10-16 RX ORDER — PEN NEEDLE, DIABETIC 30 GX3/16"
1 NEEDLE, DISPOSABLE MISCELLANEOUS DAILY
Qty: 100 EACH | Refills: 3 | Status: SHIPPED | OUTPATIENT
Start: 2022-10-16 | End: 2023-02-23 | Stop reason: SDUPTHER

## 2022-10-27 ENCOUNTER — PATIENT MESSAGE (OUTPATIENT)
Dept: FAMILY MEDICINE | Facility: CLINIC | Age: 57
End: 2022-10-27
Payer: MEDICARE

## 2022-10-27 DIAGNOSIS — E03.9 HYPOTHYROIDISM, UNSPECIFIED TYPE: Primary | ICD-10-CM

## 2022-10-28 RX ORDER — LEVOTHYROXINE SODIUM 75 UG/1
75 TABLET ORAL DAILY
Qty: 90 TABLET | Refills: 1 | Status: SHIPPED | OUTPATIENT
Start: 2022-10-28 | End: 2023-05-25

## 2022-10-28 NOTE — TELEPHONE ENCOUNTER
No new care gaps identified.  [facility] Embedded Care Gaps. Reference number: [id number]. 10/28/2022   11:22:16 AM SISIT

## 2022-10-30 ENCOUNTER — PATIENT MESSAGE (OUTPATIENT)
Dept: DIABETES | Facility: CLINIC | Age: 57
End: 2022-10-30
Payer: MEDICARE

## 2022-11-09 ENCOUNTER — PATIENT MESSAGE (OUTPATIENT)
Dept: PULMONOLOGY | Facility: CLINIC | Age: 57
End: 2022-11-09
Payer: MEDICARE

## 2022-11-09 ENCOUNTER — PATIENT MESSAGE (OUTPATIENT)
Dept: FAMILY MEDICINE | Facility: CLINIC | Age: 57
End: 2022-11-09
Payer: MEDICARE

## 2022-11-09 NOTE — TELEPHONE ENCOUNTER
Spoke with pt, pt does see pulmonary here at [facility] informed pt that is the department that deal with sleep apnea. Advise pt to let Dr Figueroa know if he need to do a new ref specifically for sleep disorders pt voiced understanding.

## 2022-11-11 ENCOUNTER — OFFICE VISIT (OUTPATIENT)
Dept: PULMONOLOGY | Facility: CLINIC | Age: 57
End: 2022-11-11
Payer: MEDICARE

## 2022-11-11 VITALS
SYSTOLIC BLOOD PRESSURE: 126 MMHG | HEIGHT: 58 IN | HEART RATE: 74 BPM | RESPIRATION RATE: 18 BRPM | WEIGHT: 176.13 LBS | BODY MASS INDEX: 36.97 KG/M2 | DIASTOLIC BLOOD PRESSURE: 66 MMHG

## 2022-11-11 DIAGNOSIS — G47.33 OSA ON CPAP: ICD-10-CM

## 2022-11-11 DIAGNOSIS — F17.200 TOBACCO DEPENDENCE: ICD-10-CM

## 2022-11-11 DIAGNOSIS — G47.429 NARCOLEPSY DUE TO UNDERLYING CONDITION WITHOUT CATAPLEXY: ICD-10-CM

## 2022-11-11 DIAGNOSIS — E78.5 DYSLIPIDEMIA: ICD-10-CM

## 2022-11-11 DIAGNOSIS — J44.89 ASTHMATIC BRONCHITIS , CHRONIC: Primary | ICD-10-CM

## 2022-11-11 DIAGNOSIS — I10 ESSENTIAL HYPERTENSION: ICD-10-CM

## 2022-11-11 DIAGNOSIS — E03.9 HYPOTHYROIDISM, UNSPECIFIED TYPE: ICD-10-CM

## 2022-11-11 PROCEDURE — 4010F PR ACE/ARB THEARPY RXD/TAKEN: ICD-10-PCS | Mod: CPTII,S$GLB,, | Performed by: INTERNAL MEDICINE

## 2022-11-11 PROCEDURE — 4010F ACE/ARB THERAPY RXD/TAKEN: CPT | Mod: CPTII,S$GLB,, | Performed by: INTERNAL MEDICINE

## 2022-11-11 PROCEDURE — 1159F PR MEDICATION LIST DOCUMENTED IN MEDICAL RECORD: ICD-10-PCS | Mod: CPTII,S$GLB,, | Performed by: INTERNAL MEDICINE

## 2022-11-11 PROCEDURE — 3066F NEPHROPATHY DOC TX: CPT | Mod: CPTII,S$GLB,, | Performed by: INTERNAL MEDICINE

## 2022-11-11 PROCEDURE — 3061F PR NEG MICROALBUMINURIA RESULT DOCUMENTED/REVIEW: ICD-10-PCS | Mod: CPTII,S$GLB,, | Performed by: INTERNAL MEDICINE

## 2022-11-11 PROCEDURE — 1160F RVW MEDS BY RX/DR IN RCRD: CPT | Mod: CPTII,S$GLB,, | Performed by: INTERNAL MEDICINE

## 2022-11-11 PROCEDURE — 3074F SYST BP LT 130 MM HG: CPT | Mod: CPTII,S$GLB,, | Performed by: INTERNAL MEDICINE

## 2022-11-11 PROCEDURE — 1159F MED LIST DOCD IN RCRD: CPT | Mod: CPTII,S$GLB,, | Performed by: INTERNAL MEDICINE

## 2022-11-11 PROCEDURE — 99999 PR PBB SHADOW E&M-EST. PATIENT-LVL IV: CPT | Mod: PBBFAC,,, | Performed by: INTERNAL MEDICINE

## 2022-11-11 PROCEDURE — 3044F HG A1C LEVEL LT 7.0%: CPT | Mod: CPTII,S$GLB,, | Performed by: INTERNAL MEDICINE

## 2022-11-11 PROCEDURE — 3061F NEG MICROALBUMINURIA REV: CPT | Mod: CPTII,S$GLB,, | Performed by: INTERNAL MEDICINE

## 2022-11-11 PROCEDURE — 3008F BODY MASS INDEX DOCD: CPT | Mod: CPTII,S$GLB,, | Performed by: INTERNAL MEDICINE

## 2022-11-11 PROCEDURE — 1160F PR REVIEW ALL MEDS BY PRESCRIBER/CLIN PHARMACIST DOCUMENTED: ICD-10-PCS | Mod: CPTII,S$GLB,, | Performed by: INTERNAL MEDICINE

## 2022-11-11 PROCEDURE — 99999 PR PBB SHADOW E&M-EST. PATIENT-LVL IV: ICD-10-PCS | Mod: PBBFAC,,, | Performed by: INTERNAL MEDICINE

## 2022-11-11 PROCEDURE — 3074F PR MOST RECENT SYSTOLIC BLOOD PRESSURE < 130 MM HG: ICD-10-PCS | Mod: CPTII,S$GLB,, | Performed by: INTERNAL MEDICINE

## 2022-11-11 PROCEDURE — 99214 OFFICE O/P EST MOD 30 MIN: CPT | Mod: 25,S$GLB,, | Performed by: INTERNAL MEDICINE

## 2022-11-11 PROCEDURE — 3078F PR MOST RECENT DIASTOLIC BLOOD PRESSURE < 80 MM HG: ICD-10-PCS | Mod: CPTII,S$GLB,, | Performed by: INTERNAL MEDICINE

## 2022-11-11 PROCEDURE — 99214 PR OFFICE/OUTPT VISIT, EST, LEVL IV, 30-39 MIN: ICD-10-PCS | Mod: 25,S$GLB,, | Performed by: INTERNAL MEDICINE

## 2022-11-11 PROCEDURE — 3078F DIAST BP <80 MM HG: CPT | Mod: CPTII,S$GLB,, | Performed by: INTERNAL MEDICINE

## 2022-11-11 PROCEDURE — 3066F PR DOCUMENTATION OF TREATMENT FOR NEPHROPATHY: ICD-10-PCS | Mod: CPTII,S$GLB,, | Performed by: INTERNAL MEDICINE

## 2022-11-11 PROCEDURE — 3044F PR MOST RECENT HEMOGLOBIN A1C LEVEL <7.0%: ICD-10-PCS | Mod: CPTII,S$GLB,, | Performed by: INTERNAL MEDICINE

## 2022-11-11 PROCEDURE — 3008F PR BODY MASS INDEX (BMI) DOCUMENTED: ICD-10-PCS | Mod: CPTII,S$GLB,, | Performed by: INTERNAL MEDICINE

## 2022-11-11 RX ORDER — METHYLPREDNISOLONE 4 MG/1
TABLET ORAL
Qty: 21 TABLET | Refills: 1 | Status: SHIPPED | OUTPATIENT
Start: 2022-11-11 | End: 2022-12-28 | Stop reason: ALTCHOICE

## 2022-11-11 RX ORDER — VORTIOXETINE 20 MG/1
1 TABLET, FILM COATED ORAL DAILY
COMMUNITY
Start: 2022-10-25
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
## 2022-11-14 DIAGNOSIS — R11.0 NAUSEA: ICD-10-CM

## 2022-11-14 RX ORDER — ONDANSETRON 4 MG/1
TABLET, ORALLY DISINTEGRATING ORAL
Qty: 20 TABLET | Refills: 1 | OUTPATIENT
Start: 2022-11-14

## 2022-11-14 NOTE — TELEPHONE ENCOUNTER
No new care gaps identified.  [facility] Embedded Care Gaps. Reference number: [id number]. 11/14/2022   8:50:18 AM CST

## 2022-11-15 NOTE — TELEPHONE ENCOUNTER
----- Message from Payton Golden sent at 11/15/2022  9:49 AM CST -----  Pt is requesting a call back in regards to getting an appt today for shortness of breath. Pt can be reached at [phone] ([phone])

## 2022-11-16 ENCOUNTER — TELEPHONE (OUTPATIENT)
Dept: FAMILY MEDICINE | Facility: CLINIC | Age: 57
End: 2022-11-16

## 2022-11-16 ENCOUNTER — OFFICE VISIT (OUTPATIENT)
Dept: FAMILY MEDICINE | Facility: CLINIC | Age: 57
End: 2022-11-16
Payer: MEDICARE

## 2022-11-16 VITALS
OXYGEN SATURATION: 96 % | WEIGHT: 173.06 LBS | HEIGHT: 58 IN | SYSTOLIC BLOOD PRESSURE: 130 MMHG | BODY MASS INDEX: 36.33 KG/M2 | HEART RATE: 86 BPM | DIASTOLIC BLOOD PRESSURE: 68 MMHG | TEMPERATURE: 97 F

## 2022-11-16 DIAGNOSIS — Z79.899 POLYPHARMACY: ICD-10-CM

## 2022-11-16 DIAGNOSIS — R80.9 TYPE 2 DIABETES MELLITUS WITH MICROALBUMINURIA, WITH LONG-TERM CURRENT USE OF INSULIN: ICD-10-CM

## 2022-11-16 DIAGNOSIS — E11.29 TYPE 2 DIABETES MELLITUS WITH MICROALBUMINURIA, WITH LONG-TERM CURRENT USE OF INSULIN: ICD-10-CM

## 2022-11-16 DIAGNOSIS — J44.89 ASTHMATIC BRONCHITIS , CHRONIC: Primary | ICD-10-CM

## 2022-11-16 DIAGNOSIS — Z79.4 TYPE 2 DIABETES MELLITUS WITH MICROALBUMINURIA, WITH LONG-TERM CURRENT USE OF INSULIN: ICD-10-CM

## 2022-11-16 DIAGNOSIS — I10 ESSENTIAL HYPERTENSION: ICD-10-CM

## 2022-11-16 DIAGNOSIS — G47.33 OSA ON CPAP: ICD-10-CM

## 2022-11-16 DIAGNOSIS — G24.01 TARDIVE DYSKINESIA: ICD-10-CM

## 2022-11-16 DIAGNOSIS — E03.9 HYPOTHYROIDISM, UNSPECIFIED TYPE: ICD-10-CM

## 2022-11-16 DIAGNOSIS — E78.5 DYSLIPIDEMIA: ICD-10-CM

## 2022-11-16 DIAGNOSIS — E66.01 MORBID (SEVERE) OBESITY DUE TO EXCESS CALORIES: ICD-10-CM

## 2022-11-16 DIAGNOSIS — R06.09 CHRONIC DYSPNEA: ICD-10-CM

## 2022-11-16 PROCEDURE — 3061F PR NEG MICROALBUMINURIA RESULT DOCUMENTED/REVIEW: ICD-10-PCS | Mod: CPTII,S$GLB,, | Performed by: FAMILY MEDICINE

## 2022-11-16 PROCEDURE — 3078F DIAST BP <80 MM HG: CPT | Mod: CPTII,S$GLB,, | Performed by: FAMILY MEDICINE

## 2022-11-16 PROCEDURE — 99999 PR PBB SHADOW E&M-EST. PATIENT-LVL V: CPT | Mod: PBBFAC,,, | Performed by: FAMILY MEDICINE

## 2022-11-16 PROCEDURE — 99214 OFFICE O/P EST MOD 30 MIN: CPT | Mod: S$GLB,,, | Performed by: FAMILY MEDICINE

## 2022-11-16 PROCEDURE — 3078F PR MOST RECENT DIASTOLIC BLOOD PRESSURE < 80 MM HG: ICD-10-PCS | Mod: CPTII,S$GLB,, | Performed by: FAMILY MEDICINE

## 2022-11-16 PROCEDURE — 3008F PR BODY MASS INDEX (BMI) DOCUMENTED: ICD-10-PCS | Mod: CPTII,S$GLB,, | Performed by: FAMILY MEDICINE

## 2022-11-16 PROCEDURE — 3061F NEG MICROALBUMINURIA REV: CPT | Mod: CPTII,S$GLB,, | Performed by: FAMILY MEDICINE

## 2022-11-16 PROCEDURE — 4010F ACE/ARB THERAPY RXD/TAKEN: CPT | Mod: CPTII,S$GLB,, | Performed by: FAMILY MEDICINE

## 2022-11-16 PROCEDURE — 99999 PR PBB SHADOW E&M-EST. PATIENT-LVL V: ICD-10-PCS | Mod: PBBFAC,,, | Performed by: FAMILY MEDICINE

## 2022-11-16 PROCEDURE — 3075F PR MOST RECENT SYSTOLIC BLOOD PRESS GE 130-139MM HG: ICD-10-PCS | Mod: CPTII,S$GLB,, | Performed by: FAMILY MEDICINE

## 2022-11-16 PROCEDURE — 3044F PR MOST RECENT HEMOGLOBIN A1C LEVEL <7.0%: ICD-10-PCS | Mod: CPTII,S$GLB,, | Performed by: FAMILY MEDICINE

## 2022-11-16 PROCEDURE — 3044F HG A1C LEVEL LT 7.0%: CPT | Mod: CPTII,S$GLB,, | Performed by: FAMILY MEDICINE

## 2022-11-16 PROCEDURE — 3066F PR DOCUMENTATION OF TREATMENT FOR NEPHROPATHY: ICD-10-PCS | Mod: CPTII,S$GLB,, | Performed by: FAMILY MEDICINE

## 2022-11-16 PROCEDURE — 3075F SYST BP GE 130 - 139MM HG: CPT | Mod: CPTII,S$GLB,, | Performed by: FAMILY MEDICINE

## 2022-11-16 PROCEDURE — 1159F PR MEDICATION LIST DOCUMENTED IN MEDICAL RECORD: ICD-10-PCS | Mod: CPTII,S$GLB,, | Performed by: FAMILY MEDICINE

## 2022-11-16 PROCEDURE — 3066F NEPHROPATHY DOC TX: CPT | Mod: CPTII,S$GLB,, | Performed by: FAMILY MEDICINE

## 2022-11-16 PROCEDURE — 1159F MED LIST DOCD IN RCRD: CPT | Mod: CPTII,S$GLB,, | Performed by: FAMILY MEDICINE

## 2022-11-16 PROCEDURE — 99214 PR OFFICE/OUTPT VISIT, EST, LEVL IV, 30-39 MIN: ICD-10-PCS | Mod: S$GLB,,, | Performed by: FAMILY MEDICINE

## 2022-11-16 PROCEDURE — 3008F BODY MASS INDEX DOCD: CPT | Mod: CPTII,S$GLB,, | Performed by: FAMILY MEDICINE

## 2022-11-16 PROCEDURE — 4010F PR ACE/ARB THEARPY RXD/TAKEN: ICD-10-PCS | Mod: CPTII,S$GLB,, | Performed by: FAMILY MEDICINE

## 2022-11-16 RX ORDER — TIRZEPATIDE 2.5 MG/.5ML
2.5 INJECTION, SOLUTION SUBCUTANEOUS
Qty: 4 PEN | Refills: 3 | Status: SHIPPED | OUTPATIENT
Start: 2022-11-16 | End: 2022-12-28

## 2022-11-16 NOTE — PROGRESS NOTES
Subjective:       Patient ID: Bianca Weldon is a 57 y.o. female.    Chief Complaint: Chest Pain      HPI Comments:       Current Outpatient Medications:     albuterol (ACCUNEB) 1.25 mg/3 mL Nebu, Take 3 mLs (1.25 mg total) by nebulization 2 (two) times a day. Rescue, Disp: 180 mL, Rfl: 11    ARIPiprazole (ABILIFY) 15 MG Tab, Take 15 mg by mouth every morning., Disp: , Rfl:     ascorbic acid, vitamin C, (VITAMIN C) 250 MG tablet, Take 250 mg by mouth once daily., Disp: , Rfl:     aspirin (ECOTRIN) 81 MG EC tablet, Take 81 mg by mouth once daily. , Disp: , Rfl:     atorvastatin (LIPITOR) 40 MG tablet, Take 1 tablet (40 mg total) by mouth once daily., Disp: 90 tablet, Rfl: 1    blood sugar diagnostic Strp, Inject 1 each into the skin 3 (three) times daily. Dispense preferred on insurance, Disp: 100 strip, Rfl: 11    buPROPion (WELLBUTRIN XL) 300 MG 24 hr tablet, Take 300 mg by mouth once daily., Disp: , Rfl:     clindamycin (CLEOCIN T) 1 % external solution, APPLY TO THE AFFECTED AREA TWICE DAILY AS NEEDED FOR ACE/ FOLLICULITIS ON FACE AND SCALP, Disp: 60 mL, Rfl: 4    clotrimazole-betamethasone 1-0.05% (LOTRISONE) cream, Apply to affected area 2 times daily for 5 days, Disp: 15 g, Rfl: 1    cyanocobalamin (VITAMIN B-12) 1000 MCG tablet, Take 1,000 mcg by mouth once daily. , Disp: , Rfl:     cyclobenzaprine (FLEXERIL) 10 MG tablet, Take 10 mg by mouth every evening., Disp: , Rfl:     cycloSPORINE (RESTASIS) 0.05 % ophthalmic emulsion, Place 1 drop into both eyes 2 (two) times daily as needed. , Disp: , Rfl:     deutetrabenazine (AUSTEDO) 9 mg Tab, Take 9 mg by mouth 2 (two) times daily., Disp: , Rfl:     diclofenac sodium (VOLTAREN) 1 % Gel, APPLY FOUR GRAMS TOPICALLY EVERY DAY AS DIRECTED, Disp: 100 g, Rfl: 3    fluconazole (DIFLUCAN) 150 MG Tab, Take 1 tablet (150 mg total) by mouth once a week. After completing 3 dose diflucan therapy, Disp: 4 tablet, Rfl: 5    fludrocortisone (FLORINEF) 0.1 mg Tab, Take 100 mcg by  mouth once daily., Disp: , Rfl:     fluticasone propionate (FLONASE) 50 mcg/actuation nasal spray, USE TWO SPRAYS IN EACH NOSTRIL ONCE DAILY, Disp: 48 g, Rfl: 3    fluticasone-umeclidin-vilanter (TRELEGY ELLIPTA) 100-62.5-25 mcg DsDv, Inhale 1 puff into the lungs once daily., Disp: 60 each, Rfl: 11    glimepiride (AMARYL) 4 MG tablet, Take 1 tablet (4 mg total) by mouth 2 (two) times daily before meals., Disp: 180 tablet, Rfl: 1    hydrOXYzine pamoate (VISTARIL) 25 MG Cap, Take 1 capsule (25 mg total) by mouth nightly as needed (itching)., Disp: 20 capsule, Rfl: 0    insulin glargine U-300 conc (TOUJEO MAX U-300 SOLOSTAR) 300 unit/mL (3 mL) insulin pen, Inject 46 Units into the skin once daily., Disp: 2 pen, Rfl: 6    ipratropium-albuteroL (COMBIVENT RESPIMAT)  mcg/actuation inhaler, Inhale 2 puffs into the lungs every 4 (four) hours as needed for Wheezing. Rescue, Disp: 4 g, Rfl: 3    lamoTRIgine (LAMICTAL) 200 MG tablet, Take 1 tablet by mouth 2 (two) times daily., Disp: , Rfl:     levothyroxine (SYNTHROID) 75 MCG tablet, Take 1 tablet (75 mcg total) by mouth once daily., Disp: 90 tablet, Rfl: 1    LORazepam (ATIVAN) 1 MG tablet, Take 1 tablet (1 mg total) by mouth 2 (two) times daily as needed for Anxiety (takes nightly)., Disp: 60 tablet, Rfl: 2    losartan (COZAAR) 25 MG tablet, Take 1 tablet (25 mg total) by mouth once daily., Disp: 90 tablet, Rfl: 2    meloxicam (MOBIC) 15 MG tablet, Take 15 mg by mouth once daily., Disp: , Rfl:     methylPREDNISolone (MEDROL DOSEPACK) 4 mg tablet, use as directed, Disp: 21 tablet, Rfl: 1    multivitamin capsule, Take 1 capsule by mouth once daily., Disp: , Rfl:     MYRBETRIQ 50 mg Tb24, Take 50 mg by mouth 2 (two) times daily as needed. , Disp: , Rfl:     ondansetron (ZOFRAN-ODT) 4 MG TbDL, DISSOLVE 1 TABLET ON THE TONGUE EVERY 6 HOURS AS NEEDED FOR NAUSEA, Disp: 20 tablet, Rfl: 1    pantoprazole (PROTONIX) 40 MG tablet, TAKE 1 TABLET BY MOUTH DAILY, Disp: 90 tablet,  "Rfl: 2    pen needle, diabetic 31 gauge x 3/16" Ndle, Inject 1 Stick into the skin once daily., Disp: 100 each, Rfl: 3    SUNOSI 150 mg Tab, Take 1 tablet by mouth once daily., Disp: , Rfl:     TRINTELLIX 20 mg Tab, Take 1 tablet by mouth once daily., Disp: , Rfl:     valACYclovir (VALTREX) 1000 MG tablet, Take 1 tablet (1,000 mg total) by mouth 2 (two) times daily., Disp: 14 tablet, Rfl: 3    vitamin D (VITAMIN D3) 1000 units Tab, Take 2,000 Units by mouth once daily., Disp: , Rfl:     estradioL (ESTRACE) 0.01 % (0.1 mg/gram) vaginal cream, Place 1 g vaginally every 7 days., Disp: 4 g, Rfl: 5    ketoconazole (NIZORAL) 2 % cream, Apply topically 2 (two) times daily. Apply to the affected area twice daily. (Patient taking differently: Apply topically 2 (two) times daily as needed. Apply to the affected area twice daily.), Disp: 30 g, Rfl: 2    levocetirizine (XYZAL) 5 MG tablet, Take 1 tablet (5 mg total) by mouth every evening., Disp: 30 tablet, Rfl: 11    tirzepatide (MOUNJARO) 2.5 mg/0.5 mL PnIj, Inject 2.5 mg into the skin every 7 days., Disp: 4 pen, Rfl: 3    triamcinolone acetonide 0.1% (KENALOG) 0.1 % cream, Apply topically 2 (two) times daily. Do not use on face (Patient taking differently: Apply topically 2 (two) times daily as needed. Do not use on face), Disp: 45 Bottle, Rfl: 6  No current facility-administered medications for this visit.    Facility-Administered Medications Ordered in Other Visits:     nozaseptin (NOZIN) nasal , , Each Nostril, On Call Procedure, Kenrick Gray MD, Given at 04/27/21 1222    Saw pulmonology last week for an exacerbation of her asthmatic bronchitis.  Now on steroids.  Doing better but wanted me to check her lungs.  Will hold off on Prevnar 20 vaccine until after the steroids are done.      Still gaining weight.  Talked about GLP 1 therapy instead of Januvia will substitute if covered .  No family history of thyroid cancer.  No personal history of " "pancreatitis    Compliant with CPAP every night.    Review of Systems   Constitutional:  Negative for activity change, appetite change and fever.   HENT:  Negative for hearing loss, rhinorrhea, sore throat and trouble swallowing.    Respiratory:  Positive for chest tightness and wheezing. Negative for cough and shortness of breath.    Cardiovascular:  Negative for chest pain.   Gastrointestinal:  Negative for abdominal pain, diarrhea and nausea.   Genitourinary:  Negative for difficulty urinating.   Musculoskeletal:  Negative for arthralgias and myalgias.   Neurological:  Negative for dizziness and headaches.     Objective:      Vitals:    11/16/22 1339   BP: 130/68   Pulse: 86   Temp: 97.2 °F (36.2 °C)   SpO2: 96%   Weight: 78.5 kg (173 lb 1 oz)   Height: 4' 10" (1.473 m)   PainSc: 0-No pain     Physical Exam  Vitals and nursing note reviewed.   Constitutional:       General: She is not in acute distress.     Appearance: She is well-developed. She is not ill-appearing or diaphoretic.   HENT:      Head: Normocephalic.      Mouth/Throat:      Pharynx: No oropharyngeal exudate.   Neck:      Thyroid: No thyromegaly.   Cardiovascular:      Rate and Rhythm: Normal rate and regular rhythm.      Heart sounds: Normal heart sounds. No murmur heard.  Pulmonary:      Effort: Pulmonary effort is normal. No tachypnea, accessory muscle usage or respiratory distress.      Breath sounds: Rhonchi present. No decreased breath sounds, wheezing or rales.   Abdominal:      General: There is no distension.      Palpations: Abdomen is soft.   Musculoskeletal:      Cervical back: Neck supple.   Lymphadenopathy:      Cervical: No cervical adenopathy.   Skin:     General: Skin is warm and dry.   Neurological:      Mental Status: She is alert and oriented to person, place, and time.   Psychiatric:         Behavior: Behavior normal.         Thought Content: Thought content normal.         Judgment: Judgment normal.       Assessment:       1. " Asthmatic bronchitis , chronic    2. Hypothyroidism, unspecified type    3. Type 2 diabetes mellitus with microalbuminuria, with long-term current use of insulin    4. DMITRI on CPAP    5. Essential hypertension    6. Dyslipidemia    7. Morbid (severe) obesity due to excess calories    8. Chronic dyspnea    9. Polypharmacy    10. Tardive dyskinesia          Plan:   Asthmatic bronchitis , chronic  Comments:  Improving symptoms on current treatments    Hypothyroidism, unspecified type  Comments:  Euthyroid on current dose    Type 2 diabetes mellitus with microalbuminuria, with long-term current use of insulin  Comments:  Discontinue Januvia.  Start GLP-1 for weight loss    DMITRI on CPAP  Comments:  Compliant    Essential hypertension  Comments:  Controlled with digital hypertension    Dyslipidemia    Morbid (severe) obesity due to excess calories  Comments:  Continue weight gain.  G LP 1 therapy    Chronic dyspnea  Comments:  Overall improved on Trelegy    Polypharmacy    Tardive dyskinesia  Comments:  Followed by neuro. [place]    Other orders  -     tirzepatide (MOUNJARO) 2.5 mg/0.5 mL PnIj; Inject 2.5 mg into the skin every 7 days.  Dispense: 4 pen; Refill: 3

## 2022-11-16 NOTE — TELEPHONE ENCOUNTER
----- Message from Pastor Liang sent at 11/16/2022  2:15 PM CST -----  Contact: [id number]  Calling requesting diagnostic code for medication ,tirzepatide (MOUNJARO) 2.5 mg/0.5 mL PnIj . Please call back at [phone] . Manan/rogers

## 2022-11-16 NOTE — TELEPHONE ENCOUNTER
Spoke with pharmacist and gave the ICD10 codes of E11.29 and E11.40 since patient is diabetic and uses insulin

## 2022-12-16 ENCOUNTER — PATIENT MESSAGE (OUTPATIENT)
Dept: OBSTETRICS AND GYNECOLOGY | Facility: CLINIC | Age: 57
End: 2022-12-16
Payer: MEDICARE

## 2022-12-28 ENCOUNTER — PATIENT MESSAGE (OUTPATIENT)
Dept: DIABETES | Facility: CLINIC | Age: 57
End: 2022-12-28

## 2022-12-28 ENCOUNTER — OFFICE VISIT (OUTPATIENT)
Dept: DIABETES | Facility: CLINIC | Age: 57
End: 2022-12-28
Payer: MEDICARE

## 2022-12-28 VITALS
WEIGHT: 169.75 LBS | BODY MASS INDEX: 35.63 KG/M2 | SYSTOLIC BLOOD PRESSURE: 124 MMHG | DIASTOLIC BLOOD PRESSURE: 82 MMHG | HEIGHT: 58 IN

## 2022-12-28 DIAGNOSIS — I10 ESSENTIAL HYPERTENSION: ICD-10-CM

## 2022-12-28 DIAGNOSIS — Z79.4 TYPE 2 DIABETES MELLITUS WITH MICROALBUMINURIA, WITH LONG-TERM CURRENT USE OF INSULIN: Primary | ICD-10-CM

## 2022-12-28 DIAGNOSIS — R80.9 TYPE 2 DIABETES MELLITUS WITH MICROALBUMINURIA, WITH LONG-TERM CURRENT USE OF INSULIN: Primary | ICD-10-CM

## 2022-12-28 DIAGNOSIS — E66.01 MORBID (SEVERE) OBESITY DUE TO EXCESS CALORIES: ICD-10-CM

## 2022-12-28 DIAGNOSIS — E11.29 TYPE 2 DIABETES MELLITUS WITH MICROALBUMINURIA, WITH LONG-TERM CURRENT USE OF INSULIN: Primary | ICD-10-CM

## 2022-12-28 LAB — GLUCOSE SERPL-MCNC: 169 MG/DL (ref 70–110)

## 2022-12-28 PROCEDURE — 3061F PR NEG MICROALBUMINURIA RESULT DOCUMENTED/REVIEW: ICD-10-PCS | Mod: CPTII,S$GLB,, | Performed by: NURSE PRACTITIONER

## 2022-12-28 PROCEDURE — 99999 PR PBB SHADOW E&M-EST. PATIENT-LVL IV: CPT | Mod: PBBFAC,,, | Performed by: NURSE PRACTITIONER

## 2022-12-28 PROCEDURE — 4010F PR ACE/ARB THEARPY RXD/TAKEN: ICD-10-PCS | Mod: CPTII,S$GLB,, | Performed by: NURSE PRACTITIONER

## 2022-12-28 PROCEDURE — 1159F MED LIST DOCD IN RCRD: CPT | Mod: CPTII,S$GLB,, | Performed by: NURSE PRACTITIONER

## 2022-12-28 PROCEDURE — 99214 PR OFFICE/OUTPT VISIT, EST, LEVL IV, 30-39 MIN: ICD-10-PCS | Mod: S$GLB,,, | Performed by: NURSE PRACTITIONER

## 2022-12-28 PROCEDURE — 99214 OFFICE O/P EST MOD 30 MIN: CPT | Mod: S$GLB,,, | Performed by: NURSE PRACTITIONER

## 2022-12-28 PROCEDURE — 4010F ACE/ARB THERAPY RXD/TAKEN: CPT | Mod: CPTII,S$GLB,, | Performed by: NURSE PRACTITIONER

## 2022-12-28 PROCEDURE — 3066F PR DOCUMENTATION OF TREATMENT FOR NEPHROPATHY: ICD-10-PCS | Mod: CPTII,S$GLB,, | Performed by: NURSE PRACTITIONER

## 2022-12-28 PROCEDURE — 82962 POCT GLUCOSE, HAND-HELD DEVICE: ICD-10-PCS | Mod: S$GLB,,, | Performed by: NURSE PRACTITIONER

## 2022-12-28 PROCEDURE — 3074F SYST BP LT 130 MM HG: CPT | Mod: CPTII,S$GLB,, | Performed by: NURSE PRACTITIONER

## 2022-12-28 PROCEDURE — 3074F PR MOST RECENT SYSTOLIC BLOOD PRESSURE < 130 MM HG: ICD-10-PCS | Mod: CPTII,S$GLB,, | Performed by: NURSE PRACTITIONER

## 2022-12-28 PROCEDURE — 3008F PR BODY MASS INDEX (BMI) DOCUMENTED: ICD-10-PCS | Mod: CPTII,S$GLB,, | Performed by: NURSE PRACTITIONER

## 2022-12-28 PROCEDURE — 82962 GLUCOSE BLOOD TEST: CPT | Mod: S$GLB,,, | Performed by: NURSE PRACTITIONER

## 2022-12-28 PROCEDURE — 3008F BODY MASS INDEX DOCD: CPT | Mod: CPTII,S$GLB,, | Performed by: NURSE PRACTITIONER

## 2022-12-28 PROCEDURE — 3079F PR MOST RECENT DIASTOLIC BLOOD PRESSURE 80-89 MM HG: ICD-10-PCS | Mod: CPTII,S$GLB,, | Performed by: NURSE PRACTITIONER

## 2022-12-28 PROCEDURE — 3044F HG A1C LEVEL LT 7.0%: CPT | Mod: CPTII,S$GLB,, | Performed by: NURSE PRACTITIONER

## 2022-12-28 PROCEDURE — 3061F NEG MICROALBUMINURIA REV: CPT | Mod: CPTII,S$GLB,, | Performed by: NURSE PRACTITIONER

## 2022-12-28 PROCEDURE — 3044F PR MOST RECENT HEMOGLOBIN A1C LEVEL <7.0%: ICD-10-PCS | Mod: CPTII,S$GLB,, | Performed by: NURSE PRACTITIONER

## 2022-12-28 PROCEDURE — 3079F DIAST BP 80-89 MM HG: CPT | Mod: CPTII,S$GLB,, | Performed by: NURSE PRACTITIONER

## 2022-12-28 PROCEDURE — 3066F NEPHROPATHY DOC TX: CPT | Mod: CPTII,S$GLB,, | Performed by: NURSE PRACTITIONER

## 2022-12-28 PROCEDURE — 99999 PR PBB SHADOW E&M-EST. PATIENT-LVL IV: ICD-10-PCS | Mod: PBBFAC,,, | Performed by: NURSE PRACTITIONER

## 2022-12-28 PROCEDURE — 1159F PR MEDICATION LIST DOCUMENTED IN MEDICAL RECORD: ICD-10-PCS | Mod: CPTII,S$GLB,, | Performed by: NURSE PRACTITIONER

## 2022-12-28 NOTE — PROGRESS NOTES
"PCP: Scotty Figueroa MD    Subjective:     Chief Complaint: Diabetes Follow Up    HISTORY OF PRESENT ILLNESS: 57 y.o.  female presenting for diabetes follow up. Patient has had Type II diabetes since 2014 and has the following complications: neuropathy, gastroparesis. She has attended diabetes education in the past. Other pertinent conditions: HTN, HLD, IBD, ISELA, and schizoaffective disorder ( followed by psychiatry ). Of note, she has a history of cortisone injections for plantar fasciitis.       Past tried and failed medications include: Invokana ( yeast infection );  Januvia; metformin caused GI side effects; and Trulicity caused abdominal pain and discomfort.     Blood glucose testing is performed regularly.  Per recall, fasting BGs are  < 59, 70 s > 80 s - 120 s. Has occasional hypoglycemia, BG < 70 s, which she treats with juice. She denies any recent hospital admissions, emergency room visits, or syncope.  She is currently enrolled in digital diabetes program ( see episodes tab for readings ).     Blood glucose in clinic: 169 mg/dl     Vitals:    12/28/22 0910   BP: 124/82   BP Location: Right arm   Weight: 77 kg (169 lb 12.1 oz)   Height: 4' 10" (1.473 m)     BMI 35.48    Labs Reviewed.       No results found for: CPEPTIDE  No results found for: GLUTAMICACID  No results found for: HUMANINSULIN    DM MEDICATIONS:   Toujeo 46 units daily ( at 9 pm )  Mounjaro 2.5 mg weekly   Glimepiride 4 mg BID ac    Review of Systems   Constitutional:  Negative for appetite change, diaphoresis, fatigue and unexpected weight change.   Eyes:  Negative for visual disturbance.   Gastrointestinal:  Positive for nausea (chronic, intermittent). Negative for abdominal pain, constipation and diarrhea.   Endocrine: Negative for polydipsia, polyphagia and polyuria.   Neurological:  Positive for numbness. Negative for headaches.   Psychiatric/Behavioral:  Negative for confusion and decreased concentration. The patient is not " nervous/anxious.        Diabetes Management Status  Statin: Taking  ACE/ARB: Not taking    Screening or Prevention Patient's value Goal Complete/Controlled?   HgA1C Testing and Control   Lab Results   Component Value Date    HGBA1C 6.0 (H) 2022      Annually/Less than 8% No   Lipid profile : 2022 Annually No   LDL control Lab Results   Component Value Date    LDLCALC 51.6 (L) 2022    Annually/Less than 100 mg/dl  No   Nephropathy screening Lab Results   Component Value Date    MICALBCREAT 10.9 2022     Lab Results   Component Value Date    PROTEINUA Negative 2015    Annually No   Blood pressure BP Readings from Last 1 Encounters:   22 124/82    Less than 140/90 Yes   Dilated retinal exam : 2022 Annually Yes, [facility]   Foot exam   : 2022 Annually Yes     ACTIVITY LEVEL: Rarely Active. Discussed activities, benefits, methods, and precautions.  MEAL PLANNING: Patient reports number of meals per day to be 3 and number of snacks per day to be 2.   Patient is encouraged to carb count and consume no more than 45 - 60 grams of carbohydrates in each meal, and 1800 k / richard per day.      BLOOD GLUCOSE TESTIN times daily  SOCIAL HISTORY: .  Former smoker.  Her mother lives with her, and sister unexpectedly passed away.     Objective:      Physical Exam  Constitutional:       Appearance: She is well-developed.   HENT:      Head: Normocephalic and atraumatic.   Eyes:      Pupils: Pupils are equal, round, and reactive to light.   Cardiovascular:      Rate and Rhythm: Normal rate and regular rhythm.      Pulses:           Dorsalis pedis pulses are 2+ on the right side and 2+ on the left side.   Pulmonary:      Effort: Pulmonary effort is normal.      Breath sounds: Normal breath sounds.   Musculoskeletal:         General: Normal range of motion.      Cervical back: Normal range of motion.   Feet:      Right foot:      Protective Sensation: 6 sites tested.  6  sites sensed.      Skin integrity: No ulcer, callus or dry skin.      Left foot:      Protective Sensation: 6 sites tested.  6 sites sensed.      Skin integrity: No ulcer, blister, callus or dry skin.   Skin:     General: Skin is warm and dry.      Findings: No rash.   Psychiatric:         Behavior: Behavior normal.         Thought Content: Thought content normal.         Judgment: Judgment normal.     Assessment / Plan:     1.) Type 2 diabetes mellitus without complication, with long-term current use of insulin  Comments:  - Condition improving. Continue Toujeo 46 units daily ( EVERY MORNING ).   Continue glimepiride 4 mg BID ac.  Increase Mounjaro 5 mg weekly, prescription sent to pharmacy.    Orders:  -     Future Labs scheduled: CMP, A1C  - POCT-glucose    2.) Essential hypertension - continue medications as prescribed.    3.) Dyslipidemia - continue statin therapy    Additional Plan Details:    1.) Continue monitoring blood sugar 2 x daily, fasting and ac dinner or at bedtime. Discussed ADA goal for fasting blood sugar, 80 - 130 mg/dL; pp blood sugars below 180 mg/dl. Also, discussed prevention of hypoglycemia and the need to adjust goals to higher levels if persistent hypoglycemia.  Reminded to bring BG records or meter to each visit for review.  2.) Return to clinic in 4 months for follow up. The patient was explained the above plan and given opportunity to ask questions.  She understands, chooses and consents to this plan and accepts all the risks, which include but are not limited to the risks mentioned above. She understands the alternative of having no testing, interventions or treatments at this time. She left content and without further questions.     Renetta Whitlock, DC, CDE    A total of 30 minutes was spent in face to face time, of which over 50 % was spent in counseling patient on disease process, complications, treatment, and side effects of medications.

## 2022-12-29 ENCOUNTER — PATIENT MESSAGE (OUTPATIENT)
Dept: FAMILY MEDICINE | Facility: CLINIC | Age: 57
End: 2022-12-29
Payer: MEDICARE

## 2022-12-29 ENCOUNTER — PATIENT MESSAGE (OUTPATIENT)
Dept: GASTROENTEROLOGY | Facility: CLINIC | Age: 57
End: 2022-12-29
Payer: MEDICARE

## 2022-12-29 ENCOUNTER — PATIENT MESSAGE (OUTPATIENT)
Dept: DIABETES | Facility: CLINIC | Age: 57
End: 2022-12-29
Payer: MEDICARE

## 2022-12-29 ENCOUNTER — PATIENT MESSAGE (OUTPATIENT)
Dept: PULMONOLOGY | Facility: CLINIC | Age: 57
End: 2022-12-29
Payer: MEDICARE

## 2022-12-29 DIAGNOSIS — R11.0 NAUSEA: ICD-10-CM

## 2022-12-29 DIAGNOSIS — G47.10 HYPERSOMNIA: Primary | ICD-10-CM

## 2022-12-29 DIAGNOSIS — K31.84 GASTROPARESIS: Primary | ICD-10-CM

## 2022-12-29 RX ORDER — ONDANSETRON 4 MG/1
TABLET, ORALLY DISINTEGRATING ORAL
Qty: 20 TABLET | Refills: 1 | Status: SHIPPED | OUTPATIENT
Start: 2022-12-29 | End: 2023-09-06 | Stop reason: SDUPTHER

## 2022-12-30 RX ORDER — SOLRIAMFETOL 150 MG/1
1 TABLET, FILM COATED ORAL DAILY
Qty: 30 TABLET | Refills: 2 | Status: SHIPPED | OUTPATIENT
Start: 2022-12-30 | End: 2023-05-16

## 2022-12-30 NOTE — PROGRESS NOTES
Subjective:       Patient ID: Bianca Weldon is a 57 y.o. female.    Chief Complaint: No chief complaint on file.      HPI Comments:       Current Outpatient Medications:     albuterol (ACCUNEB) 1.25 mg/3 mL Nebu, Take 3 mLs (1.25 mg total) by nebulization 2 (two) times a day. Rescue, Disp: 180 mL, Rfl: 11    ARIPiprazole (ABILIFY) 15 MG Tab, Take 15 mg by mouth every morning., Disp: , Rfl:     ascorbic acid, vitamin C, (VITAMIN C) 250 MG tablet, Take 250 mg by mouth once daily., Disp: , Rfl:     aspirin (ECOTRIN) 81 MG EC tablet, Take 81 mg by mouth once daily. , Disp: , Rfl:     atorvastatin (LIPITOR) 40 MG tablet, Take 1 tablet (40 mg total) by mouth once daily., Disp: 90 tablet, Rfl: 1    blood sugar diagnostic Strp, Inject 1 each into the skin 3 (three) times daily. Dispense preferred on insurance, Disp: 100 strip, Rfl: 11    buPROPion (WELLBUTRIN XL) 300 MG 24 hr tablet, Take 300 mg by mouth once daily., Disp: , Rfl:     clindamycin (CLEOCIN T) 1 % external solution, APPLY TO THE AFFECTED AREA TWICE DAILY AS NEEDED FOR ACE/ FOLLICULITIS ON FACE AND SCALP, Disp: 60 mL, Rfl: 4    clotrimazole-betamethasone 1-0.05% (LOTRISONE) cream, Apply to affected area 2 times daily for 5 days, Disp: 15 g, Rfl: 1    cyanocobalamin (VITAMIN B-12) 1000 MCG tablet, Take 1,000 mcg by mouth once daily. , Disp: , Rfl:     cyclobenzaprine (FLEXERIL) 10 MG tablet, Take 10 mg by mouth every evening., Disp: , Rfl:     cycloSPORINE (RESTASIS) 0.05 % ophthalmic emulsion, Place 1 drop into both eyes 2 (two) times daily as needed. , Disp: , Rfl:     deutetrabenazine (AUSTEDO) 9 mg Tab, Take 9 mg by mouth 2 (two) times daily., Disp: , Rfl:     diclofenac sodium (VOLTAREN) 1 % Gel, APPLY FOUR GRAMS TOPICALLY EVERY DAY AS DIRECTED, Disp: 100 g, Rfl: 3    estradioL (ESTRACE) 0.01 % (0.1 mg/gram) vaginal cream, Place 1 g vaginally every 7 days., Disp: 4 g, Rfl: 5    fluconazole (DIFLUCAN) 150 MG Tab, Take 1 tablet (150 mg total) by mouth once a  week. After completing 3 dose diflucan therapy, Disp: 4 tablet, Rfl: 5    fludrocortisone (FLORINEF) 0.1 mg Tab, Take 100 mcg by mouth once daily., Disp: , Rfl:     fluticasone propionate (FLONASE) 50 mcg/actuation nasal spray, USE TWO SPRAYS IN EACH NOSTRIL ONCE DAILY, Disp: 48 g, Rfl: 3    glimepiride (AMARYL) 4 MG tablet, Take 1 tablet (4 mg total) by mouth 2 (two) times daily before meals., Disp: 180 tablet, Rfl: 1    insulin glargine U-300 conc (TOUJEO MAX U-300 SOLOSTAR) 300 unit/mL (3 mL) insulin pen, Inject 46 Units into the skin once daily., Disp: 2 pen, Rfl: 6    ipratropium-albuteroL (COMBIVENT RESPIMAT)  mcg/actuation inhaler, Inhale 2 puffs into the lungs every 4 (four) hours as needed for Wheezing. Rescue, Disp: 4 g, Rfl: 3    ketoconazole (NIZORAL) 2 % cream, Apply topically 2 (two) times daily. Apply to the affected area twice daily. (Patient taking differently: Apply topically 2 (two) times daily as needed. Apply to the affected area twice daily.), Disp: 30 g, Rfl: 2    lamoTRIgine (LAMICTAL) 200 MG tablet, Take 1 tablet by mouth 2 (two) times daily., Disp: , Rfl:     levocetirizine (XYZAL) 5 MG tablet, Take 1 tablet (5 mg total) by mouth every evening., Disp: 30 tablet, Rfl: 11    levothyroxine (SYNTHROID) 75 MCG tablet, Take 1 tablet (75 mcg total) by mouth once daily., Disp: 90 tablet, Rfl: 1    LORazepam (ATIVAN) 1 MG tablet, Take 1 tablet (1 mg total) by mouth 2 (two) times daily as needed for Anxiety (takes nightly)., Disp: 60 tablet, Rfl: 2    losartan (COZAAR) 25 MG tablet, Take 1 tablet (25 mg total) by mouth once daily., Disp: 90 tablet, Rfl: 2    meloxicam (MOBIC) 15 MG tablet, Take 15 mg by mouth once daily., Disp: , Rfl:     multivitamin capsule, Take 1 capsule by mouth once daily., Disp: , Rfl:     MYRBETRIQ 50 mg Tb24, Take 50 mg by mouth 2 (two) times daily as needed. , Disp: , Rfl:     ondansetron (ZOFRAN-ODT) 4 MG TbDL, DISSOLVE 1 TABLET ON THE TONGUE EVERY 6 HOURS AS NEEDED  "FOR NAUSEA, Disp: 20 tablet, Rfl: 1    pantoprazole (PROTONIX) 40 MG tablet, TAKE 1 TABLET BY MOUTH DAILY, Disp: 90 tablet, Rfl: 3    pen needle, diabetic 31 gauge x 3/16" Ndle, Inject 1 Stick into the skin once daily., Disp: 100 each, Rfl: 3    SUNOSI 150 mg Tab, Take 1 tablet by mouth once daily., Disp: , Rfl:     tirzepatide 5 mg/0.5 mL PnIj, Inject 5 mg into the skin every 7 days., Disp: 4 pen, Rfl: 3    triamcinolone acetonide 0.1% (KENALOG) 0.1 % cream, Apply topically 2 (two) times daily. Do not use on face (Patient taking differently: Apply topically 2 (two) times daily as needed. Do not use on face), Disp: 45 Bottle, Rfl: 6    TRINTELLIX 20 mg Tab, Take 1 tablet by mouth once daily., Disp: , Rfl:     vitamin D (VITAMIN D3) 1000 units Tab, Take 2,000 Units by mouth once daily., Disp: , Rfl:   No current facility-administered medications for this visit.    Facility-Administered Medications Ordered in Other Visits:     nozaseptin (NOZIN) nasal , , Each Nostril, On Call Procedure, Kenrick Gray MD, Given at 04/27/21 1222  HPI  Review of Systems    Objective:      There were no vitals filed for this visit.  Physical Exam    Assessment:       No diagnosis found.    Plan:   There are no diagnoses linked to this encounter.        "

## 2023-01-02 ENCOUNTER — PATIENT MESSAGE (OUTPATIENT)
Dept: DIABETES | Facility: CLINIC | Age: 58
End: 2023-01-02
Payer: MEDICARE

## 2023-01-04 ENCOUNTER — LAB VISIT (OUTPATIENT)
Dept: LAB | Facility: HOSPITAL | Age: 58
End: 2023-01-04
Attending: FAMILY MEDICINE
Payer: MEDICARE

## 2023-01-04 DIAGNOSIS — R80.9 TYPE 2 DIABETES MELLITUS WITH MICROALBUMINURIA, WITH LONG-TERM CURRENT USE OF INSULIN: ICD-10-CM

## 2023-01-04 DIAGNOSIS — E11.29 TYPE 2 DIABETES MELLITUS WITH MICROALBUMINURIA, WITH LONG-TERM CURRENT USE OF INSULIN: ICD-10-CM

## 2023-01-04 DIAGNOSIS — Z79.4 TYPE 2 DIABETES MELLITUS WITH MICROALBUMINURIA, WITH LONG-TERM CURRENT USE OF INSULIN: ICD-10-CM

## 2023-01-04 LAB
ESTIMATED AVG GLUCOSE: 117 MG/DL (ref 68–131)
HBA1C MFR BLD: 5.7 % (ref 4–5.6)

## 2023-01-04 PROCEDURE — 83036 HEMOGLOBIN GLYCOSYLATED A1C: CPT | Performed by: NURSE PRACTITIONER

## 2023-01-04 PROCEDURE — 80053 COMPREHEN METABOLIC PANEL: CPT | Performed by: NURSE PRACTITIONER

## 2023-01-04 PROCEDURE — 36415 COLL VENOUS BLD VENIPUNCTURE: CPT | Mod: PO | Performed by: NURSE PRACTITIONER

## 2023-01-05 LAB
ALBUMIN SERPL BCP-MCNC: 3.5 G/DL (ref 3.5–5.2)
ALP SERPL-CCNC: 140 U/L (ref 55–135)
ALT SERPL W/O P-5'-P-CCNC: 18 U/L (ref 10–44)
ANION GAP SERPL CALC-SCNC: 9 MMOL/L (ref 8–16)
AST SERPL-CCNC: 20 U/L (ref 10–40)
BILIRUB SERPL-MCNC: 0.5 MG/DL (ref 0.1–1)
BUN SERPL-MCNC: 7 MG/DL (ref 6–20)
CALCIUM SERPL-MCNC: 8.8 MG/DL (ref 8.7–10.5)
CHLORIDE SERPL-SCNC: 110 MMOL/L (ref 95–110)
CO2 SERPL-SCNC: 25 MMOL/L (ref 23–29)
CREAT SERPL-MCNC: 0.9 MG/DL (ref 0.5–1.4)
EST. GFR  (NO RACE VARIABLE): >60 ML/MIN/1.73 M^2
GLUCOSE SERPL-MCNC: 73 MG/DL (ref 70–110)
POTASSIUM SERPL-SCNC: 3.4 MMOL/L (ref 3.5–5.1)
PROT SERPL-MCNC: 6.1 G/DL (ref 6–8.4)
SODIUM SERPL-SCNC: 144 MMOL/L (ref 136–145)

## 2023-01-19 ENCOUNTER — PATIENT MESSAGE (OUTPATIENT)
Dept: DIABETES | Facility: CLINIC | Age: 58
End: 2023-01-19
Payer: MEDICARE

## 2023-01-19 DIAGNOSIS — R80.9 TYPE 2 DIABETES MELLITUS WITH MICROALBUMINURIA, WITH LONG-TERM CURRENT USE OF INSULIN: Primary | ICD-10-CM

## 2023-01-19 DIAGNOSIS — Z79.4 TYPE 2 DIABETES MELLITUS WITH MICROALBUMINURIA, WITH LONG-TERM CURRENT USE OF INSULIN: Primary | ICD-10-CM

## 2023-01-19 DIAGNOSIS — E11.29 TYPE 2 DIABETES MELLITUS WITH MICROALBUMINURIA, WITH LONG-TERM CURRENT USE OF INSULIN: Primary | ICD-10-CM

## 2023-01-23 RX ORDER — FLUTICASONE PROPIONATE 50 MCG
SPRAY, SUSPENSION (ML) NASAL
Qty: 48 G | Refills: 3 | Status: SHIPPED | OUTPATIENT
Start: 2023-01-23 | End: 2024-02-10

## 2023-01-23 NOTE — TELEPHONE ENCOUNTER
Refill Decision Note   Bianca Weldon  is requesting a refill authorization.  Brief Assessment and Rationale for Refill:  Approve     Medication Therapy Plan:       Medication Reconciliation Completed: No   Comments:     No Care Gaps recommended.     Note composed:2:24 PM 01/23/2023

## 2023-01-23 NOTE — TELEPHONE ENCOUNTER
No new care gaps identified.  [facility] Embedded Care Gaps. Reference number: [id number]. 1/23/2023   10:02:18 AM CST

## 2023-02-07 DIAGNOSIS — M25.561 PAIN IN BOTH KNEES, UNSPECIFIED CHRONICITY: Primary | ICD-10-CM

## 2023-02-07 DIAGNOSIS — M25.562 PAIN IN BOTH KNEES, UNSPECIFIED CHRONICITY: Primary | ICD-10-CM

## 2023-02-20 ENCOUNTER — TELEPHONE (OUTPATIENT)
Dept: ORTHOPEDICS | Facility: CLINIC | Age: 58
End: 2023-02-20
Payer: MEDICARE

## 2023-02-20 ENCOUNTER — HOSPITAL ENCOUNTER (OUTPATIENT)
Dept: RADIOLOGY | Facility: HOSPITAL | Age: 58
Discharge: HOME | End: 2023-02-20
Attending: ORTHOPAEDIC SURGERY
Payer: MEDICARE

## 2023-02-20 ENCOUNTER — OFFICE VISIT (OUTPATIENT)
Dept: ORTHOPEDICS | Facility: CLINIC | Age: 58
End: 2023-02-20
Payer: MEDICARE

## 2023-02-20 VITALS — HEIGHT: 58 IN | WEIGHT: 171.19 LBS | BODY MASS INDEX: 35.93 KG/M2

## 2023-02-20 DIAGNOSIS — S83.411A GRADE 2 SPRAIN OF MEDIAL COLLATERAL LIGAMENT OF RIGHT KNEE: Primary | ICD-10-CM

## 2023-02-20 DIAGNOSIS — M23.312 INTERNAL DERANGEMENT OF LEFT KNEE INVOLVING ANTERIOR HORN OF MEDIAL MENISCUS: ICD-10-CM

## 2023-02-20 DIAGNOSIS — M94.261 CHONDROMALACIA OF RIGHT KNEE: ICD-10-CM

## 2023-02-20 DIAGNOSIS — M25.562 PAIN IN BOTH KNEES, UNSPECIFIED CHRONICITY: ICD-10-CM

## 2023-02-20 DIAGNOSIS — M25.561 PAIN IN BOTH KNEES, UNSPECIFIED CHRONICITY: ICD-10-CM

## 2023-02-20 DIAGNOSIS — S83.241D ACUTE MEDIAL MENISCUS TEAR OF RIGHT KNEE, SUBSEQUENT ENCOUNTER: Primary | ICD-10-CM

## 2023-02-20 DIAGNOSIS — S83.241D ACUTE MEDIAL MENISCUS TEAR OF RIGHT KNEE, SUBSEQUENT ENCOUNTER: ICD-10-CM

## 2023-02-20 PROCEDURE — 1159F PR MEDICATION LIST DOCUMENTED IN MEDICAL RECORD: ICD-10-PCS | Mod: CPTII,S$GLB,, | Performed by: ORTHOPAEDIC SURGERY

## 2023-02-20 PROCEDURE — 73564 XR KNEE ORTHO BILAT WITH FLEXION: ICD-10-PCS | Mod: 26,50,, | Performed by: RADIOLOGY

## 2023-02-20 PROCEDURE — 4010F ACE/ARB THERAPY RXD/TAKEN: CPT | Mod: CPTII,S$GLB,, | Performed by: ORTHOPAEDIC SURGERY

## 2023-02-20 PROCEDURE — 99999 PR PBB SHADOW E&M-EST. PATIENT-LVL V: CPT | Mod: PBBFAC,,, | Performed by: ORTHOPAEDIC SURGERY

## 2023-02-20 PROCEDURE — 1159F MED LIST DOCD IN RCRD: CPT | Mod: CPTII,S$GLB,, | Performed by: ORTHOPAEDIC SURGERY

## 2023-02-20 PROCEDURE — 3008F PR BODY MASS INDEX (BMI) DOCUMENTED: ICD-10-PCS | Mod: CPTII,S$GLB,, | Performed by: ORTHOPAEDIC SURGERY

## 2023-02-20 PROCEDURE — 4010F PR ACE/ARB THEARPY RXD/TAKEN: ICD-10-PCS | Mod: CPTII,S$GLB,, | Performed by: ORTHOPAEDIC SURGERY

## 2023-02-20 PROCEDURE — 73564 X-RAY EXAM KNEE 4 OR MORE: CPT | Mod: TC,50

## 2023-02-20 PROCEDURE — 73564 X-RAY EXAM KNEE 4 OR MORE: CPT | Mod: 26,50,, | Performed by: RADIOLOGY

## 2023-02-20 PROCEDURE — 99999 PR PBB SHADOW E&M-EST. PATIENT-LVL V: ICD-10-PCS | Mod: PBBFAC,,, | Performed by: ORTHOPAEDIC SURGERY

## 2023-02-20 PROCEDURE — 3044F HG A1C LEVEL LT 7.0%: CPT | Mod: CPTII,S$GLB,, | Performed by: ORTHOPAEDIC SURGERY

## 2023-02-20 PROCEDURE — 99214 PR OFFICE/OUTPT VISIT, EST, LEVL IV, 30-39 MIN: ICD-10-PCS | Mod: S$GLB,,, | Performed by: ORTHOPAEDIC SURGERY

## 2023-02-20 PROCEDURE — 3044F PR MOST RECENT HEMOGLOBIN A1C LEVEL <7.0%: ICD-10-PCS | Mod: CPTII,S$GLB,, | Performed by: ORTHOPAEDIC SURGERY

## 2023-02-20 PROCEDURE — 99214 OFFICE O/P EST MOD 30 MIN: CPT | Mod: S$GLB,,, | Performed by: ORTHOPAEDIC SURGERY

## 2023-02-20 PROCEDURE — 3008F BODY MASS INDEX DOCD: CPT | Mod: CPTII,S$GLB,, | Performed by: ORTHOPAEDIC SURGERY

## 2023-02-20 RX ORDER — CYCLOBENZAPRINE HCL 10 MG
10 TABLET ORAL 3 TIMES DAILY PRN
Qty: 90 TABLET | Refills: 2 | Status: SHIPPED | OUTPATIENT
Start: 2023-02-20 | End: 2023-03-02

## 2023-02-20 RX ORDER — MELOXICAM 15 MG/1
15 TABLET ORAL DAILY
Qty: 30 TABLET | Refills: 3 | Status: SHIPPED | OUTPATIENT
Start: 2023-02-20 | End: 2023-04-12 | Stop reason: ALTCHOICE

## 2023-02-20 RX ORDER — NIRMATRELVIR AND RITONAVIR 300-100 MG
KIT ORAL
COMMUNITY
Start: 2023-01-25

## 2023-02-20 RX ORDER — CIPROFLOXACIN 500 MG/1
TABLET ORAL
COMMUNITY
Start: 2022-06-05

## 2023-02-20 RX ORDER — FLUTICASONE FUROATE, UMECLIDINIUM BROMIDE AND VILANTEROL TRIFENATATE 100; 62.5; 25 UG/1; UG/1; UG/1
1 POWDER RESPIRATORY (INHALATION)
COMMUNITY
Start: 2023-02-09 | End: 2023-05-09 | Stop reason: SDUPTHER

## 2023-02-20 RX ORDER — GLIPIZIDE 10 MG/1
5 TABLET, FILM COATED, EXTENDED RELEASE ORAL
COMMUNITY
End: 2023-04-12 | Stop reason: SDUPTHER
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
## 2023-02-21 ENCOUNTER — HOSPITAL ENCOUNTER (OUTPATIENT)
Dept: RADIOLOGY | Facility: HOSPITAL | Age: 58
Discharge: HOME | End: 2023-02-21
Attending: ORTHOPAEDIC SURGERY
Payer: MEDICARE

## 2023-02-21 DIAGNOSIS — S83.241D ACUTE MEDIAL MENISCUS TEAR OF RIGHT KNEE, SUBSEQUENT ENCOUNTER: ICD-10-CM

## 2023-02-21 DIAGNOSIS — M23.312 INTERNAL DERANGEMENT OF LEFT KNEE INVOLVING ANTERIOR HORN OF MEDIAL MENISCUS: ICD-10-CM

## 2023-02-21 PROCEDURE — 73721 MRI KNEE WITHOUT CONTRAST LEFT: ICD-10-PCS | Mod: 26,LT,, | Performed by: STUDENT IN AN ORGANIZED HEALTH CARE EDUCATION/TRAINING PROGRAM

## 2023-02-21 PROCEDURE — 73721 MRI JNT OF LWR EXTRE W/O DYE: CPT | Mod: 26,LT,, | Performed by: STUDENT IN AN ORGANIZED HEALTH CARE EDUCATION/TRAINING PROGRAM

## 2023-02-21 PROCEDURE — 73721 MRI KNEE WITHOUT CONTRAST RIGHT: ICD-10-PCS | Mod: 26,RT,, | Performed by: RADIOLOGY

## 2023-02-21 PROCEDURE — 73721 MRI JNT OF LWR EXTRE W/O DYE: CPT | Mod: TC,LT

## 2023-02-21 PROCEDURE — 73721 MRI JNT OF LWR EXTRE W/O DYE: CPT | Mod: 26,RT,, | Performed by: RADIOLOGY

## 2023-02-21 PROCEDURE — 73721 MRI JNT OF LWR EXTRE W/O DYE: CPT | Mod: TC,RT
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
## 2023-02-23 ENCOUNTER — TELEPHONE (OUTPATIENT)
Dept: ORTHOPEDICS | Facility: CLINIC | Age: 58
End: 2023-02-23

## 2023-02-23 ENCOUNTER — OFFICE VISIT (OUTPATIENT)
Dept: ORTHOPEDICS | Facility: CLINIC | Age: 58
End: 2023-02-23
Payer: MEDICARE

## 2023-02-23 VITALS — WEIGHT: 171.5 LBS | HEIGHT: 58 IN | BODY MASS INDEX: 36 KG/M2

## 2023-02-23 DIAGNOSIS — M94.261 CHONDROMALACIA, RIGHT KNEE: Primary | ICD-10-CM

## 2023-02-23 DIAGNOSIS — M25.561 PAIN IN BOTH KNEES, UNSPECIFIED CHRONICITY: Primary | ICD-10-CM

## 2023-02-23 DIAGNOSIS — M25.562 PAIN IN BOTH KNEES, UNSPECIFIED CHRONICITY: Primary | ICD-10-CM

## 2023-02-23 DIAGNOSIS — M76.32 ILIOTIBIAL BAND SYNDROME AFFECTING LEFT LOWER LEG: ICD-10-CM

## 2023-02-23 DIAGNOSIS — S83.241D OTHER TEAR OF MEDIAL MENISCUS OF RIGHT KNEE AS CURRENT INJURY, SUBSEQUENT ENCOUNTER: ICD-10-CM

## 2023-02-23 PROCEDURE — 4010F PR ACE/ARB THEARPY RXD/TAKEN: ICD-10-PCS | Mod: CPTII,S$GLB,, | Performed by: ORTHOPAEDIC SURGERY

## 2023-02-23 PROCEDURE — 3044F HG A1C LEVEL LT 7.0%: CPT | Mod: CPTII,S$GLB,, | Performed by: ORTHOPAEDIC SURGERY

## 2023-02-23 PROCEDURE — 99999 PR PBB SHADOW E&M-EST. PATIENT-LVL V: ICD-10-PCS | Mod: PBBFAC,,, | Performed by: ORTHOPAEDIC SURGERY

## 2023-02-23 PROCEDURE — 3044F PR MOST RECENT HEMOGLOBIN A1C LEVEL <7.0%: ICD-10-PCS | Mod: CPTII,S$GLB,, | Performed by: ORTHOPAEDIC SURGERY

## 2023-02-23 PROCEDURE — 4010F ACE/ARB THERAPY RXD/TAKEN: CPT | Mod: CPTII,S$GLB,, | Performed by: ORTHOPAEDIC SURGERY

## 2023-02-23 PROCEDURE — 99214 PR OFFICE/OUTPT VISIT, EST, LEVL IV, 30-39 MIN: ICD-10-PCS | Mod: S$GLB,,, | Performed by: ORTHOPAEDIC SURGERY

## 2023-02-23 PROCEDURE — 1159F MED LIST DOCD IN RCRD: CPT | Mod: CPTII,S$GLB,, | Performed by: ORTHOPAEDIC SURGERY

## 2023-02-23 PROCEDURE — 3008F PR BODY MASS INDEX (BMI) DOCUMENTED: ICD-10-PCS | Mod: CPTII,S$GLB,, | Performed by: ORTHOPAEDIC SURGERY

## 2023-02-23 PROCEDURE — 3008F BODY MASS INDEX DOCD: CPT | Mod: CPTII,S$GLB,, | Performed by: ORTHOPAEDIC SURGERY

## 2023-02-23 PROCEDURE — 99214 OFFICE O/P EST MOD 30 MIN: CPT | Mod: S$GLB,,, | Performed by: ORTHOPAEDIC SURGERY

## 2023-02-23 PROCEDURE — 99999 PR PBB SHADOW E&M-EST. PATIENT-LVL V: CPT | Mod: PBBFAC,,, | Performed by: ORTHOPAEDIC SURGERY

## 2023-02-23 PROCEDURE — 1159F PR MEDICATION LIST DOCUMENTED IN MEDICAL RECORD: ICD-10-PCS | Mod: CPTII,S$GLB,, | Performed by: ORTHOPAEDIC SURGERY
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
## 2023-03-01 ENCOUNTER — TELEPHONE (OUTPATIENT)
Dept: PULMONOLOGY | Facility: CLINIC | Age: 58
End: 2023-03-01
Payer: MEDICARE

## 2023-03-01 NOTE — TELEPHONE ENCOUNTER
Scheduled appointment with pulmonary disease management for 3/21/23 @ 2:00pm @ [facility].

## 2023-03-05 ENCOUNTER — PATIENT MESSAGE (OUTPATIENT)
Dept: ADMINISTRATIVE | Facility: OTHER | Age: 58
End: 2023-03-05
Payer: MEDICARE

## 2023-03-07 ENCOUNTER — PATIENT MESSAGE (OUTPATIENT)
Dept: ORTHOPEDICS | Facility: CLINIC | Age: 58
End: 2023-03-07
Payer: MEDICARE

## 2023-03-20 ENCOUNTER — TELEPHONE (OUTPATIENT)
Dept: PULMONOLOGY | Facility: CLINIC | Age: 58
End: 2023-03-20
Payer: MEDICARE

## 2023-03-20 NOTE — TELEPHONE ENCOUNTER
Chronic Disease Management:   Called patient to confirm Pulmonary Disease Management appointment.  No answer. Left message.

## 2023-03-21 ENCOUNTER — CLINICAL SUPPORT (OUTPATIENT)
Dept: PULMONOLOGY | Facility: CLINIC | Age: 58
End: 2023-03-21
Payer: MEDICARE

## 2023-03-21 VITALS — WEIGHT: 173.19 LBS | BODY MASS INDEX: 36.36 KG/M2 | OXYGEN SATURATION: 99 % | HEIGHT: 58 IN | HEART RATE: 90 BPM

## 2023-03-21 DIAGNOSIS — J44.89 ASTHMATIC BRONCHITIS , CHRONIC: ICD-10-CM

## 2023-03-21 PROCEDURE — 99999 PR PBB SHADOW E&M-EST. PATIENT-LVL III: CPT | Mod: PBBFAC,,,

## 2023-03-21 PROCEDURE — 99999 PR PBB SHADOW E&M-EST. PATIENT-LVL III: ICD-10-PCS | Mod: PBBFAC,,,

## 2023-03-22 NOTE — PROGRESS NOTES
Pulmonary Disease Management  [facility]  Chronic Care Management  Initial Visit    Referring Provider: Nasir  Diagnosis: Asthmatic bronchitis  Last Hospital Admission: na  Last provider visit: 11/11/22      Current Outpatient Medications:     albuterol (ACCUNEB) 1.25 mg/3 mL Nebu, Take 3 mLs (1.25 mg total) by nebulization 2 (two) times a day. Rescue, Disp: 180 mL, Rfl: 11    ARIPiprazole (ABILIFY) 15 MG Tab, Take 15 mg by mouth every morning., Disp: , Rfl:     ascorbic acid, vitamin C, (VITAMIN C) 250 MG tablet, Take 250 mg by mouth once daily., Disp: , Rfl:     aspirin (ECOTRIN) 81 MG EC tablet, Take 81 mg by mouth once daily. , Disp: , Rfl:     atorvastatin (LIPITOR) 40 MG tablet, Take 1 tablet (40 mg total) by mouth once daily., Disp: 90 tablet, Rfl: 1    blood sugar diagnostic Strp, Inject 1 each into the skin 3 (three) times daily. Dispense preferred on insurance, Disp: 100 strip, Rfl: 11    buPROPion (WELLBUTRIN XL) 300 MG 24 hr tablet, Take 300 mg by mouth once daily., Disp: , Rfl:     ciprofloxacin HCl (CIPRO) 500 MG tablet, Cipro Take 1 tablet (oral) every 12 hours for 5 days 20220605 tablet every 12 hours oral 5 days suspended 500 MG, Disp: , Rfl:     clindamycin (CLEOCIN T) 1 % external solution, APPLY TO THE AFFECTED AREA TWICE DAILY AS NEEDED FOR ACE/ FOLLICULITIS ON FACE AND SCALP, Disp: 60 mL, Rfl: 4    clotrimazole-betamethasone 1-0.05% (LOTRISONE) cream, Apply to affected area 2 times daily for 5 days, Disp: 15 g, Rfl: 1    cyclobenzaprine (FLEXERIL) 10 MG tablet, Take 10 mg by mouth every evening., Disp: , Rfl:     cycloSPORINE (RESTASIS) 0.05 % ophthalmic emulsion, Place 1 drop into both eyes 2 (two) times daily as needed. , Disp: , Rfl:     dapagliflozin propanediol (FARXIGA ORAL), Farxiga Take No date recorded No form recorded No frequency recorded No route recorded No set duration recorded No set duration amount recorded suspended No dosage strength recorded No dosage strength  units of measure recorded, Disp: , Rfl:     deutetrabenazine (AUSTEDO) 9 mg Tab, Take 9 mg by mouth 2 (two) times daily., Disp: , Rfl:     diclofenac sodium (VOLTAREN) 1 % Gel, APPLY FOUR GRAMS TOPICALLY EVERY DAY AS DIRECTED, Disp: 100 g, Rfl: 3    estradioL (ESTRACE) 0.01 % (0.1 mg/gram) vaginal cream, Place 1 g vaginally every 7 days., Disp: 4 g, Rfl: 5    fluconazole (DIFLUCAN) 150 MG Tab, Take 1 tablet (150 mg total) by mouth once a week. After completing 3 dose diflucan therapy, Disp: 4 tablet, Rfl: 5    fludrocortisone (FLORINEF) 0.1 mg Tab, Take 100 mcg by mouth once daily., Disp: , Rfl:     fluticasone propionate (FLONASE) 50 mcg/actuation nasal spray, USE TWO SPRAYS IN EACH NOSTRIL ONCE DAILY, Disp: 48 g, Rfl: 3    glimepiride (AMARYL) 4 MG tablet, Take 1 tablet (4 mg total) by mouth 2 (two) times daily before meals., Disp: 180 tablet, Rfl: 0    glipiZIDE (GLUCOTROL) 10 MG TR24, Take 5 mg by mouth., Disp: , Rfl:     insulin glargine U-300 conc (TOUJEO MAX U-300 SOLOSTAR) 300 unit/mL (3 mL) insulin pen, Inject 46 Units into the skin once daily., Disp: 2 pen, Rfl: 6    ipratropium-albuteroL (COMBIVENT RESPIMAT)  mcg/actuation inhaler, Inhale 2 puffs into the lungs every 4 (four) hours as needed for Wheezing. Rescue, Disp: 4 g, Rfl: 3    ketoconazole (NIZORAL) 2 % cream, Apply topically 2 (two) times daily. Apply to the affected area twice daily. (Patient taking differently: Apply topically 2 (two) times daily as needed. Apply to the affected area twice daily.), Disp: 30 g, Rfl: 2    lamoTRIgine (LAMICTAL) 200 MG tablet, Take 1 tablet by mouth 2 (two) times daily., Disp: , Rfl:     levocetirizine (XYZAL) 5 MG tablet, Take 1 tablet (5 mg total) by mouth every evening., Disp: 30 tablet, Rfl: 11    levothyroxine (SYNTHROID) 75 MCG tablet, Take 1 tablet (75 mcg total) by mouth once daily., Disp: 90 tablet, Rfl: 1    LORazepam (ATIVAN) 1 MG tablet, Take 1 tablet (1 mg total) by mouth 2 (two) times daily as  "needed for Anxiety (takes nightly)., Disp: 60 tablet, Rfl: 2    losartan (COZAAR) 25 MG tablet, Take 1 tablet (25 mg total) by mouth once daily., Disp: 90 tablet, Rfl: 2    meloxicam (MOBIC) 15 MG tablet, Take 15 mg by mouth once daily., Disp: , Rfl:     meloxicam (MOBIC) 15 MG tablet, Take 1 tablet (15 mg total) by mouth once daily. Take with food, Disp: 30 tablet, Rfl: 3    methylPREDNISolone (MEDROL DOSEPACK) 4 mg tablet, TAKE BY MOUTH AS DIRECTED ON PACKAGE FOR SIX DAYS, Disp: 21 tablet, Rfl: 1    multivitamin capsule, Take 1 capsule by mouth once daily., Disp: , Rfl:     MYRBETRIQ 50 mg Tb24, Take 50 mg by mouth 2 (two) times daily as needed. , Disp: , Rfl:     nirmatrelvir-ritonavir (PAXLOVID, EUA,) 300 mg (150 mg x 2)-100 mg copackaged tablets (EUA), Paxlovid (EUA) Take 3 Tablet (oral) 2 times per day for 5 days 20230125 tablet 2 times per day oral 5 days active 300 mg (150 mg x 2)-100 mg, Disp: , Rfl:     ondansetron (ZOFRAN-ODT) 4 MG TbDL, DISSOLVE 1 TABLET ON THE TONGUE EVERY 6 HOURS AS NEEDED FOR NAUSEA, Disp: 20 tablet, Rfl: 1    pantoprazole (PROTONIX) 40 MG tablet, TAKE 1 TABLET BY MOUTH DAILY, Disp: 90 tablet, Rfl: 3    pen needle, diabetic 31 gauge x 3/16" Ndle, Inject 1 Stick into the skin once daily., Disp: 100 each, Rfl: 3    SITagliptin phosphate (JANUVIA) 100 MG Tab, Take 1 tablet (100 mg total) by mouth once daily., Disp: 30 tablet, Rfl: 3    SUNOSI 150 mg Tab, Take 1 tablet by mouth once daily., Disp: 30 tablet, Rfl: 2    tirzepatide 5 mg/0.5 mL PnIj, Inject 5 mg into the skin every 7 days., Disp: 4 pen, Rfl: 3    TRELEGY ELLIPTA 100-62.5-25 mcg DsDv, Inhale 1 puff into the lungs., Disp: , Rfl:     triamcinolone acetonide 0.1% (KENALOG) 0.1 % cream, Apply topically 2 (two) times daily. Do not use on face (Patient taking differently: Apply topically 2 (two) times daily as needed. Do not use on face), Disp: 45 Bottle, Rfl: 6    TRINTELLIX 20 mg Tab, Take 1 tablet by mouth once daily., Disp: , " "Rfl:   No current facility-administered medications for this visit.    Facility-Administered Medications Ordered in Other Visits:     nozaseptin (NOZIN) nasal , , Each Nostril, On Call Procedure, Kenrick Gray MD, Given at 21 1222    Review of patient's allergies indicates:   Allergen Reactions    Bactrim [sulfamethoxazole-trimethoprim] Swelling, Other (See Comments) and Anaphylaxis    Latuda [lurasidone] Anaphylaxis    Macrodantin [nitrofurantoin macrocrystalline] Swelling, Other (See Comments) and Anaphylaxis    Canagliflozin      Yeast infections    Metformin Diarrhea       Smoking history:   Social History     Tobacco Use   Smoking Status Former    Packs/day: 1.50    Years: 33.00    Pack years: 49.50    Types: Cigarettes    Quit date: 2017    Years since quittin.2    Passive exposure: Current   Smokeless Tobacco Never       Pulse: 90  SpO2: 99 %        Height: 4' 10" (147.3 cm)  Weight: 78.5 kg (173 lb 2.7 oz)  BMI (kg/m2): 36.27                        Resting Elliott Dyspnea Rating : 3         ACT Questionnaire:  Asthma Control Test  In the past 4  weeks, how much of the time did your asthma keep you from getting as much done at work, school or at home?: Some of the time  During the past 4 weeks, how often have you had shortness of breath?: 3 to 6 times a week  During the past 4 weeks, how often did your asthma symptoms (wheezing, couging, shortness of breath, chest tightness or pain) wake you up at night or earlier than usual in the morning?: Not at all  During the past 4 weeks, how often have you used your rescue inhaler or nebulizer medication (such as albuterol)?: 1 or 2 times per day  How would you rate your asthma control during the past 4 weeks?: Somewhat controlled  If your score is 19 or less, your asthma may not be under control: 16         PFT Results:  Pre FVC   Date/Time Value Ref Range Status   2022 10:29 PM 2.12 L Final     Pre FEV1   Date/Time Value Ref Range " "Status   09/01/2022 10:29 PM 1.74 L Final     Pre FEV1 FVC   Date/Time Value Ref Range Status   09/01/2022 10:29 PM 81.96 % Final       Patient Concerns or Expectations:   The patient states she breathes shallowly and  feels like she's not getting enough air. She practiced diaphragmatic and pursed lip breathing and stated she felt the difference.     Therapist Comments:   Bianca Weldon  was seen 3/21/2023  2:00 PM in the Pulmonary Disease management clinic for evaluation, education, reinforcement of self management techniques and exacerbation action plan.    Maylin Rubio    Past Medical History:   Diagnosis Date    Abnormal Pap smear of cervix     Abnormal Pap smear of vagina     after hyst, repeat every 6 months, per pt, had bx's, unknown results    Acid reflux     Arthritis     Asthma     Bipolar 1 disorder     Depression     bipolar    Diabetes mellitus     Diabetes mellitus, type 2     Elevated alkaline phosphatase level     GERD (gastroesophageal reflux disease)     Hyperlipidemia     Hypertension     IBS (irritable bowel syndrome)     Interstitial cystitis     Narcolepsy     Nonalcoholic steatohepatitis     fatty liver    Orthostatic hypotension     PONV (postoperative nausea and vomiting)     Respiratory distress     States, "My lungs collapsed during hemorrhoid surgery."    Restless legs syndrome     Sleep apnea     DMITRI-CPAP    Thyroid disease              Educational assessment:   [x]            Good  []            Fair  []            Poor    Readiness to learn:   [x]            Good  []            Fair  []            Poor    Vision Status:   [x]            Good  []            Fair  []            Poor    Reading Ability:  [x]            Good  []            Fair  []            Poor    Knowledge of condition:   [x]            Good  []            Fair  []            Poor    Language Barriers:   []            Good  []            Fair  []            Poor  [x]            None    Cognitive/ Physical " Barriers:   []            Good  []            Fair  []            Poor  [x]            None    Learning best by:                       [x]            Seeing  [x]            Hearing  [x]            Reading                         [x]            Doing    Describe any barrier /Limitation or financial implications of care choices identified     []            Financial  []            Emotional  []            Education  []            Vision/Hearing  []            Physical  [x]            None  []                TOPIC /CONTENT FOR TODAY:    [x]            MDI with or without spacer  [x]            Dry powder inhaler  []            Acapella   []           Peak Flow meter  [x]            ASTHMA action plan  [x]            Nebulizer use  []            Oxygen use safety  [x]            Breathing and cough techniques  []            Energy conservation  []            Infection prevention  [x]            ASTHMA TRIGGER CHECKLIST        Learner:    [x]            Patient   []            Caregiver    Method:    [x]            Verbal explanation  []            Audio visual    [x]            Literature  [x]            Teach back      Evaluation:    [x]            Teach back  [x]            Demonstrate  []            Follow up phone call    []            2 weeks     [x]            4 weeks   []            PRN    Additional comments:    Patient was seen today to review respiratory medication purpose and proper technique for use of inhalers. Patient practiced proper technique using MDI with valved holding chamber (spacer) and DPI inhalers. Patient demonstrated understanding. Literature was given to patient.     Asthma trigger checklist was verbally reviewed and literature given to patient.     Infection prevention was discussed. Patient was reminded to get influenza vaccine. Hand hygiene and cleaning of respiratory equipment was also discussed. Patient verbalized understanding.      COPD action plan was reviewed and literature was given  to patient. Patient verbalized understanding.      Reviewed breathing techniques such as pursed-lip breathing, saavedra-cough technique, and diaphragmatic breathing. Patient practiced proper technique and verbalized understanding. Literature given to patient.        Plan:  Monthly Pulmonary Disease Management Questionnaire  Follow-up PDM appointment scheduled for 9/20/23 at 0900 at [facility]    Reinforce education  Meds: TRELEGY, ALBUTEROL  DME Needs: [facility]   Action Plan: ASTHMA  Immunization: Pneumococcal- DUE, Flu-CURRENT, [id number]  Next Provider Visit: 5/9/23  Next Spirometry/CPFT: 5/9/23  Approximate time spent with patient: 60 MINUTES

## 2023-03-22 NOTE — PATIENT INSTRUCTIONS
ACTION PLAN    GREEN ZONE  My sputum is clear/white/usual color and easily cleared.  My breathing is no harder than usual.  I can do my usual activities.  I can think clearly.   Take your usual medicines, including oxygen, as you are told to do so by your health care provider.   YELLOW ZONE  My sputum has change (color, thickness, amount).  I am more short of breath than usual.  I cough or wheeze more.  I weigh more and my legs/feet swell.  I cannot do my usual activities without resting.   Call your health care provider. You will probably be told to begin taking an antibiotic and prednisone. Have your pharmacy phone number available.   RED ZONE  I cannot cough out my sputum.  I am much more short of breath than normal.  I need to sit up to breathe  I cannot do my usual activities.  I am unable to speak more than one or two words at a time.  I am confused.   Call your health care provider. You may be asked to come in to be seen, told to go to the emergency room, or told to call 9-1-1.

## 2023-03-30 ENCOUNTER — HOSPITAL ENCOUNTER (OUTPATIENT)
Dept: RADIOLOGY | Facility: HOSPITAL | Age: 58
Discharge: HOME | End: 2023-03-30
Attending: OBSTETRICS & GYNECOLOGY
Payer: MEDICARE

## 2023-03-30 DIAGNOSIS — R92.8 ABNORMAL MAMMOGRAM: ICD-10-CM

## 2023-03-30 PROCEDURE — 77065 MAMMO DIGITAL DIAGNOSTIC RIGHT WITH TOMO: ICD-10-PCS | Mod: 26,RT,, | Performed by: RADIOLOGY

## 2023-03-30 PROCEDURE — 77061 BREAST TOMOSYNTHESIS UNI: CPT | Mod: 26,RT,, | Performed by: RADIOLOGY

## 2023-03-30 PROCEDURE — 77061 MAMMO DIGITAL DIAGNOSTIC RIGHT WITH TOMO: ICD-10-PCS | Mod: 26,RT,, | Performed by: RADIOLOGY

## 2023-03-30 PROCEDURE — 76642 ULTRASOUND BREAST LIMITED: CPT | Mod: 26,RT,, | Performed by: RADIOLOGY

## 2023-03-30 PROCEDURE — 77065 DX MAMMO INCL CAD UNI: CPT | Mod: TC,RT

## 2023-03-30 PROCEDURE — 77065 DX MAMMO INCL CAD UNI: CPT | Mod: 26,RT,, | Performed by: RADIOLOGY

## 2023-03-30 PROCEDURE — 76642 ULTRASOUND BREAST LIMITED: CPT | Mod: TC,RT

## 2023-03-30 PROCEDURE — 76642 US BREAST RIGHT LIMITED: ICD-10-PCS | Mod: 26,RT,, | Performed by: RADIOLOGY

## 2023-04-12 PROBLEM — F13.20 SEDATIVE, HYPNOTIC OR ANXIOLYTIC DEPENDENCE, UNCOMPLICATED: Status: ACTIVE | Noted: 2023-04-12

## 2023-04-13 ENCOUNTER — LAB VISIT (OUTPATIENT)
Dept: LAB | Facility: HOSPITAL | Age: 58
End: 2023-04-13
Attending: FAMILY MEDICINE
Payer: MEDICARE

## 2023-04-13 ENCOUNTER — OFFICE VISIT (OUTPATIENT)
Dept: FAMILY MEDICINE | Facility: CLINIC | Age: 58
End: 2023-04-13
Payer: MEDICARE

## 2023-04-13 VITALS
DIASTOLIC BLOOD PRESSURE: 70 MMHG | TEMPERATURE: 97 F | SYSTOLIC BLOOD PRESSURE: 128 MMHG | HEIGHT: 58 IN | HEART RATE: 71 BPM | OXYGEN SATURATION: 96 % | BODY MASS INDEX: 36.33 KG/M2 | WEIGHT: 173.06 LBS

## 2023-04-13 DIAGNOSIS — K31.84 GASTROPARESIS: ICD-10-CM

## 2023-04-13 DIAGNOSIS — R80.9 TYPE 2 DIABETES MELLITUS WITH MICROALBUMINURIA, WITH LONG-TERM CURRENT USE OF INSULIN: ICD-10-CM

## 2023-04-13 DIAGNOSIS — G47.33 OSA ON CPAP: ICD-10-CM

## 2023-04-13 DIAGNOSIS — G90.3 NEUROGENIC ORTHOSTATIC HYPOTENSION: ICD-10-CM

## 2023-04-13 DIAGNOSIS — G89.29 CHRONIC PAIN OF RIGHT KNEE: ICD-10-CM

## 2023-04-13 DIAGNOSIS — Z79.4 TYPE 2 DIABETES MELLITUS WITH MICROALBUMINURIA, WITH LONG-TERM CURRENT USE OF INSULIN: ICD-10-CM

## 2023-04-13 DIAGNOSIS — J44.89 ASTHMATIC BRONCHITIS , CHRONIC: ICD-10-CM

## 2023-04-13 DIAGNOSIS — M25.561 CHRONIC PAIN OF RIGHT KNEE: ICD-10-CM

## 2023-04-13 DIAGNOSIS — E66.01 MORBID (SEVERE) OBESITY DUE TO EXCESS CALORIES: ICD-10-CM

## 2023-04-13 DIAGNOSIS — F13.20 SEDATIVE, HYPNOTIC OR ANXIOLYTIC DEPENDENCE, UNCOMPLICATED: Primary | ICD-10-CM

## 2023-04-13 DIAGNOSIS — E03.9 HYPOTHYROIDISM, UNSPECIFIED TYPE: ICD-10-CM

## 2023-04-13 DIAGNOSIS — E11.29 TYPE 2 DIABETES MELLITUS WITH MICROALBUMINURIA, WITH LONG-TERM CURRENT USE OF INSULIN: ICD-10-CM

## 2023-04-13 DIAGNOSIS — I10 ESSENTIAL HYPERTENSION: ICD-10-CM

## 2023-04-13 DIAGNOSIS — F25.0 SCHIZOAFFECTIVE DISORDER, BIPOLAR TYPE: ICD-10-CM

## 2023-04-13 LAB
BASOPHILS # BLD AUTO: 0 K/UL (ref 0–0.2)
BASOPHILS NFR BLD: 0 % (ref 0–1.9)
DIFFERENTIAL METHOD: NORMAL
EOSINOPHIL # BLD AUTO: 0.2 K/UL (ref 0–0.5)
EOSINOPHIL NFR BLD: 2.7 % (ref 0–8)
ERYTHROCYTE [DISTWIDTH] IN BLOOD BY AUTOMATED COUNT: 12.8 % (ref 11.5–14.5)
HCT VFR BLD AUTO: 40.8 % (ref 37–48.5)
HGB BLD-MCNC: 13.1 G/DL (ref 12–16)
IMM GRANULOCYTES # BLD AUTO: 0.01 K/UL (ref 0–0.04)
IMM GRANULOCYTES NFR BLD AUTO: 0.1 % (ref 0–0.5)
LYMPHOCYTES # BLD AUTO: 2.5 K/UL (ref 1–4.8)
LYMPHOCYTES NFR BLD: 29.8 % (ref 18–48)
MCH RBC QN AUTO: 29 PG (ref 27–31)
MCHC RBC AUTO-ENTMCNC: 32.1 G/DL (ref 32–36)
MCV RBC AUTO: 90 FL (ref 82–98)
MONOCYTES # BLD AUTO: 0.4 K/UL (ref 0.3–1)
MONOCYTES NFR BLD: 5.2 % (ref 4–15)
NEUTROPHILS # BLD AUTO: 5.1 K/UL (ref 1.8–7.7)
NEUTROPHILS NFR BLD: 62.2 % (ref 38–73)
NRBC BLD-RTO: 0 /100 WBC
PLATELET # BLD AUTO: 209 K/UL (ref 150–450)
PMV BLD AUTO: 10.1 FL (ref 9.2–12.9)
RBC # BLD AUTO: 4.52 M/UL (ref 4–5.4)
WBC # BLD AUTO: 8.22 K/UL (ref 3.9–12.7)

## 2023-04-13 PROCEDURE — 3074F PR MOST RECENT SYSTOLIC BLOOD PRESSURE < 130 MM HG: ICD-10-PCS | Mod: CPTII,S$GLB,, | Performed by: FAMILY MEDICINE

## 2023-04-13 PROCEDURE — 83036 HEMOGLOBIN GLYCOSYLATED A1C: CPT | Performed by: FAMILY MEDICINE

## 2023-04-13 PROCEDURE — 4010F ACE/ARB THERAPY RXD/TAKEN: CPT | Mod: CPTII,S$GLB,, | Performed by: FAMILY MEDICINE

## 2023-04-13 PROCEDURE — 3078F PR MOST RECENT DIASTOLIC BLOOD PRESSURE < 80 MM HG: ICD-10-PCS | Mod: CPTII,S$GLB,, | Performed by: FAMILY MEDICINE

## 2023-04-13 PROCEDURE — 3044F HG A1C LEVEL LT 7.0%: CPT | Mod: CPTII,S$GLB,, | Performed by: FAMILY MEDICINE

## 2023-04-13 PROCEDURE — 3044F PR MOST RECENT HEMOGLOBIN A1C LEVEL <7.0%: ICD-10-PCS | Mod: CPTII,S$GLB,, | Performed by: FAMILY MEDICINE

## 2023-04-13 PROCEDURE — 36415 COLL VENOUS BLD VENIPUNCTURE: CPT | Mod: PO | Performed by: FAMILY MEDICINE

## 2023-04-13 PROCEDURE — 99999 PR PBB SHADOW E&M-EST. PATIENT-LVL IV: CPT | Mod: PBBFAC,,, | Performed by: FAMILY MEDICINE

## 2023-04-13 PROCEDURE — 3008F BODY MASS INDEX DOCD: CPT | Mod: CPTII,S$GLB,, | Performed by: FAMILY MEDICINE

## 2023-04-13 PROCEDURE — 3074F SYST BP LT 130 MM HG: CPT | Mod: CPTII,S$GLB,, | Performed by: FAMILY MEDICINE

## 2023-04-13 PROCEDURE — 1159F PR MEDICATION LIST DOCUMENTED IN MEDICAL RECORD: ICD-10-PCS | Mod: CPTII,S$GLB,, | Performed by: FAMILY MEDICINE

## 2023-04-13 PROCEDURE — 3078F DIAST BP <80 MM HG: CPT | Mod: CPTII,S$GLB,, | Performed by: FAMILY MEDICINE

## 2023-04-13 PROCEDURE — 99999 PR PBB SHADOW E&M-EST. PATIENT-LVL IV: ICD-10-PCS | Mod: PBBFAC,,, | Performed by: FAMILY MEDICINE

## 2023-04-13 PROCEDURE — 3008F PR BODY MASS INDEX (BMI) DOCUMENTED: ICD-10-PCS | Mod: CPTII,S$GLB,, | Performed by: FAMILY MEDICINE

## 2023-04-13 PROCEDURE — 4010F PR ACE/ARB THEARPY RXD/TAKEN: ICD-10-PCS | Mod: CPTII,S$GLB,, | Performed by: FAMILY MEDICINE

## 2023-04-13 PROCEDURE — 1159F MED LIST DOCD IN RCRD: CPT | Mod: CPTII,S$GLB,, | Performed by: FAMILY MEDICINE

## 2023-04-13 PROCEDURE — 99215 OFFICE O/P EST HI 40 MIN: CPT | Mod: S$GLB,,, | Performed by: FAMILY MEDICINE

## 2023-04-13 PROCEDURE — 99215 PR OFFICE/OUTPT VISIT, EST, LEVL V, 40-54 MIN: ICD-10-PCS | Mod: S$GLB,,, | Performed by: FAMILY MEDICINE

## 2023-04-13 PROCEDURE — 85025 COMPLETE CBC W/AUTO DIFF WBC: CPT | Performed by: FAMILY MEDICINE

## 2023-04-13 NOTE — PROGRESS NOTES
Subjective:       Patient ID: Bianca Weldon is a 58 y.o. female.    Chief Complaint: No chief complaint on file.      HPI Comments:       Current Outpatient Medications:     albuterol (ACCUNEB) 1.25 mg/3 mL Nebu, Take 3 mLs (1.25 mg total) by nebulization 2 (two) times a day. Rescue, Disp: 180 mL, Rfl: 11    ascorbic acid, vitamin C, (VITAMIN C) 250 MG tablet, Take 250 mg by mouth once daily., Disp: , Rfl:     aspirin (ECOTRIN) 81 MG EC tablet, Take 81 mg by mouth once daily. , Disp: , Rfl:     atorvastatin (LIPITOR) 40 MG tablet, Take 1 tablet (40 mg total) by mouth once daily., Disp: 90 tablet, Rfl: 1    blood sugar diagnostic Strp, Inject 1 each into the skin 3 (three) times daily. Dispense preferred on insurance, Disp: 100 strip, Rfl: 11    clindamycin (CLEOCIN T) 1 % external solution, APPLY TO THE AFFECTED AREA TWICE DAILY AS NEEDED FOR ACE/ FOLLICULITIS ON FACE AND SCALP, Disp: 60 mL, Rfl: 4    clotrimazole-betamethasone 1-0.05% (LOTRISONE) cream, Apply to affected area 2 times daily for 5 days, Disp: 15 g, Rfl: 1    cyclobenzaprine (FLEXERIL) 10 MG tablet, Take 10 mg by mouth every evening., Disp: , Rfl:     cycloSPORINE (RESTASIS) 0.05 % ophthalmic emulsion, Place 1 drop into both eyes 2 (two) times daily as needed. , Disp: , Rfl:     deutetrabenazine (AUSTEDO) 9 mg Tab, Take 9 mg by mouth 2 (two) times daily., Disp: , Rfl:     diclofenac sodium (VOLTAREN) 1 % Gel, APPLY FOUR GRAMS TOPICALLY EVERY DAY AS DIRECTED, Disp: 100 g, Rfl: 3    fluconazole (DIFLUCAN) 150 MG Tab, Take 1 tablet (150 mg total) by mouth once a week. After completing 3 dose diflucan therapy, Disp: 4 tablet, Rfl: 5    fludrocortisone (FLORINEF) 0.1 mg Tab, Take 100 mcg by mouth once daily., Disp: , Rfl:     fluticasone propionate (FLONASE) 50 mcg/actuation nasal spray, USE TWO SPRAYS IN EACH NOSTRIL ONCE DAILY, Disp: 48 g, Rfl: 3    glimepiride (AMARYL) 4 MG tablet, Take 1 tablet (4 mg total) by mouth 2 (two) times daily before meals.,  "Disp: 180 tablet, Rfl: 0    insulin glargine U-300 conc (TOUJEO MAX U-300 SOLOSTAR) 300 unit/mL (3 mL) insulin pen, Inject 46 Units into the skin once daily., Disp: 2 pen, Rfl: 6    ipratropium-albuteroL (COMBIVENT RESPIMAT)  mcg/actuation inhaler, Inhale 2 puffs into the lungs every 4 (four) hours as needed for Wheezing. Rescue, Disp: 4 g, Rfl: 3    levothyroxine (SYNTHROID) 75 MCG tablet, Take 1 tablet (75 mcg total) by mouth once daily., Disp: 90 tablet, Rfl: 1    LORazepam (ATIVAN) 1 MG tablet, Take 1 tablet (1 mg total) by mouth 2 (two) times daily as needed for Anxiety (takes nightly)., Disp: 60 tablet, Rfl: 2    losartan (COZAAR) 25 MG tablet, Take 1 tablet (25 mg total) by mouth once daily., Disp: 90 tablet, Rfl: 2    multivitamin capsule, Take 1 capsule by mouth once daily., Disp: , Rfl:     MYRBETRIQ 50 mg Tb24, Take 50 mg by mouth 2 (two) times daily as needed. , Disp: , Rfl:     nirmatrelvir-ritonavir (PAXLOVID, EUA,) 300 mg (150 mg x 2)-100 mg copackaged tablets (EUA), Paxlovid (EUA) Take 3 Tablet (oral) 2 times per day for 5 days [id number] tablet 2 times per day oral 5 days active 300 mg (150 mg x 2)-100 mg, Disp: , Rfl:     ondansetron (ZOFRAN-ODT) 4 MG TbDL, DISSOLVE 1 TABLET ON THE TONGUE EVERY 6 HOURS AS NEEDED FOR NAUSEA, Disp: 20 tablet, Rfl: 1    pantoprazole (PROTONIX) 40 MG tablet, TAKE 1 TABLET BY MOUTH DAILY, Disp: 90 tablet, Rfl: 3    pen needle, diabetic 31 gauge x 3/16" Ndle, Inject 1 Stick into the skin once daily., Disp: 100 each, Rfl: 3    SITagliptin phosphate (JANUVIA) 100 MG Tab, Take 1 tablet (100 mg total) by mouth once daily., Disp: 30 tablet, Rfl: 3    SUNOSI 150 mg Tab, Take 1 tablet by mouth once daily., Disp: 30 tablet, Rfl: 2    TRELEGY ELLIPTA 100-62.5-25 mcg DsDv, Inhale 1 puff into the lungs., Disp: , Rfl:     TRINTELLIX 20 mg Tab, Take 1 tablet by mouth once daily., Disp: , Rfl:     buPROPion (WELLBUTRIN XL) 300 MG 24 hr tablet, Take 300 mg by mouth once daily., " Disp: , Rfl:     ciprofloxacin HCl (CIPRO) 500 MG tablet, Cipro Take 1 tablet (oral) every 12 hours for 5 days 20220605 tablet every 12 hours oral 5 days suspended 500 MG, Disp: , Rfl:     estradioL (ESTRACE) 0.01 % (0.1 mg/gram) vaginal cream, Place 1 g vaginally every 7 days., Disp: 4 g, Rfl: 5    ketoconazole (NIZORAL) 2 % cream, Apply topically 2 (two) times daily. Apply to the affected area twice daily. (Patient taking differently: Apply topically 2 (two) times daily as needed. Apply to the affected area twice daily.), Disp: 30 g, Rfl: 2    lamoTRIgine (LAMICTAL) 200 MG tablet, Take 1 tablet by mouth 2 (two) times daily., Disp: , Rfl:     levocetirizine (XYZAL) 5 MG tablet, Take 1 tablet (5 mg total) by mouth every evening., Disp: 30 tablet, Rfl: 11    triamcinolone acetonide 0.1% (KENALOG) 0.1 % cream, Apply topically 2 (two) times daily. Do not use on face (Patient taking differently: Apply topically 2 (two) times daily as needed. Do not use on face), Disp: 45 Bottle, Rfl: 6  No current facility-administered medications for this visit.    Facility-Administered Medications Ordered in Other Visits:     nozaseptin (NOZIN) nasal , , Each Nostril, On Call Procedure, Kenrick Gray MD, Given at 04/27/21 1222      Six-month follow-up.  Generally doing well.  No change in weight, despite exercising 3 times a week and keeping an eye on her diet.      Had some difficulties with her GI tract when she was on GLP 1 therapy.  Had to stop.  Back on her previous diabetes meds.      Other changes:  Now on Sunosi for narcolepsy.  Lithium for bipolar disorder.  No longer on Abilify.  Dr. Welch in her mental health provider.      Doing fairly well with Trelegy and Combivent for her breathing.      Right knee pains including meniscus tear.  Maybe doing something surgical eventually.  Right now using mostly topicals for pain relief.    Review of Systems   Constitutional:  Negative for activity change, appetite  "change and fever.   HENT:  Negative for sore throat.    Respiratory:  Negative for cough and shortness of breath.    Cardiovascular:  Negative for chest pain.   Gastrointestinal:  Negative for abdominal pain, diarrhea and nausea.   Genitourinary:  Negative for difficulty urinating.   Musculoskeletal:  Negative for arthralgias and myalgias.   Neurological:  Negative for dizziness and headaches.     Objective:      Vitals:    04/13/23 0758   BP: 128/70   Pulse: 71   Temp: 96.9 °F (36.1 °C)   SpO2: 96%   Weight: 78.5 kg (173 lb 1 oz)   Height: 4' 10" (1.473 m)     Physical Exam  Vitals and nursing note reviewed.   Constitutional:       General: She is not in acute distress.     Appearance: She is well-developed. She is not diaphoretic.   HENT:      Head: Normocephalic.   Neck:      Thyroid: No thyromegaly.   Cardiovascular:      Rate and Rhythm: Normal rate and regular rhythm.      Heart sounds: Normal heart sounds. No murmur heard.  Pulmonary:      Effort: Pulmonary effort is normal.      Breath sounds: Normal breath sounds. No wheezing or rales.   Abdominal:      General: There is no distension.      Palpations: Abdomen is soft.   Musculoskeletal:      Cervical back: Neck supple.      Right lower leg: No edema.      Left lower leg: No edema.   Lymphadenopathy:      Cervical: No cervical adenopathy.   Skin:     General: Skin is warm and dry.   Neurological:      Mental Status: She is alert and oriented to person, place, and time.   Psychiatric:         Mood and Affect: Mood normal.         Behavior: Behavior normal.         Thought Content: Thought content normal.         Judgment: Judgment normal.       Assessment:       1. Sedative, hypnotic or anxiolytic dependence, uncomplicated    2. Type 2 diabetes mellitus with microalbuminuria, with long-term current use of insulin    3. Morbid (severe) obesity due to excess calories    4. Schizoaffective disorder, bipolar type    5. Neurogenic orthostatic hypotension    6. " Asthmatic bronchitis , chronic    7. Hypothyroidism, unspecified type    8. DMITRI on CPAP    9. Essential hypertension    10. Gastroparesis    11. Chronic pain of right knee        Plan:   Sedative, hypnotic or anxiolytic dependence, uncomplicated  Comments:  Lorazepam    Type 2 diabetes mellitus with microalbuminuria, with long-term current use of insulin  Comments:  A1c today.  Not able to tolerate GLP 1 therapy  Orders:  -     Hemoglobin A1C; Future; Expected date: 04/13/2023    Morbid (severe) obesity due to excess calories  Comments:  No change in weight  Orders:  -     CBC Auto Differential; Future; Expected date: 04/13/2023    Schizoaffective disorder, bipolar type  Comments:  Now on lithium    Neurogenic orthostatic hypotension  Comments:  Stable    Asthmatic bronchitis , chronic  Comments:  Stable on Trelegy    Hypothyroidism, unspecified type  Comments:  Euthyroid on current dose    DMITRI on CPAP  Comments:  Compliant    Essential hypertension  Comments:  Controlled.  Follow-up 6 months    Gastroparesis  Comments:  Doing better once GLP therapy has been discontinued    Chronic pain of right knee  Comments:  Followed by Orthopedics

## 2023-04-14 LAB
ESTIMATED AVG GLUCOSE: 157 MG/DL (ref 68–131)
HBA1C MFR BLD: 7.1 % (ref 4–5.6)

## 2023-05-03 ENCOUNTER — OFFICE VISIT (OUTPATIENT)
Dept: DIABETES | Facility: CLINIC | Age: 58
End: 2023-05-03
Payer: MEDICARE

## 2023-05-03 DIAGNOSIS — E11.29 TYPE 2 DIABETES MELLITUS WITH MICROALBUMINURIA, WITH LONG-TERM CURRENT USE OF INSULIN: ICD-10-CM

## 2023-05-03 DIAGNOSIS — R80.9 TYPE 2 DIABETES MELLITUS WITH MICROALBUMINURIA, WITH LONG-TERM CURRENT USE OF INSULIN: ICD-10-CM

## 2023-05-03 DIAGNOSIS — Z79.4 TYPE 2 DIABETES MELLITUS WITH MICROALBUMINURIA, WITH LONG-TERM CURRENT USE OF INSULIN: ICD-10-CM

## 2023-05-03 PROCEDURE — 4010F PR ACE/ARB THEARPY RXD/TAKEN: ICD-10-PCS | Mod: CPTII,95,, | Performed by: NURSE PRACTITIONER

## 2023-05-03 PROCEDURE — 1159F PR MEDICATION LIST DOCUMENTED IN MEDICAL RECORD: ICD-10-PCS | Mod: CPTII,95,, | Performed by: NURSE PRACTITIONER

## 2023-05-03 PROCEDURE — 3051F HG A1C>EQUAL 7.0%<8.0%: CPT | Mod: CPTII,95,, | Performed by: NURSE PRACTITIONER

## 2023-05-03 PROCEDURE — 1160F PR REVIEW ALL MEDS BY PRESCRIBER/CLIN PHARMACIST DOCUMENTED: ICD-10-PCS | Mod: CPTII,95,, | Performed by: NURSE PRACTITIONER

## 2023-05-03 PROCEDURE — 1160F RVW MEDS BY RX/DR IN RCRD: CPT | Mod: CPTII,95,, | Performed by: NURSE PRACTITIONER

## 2023-05-03 PROCEDURE — 99213 PR OFFICE/OUTPT VISIT, EST, LEVL III, 20-29 MIN: ICD-10-PCS | Mod: 95,,, | Performed by: NURSE PRACTITIONER

## 2023-05-03 PROCEDURE — 1159F MED LIST DOCD IN RCRD: CPT | Mod: CPTII,95,, | Performed by: NURSE PRACTITIONER

## 2023-05-03 PROCEDURE — 99213 OFFICE O/P EST LOW 20 MIN: CPT | Mod: 95,,, | Performed by: NURSE PRACTITIONER

## 2023-05-03 PROCEDURE — 4010F ACE/ARB THERAPY RXD/TAKEN: CPT | Mod: CPTII,95,, | Performed by: NURSE PRACTITIONER

## 2023-05-03 PROCEDURE — 3051F PR MOST RECENT HEMOGLOBIN A1C LEVEL 7.0 - < 8.0%: ICD-10-PCS | Mod: CPTII,95,, | Performed by: NURSE PRACTITIONER

## 2023-05-03 RX ORDER — GLIMEPIRIDE 4 MG/1
4 TABLET ORAL
Qty: 180 TABLET | Refills: 1 | Status: SHIPPED | OUTPATIENT
Start: 2023-05-03 | End: 2023-11-22 | Stop reason: ALTCHOICE

## 2023-05-03 NOTE — PROGRESS NOTES
PCP: Scotty Figueroa MD    Subjective:     Chief Complaint: Diabetes Follow Up    HISTORY OF PRESENT ILLNESS: 58 y.o.  female presenting for diabetes follow up. Patient has had Type II diabetes since 2014 and has the following complications: neuropathy, gastroparesis. She has attended diabetes education in the past. Other pertinent conditions: HTN, HLD, IBD, ISELA, and schizoaffective disorder ( followed by psychiatry). Of note, she has a history of cortisone injections for plantar fasciitis.       Past tried and failed medications include: Invokana ( yeast infection ); Metformin caused GI side effects; and Trulicity / Mounjaro caused abdominal pain and discomfort.     Blood glucose testing is performed regularly. She is enrolled in digital diabetes ( readings are available under episodes tab ). Per recall, fasting BGs are < 83 >, 108 - 110; afternoon 150 - 170 s.  Has rare hypoglycemia, BG < 70 s, which she treats with juice. She denies any recent hospital admissions, emergency room visits, or syncope.      No results found for: CPEPTIDE  No results found for: GLUTAMICACID  No results found for: HUMANINSULIN    DM MEDICATIONS:  Toujeo 46 units daily ( at 9 pm ) ;  Januvia 100 mg daily;  Glimepiride 4 mg BID ac    Review of Systems   Eyes:  Negative for visual disturbance.   Gastrointestinal:  Positive for nausea (chronic, intermittent). Negative for diarrhea and vomiting.   Endocrine: Negative for polydipsia, polyphagia and polyuria.   Neurological:  Positive for numbness. Negative for headaches.       Diabetes Management Status  Statin: Taking  ACE/ARB: Not taking    Screening or Prevention Patient's value Goal Complete/Controlled?   HgA1C Testing and Control   Lab Results   Component Value Date    HGBA1C 7.1 (H) 04/13/2023      Annually/Less than 8% No   Lipid profile : 03/14/2023 Annually No   LDL control Lab Results   Component Value Date    LDLCALC 51.6 (L) 06/27/2022    Annually/Less than 100 mg/dl   No   Nephropathy screening Lab Results   Component Value Date    MICALBCREAT 10.9 2022     Lab Results   Component Value Date    PROTEINUA Negative 2015    Annually No   Blood pressure BP Readings from Last 1 Encounters:   23 128/70    Less than 140/90 Yes   Dilated retinal exam : 2023 Annually Yes, [facility]   Foot exam   : 2023 Annually Yes     ACTIVITY LEVEL: Rarely Active. Discussed activities, benefits, methods, and precautions.  MEAL PLANNING: Patient reports number of meals per day to be 3 and number of snacks per day to be 2.   Patient is encouraged to carb count and consume no more than 45 - 60 grams of carbohydrates in each meal, and 1800 k / richard per day.      BLOOD GLUCOSE TESTIN times daily  SOCIAL HISTORY: .  Former smoker.  Her mother lives with her, and sister unexpectedly passed away.     Objective:      Physical Exam  Constitutional:       Appearance: She is well-developed.   HENT:      Head: Normocephalic and atraumatic.   Eyes:      Pupils: Pupils are equal, round, and reactive to light.   Pulmonary:      Effort: Pulmonary effort is normal.   Psychiatric:         Behavior: Behavior normal.         Thought Content: Thought content normal.         Judgment: Judgment normal.     Assessment / Plan:     1.) Type 2 diabetes mellitus without complication, with long-term current use of insulin  Comments:  - Fair control, most recent A1C of 7.1 %.  Continue Toujeo 46 units daily ( EVERY MORNING ).  Continue glimepiride 4 mg BID ac. Continue Januvia 100 mg daily.   She has been unable to tolerate the GLP-1 drug class.    Additional Plan Details:    1.) Continue monitoring blood sugar 2 x daily, fasting and ac dinner or at bedtime. Discussed ADA goal for fasting blood sugar, 80 - 130 mg/dL; pp blood sugars below 180 mg/dl. Also, discussed prevention of hypoglycemia and the need to adjust goals to higher levels if persistent hypoglycemia.    2.) Return to  clinic in 4 months for follow up. The patient was explained the above plan and given opportunity to ask questions.  She understands, chooses and consents to this plan and accepts all the risks, which include but are not limited to the risks mentioned above.    Renetta Whitlock, NP-C, [facility]    The patient location is: [place]  The chief complaint leading to consultation is: Type 2 Diabetes    Visit type: audiovisual    Face to Face time with patient: 15 minutes  20 minutes of total time spent on the encounter, which includes face to face time and non-face to face time preparing to see the patient (eg, review of tests), Obtaining and/or reviewing separately obtained history, Documenting clinical information in the electronic or other health record, Independently interpreting results (not separately reported) and communicating results to the patient/family/caregiver, or Care coordination (not separately reported).     Each patient to whom he or she provides medical services by telemedicine is:  (1) informed of the relationship between the physician and patient and the respective role of any other health care provider with respect to management of the patient; and (2) notified that he or she may decline to receive medical services by telemedicine and may withdraw from such care at any time.

## 2023-05-09 ENCOUNTER — CLINICAL SUPPORT (OUTPATIENT)
Dept: PULMONOLOGY | Facility: CLINIC | Age: 58
End: 2023-05-09
Payer: MEDICARE

## 2023-05-09 ENCOUNTER — OFFICE VISIT (OUTPATIENT)
Dept: PULMONOLOGY | Facility: CLINIC | Age: 58
End: 2023-05-09
Payer: MEDICARE

## 2023-05-09 ENCOUNTER — HOSPITAL ENCOUNTER (OUTPATIENT)
Dept: RADIOLOGY | Facility: HOSPITAL | Age: 58
Discharge: HOME | End: 2023-05-09
Attending: INTERNAL MEDICINE
Payer: MEDICARE

## 2023-05-09 VITALS
OXYGEN SATURATION: 97 % | BODY MASS INDEX: 36.61 KG/M2 | HEIGHT: 58 IN | RESPIRATION RATE: 20 BRPM | DIASTOLIC BLOOD PRESSURE: 74 MMHG | HEART RATE: 74 BPM | WEIGHT: 174.38 LBS | SYSTOLIC BLOOD PRESSURE: 126 MMHG

## 2023-05-09 DIAGNOSIS — E03.8 OTHER SPECIFIED HYPOTHYROIDISM: ICD-10-CM

## 2023-05-09 DIAGNOSIS — J44.89 ASTHMATIC BRONCHITIS , CHRONIC: ICD-10-CM

## 2023-05-09 DIAGNOSIS — I10 ESSENTIAL HYPERTENSION: ICD-10-CM

## 2023-05-09 DIAGNOSIS — G47.429 NARCOLEPSY DUE TO UNDERLYING CONDITION WITHOUT CATAPLEXY: ICD-10-CM

## 2023-05-09 DIAGNOSIS — E78.5 DYSLIPIDEMIA: ICD-10-CM

## 2023-05-09 DIAGNOSIS — G47.33 OSA ON CPAP: ICD-10-CM

## 2023-05-09 DIAGNOSIS — J44.89 ASTHMATIC BRONCHITIS , CHRONIC: Primary | ICD-10-CM

## 2023-05-09 LAB
BRPFT: NORMAL
FEF 25 75 CHG: 11 %
FEF 25 75 LLN: 1.06
FEF 25 75 POST REF: 81.6 %
FEF 25 75 PRE REF: 73.5 %
FEF 25 75 REF: 2.04
FET100 CHG: 4.5 %
FEV1 CHG: 2.4 %
FEV1 FVC CHG: 1.8 %
FEV1 FVC LLN: 68
FEV1 FVC POST REF: 99.1 %
FEV1 FVC PRE REF: 97.4 %
FEV1 FVC REF: 80
FEV1 LLN: 1.56
FEV1 POST REF: 77.7 %
FEV1 PRE REF: 75.9 %
FEV1 REF: 2.06
FVC CHG: 0.7 %
FVC LLN: 1.96
FVC POST REF: 78.2 %
FVC PRE REF: 77.7 %
FVC REF: 2.56
PEF CHG: -6.3 %
PEF LLN: 4.1
PEF POST REF: 63.7 %
PEF PRE REF: 68 %
PEF REF: 5.5
POST FEF 25 75: 1.66 L/S
POST FET 100: 8.19 SEC
POST FEV1 FVC: 79.7 %
POST FEV1: 1.6 L
POST FVC: 2 L
POST PEF: 3.5 L/S
PRE FEF 25 75: 1.5 L/S
PRE FET 100: 7.84 SEC
PRE FEV1 FVC: 78.31 %
PRE FEV1: 1.56 L
PRE FVC: 1.99 L
PRE PEF: 3.74 L/S

## 2023-05-09 PROCEDURE — 95012 PR NITRIC OXIDE EXPIRED GAS DETERMINATION: ICD-10-PCS | Mod: S$GLB,,, | Performed by: INTERNAL MEDICINE

## 2023-05-09 PROCEDURE — 1159F MED LIST DOCD IN RCRD: CPT | Mod: CPTII,S$GLB,, | Performed by: INTERNAL MEDICINE

## 2023-05-09 PROCEDURE — 3008F PR BODY MASS INDEX (BMI) DOCUMENTED: ICD-10-PCS | Mod: CPTII,S$GLB,, | Performed by: INTERNAL MEDICINE

## 2023-05-09 PROCEDURE — 3078F DIAST BP <80 MM HG: CPT | Mod: CPTII,S$GLB,, | Performed by: INTERNAL MEDICINE

## 2023-05-09 PROCEDURE — 4010F ACE/ARB THERAPY RXD/TAKEN: CPT | Mod: CPTII,S$GLB,, | Performed by: INTERNAL MEDICINE

## 2023-05-09 PROCEDURE — 95012 NITRIC OXIDE EXP GAS DETER: CPT | Mod: S$GLB,,, | Performed by: INTERNAL MEDICINE

## 2023-05-09 PROCEDURE — 99999 PR PBB SHADOW E&M-EST. PATIENT-LVL V: CPT | Mod: PBBFAC,,, | Performed by: INTERNAL MEDICINE

## 2023-05-09 PROCEDURE — 3051F PR MOST RECENT HEMOGLOBIN A1C LEVEL 7.0 - < 8.0%: ICD-10-PCS | Mod: CPTII,S$GLB,, | Performed by: INTERNAL MEDICINE

## 2023-05-09 PROCEDURE — 3074F PR MOST RECENT SYSTOLIC BLOOD PRESSURE < 130 MM HG: ICD-10-PCS | Mod: CPTII,S$GLB,, | Performed by: INTERNAL MEDICINE

## 2023-05-09 PROCEDURE — 4010F PR ACE/ARB THEARPY RXD/TAKEN: ICD-10-PCS | Mod: CPTII,S$GLB,, | Performed by: INTERNAL MEDICINE

## 2023-05-09 PROCEDURE — 71046 X-RAY EXAM CHEST 2 VIEWS: CPT | Mod: 26,,, | Performed by: RADIOLOGY

## 2023-05-09 PROCEDURE — 94060 PR EVAL OF BRONCHOSPASM: ICD-10-PCS | Mod: S$GLB,,, | Performed by: INTERNAL MEDICINE

## 2023-05-09 PROCEDURE — 3074F SYST BP LT 130 MM HG: CPT | Mod: CPTII,S$GLB,, | Performed by: INTERNAL MEDICINE

## 2023-05-09 PROCEDURE — 3078F PR MOST RECENT DIASTOLIC BLOOD PRESSURE < 80 MM HG: ICD-10-PCS | Mod: CPTII,S$GLB,, | Performed by: INTERNAL MEDICINE

## 2023-05-09 PROCEDURE — 94060 EVALUATION OF WHEEZING: CPT | Mod: PBBFAC

## 2023-05-09 PROCEDURE — 71046 X-RAY EXAM CHEST 2 VIEWS: CPT | Mod: TC

## 2023-05-09 PROCEDURE — 99999 PR PBB SHADOW E&M-EST. PATIENT-LVL V: ICD-10-PCS | Mod: PBBFAC,,, | Performed by: INTERNAL MEDICINE

## 2023-05-09 PROCEDURE — 3008F BODY MASS INDEX DOCD: CPT | Mod: CPTII,S$GLB,, | Performed by: INTERNAL MEDICINE

## 2023-05-09 PROCEDURE — 71046 XR CHEST PA AND LATERAL: ICD-10-PCS | Mod: 26,,, | Performed by: RADIOLOGY

## 2023-05-09 PROCEDURE — 3051F HG A1C>EQUAL 7.0%<8.0%: CPT | Mod: CPTII,S$GLB,, | Performed by: INTERNAL MEDICINE

## 2023-05-09 PROCEDURE — 1159F PR MEDICATION LIST DOCUMENTED IN MEDICAL RECORD: ICD-10-PCS | Mod: CPTII,S$GLB,, | Performed by: INTERNAL MEDICINE

## 2023-05-09 PROCEDURE — 94060 EVALUATION OF WHEEZING: CPT | Mod: S$GLB,,, | Performed by: INTERNAL MEDICINE

## 2023-05-09 PROCEDURE — 99214 PR OFFICE/OUTPT VISIT, EST, LEVL IV, 30-39 MIN: ICD-10-PCS | Mod: 25,S$GLB,, | Performed by: INTERNAL MEDICINE

## 2023-05-09 PROCEDURE — 99214 OFFICE O/P EST MOD 30 MIN: CPT | Mod: 25,S$GLB,, | Performed by: INTERNAL MEDICINE

## 2023-05-09 RX ORDER — LITHIUM CARBONATE 300 MG/1
900 CAPSULE ORAL
COMMUNITY
Start: 2023-04-19 | End: 2023-07-18

## 2023-05-09 RX ORDER — ARIPIPRAZOLE 20 MG/1
20 TABLET ORAL EVERY MORNING
COMMUNITY
Start: 2023-03-07

## 2023-05-09 RX ORDER — LIDOCAINE AND PRILOCAINE 25; 25 MG/G; MG/G
CREAM TOPICAL
COMMUNITY
Start: 2023-02-27

## 2023-05-09 RX ORDER — FLUTICASONE FUROATE, UMECLIDINIUM BROMIDE AND VILANTEROL TRIFENATATE 100; 62.5; 25 UG/1; UG/1; UG/1
1 POWDER RESPIRATORY (INHALATION) DAILY
Qty: 60 EACH | Refills: 6 | Status: SHIPPED | OUTPATIENT
Start: 2023-05-09 | End: 2024-05-08

## 2023-05-09 RX ORDER — LITHIUM CARBONATE 300 MG/1
900 CAPSULE ORAL NIGHTLY
COMMUNITY
Start: 2023-05-03

## 2023-05-09 RX ORDER — QUETIAPINE FUMARATE 300 MG/1
300 TABLET, FILM COATED ORAL NIGHTLY
COMMUNITY
Start: 2023-04-19 | End: 2023-09-06

## 2023-05-09 NOTE — PROGRESS NOTES
Immunization History   Administered Date(s) Administered    COVID-19, MRNA, LN-S, PF (Pfizer) (Purple Cap) 2021, 2021    Hepatitis B, Adult 2014, 10/13/2014, 2015    Influenza 10/29/2019    Influenza - Quadrivalent 2016, 2017, 2022    Influenza - Quadrivalent - PF *Preferred* (6 months and older) 10/26/2018, 2020    Pneumococcal Polysaccharide - 23 Valent 2014, 2018    Tdap 2014    Zoster Recombinant 2020, 2021      Social History     Tobacco Use   Smoking Status Former    Packs/day: 1.50    Years: 33.00    Pack years: 49.50    Types: Cigarettes    Quit date: 2017    Years since quittin.3    Passive exposure: Current   Smokeless Tobacco Never        Subjective:      Bianca Weldon is a 58 y.o. female with known Chronic bronchitis   Last seen  2018  Also has DMITRI on CPAP and ? Narcolepsy on NUVIGIL follow by [facility]  New symptoms: several months, worse 2-3 weeks, someone sitting on chest, worse with activity, better with rest  No cough No wheezing, No sputum  Now using CPAP and fans during day.  Avoid physical activity  Taking BREO and Combivent. Last PFT were normal.   Worse with heat, mask  Immunizations are current.  Weight gain from 159lb to 181 Lb  I have reviewed the patient's medical history in detail and updated the computerized patient record.    10/07/2022  Last seen 2022  Still has SOB and Asthma   On BREO  Wheezing  Ask for nebulizer  ACT is [place] reveiwed  FeNo was 5     2022  Last seen 10/07/2022  Better now on TRELEGY and Neb Albuterol  No cough, SOB wheezing  Narcolepsy and DMITRI followed by [facility]:  [place] 8.  Apap 11-13  AHI 0.6  Usage > 4 hrs 76.7%  Sees me mainly for asthma  Asthma sick plan  Spacer given    2023  Follow up to review   Here to review results  Asthma Stable  No flares  No cough, No wheezing  ACT 21  FeNo was 11: low  Normal Spirometry  Normal CXR  Adherent with CPAp: need  download  Mask: nasal  Adherent with SUNOSI      The following portions of the patient's history were reviewed and updated as appropriate:   She  has a past medical history of Abnormal Pap smear of cervix, Abnormal Pap smear of vagina, Acid reflux, Arthritis, Asthma, Bipolar 1 disorder, Depression, Diabetes mellitus, Diabetes mellitus, type 2, Elevated alkaline phosphatase level, GERD (gastroesophageal reflux disease), Hyperlipidemia, Hypertension, IBS (irritable bowel syndrome), Interstitial cystitis, Narcolepsy, Nonalcoholic steatohepatitis, Orthostatic hypotension, PONV (postoperative nausea and vomiting), Respiratory distress, Restless legs syndrome, Sleep apnea, and Thyroid disease.  She does not have any pertinent problems on file.  She  has a past surgical history that includes Appendectomy; Tonsillectomy; Cystostomy w/ bladder biopsy; Hysterectomy (2001); Oophorectomy (2001); Dilation and curettage of uterus; Adenoidectomy; [place] tooth extraction; Colonoscopy; Esophageal dilation; julia shoulder surgery; Adenoidectomy; Hemorrhoid surgery; and Knee arthroscopy w/ meniscectomy (Right, 4/27/2021).  Her family history includes Anesthesia problems in her sister; Breast cancer in her maternal grandmother; Colon cancer in her father; Diabetes Mellitus in her father and mother; Heart disease (age of onset: 45) in her father; Melanoma in her mother; Rheum arthritis in her maternal grandmother; Thrombophilia in her paternal grandmother.  She  reports that she quit smoking about 6 years ago. Her smoking use included cigarettes. She has a 49.50 pack-year smoking history. She has been exposed to tobacco smoke. She has never used smokeless tobacco. She reports that she does not drink alcohol and does not use drugs.  She has a current medication list which includes the following prescription(s): albuterol, aripiprazole, atorvastatin, blood sugar diagnostic, ciprofloxacin hcl, clindamycin, clotrimazole-betamethasone 1-0.05%,  cyclobenzaprine, cyclosporine, austedo, diclofenac sodium, fluconazole, fludrocortisone, fluticasone propionate, glimepiride, toujeo max u-300 solostar, combivent respimat, januvia, lamotrigine, levothyroxine, lidocaine-prilocaine, lithium, lithium, lorazepam, losartan, multivitamin, myrbetriq, ondansetron, pantoprazole, pen needle, diabetic, quetiapine, sunosi, ascorbic acid (vitamin c), aspirin, bupropion, estradiol, ketoconazole, levocetirizine, paxlovid (eua), trelegy ellipta, triamcinolone acetonide 0.1%, and trintellix, and the following Facility-Administered Medications: nozaseptin.  Current Outpatient Medications on File Prior to Visit   Medication Sig Dispense Refill    albuterol (ACCUNEB) 1.25 mg/3 mL Nebu Take 3 mLs (1.25 mg total) by nebulization 2 (two) times a day. Rescue 180 mL 11    ARIPiprazole (ABILIFY) 20 MG Tab Take 20 mg by mouth every morning.      atorvastatin (LIPITOR) 40 MG tablet Take 1 tablet (40 mg total) by mouth once daily. 90 tablet 0    blood sugar diagnostic Strp Inject 1 each into the skin 3 (three) times daily. Dispense preferred on insurance 100 strip 11    ciprofloxacin HCl (CIPRO) 500 MG tablet Cipro Take 1 tablet (oral) every 12 hours for 5 days 20220605 tablet every 12 hours oral 5 days suspended 500 MG      clindamycin (CLEOCIN T) 1 % external solution APPLY TO THE AFFECTED AREA TWICE DAILY AS NEEDED FOR ACE/ FOLLICULITIS ON FACE AND SCALP 60 mL 4    clotrimazole-betamethasone 1-0.05% (LOTRISONE) cream Apply to affected area 2 times daily for 5 days 15 g 1    cyclobenzaprine (FLEXERIL) 10 MG tablet Take 10 mg by mouth every evening.      cycloSPORINE (RESTASIS) 0.05 % ophthalmic emulsion Place 1 drop into both eyes 2 (two) times daily as needed.       deutetrabenazine (AUSTEDO) 9 mg Tab Take 9 mg by mouth 2 (two) times daily.      diclofenac sodium (VOLTAREN) 1 % Gel APPLY FOUR GRAMS TOPICALLY EVERY DAY AS DIRECTED 100 g 3    fluconazole (DIFLUCAN) 150 MG Tab Take 1 tablet  "(150 mg total) by mouth once a week. After completing 3 dose diflucan therapy 4 tablet 5    fludrocortisone (FLORINEF) 0.1 mg Tab Take 100 mcg by mouth once daily.      fluticasone propionate (FLONASE) 50 mcg/actuation nasal spray USE TWO SPRAYS IN EACH NOSTRIL ONCE DAILY 48 g 3    glimepiride (AMARYL) 4 MG tablet Take 1 tablet (4 mg total) by mouth 2 (two) times daily before meals. 180 tablet 1    insulin glargine U-300 conc (TOUJEO MAX U-300 SOLOSTAR) 300 unit/mL (3 mL) insulin pen Inject 46 Units into the skin once daily. 2 pen 6    ipratropium-albuteroL (COMBIVENT RESPIMAT)  mcg/actuation inhaler Inhale 2 puffs into the lungs every 4 (four) hours as needed for Wheezing. Rescue 4 g 3    JANUVIA 100 mg Tab TAKE ONE TABLET BY MOUTH ONCE DAILY 30 tablet 3    lamoTRIgine (LAMICTAL) 200 MG tablet Take 1 tablet by mouth 2 (two) times daily.      levothyroxine (SYNTHROID) 75 MCG tablet Take 1 tablet (75 mcg total) by mouth once daily. 90 tablet 1    LIDOcaine-prilocaine (EMLA) cream SMARTSI-4.8 Gram(s) Topical      lithium (ESKALITH) 300 MG capsule Take 900 mg by mouth.      lithium (ESKALITH) 300 MG capsule Take 900 mg by mouth every evening.      LORazepam (ATIVAN) 1 MG tablet Take 1 tablet (1 mg total) by mouth 2 (two) times daily as needed for Anxiety (takes nightly). 60 tablet 2    losartan (COZAAR) 25 MG tablet Take 1 tablet (25 mg total) by mouth once daily. 90 tablet 2    multivitamin capsule Take 1 capsule by mouth once daily.      MYRBETRIQ 50 mg Tb24 Take 50 mg by mouth 2 (two) times daily as needed.       ondansetron (ZOFRAN-ODT) 4 MG TbDL DISSOLVE 1 TABLET ON THE TONGUE EVERY 6 HOURS AS NEEDED FOR NAUSEA 20 tablet 1    pantoprazole (PROTONIX) 40 MG tablet TAKE 1 TABLET BY MOUTH DAILY 90 tablet 3    pen needle, diabetic 31 gauge x 3/16" Ndle Inject 1 Stick into the skin once daily. 100 each 3    QUEtiapine (SEROQUEL) 300 MG Tab Take 300 mg by mouth every evening.      SUNOSI 150 mg Tab Take 1 tablet " by mouth once daily. 30 tablet 2    [DISCONTINUED] TRELEGY ELLIPTA 100-62.5-25 mcg DsDv Inhale 1 puff into the lungs.      ascorbic acid, vitamin C, (VITAMIN C) 250 MG tablet Take 250 mg by mouth once daily.      aspirin (ECOTRIN) 81 MG EC tablet Take 81 mg by mouth once daily.       buPROPion (WELLBUTRIN XL) 300 MG 24 hr tablet Take 300 mg by mouth once daily.      estradioL (ESTRACE) 0.01 % (0.1 mg/gram) vaginal cream Place 1 g vaginally every 7 days. 4 g 5    ketoconazole (NIZORAL) 2 % cream Apply topically 2 (two) times daily. Apply to the affected area twice daily. (Patient taking differently: Apply topically 2 (two) times daily as needed. Apply to the affected area twice daily.) 30 g 2    levocetirizine (XYZAL) 5 MG tablet Take 1 tablet (5 mg total) by mouth every evening. 30 tablet 11    nirmatrelvir-ritonavir (PAXLOVID, EUA,) 300 mg (150 mg x 2)-100 mg copackaged tablets (EUA) Paxlovid (EUA) Take 3 Tablet (oral) 2 times per day for 5 days [id number] tablet 2 times per day oral 5 days active 300 mg (150 mg x 2)-100 mg      triamcinolone acetonide 0.1% (KENALOG) 0.1 % cream Apply topically 2 (two) times daily. Do not use on face (Patient taking differently: Apply topically 2 (two) times daily as needed. Do not use on face) 45 Bottle 6    TRINTELLIX 20 mg Tab Take 1 tablet by mouth once daily.       Current Facility-Administered Medications on File Prior to Visit   Medication Dose Route Frequency Provider Last Rate Last Admin    nozaseptin (NOZIN) nasal    Each Nostril On Call Procedure Kenrick Gray MD   Given at 04/27/21 1222     She is allergic to bactrim [sulfamethoxazole-trimethoprim], latuda [lurasidone], macrodantin [nitrofurantoin macrocrystalline], canagliflozin, and metformin..     Review of Systems   Constitutional:  Positive for malaise/fatigue.   Respiratory:  Positive for shortness of breath.    Cardiovascular:  Negative for chest pain (chest pressure).   All other systems reviewed  "and are negative.   Objective:      /74   Pulse 74   Resp 20   Ht 4' 10" (1.473 m)   Wt 79.1 kg (174 lb 6.1 oz)   SpO2 97%   BMI 36.45 kg/m²        Physical Exam  Vitals and nursing note reviewed.   Constitutional:       General: She is not in acute distress.     Appearance: She is well-developed. She is not diaphoretic.   HENT:      Head: Normocephalic.      Nose: No mucosal edema or rhinorrhea.      Mouth/Throat:      Pharynx: No oropharyngeal exudate.   Eyes:      General:         Left eye: No discharge.      Conjunctiva/sclera: Conjunctivae normal.      Pupils: Pupils are equal, round, and reactive to light.   Neck:      Thyroid: No thyromegaly.      Vascular: No JVD.      Trachea: No tracheal deviation.   Cardiovascular:      Rate and Rhythm: Normal rate and regular rhythm.      Pulses: Normal pulses.      Heart sounds: Normal heart sounds. No murmur heard.  Pulmonary:      Effort: Pulmonary effort is normal. No respiratory distress.      Breath sounds: Normal breath sounds. No wheezing.   Abdominal:      General: Bowel sounds are normal. There is no distension.      Palpations: Abdomen is soft.      Tenderness: There is no abdominal tenderness.   Musculoskeletal:         General: Normal range of motion.      Cervical back: Normal range of motion and neck supple.   Skin:     General: Skin is warm and dry.   Neurological:      Mental Status: She is alert and oriented to person, place, and time.      Cranial Nerves: No cranial nerve deficit.           PFT    FEV1 1.56L( 75.9%), FVC 1.99 L( 77.7 %), FEV1/FVC 78       Procedure Note    Clinical Guide to Interpretation or FeNO Levels :     FeNO  (ppb) LOW INTERMEDIATE HIGH   ADULT VALUES < 25 25-50          > 50   Th2-driven Inflammation Unlikely Likely Significant      Patients FeNO level at this visit : __11__ (ppb)     Interpretation of FeNO measurement in adults:  [] FENO is less than 25 ppb implies non eosinophilic airway inflammation or the absence " of airway inflammation.               Comment: Low FENO (<25 ppb) in adult asthmatics with persistent symptoms suggests other etiologies for these symptoms and a lower likelihood of benefit from adding or increasing inhaled glucocorticoids.          X-Ray Chest PA And Lateral  Narrative: EXAM:  XR CHEST PA AND LATERAL    CLINICAL HISTORY: COPD    COMPARISON: 08/31/2022    FINDINGS: No confluent airspace opacity or consolidation.  There is no evidence of pleural effusion, pneumothorax, or other acute pulmonary disease.  The cardiomediastinal silhouette is within normal limits.  No acute osseous abnormality is evident.  Impression:  No acute cardiopulmonary abnormality.    Finalized on: 5/9/2023 8:51 AM By:  Anthony Head MD  [facility]# [id number]      2023-05-09 08:53:47.909    [facility]       Lab Results   Component Value Date    PREFVC 1.99 05/09/2023    [id number] 1.50 05/09/2023    PREPEF 3.74 05/09/2023    [id number] 7.84 05/09/2023    RHDQVP1FIE 78.31 05/09/2023    POSTFVC 2.00 05/09/2023    POSTFEV1 1.60 05/09/2023    [id number] 1.66 05/09/2023    POSTPEF 3.50 05/09/2023    [id number] 8.19 05/09/2023    GCHILFR64 3.68 05/10/2018    [id number] 79.70 05/09/2023    LTLTXDL3DSO 79.55 05/10/2018    PREDICTEDFVC 2.68 05/10/2018    PREDICTEDFEV 2.11 05/10/2018           Assessment:       Problem List Items Addressed This Visit       Hypothyroid     Stable on SYNTHROID           Essential hypertension     STABLE COZAAR,            Dyslipidemia     STABLE: LIPITOR           BMI 36.0-36.9,adult     General weight loss/lifestyle modification strategies discussed (elicit support from others; identify saboteurs; non-food rewards).  Diet interventions: low calorie (1000 kCal/d) deficit diet          Asthmatic bronchitis , chronic - Primary     STABLE ON COMBIVENT AND TRELEGY  ACT score 21  FeNo 11  FEV1: 1.56l( 75.9%)  Doing well  Albutero Neb  Immunisations :           Relevant Medications    TRELEGY ELLIPTA 100-62.5-25 mcg DsDv     Other Relevant Orders    Fraction of  Nitric Oxide    X-Ray Chest PA And Lateral    Spirometry with/without bronchodilator    DMITRI on CPAP     Subjectively reports adherence  Followed at [facility]  APAP 11-13  Using and benefits           Relevant Orders    CPAP/BIPAP SUPPLIES    Narcolepsy     [place] 7  STABLE ON SUNOSI  Followed by [facility]            Reassurance  Discussed indication for INSPIRE  She is using PAP well, Mild DMITRI    Plan:      Follow up in about 1 year (around 2024), or download, weightloss, [place], CXR, Feno.    This note was prepared using voice recognition system and is likely to have sound alike errors that may have been overlooked even after proof reading.  Please call me with any questions    Discussed diagnosis, its evaluation, treatment and usual course. All questions answered.    Thank you for the courtesy of participating in the care of this patient    Steve Best MD

## 2023-05-09 NOTE — ASSESSMENT & PLAN NOTE
STABLE ON COMBIVENT AND TRELEGY  ACT score 21  FeNo 11  FEV1: 1.56l( 75.9%)  Doing well  Albutero Neb  Immunisations :

## 2023-05-16 DIAGNOSIS — G47.10 HYPERSOMNIA: ICD-10-CM

## 2023-05-16 RX ORDER — SOLRIAMFETOL 150 MG/1
1 TABLET, FILM COATED ORAL DAILY
Qty: 30 TABLET | Refills: 2 | Status: SHIPPED | OUTPATIENT
Start: 2023-05-16 | End: 2023-07-25

## 2023-05-25 ENCOUNTER — OFFICE VISIT (OUTPATIENT)
Dept: ORTHOPEDICS | Facility: CLINIC | Age: 58
End: 2023-05-25
Payer: MEDICARE

## 2023-05-25 VITALS — BODY MASS INDEX: 36.48 KG/M2 | WEIGHT: 173.81 LBS | HEIGHT: 58 IN

## 2023-05-25 DIAGNOSIS — E03.9 HYPOTHYROIDISM, UNSPECIFIED TYPE: ICD-10-CM

## 2023-05-25 DIAGNOSIS — S83.241D OTHER TEAR OF MEDIAL MENISCUS OF RIGHT KNEE AS CURRENT INJURY, SUBSEQUENT ENCOUNTER: ICD-10-CM

## 2023-05-25 DIAGNOSIS — M94.261 CHONDROMALACIA, RIGHT KNEE: Primary | ICD-10-CM

## 2023-05-25 DIAGNOSIS — M76.32 ILIOTIBIAL BAND SYNDROME AFFECTING LEFT LOWER LEG: ICD-10-CM

## 2023-05-25 PROCEDURE — 3008F BODY MASS INDEX DOCD: CPT | Mod: CPTII,S$GLB,, | Performed by: ORTHOPAEDIC SURGERY

## 2023-05-25 PROCEDURE — 3008F PR BODY MASS INDEX (BMI) DOCUMENTED: ICD-10-PCS | Mod: CPTII,S$GLB,, | Performed by: ORTHOPAEDIC SURGERY

## 2023-05-25 PROCEDURE — 4010F PR ACE/ARB THEARPY RXD/TAKEN: ICD-10-PCS | Mod: CPTII,S$GLB,, | Performed by: ORTHOPAEDIC SURGERY

## 2023-05-25 PROCEDURE — 1159F PR MEDICATION LIST DOCUMENTED IN MEDICAL RECORD: ICD-10-PCS | Mod: CPTII,S$GLB,, | Performed by: ORTHOPAEDIC SURGERY

## 2023-05-25 PROCEDURE — 99999 PR PBB SHADOW E&M-EST. PATIENT-LVL IV: ICD-10-PCS | Mod: PBBFAC,,, | Performed by: ORTHOPAEDIC SURGERY

## 2023-05-25 PROCEDURE — 3051F HG A1C>EQUAL 7.0%<8.0%: CPT | Mod: CPTII,S$GLB,, | Performed by: ORTHOPAEDIC SURGERY

## 2023-05-25 PROCEDURE — 99999 PR PBB SHADOW E&M-EST. PATIENT-LVL IV: CPT | Mod: PBBFAC,,, | Performed by: ORTHOPAEDIC SURGERY

## 2023-05-25 PROCEDURE — 4010F ACE/ARB THERAPY RXD/TAKEN: CPT | Mod: CPTII,S$GLB,, | Performed by: ORTHOPAEDIC SURGERY

## 2023-05-25 PROCEDURE — 99213 PR OFFICE/OUTPT VISIT, EST, LEVL III, 20-29 MIN: ICD-10-PCS | Mod: S$GLB,,, | Performed by: ORTHOPAEDIC SURGERY

## 2023-05-25 PROCEDURE — 1159F MED LIST DOCD IN RCRD: CPT | Mod: CPTII,S$GLB,, | Performed by: ORTHOPAEDIC SURGERY

## 2023-05-25 PROCEDURE — 3051F PR MOST RECENT HEMOGLOBIN A1C LEVEL 7.0 - < 8.0%: ICD-10-PCS | Mod: CPTII,S$GLB,, | Performed by: ORTHOPAEDIC SURGERY

## 2023-05-25 PROCEDURE — 99213 OFFICE O/P EST LOW 20 MIN: CPT | Mod: S$GLB,,, | Performed by: ORTHOPAEDIC SURGERY

## 2023-05-25 RX ORDER — LEVOTHYROXINE SODIUM 75 UG/1
TABLET ORAL
Qty: 90 TABLET | Refills: 2 | Status: SHIPPED | OUTPATIENT
Start: 2023-05-25 | End: 2024-03-11

## 2023-05-25 NOTE — PATIENT INSTRUCTIONS
Your right knee is not hurting as much as it was before and you already finish her physical [facility] at [facility]   Continue doing her independent exercise program   The topical/compound cream helped a lot  Your knee is not giving out and you not wearing a knee brace anymore   Since you doing really well at this time will see you as needed     If you need renewal on the compound cream please let us know or have the pharmacy send us a renewal  (Compound cream or Voltaren, gabapentin, lidocaine, prilocaine etcetera )

## 2023-05-25 NOTE — TELEPHONE ENCOUNTER
Refill Decision Note   Bianca Weldon  is requesting a refill authorization.  Brief Assessment and Rationale for Refill:        Medication Therapy Plan:             Comments:     Provider Staff:     Action is required for this patient.   Please see care gap opportunities below in Care Due Message.     Thanks!  [facility]     Appointments      Date Provider   Last Visit   4/13/2023 Scotty Figueroa MD   Next Visit   Visit date not found Scotty Figueroa MD     Note composed:10:51 AM 05/25/2023           Note composed:10:51 AM 05/25/2023

## 2023-05-25 NOTE — TELEPHONE ENCOUNTER
Refill Routing Note   Medication(s) are not appropriate for processing by [facility] for the following reason(s):      Drug-disease interaction    [facility] action(s):  Defer Labs due   Medication Therapy Plan: Drug-Disease: losartan and Neurogenic orthostatic hypotension      Appointments  past 12m or future 3m with PCP    Date Provider   Last Visit   4/13/2023 Scotty Figueroa MD   Next Visit   Visit date not found Scotty Figueroa MD   ED visits in past 90 days: 0        Note composed:10:52 AM 05/25/2023

## 2023-05-25 NOTE — TELEPHONE ENCOUNTER
Care Due:                  Date            Visit Type   Department     Provider  --------------------------------------------------------------------------------                                EP -                              PRIMARY      [facility] INTERNAL  Last Visit: 04-      CARE ([facility])   MEDICINE       Scotty Figueroa  Next Visit: None Scheduled  None         None Found                                                            Last  Test          Frequency    Reason                     Performed    Due Date  --------------------------------------------------------------------------------    TSH.........  12 months..  levothyroxine............  08- 08-    [facility] Embedded Care Due Messages. Reference number: [id number].   5/25/2023 9:36:49 AM CDT
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
## 2023-05-26 RX ORDER — LOSARTAN POTASSIUM 25 MG/1
25 TABLET ORAL DAILY
Qty: 90 TABLET | Refills: 1 | Status: SHIPPED | OUTPATIENT
Start: 2023-05-26 | End: 2023-12-11 | Stop reason: SDUPTHER

## 2023-06-22 RX ORDER — DICLOFENAC SODIUM 10 MG/G
GEL TOPICAL
Qty: 100 G | Refills: 3 | Status: SHIPPED | OUTPATIENT
Start: 2023-06-22 | End: 2023-11-13

## 2023-07-07 ENCOUNTER — PATIENT MESSAGE (OUTPATIENT)
Dept: INFECTIOUS DISEASES | Facility: CLINIC | Age: 58
End: 2023-07-07
Payer: MEDICARE

## 2023-08-09 ENCOUNTER — LAB VISIT (OUTPATIENT)
Dept: LAB | Facility: HOSPITAL | Age: 58
End: 2023-08-09
Attending: FAMILY MEDICINE
Payer: MEDICARE

## 2023-08-09 DIAGNOSIS — Z79.4 TYPE 2 DIABETES MELLITUS WITH MICROALBUMINURIA, WITH LONG-TERM CURRENT USE OF INSULIN: ICD-10-CM

## 2023-08-09 DIAGNOSIS — R80.9 TYPE 2 DIABETES MELLITUS WITH MICROALBUMINURIA, WITH LONG-TERM CURRENT USE OF INSULIN: ICD-10-CM

## 2023-08-09 DIAGNOSIS — E11.29 TYPE 2 DIABETES MELLITUS WITH MICROALBUMINURIA, WITH LONG-TERM CURRENT USE OF INSULIN: ICD-10-CM

## 2023-08-09 LAB
ALBUMIN/CREAT UR: NORMAL UG/MG (ref 0–30)
CREAT UR-MCNC: 23 MG/DL (ref 15–325)
MICROALBUMIN UR DL<=1MG/L-MCNC: <5 UG/ML

## 2023-08-09 PROCEDURE — 82570 ASSAY OF URINE CREATININE: CPT | Performed by: FAMILY MEDICINE

## 2023-08-15 RX ORDER — ATORVASTATIN CALCIUM 40 MG/1
40 TABLET, FILM COATED ORAL DAILY
Qty: 90 TABLET | Refills: 1 | Status: SHIPPED | OUTPATIENT
Start: 2023-08-15 | End: 2024-02-10

## 2023-08-15 NOTE — TELEPHONE ENCOUNTER
Care Due:                  Date            Visit Type   Department     Provider  --------------------------------------------------------------------------------                                EP -                              PRIMARY      [facility] INTERNAL  Last Visit: 04-      CARE ([facility])   MEDICINE       Scotty Figueroa  Next Visit: None Scheduled  None         None Found                                                            Last  Test          Frequency    Reason                     Performed    Due Date  --------------------------------------------------------------------------------    Lipid Panel.  12 months..  atorvastatin.............  06- 06-    [facility] Embedded Care Due Messages. Reference number: [id number].   8/15/2023 2:53:02 PM CDT

## 2023-08-15 NOTE — TELEPHONE ENCOUNTER
Provider Staff:  Action required for this patient     Please see care gap opportunities below in Care Due Message.    Thanks!  [facility]     Appointments      Date Provider   Last Visit   4/13/2023 Scotty Figueroa MD   Next Visit   Visit date not found Scotty Figueroa MD     Refill Decision Note   Bianca Weldon  is requesting a refill authorization.  Brief Assessment and Rationale for Refill:  Approve     Medication Therapy Plan:         Comments:     Note composed:3:27 PM 08/15/2023

## 2023-09-06 ENCOUNTER — OFFICE VISIT (OUTPATIENT)
Dept: DIABETES | Facility: CLINIC | Age: 58
End: 2023-09-06
Payer: MEDICARE

## 2023-09-06 DIAGNOSIS — R80.9 TYPE 2 DIABETES MELLITUS WITH MICROALBUMINURIA, WITH LONG-TERM CURRENT USE OF INSULIN: Primary | ICD-10-CM

## 2023-09-06 DIAGNOSIS — K31.84 GASTROPARESIS: ICD-10-CM

## 2023-09-06 DIAGNOSIS — Z79.4 TYPE 2 DIABETES MELLITUS WITH MICROALBUMINURIA, WITH LONG-TERM CURRENT USE OF INSULIN: Primary | ICD-10-CM

## 2023-09-06 DIAGNOSIS — E11.29 TYPE 2 DIABETES MELLITUS WITH MICROALBUMINURIA, WITH LONG-TERM CURRENT USE OF INSULIN: Primary | ICD-10-CM

## 2023-09-06 PROCEDURE — 99213 OFFICE O/P EST LOW 20 MIN: CPT | Mod: 95,,, | Performed by: NURSE PRACTITIONER

## 2023-09-06 PROCEDURE — 3061F PR NEG MICROALBUMINURIA RESULT DOCUMENTED/REVIEW: ICD-10-PCS | Mod: CPTII,95,, | Performed by: NURSE PRACTITIONER

## 2023-09-06 PROCEDURE — 1159F MED LIST DOCD IN RCRD: CPT | Mod: CPTII,95,, | Performed by: NURSE PRACTITIONER

## 2023-09-06 PROCEDURE — 3061F NEG MICROALBUMINURIA REV: CPT | Mod: CPTII,95,, | Performed by: NURSE PRACTITIONER

## 2023-09-06 PROCEDURE — 1160F PR REVIEW ALL MEDS BY PRESCRIBER/CLIN PHARMACIST DOCUMENTED: ICD-10-PCS | Mod: CPTII,95,, | Performed by: NURSE PRACTITIONER

## 2023-09-06 PROCEDURE — 1159F PR MEDICATION LIST DOCUMENTED IN MEDICAL RECORD: ICD-10-PCS | Mod: CPTII,95,, | Performed by: NURSE PRACTITIONER

## 2023-09-06 PROCEDURE — 3051F HG A1C>EQUAL 7.0%<8.0%: CPT | Mod: CPTII,95,, | Performed by: NURSE PRACTITIONER

## 2023-09-06 PROCEDURE — 4010F ACE/ARB THERAPY RXD/TAKEN: CPT | Mod: CPTII,95,, | Performed by: NURSE PRACTITIONER

## 2023-09-06 PROCEDURE — 99213 PR OFFICE/OUTPT VISIT, EST, LEVL III, 20-29 MIN: ICD-10-PCS | Mod: 95,,, | Performed by: NURSE PRACTITIONER

## 2023-09-06 PROCEDURE — 3066F PR DOCUMENTATION OF TREATMENT FOR NEPHROPATHY: ICD-10-PCS | Mod: CPTII,95,, | Performed by: NURSE PRACTITIONER

## 2023-09-06 PROCEDURE — 3066F NEPHROPATHY DOC TX: CPT | Mod: CPTII,95,, | Performed by: NURSE PRACTITIONER

## 2023-09-06 PROCEDURE — 1160F RVW MEDS BY RX/DR IN RCRD: CPT | Mod: CPTII,95,, | Performed by: NURSE PRACTITIONER

## 2023-09-06 PROCEDURE — 4010F PR ACE/ARB THEARPY RXD/TAKEN: ICD-10-PCS | Mod: CPTII,95,, | Performed by: NURSE PRACTITIONER

## 2023-09-06 PROCEDURE — 3051F PR MOST RECENT HEMOGLOBIN A1C LEVEL 7.0 - < 8.0%: ICD-10-PCS | Mod: CPTII,95,, | Performed by: NURSE PRACTITIONER

## 2023-09-06 RX ORDER — ONDANSETRON 4 MG/1
TABLET, ORALLY DISINTEGRATING ORAL
Qty: 20 TABLET | Refills: 0 | Status: SHIPPED | OUTPATIENT
Start: 2023-09-06 | End: 2023-12-13 | Stop reason: SDUPTHER

## 2023-09-06 NOTE — PROGRESS NOTES
"PCP: Scotty Figueroa MD     Subjective:     Chief Complaint: Diabetes Follow Up    HISTORY OF PRESENT ILLNESS: 58 y.o.  female presenting for diabetes follow up. Patient has had Type II diabetes since 2014 and has the following complications: neuropathy, gastroparesis. She has attended diabetes education in the past. Other pertinent conditions: HTN, HLD, IBD, ISELA, and schizoaffective disorder ( followed by psychiatry). Of note, she has a history of cortisone injections for plantar fasciitis.       Past tried and failed medications include: Invokana ( yeast infection ); Metformin caused GI side effects; and Trulicity / Mounjaro caused abdominal pain and discomfort.     Blood glucose testing is performed regularly. She is enrolled in digital diabetes ( readings are available under episodes tab ). Per recall, fasting BGs are 83 - 194, < 220 >. Has rare hypoglycemia.  She denies any recent hospital admissions, emergency room visits, or syncope.      No results found for: "CPEPTIDE"  No results found for: "GLUTAMICACID"  No results found for: "HUMANINSULIN"    DM MEDICATIONS:  Toujeo 50 units daily ( at 9 pm ) ;  Januvia 100 mg daily;  Glimepiride 4 mg BID ac    Review of Systems   Constitutional:  Positive for diaphoresis.   Eyes:  Negative for visual disturbance.   Gastrointestinal:  Positive for nausea (chronic, intermittent). Negative for diarrhea and vomiting.   Endocrine: Negative for polydipsia, polyphagia and polyuria.   Neurological:  Positive for numbness. Negative for headaches.       Diabetes Management Status  Statin: Taking  ACE/ARB: Not taking    Screening or Prevention Patient's value Goal Complete/Controlled?   HgA1C Testing and Control   Lab Results   Component Value Date    HGBA1C 7.0 (H) 08/09/2023      Annually/Less than 8% No   Lipid profile : 03/14/2023 Annually No   LDL control Lab Results   Component Value Date    LDLCALC 59 03/14/2023    Annually/Less than 100 mg/dl  No   Nephropathy " screening Lab Results   Component Value Date    MICALBCREAT Unable to calculate 2023     Lab Results   Component Value Date    PROTEINUA Negative 2015    Annually No   Blood pressure BP Readings from Last 1 Encounters:   23 126/74    Less than 140/90 Yes   Dilated retinal exam : 2023 Annually Yes, [facility]   Foot exam   : 2023 Annually Yes     ACTIVITY LEVEL: Rarely Active. Discussed activities, benefits, methods, and precautions.  MEAL PLANNING: Patient reports number of meals per day to be 3 and number of snacks per day to be 2.   Patient is encouraged to carb count and consume no more than 45 - 60 grams of carbohydrates in each meal, and 1800 k / richard per day.      BLOOD GLUCOSE TESTIN times daily  SOCIAL HISTORY: .  Former smoker.  Her mother lives with her and sister unexpectedly passed away.     Objective:      Physical Exam  Constitutional:       Appearance: She is well-developed.   HENT:      Head: Normocephalic and atraumatic.   Eyes:      Pupils: Pupils are equal, round, and reactive to light.   Pulmonary:      Effort: Pulmonary effort is normal.   Psychiatric:         Behavior: Behavior normal.         Thought Content: Thought content normal.         Judgment: Judgment normal.       Assessment / Plan:     1.) Type 2 diabetes mellitus with microalbuminuria, with long-term current use of insulin   Comments:  - Fair control, most recent A1C of 7.0 %.  Continue Toujeo 50 units daily ( EVERY MORNING ).  Continue glimepiride 4 mg BID ac. Continue Januvia 100 mg daily.  She has been unable to tolerate the GLP-1 drug class.  On today, patient complains of intermittent diaphoresis. Patient voices that she does not check BG when these episodes occur, but will be more diligent about checking.  In the past, she has dealt with hot flashes.  Advised to follow up with GYN regarding this concern.    Orders:  -     SITagliptin phosphate (JANUVIA) 100 MG Tab; Take 1  tablet (100 mg total) by mouth once daily.  Dispense: 30 tablet; Refill: 3    2.) Gastroparesis  -     ondansetron (ZOFRAN-ODT) 4 MG TbDL; DISSOLVE 1 TABLET ON THE TONGUE EVERY 6 HOURS AS NEEDED FOR NAUSEA  Dispense: 20 tablet; Refill: 0    Additional Plan Details:    1.) Continue monitoring blood sugar 2 x daily, fasting and ac dinner or at bedtime. Discussed ADA goal for fasting blood sugar, 80 - 130 mg/dL; pp blood sugars below 180 mg/dl. Also, discussed prevention of hypoglycemia and the need to adjust goals to higher levels if persistent hypoglycemia.    2.) Return to clinic in 4 months for follow up. The patient was explained the above plan and given opportunity to ask questions.  She understands, chooses and consents to this plan and accepts all the risks, which include but are not limited to the risks mentioned above.    Renetta Whitlock, NP-C, [facility]    The patient location is: [place]  The chief complaint leading to consultation is: Type 2 Diabetes    Visit type: audiovisual    Face to Face time with patient: 15 minutes  18 minutes of total time spent on the encounter, which includes face to face time and non-face to face time preparing to see the patient (eg, review of tests), Obtaining and/or reviewing separately obtained history, Documenting clinical information in the electronic or other health record, Independently interpreting results (not separately reported) and communicating results to the patient/family/caregiver, or Care coordination (not separately reported).     Each patient to whom he or she provides medical services by telemedicine is:  (1) informed of the relationship between the physician and patient and the respective role of any other health care provider with respect to management of the patient; and (2) notified that he or she may decline to receive medical services by telemedicine and may withdraw from such care at any time.

## 2023-09-18 DIAGNOSIS — N95.2 ATROPHIC VAGINITIS: Primary | ICD-10-CM

## 2023-09-20 ENCOUNTER — CLINICAL SUPPORT (OUTPATIENT)
Dept: PULMONOLOGY | Facility: CLINIC | Age: 58
End: 2023-09-20
Payer: MEDICARE

## 2023-09-20 DIAGNOSIS — J06.9 VIRAL UPPER RESPIRATORY TRACT INFECTION: ICD-10-CM

## 2023-09-20 DIAGNOSIS — J44.89 ASTHMATIC BRONCHITIS , CHRONIC: Primary | ICD-10-CM

## 2023-09-20 RX ORDER — LEVOCETIRIZINE DIHYDROCHLORIDE 5 MG/1
5 TABLET, FILM COATED ORAL NIGHTLY
Qty: 30 TABLET | Refills: 11 | Status: SHIPPED | OUTPATIENT
Start: 2023-09-20 | End: 2024-09-19

## 2023-09-20 RX ORDER — ESTRADIOL 0.1 MG/G
1 CREAM VAGINAL
Qty: 42.5 G | Refills: 5 | Status: SHIPPED | OUTPATIENT
Start: 2023-09-20 | End: 2024-03-11

## 2023-09-20 RX ORDER — ALBUTEROL SULFATE 90 UG/1
2 AEROSOL, METERED RESPIRATORY (INHALATION) EVERY 6 HOURS PRN
Qty: 18 G | Refills: 0 | Status: SHIPPED | OUTPATIENT
Start: 2023-09-20 | End: 2024-03-20 | Stop reason: SDUPTHER

## 2023-09-20 NOTE — PROGRESS NOTES
"Pulmonary Disease Management  [facility]  Follow up Visit  Chronic Care Management    Bianca Weldon  was seen 9/20/2023  9:00 AM in the Pulmonary Disease management clinic for evaluation, education, reinforcement of self management techniques and exacerbation action plan.    The patient location is:  [address] [place] [id number]  Visit type: Virtual visit with synchronous audio and video     Maylin Rubio    Past Medical History:   Diagnosis Date    Abnormal Pap smear of cervix     Abnormal Pap smear of vagina     after hyst, repeat every 6 months, per pt, had bx's, unknown results    Acid reflux     Arthritis     Asthma     Bipolar 1 disorder     Depression     bipolar    Diabetes mellitus     Diabetes mellitus, type 2     Elevated alkaline phosphatase level     GERD (gastroesophageal reflux disease)     Hyperlipidemia     Hypertension     IBS (irritable bowel syndrome)     Interstitial cystitis     Narcolepsy     Nonalcoholic steatohepatitis     fatty liver    Orthostatic hypotension     PONV (postoperative nausea and vomiting)     Respiratory distress     States, "My lungs collapsed during hemorrhoid surgery."    Restless legs syndrome     Sleep apnea     DMITRI-CPAP    Thyroid disease        Patient's Medications   New Prescriptions    No medications on file   Previous Medications    ALBUTEROL (ACCUNEB) 1.25 MG/3 ML NEBU    Take 3 mLs (1.25 mg total) by nebulization 2 (two) times a day. Rescue    ARIPIPRAZOLE (ABILIFY) 20 MG TAB    Take 20 mg by mouth every morning.    ASCORBIC ACID, VITAMIN C, (VITAMIN C) 250 MG TABLET    Take 250 mg by mouth once daily.    ASPIRIN (ECOTRIN) 81 MG EC TABLET    Take 81 mg by mouth once daily.     ATORVASTATIN (LIPITOR) 40 MG TABLET    Take 1 tablet (40 mg total) by mouth once daily.    BLOOD SUGAR DIAGNOSTIC STRP    Inject 1 each into the skin 3 (three) times daily. Dispense preferred on insurance    BUPROPION (WELLBUTRIN XL) 300 MG 24 HR " TABLET    Take 300 mg by mouth once daily.    CIPROFLOXACIN HCL (CIPRO) 500 MG TABLET    Cipro Take 1 tablet (oral) every 12 hours for 5 days 20220605 tablet every 12 hours oral 5 days suspended 500 MG    CLINDAMYCIN (CLEOCIN T) 1 % EXTERNAL SOLUTION    APPLY TO THE AFFECTED AREA TWICE DAILY AS NEEDED FOR ACE/ FOLLICULITIS ON FACE AND SCALP    CLOTRIMAZOLE-BETAMETHASONE 1-0.05% (LOTRISONE) CREAM    Apply to affected area 2 times daily for 5 days    CYCLOBENZAPRINE (FLEXERIL) 10 MG TABLET    Take 10 mg by mouth every evening.    CYCLOSPORINE (RESTASIS) 0.05 % OPHTHALMIC EMULSION    Place 1 drop into both eyes 2 (two) times daily as needed.     DEUTETRABENAZINE (AUSTEDO) 9 MG TAB    Take 9 mg by mouth 2 (two) times daily.    DICLOFENAC SODIUM (VOLTAREN) 1 % GEL    APPLY FOUR GRAMS TOPICALLY EVERY DAY AS DIRECTED    ESTRADIOL (ESTRACE) 0.01 % (0.1 MG/GRAM) VAGINAL CREAM    Place 1 g vaginally every 7 days.    FLUCONAZOLE (DIFLUCAN) 150 MG TAB    TAKE ONE TABLET BY MOUTH ONCE A WEEK, AFTER COMPLETING THREE DOSE TAKE ONE BY MOUTH AS A SINGLE DOSE THERAPY    FLUDROCORTISONE (FLORINEF) 0.1 MG TAB    Take 100 mcg by mouth once daily.    FLUTICASONE PROPIONATE (FLONASE) 50 MCG/ACTUATION NASAL SPRAY    USE TWO SPRAYS IN EACH NOSTRIL ONCE DAILY    GLIMEPIRIDE (AMARYL) 4 MG TABLET    Take 1 tablet (4 mg total) by mouth 2 (two) times daily before meals.    INSULIN GLARGINE U-300 CONC (TOUJEO MAX U-300 SOLOSTAR) 300 UNIT/ML (3 ML) INSULIN PEN    Inject 50 Units into the skin once daily.    IPRATROPIUM-ALBUTEROL (COMBIVENT RESPIMAT)  MCG/ACTUATION INHALER    Inhale 2 puffs into the lungs every 4 (four) hours as needed for Wheezing. Rescue    KETOCONAZOLE (NIZORAL) 2 % CREAM    Apply topically 2 (two) times daily. Apply to the affected area twice daily.    LAMOTRIGINE (LAMICTAL) 200 MG TABLET    Take 1 tablet by mouth 2 (two) times daily.    LEVOCETIRIZINE (XYZAL) 5 MG TABLET    Take 1 tablet (5 mg total) by mouth every  "evening.    LEVOTHYROXINE (SYNTHROID) 75 MCG TABLET    TAKE ONE TABLET BY MOUTH EVERY DAY    LIDOCAINE-PRILOCAINE (EMLA) CREAM    SMARTSI-4.8 Gram(s) Topical    LITHIUM (ESKALITH) 300 MG CAPSULE    Take 900 mg by mouth every evening.    LORAZEPAM (ATIVAN) 1 MG TABLET    Take 1 tablet (1 mg total) by mouth 2 (two) times daily as needed for Anxiety (takes nightly).    LOSARTAN (COZAAR) 25 MG TABLET    Take 1 tablet (25 mg total) by mouth once daily.    MULTIVITAMIN CAPSULE    Take 1 capsule by mouth once daily.    MYRBETRIQ 50 MG TB24    Take 50 mg by mouth 2 (two) times daily as needed.     NIRMATRELVIR-RITONAVIR (PAXLOVID, EUA,) 300 MG (150 MG X 2)-100 MG COPACKAGED TABLETS (EUA)    Paxlovid (EUA) Take 3 Tablet (oral) 2 times per day for 5 days 2023 tablet 2 times per day oral 5 days active 300 mg (150 mg x 2)-100 mg    ONDANSETRON (ZOFRAN-ODT) 4 MG TBDL    DISSOLVE 1 TABLET ON THE TONGUE EVERY 6 HOURS AS NEEDED FOR NAUSEA    PANTOPRAZOLE (PROTONIX) 40 MG TABLET    TAKE 1 TABLET BY MOUTH DAILY    PEN NEEDLE, DIABETIC 31 GAUGE X 3/16" NDLE    Inject 1 Stick into the skin once daily.    SITAGLIPTIN PHOSPHATE (JANUVIA) 100 MG TAB    Take 1 tablet (100 mg total) by mouth once daily.    SUNOSI 150 MG TAB    TAKE ONE TABLET BY MOUTH EVERY DAY    TRELEGY ELLIPTA 100-62.5-25 MCG DSDV    Inhale 1 puff into the lungs once daily.    TRIAMCINOLONE ACETONIDE 0.1% (KENALOG) 0.1 % CREAM    Apply topically 2 (two) times daily. Do not use on face    TRINTELLIX 20 MG TAB    Take 1 tablet by mouth once daily.   Modified Medications    No medications on file   Discontinued Medications    No medications on file       Office Spirometry Results:         2023    10:22 AM 2023     9:23 AM 2023     7:58 AM 3/21/2023     1:46 PM 2023     1:53 PM 2023     2:39 PM 2022     9:10 AM   Pulmonary Function Tests   SpO2  97 % 96 % 99 %      Height 4' 10" (1.473 m) 4' 10" (1.473 m) 4' 10" (1.473 m) 4' 10" (1.473 m) " "4' 10" (1.473 m) 4' 10" (1.473 m) 4' 10" (1.473 m)   Weight 78.8 kg (173 lb 13.3 oz) 79.1 kg (174 lb 6.1 oz) 78.5 kg (173 lb 1 oz) 78.5 kg (173 lb 2.7 oz) 77.8 kg (171 lb 8.3 oz) 77.7 kg (171 lb 3 oz) 77 kg (169 lb 12.1 oz)   BMI (Calculated) 36.3 36.5 36.2 36.2 35.9 35.8 35.5         5/25/2023    10:22 AM   Pulmonary Studies Review   Height 4' 10" (1.473 m)   Weight 78.8 kg (173 lb 13.3 oz)   BMI (Calculated) 36.3   Predicted Distance 313.35   Predicted Distance Meters (Calculated) 462 meters                 Educational assessment:   [x]            Good  []            Fair  []            Poor    Readiness to learn:   [x]            Good  []            Fair  []            Poor    Vision Status:   [x]            Good  []            Fair  []            Poor    Reading Ability:  [x]            Good  []            Fair  []            Poor    Knowledge of condition:   [x]            Good  []            Fair  []            Poor    Language Barriers:   []            Good  []            Fair  []            Poor  [x]            None    Cognitive/ Physical Barriers:   []            Good  []            Fair  []            Poor  [x]            None    Learning best by:                       []            Seeing  []            Hearing  []            Reading                         [x]            Doing    Describe any barrier /Limitation or financial implications of care choices identified     []            Financial  []            Emotional  []            Education  []            Vision/Hearing  []            Physical  [x]            None  []                TOPIC /CONTENT FOR TODAY:    [x]            MDI with or without spacer  [x]            Dry powder inhaler  []            Acapella   []           Peak Flow meter  [x]            Action plan  [x]            Nebulizer use  []            Oxygen use safety  []            Breathing and cough techniques  []            Energy conservation  []            Infection prevention  [x]      "       CPAP        Learner:    [x]            Patient   []            Caregiver    Method:    [x]            Verbal explanation  []            Audio visual    []            Literature  [x]            Teach back      Evaluation:    [x]            Teach back  []            Demonstrate  [x]            Follow up phone call    []            2 weeks     [x]            4 weeks   []            PRN      Patient concerns and expectations:  NA    Therapist comments:   Patient was seen today to review respiratory medication purpose and proper technique for use of inhalers. Patient practiced proper technique using MDI with valved holding chamber (spacer) and DPI inhalers. Patient demonstrated understanding. Literature was given to patient.     Trelegy   Proportion of Days Covered    98% of total days covered  (176/180 days)        She takes albuterol nebulized treatments 2 time daily routinely. Advised that she can take albuterol as needed.   She stated understanding.     Asthma trigger checklist was verbally reviewed and literature given to patient.     Infection prevention was discussed. Patient was reminded to get influenza vaccine. Hand hygiene and cleaning of respiratory equipment was also discussed. Patient verbalized understanding.      COPD action plan was reviewed and literature was given to patient. Patient verbalized understanding.      She uses CPAP nightly.    Refill requests submitted.    Plan:  Monthly Pulmonary Disease Management Questionnaire  Follow-up PDM appointment scheduled for 3/20/24  Reinforce education  Meds: Trelegy, Albuterol, Combivent  DME Needs: [facility]  Action Plan  Immunization: Pneumococcal- DUE, Flu-DUE, COVID 19- DUE  Next Provider Visit: NOT SCHEDULED  Next Spirometry/CPFT: NOT SCHEDULED  Approximate time spent with patient: 15 MINUTES

## 2023-09-21 ENCOUNTER — OFFICE VISIT (OUTPATIENT)
Dept: OBSTETRICS AND GYNECOLOGY | Facility: CLINIC | Age: 58
End: 2023-09-21
Payer: MEDICARE

## 2023-09-21 VITALS — HEIGHT: 59 IN | BODY MASS INDEX: 34.18 KG/M2 | WEIGHT: 169.56 LBS

## 2023-09-21 DIAGNOSIS — B37.31 VULVOVAGINAL CANDIDIASIS: ICD-10-CM

## 2023-09-21 DIAGNOSIS — L30.4 INTERTRIGO: Primary | ICD-10-CM

## 2023-09-21 PROCEDURE — 4010F ACE/ARB THERAPY RXD/TAKEN: CPT | Mod: CPTII,S$GLB,, | Performed by: OBSTETRICS & GYNECOLOGY

## 2023-09-21 PROCEDURE — 99213 OFFICE O/P EST LOW 20 MIN: CPT | Mod: 25,S$GLB,, | Performed by: OBSTETRICS & GYNECOLOGY

## 2023-09-21 PROCEDURE — 3051F PR MOST RECENT HEMOGLOBIN A1C LEVEL 7.0 - < 8.0%: ICD-10-PCS | Mod: CPTII,S$GLB,, | Performed by: OBSTETRICS & GYNECOLOGY

## 2023-09-21 PROCEDURE — 3008F BODY MASS INDEX DOCD: CPT | Mod: CPTII,S$GLB,, | Performed by: OBSTETRICS & GYNECOLOGY

## 2023-09-21 PROCEDURE — 87210 PR  SMEAR,STAIN,WET MNT,INTERP: ICD-10-PCS | Mod: QW,S$GLB,, | Performed by: OBSTETRICS & GYNECOLOGY

## 2023-09-21 PROCEDURE — 3066F PR DOCUMENTATION OF TREATMENT FOR NEPHROPATHY: ICD-10-PCS | Mod: CPTII,S$GLB,, | Performed by: OBSTETRICS & GYNECOLOGY

## 2023-09-21 PROCEDURE — 87210 SMEAR WET MOUNT SALINE/INK: CPT | Mod: QW,S$GLB,, | Performed by: OBSTETRICS & GYNECOLOGY

## 2023-09-21 PROCEDURE — 3051F HG A1C>EQUAL 7.0%<8.0%: CPT | Mod: CPTII,S$GLB,, | Performed by: OBSTETRICS & GYNECOLOGY

## 2023-09-21 PROCEDURE — 1159F PR MEDICATION LIST DOCUMENTED IN MEDICAL RECORD: ICD-10-PCS | Mod: CPTII,S$GLB,, | Performed by: OBSTETRICS & GYNECOLOGY

## 2023-09-21 PROCEDURE — 3061F PR NEG MICROALBUMINURIA RESULT DOCUMENTED/REVIEW: ICD-10-PCS | Mod: CPTII,S$GLB,, | Performed by: OBSTETRICS & GYNECOLOGY

## 2023-09-21 PROCEDURE — 99213 PR OFFICE/OUTPT VISIT, EST, LEVL III, 20-29 MIN: ICD-10-PCS | Mod: 25,S$GLB,, | Performed by: OBSTETRICS & GYNECOLOGY

## 2023-09-21 PROCEDURE — 99999 PR PBB SHADOW E&M-EST. PATIENT-LVL V: ICD-10-PCS | Mod: PBBFAC,,, | Performed by: OBSTETRICS & GYNECOLOGY

## 2023-09-21 PROCEDURE — 99999 PR PBB SHADOW E&M-EST. PATIENT-LVL V: CPT | Mod: PBBFAC,,, | Performed by: OBSTETRICS & GYNECOLOGY

## 2023-09-21 PROCEDURE — 1159F MED LIST DOCD IN RCRD: CPT | Mod: CPTII,S$GLB,, | Performed by: OBSTETRICS & GYNECOLOGY

## 2023-09-21 PROCEDURE — 3008F PR BODY MASS INDEX (BMI) DOCUMENTED: ICD-10-PCS | Mod: CPTII,S$GLB,, | Performed by: OBSTETRICS & GYNECOLOGY

## 2023-09-21 PROCEDURE — 3066F NEPHROPATHY DOC TX: CPT | Mod: CPTII,S$GLB,, | Performed by: OBSTETRICS & GYNECOLOGY

## 2023-09-21 PROCEDURE — 3061F NEG MICROALBUMINURIA REV: CPT | Mod: CPTII,S$GLB,, | Performed by: OBSTETRICS & GYNECOLOGY

## 2023-09-21 PROCEDURE — 4010F PR ACE/ARB THEARPY RXD/TAKEN: ICD-10-PCS | Mod: CPTII,S$GLB,, | Performed by: OBSTETRICS & GYNECOLOGY

## 2023-09-21 RX ORDER — CLOTRIMAZOLE AND BETAMETHASONE DIPROPIONATE 10; .64 MG/G; MG/G
CREAM TOPICAL 2 TIMES DAILY
Qty: 45 G | Refills: 1 | Status: SHIPPED | OUTPATIENT
Start: 2023-09-21 | End: 2023-09-26

## 2023-09-21 NOTE — PROGRESS NOTES
Subjective:      Patient ID: Bianca Weldon is a 58 y.o. female.    Chief Complaint:  Vaginitis      History of Present Illness  HPI  Presents with vaginal yeast infection.  Pt with DM, and has hx of recurrent yeast infections.  At her visit with me last year, we treated her yeast, discussed importance of glycemic control and lifestyle modifications to prevent recurrences, and started weekly diflucan.  Since then, she's had no yeast infections until recently.  Developed worsening vulvar itching.  Took 2 extra diflucan this past week.  Symptoms have improved.  Still with some itching at introitus.  No change in soap or detergent.  Needs vaginal estrace refill.  Does have hot flushes.  Has hx of TLH/LSO.  Still has right ovary.  Having some LLQ discomfort.    GYN & OB History  No LMP recorded. Patient has had a hysterectomy.   Date of Last Pap: No result found    OB History    Para Term  AB Living   3 2 2   1 2   SAB IAB Ectopic Multiple Live Births   1       2      # Outcome Date GA Lbr Raúl/2nd Weight Sex Delivery Anes PTL Lv   3 Term      Vag-Spont      2 Term      Vag-Spont      1 SAB                Review of Systems  Review of Systems       Objective:     Physical Exam:   Constitutional: She is oriented to person, place, and time. She appears well-developed and well-nourished. No distress.             Abdominal: Soft. She exhibits no distension and no mass. There is no abdominal tenderness. There is no rebound and no guarding. Hernia confirmed negative in the right inguinal area and confirmed negative in the left inguinal area.     Genitourinary:    Vagina normal.      Pelvic exam was performed with patient supine.   There is no rash, tenderness, lesion or injury on the right labia. There is no rash, tenderness, lesion or injury on the left labia. Right adnexum displays no mass, no tenderness and no fullness. Left adnexum displays no mass, no tenderness and no fullness. Vaginal cuff normal.  No  erythema,  no vaginal discharge, tenderness, bleeding, rectocele, cystocele or unspecified prolapse of vaginal walls in the vagina.    No foreign body in the vagina.      No signs of injury in the vagina.   Vaginal atrophy noted. Cervix is absent.Uterus is absent.               Neurological: She is alert and oriented to person, place, and time.    Skin: Rash (erythematous maculopapular rash with satellite lesions in BL inguinal folds c/w yeast) noted.    Psychiatric: She has a normal mood and affect.       Wet prep: normal vaginal squamous epithelial cells    Assessment:     1. Intertrigo    2. Vulvovaginal candidiasis               Plan:     Bianca was seen today for vaginitis.    Diagnoses and all orders for this visit:    Intertrigo    Vulvovaginal candidiasis  -     clotrimazole-betamethasone 1-0.05% (LOTRISONE) cream; Apply topically 2 (two) times daily. for 5 days     Continue vaginal estrace cream twice per week  Continue weekly diflucan.  Lotrisone cream for the groin.  Discussed estroven to help with hot flushes.  Poor candidate for ERT due to various VTE and CV risk factors   RTC prn

## 2023-09-21 NOTE — PATIENT INSTRUCTIONS
You can try Estroven for hot flushes.  You may not see a difference until you've been taking it for at least 4 weeks.

## 2023-09-22 ENCOUNTER — HOSPITAL ENCOUNTER (OUTPATIENT)
Dept: RADIOLOGY | Facility: HOSPITAL | Age: 58
Discharge: HOME | End: 2023-09-22
Attending: OBSTETRICS & GYNECOLOGY
Payer: MEDICARE

## 2023-09-22 DIAGNOSIS — R92.8 ABNORMAL MAMMOGRAM: ICD-10-CM

## 2023-09-22 PROCEDURE — 77066 DX MAMMO INCL CAD BI: CPT | Mod: TC

## 2023-09-22 PROCEDURE — 76642 ULTRASOUND BREAST LIMITED: CPT | Mod: TC,RT

## 2023-09-22 PROCEDURE — 76642 US BREAST RIGHT LIMITED: ICD-10-PCS | Mod: 26,RT,, | Performed by: RADIOLOGY

## 2023-09-22 PROCEDURE — 77062 BREAST TOMOSYNTHESIS BI: CPT | Mod: 26,,, | Performed by: RADIOLOGY

## 2023-09-22 PROCEDURE — 77066 MAMMO DIGITAL DIAGNOSTIC BILAT WITH TOMO: ICD-10-PCS | Mod: 26,,, | Performed by: RADIOLOGY

## 2023-09-22 PROCEDURE — 77062 MAMMO DIGITAL DIAGNOSTIC BILAT WITH TOMO: ICD-10-PCS | Mod: 26,,, | Performed by: RADIOLOGY

## 2023-09-22 PROCEDURE — 76642 ULTRASOUND BREAST LIMITED: CPT | Mod: 26,RT,, | Performed by: RADIOLOGY

## 2023-09-22 PROCEDURE — 77066 DX MAMMO INCL CAD BI: CPT | Mod: 26,,, | Performed by: RADIOLOGY

## 2023-10-02 RX ORDER — PEN NEEDLE, DIABETIC 30 GX3/16"
1 NEEDLE, DISPOSABLE MISCELLANEOUS DAILY
Qty: 100 EACH | Refills: 3 | Status: SHIPPED | OUTPATIENT
Start: 2023-10-02 | End: 2024-01-04 | Stop reason: SDUPTHER

## 2023-10-09 DIAGNOSIS — J44.89 ASTHMATIC BRONCHITIS , CHRONIC: ICD-10-CM

## 2023-10-11 RX ORDER — ALBUTEROL SULFATE 1.25 MG/3ML
SOLUTION RESPIRATORY (INHALATION)
Qty: 150 ML | Refills: 11 | Status: SHIPPED | OUTPATIENT
Start: 2023-10-11

## 2023-10-19 ENCOUNTER — PATIENT OUTREACH (OUTPATIENT)
Dept: PULMONOLOGY | Facility: CLINIC | Age: 58
End: 2023-10-19
Payer: MEDICARE

## 2023-11-07 ENCOUNTER — PATIENT MESSAGE (OUTPATIENT)
Dept: ADMINISTRATIVE | Facility: OTHER | Age: 58
End: 2023-11-07
Payer: MEDICARE

## 2023-11-11 DIAGNOSIS — G47.10 HYPERSOMNIA: ICD-10-CM

## 2023-11-11 NOTE — TELEPHONE ENCOUNTER
Care Due:                  Date            Visit Type   Department     Provider  --------------------------------------------------------------------------------                                EP -                              PRIMARY      [facility] INTERNAL  Last Visit: 04-      CARE ([facility])   MEDICINE       Scotty Figueroa  Next Visit: None Scheduled  None         None Found                                                            Last  Test          Frequency    Reason                     Performed    Due Date  --------------------------------------------------------------------------------    CMP.........  12 months..  atorvastatin, diclofenac,   01- 12-                             insulin..................    HBA1C.......  6 months...  insulin..................  08-   02-    Lipid Panel.  12 months..  atorvastatin.............  06- 06-    Health [facility] Embedded Care Due Messages. Reference number: [id number].   11/11/2023 8:03:33 AM CST

## 2023-11-13 RX ORDER — SOLRIAMFETOL 150 MG/1
TABLET, FILM COATED ORAL
Qty: 30 TABLET | Refills: 2 | Status: SHIPPED | OUTPATIENT
Start: 2023-11-13 | End: 2024-02-12

## 2023-11-13 RX ORDER — DICLOFENAC SODIUM 10 MG/G
GEL TOPICAL
Qty: 100 G | Refills: 3 | Status: SHIPPED | OUTPATIENT
Start: 2023-11-13

## 2023-11-14 ENCOUNTER — PATIENT MESSAGE (OUTPATIENT)
Dept: DIABETES | Facility: CLINIC | Age: 58
End: 2023-11-14
Payer: MEDICARE

## 2023-12-10 DIAGNOSIS — R80.9 TYPE 2 DIABETES MELLITUS WITH MICROALBUMINURIA, WITH LONG-TERM CURRENT USE OF INSULIN: ICD-10-CM

## 2023-12-10 DIAGNOSIS — Z79.4 TYPE 2 DIABETES MELLITUS WITH MICROALBUMINURIA, WITH LONG-TERM CURRENT USE OF INSULIN: ICD-10-CM

## 2023-12-10 DIAGNOSIS — E11.29 TYPE 2 DIABETES MELLITUS WITH MICROALBUMINURIA, WITH LONG-TERM CURRENT USE OF INSULIN: ICD-10-CM

## 2023-12-10 NOTE — TELEPHONE ENCOUNTER
No care due was identified.  Health [facility] Embedded Care Due Messages. Reference number: [id number].   12/10/2023 8:03:45 AM CST

## 2023-12-11 RX ORDER — LOSARTAN POTASSIUM 25 MG/1
25 TABLET ORAL DAILY
Qty: 90 TABLET | Refills: 1 | OUTPATIENT
Start: 2023-12-11

## 2023-12-11 RX ORDER — PANTOPRAZOLE SODIUM 40 MG/1
TABLET, DELAYED RELEASE ORAL
Qty: 90 TABLET | Refills: 1 | Status: SHIPPED | OUTPATIENT
Start: 2023-12-11

## 2023-12-11 RX ORDER — GLIMEPIRIDE 4 MG/1
4 TABLET ORAL
Qty: 180 TABLET | Refills: 1 | OUTPATIENT
Start: 2023-12-11

## 2023-12-11 RX ORDER — LOSARTAN POTASSIUM 25 MG/1
25 TABLET ORAL DAILY
Qty: 90 TABLET | Refills: 0 | Status: SHIPPED | OUTPATIENT
Start: 2023-12-11 | End: 2024-03-11

## 2023-12-11 NOTE — TELEPHONE ENCOUNTER
Refill Routing Note   Medication(s) are not appropriate for processing by [facility] for the following reason(s):        No active prescription written by provider    [facility] action(s):  Defer  Approve               Appointments  past 12m or future 3m with PCP    Date Provider   Last Visit   4/13/2023 Scotty Figueroa MD   Next Visit   Visit date not found Scotty Figueroa MD   ED visits in past 90 days: 0        Note composed:12:31 PM 12/11/2023

## 2023-12-13 ENCOUNTER — OFFICE VISIT (OUTPATIENT)
Dept: DIABETES | Facility: CLINIC | Age: 58
End: 2023-12-13
Payer: MEDICARE

## 2023-12-13 ENCOUNTER — LAB VISIT (OUTPATIENT)
Dept: LAB | Facility: HOSPITAL | Age: 58
End: 2023-12-13
Attending: NURSE PRACTITIONER
Payer: MEDICARE

## 2023-12-13 DIAGNOSIS — E11.29 TYPE 2 DIABETES MELLITUS WITH MICROALBUMINURIA, WITH LONG-TERM CURRENT USE OF INSULIN: Primary | ICD-10-CM

## 2023-12-13 DIAGNOSIS — R80.9 TYPE 2 DIABETES MELLITUS WITH MICROALBUMINURIA, WITH LONG-TERM CURRENT USE OF INSULIN: ICD-10-CM

## 2023-12-13 DIAGNOSIS — Z79.4 TYPE 2 DIABETES MELLITUS WITH MICROALBUMINURIA, WITH LONG-TERM CURRENT USE OF INSULIN: ICD-10-CM

## 2023-12-13 DIAGNOSIS — E11.29 TYPE 2 DIABETES MELLITUS WITH MICROALBUMINURIA, WITH LONG-TERM CURRENT USE OF INSULIN: ICD-10-CM

## 2023-12-13 DIAGNOSIS — R80.9 TYPE 2 DIABETES MELLITUS WITH MICROALBUMINURIA, WITH LONG-TERM CURRENT USE OF INSULIN: Primary | ICD-10-CM

## 2023-12-13 DIAGNOSIS — K31.84 GASTROPARESIS: ICD-10-CM

## 2023-12-13 DIAGNOSIS — Z79.4 TYPE 2 DIABETES MELLITUS WITH MICROALBUMINURIA, WITH LONG-TERM CURRENT USE OF INSULIN: Primary | ICD-10-CM

## 2023-12-13 DIAGNOSIS — I10 ESSENTIAL HYPERTENSION: ICD-10-CM

## 2023-12-13 DIAGNOSIS — E78.5 DYSLIPIDEMIA: ICD-10-CM

## 2023-12-13 LAB
ALBUMIN SERPL BCP-MCNC: 4 G/DL (ref 3.5–5.2)
ALP SERPL-CCNC: 151 U/L (ref 55–135)
ALT SERPL W/O P-5'-P-CCNC: 16 U/L (ref 10–44)
ANION GAP SERPL CALC-SCNC: 9 MMOL/L (ref 8–16)
AST SERPL-CCNC: 19 U/L (ref 10–40)
BILIRUB SERPL-MCNC: 0.7 MG/DL (ref 0.1–1)
BUN SERPL-MCNC: 8 MG/DL (ref 6–20)
CALCIUM SERPL-MCNC: 9.3 MG/DL (ref 8.7–10.5)
CHLORIDE SERPL-SCNC: 104 MMOL/L (ref 95–110)
CO2 SERPL-SCNC: 28 MMOL/L (ref 23–29)
CREAT SERPL-MCNC: 1 MG/DL (ref 0.5–1.4)
EST. GFR  (NO RACE VARIABLE): >60 ML/MIN/1.73 M^2
ESTIMATED AVG GLUCOSE: 143 MG/DL (ref 68–131)
GLUCOSE SERPL-MCNC: 125 MG/DL (ref 70–110)
GLUCOSE SERPL-MCNC: 134 MG/DL (ref 70–110)
HBA1C MFR BLD: 6.6 % (ref 4–5.6)
POTASSIUM SERPL-SCNC: 3.9 MMOL/L (ref 3.5–5.1)
PROT SERPL-MCNC: 6.8 G/DL (ref 6–8.4)
SODIUM SERPL-SCNC: 141 MMOL/L (ref 136–145)

## 2023-12-13 PROCEDURE — 3066F PR DOCUMENTATION OF TREATMENT FOR NEPHROPATHY: ICD-10-PCS | Mod: CPTII,S$GLB,, | Performed by: NURSE PRACTITIONER

## 2023-12-13 PROCEDURE — 99214 OFFICE O/P EST MOD 30 MIN: CPT | Mod: S$GLB,,, | Performed by: NURSE PRACTITIONER

## 2023-12-13 PROCEDURE — 82962 POCT GLUCOSE, HAND-HELD DEVICE: ICD-10-PCS | Mod: S$GLB,,, | Performed by: NURSE PRACTITIONER

## 2023-12-13 PROCEDURE — 3008F PR BODY MASS INDEX (BMI) DOCUMENTED: ICD-10-PCS | Mod: CPTII,S$GLB,, | Performed by: NURSE PRACTITIONER

## 2023-12-13 PROCEDURE — 1159F MED LIST DOCD IN RCRD: CPT | Mod: CPTII,S$GLB,, | Performed by: NURSE PRACTITIONER

## 2023-12-13 PROCEDURE — 3061F NEG MICROALBUMINURIA REV: CPT | Mod: CPTII,S$GLB,, | Performed by: NURSE PRACTITIONER

## 2023-12-13 PROCEDURE — 1160F PR REVIEW ALL MEDS BY PRESCRIBER/CLIN PHARMACIST DOCUMENTED: ICD-10-PCS | Mod: CPTII,S$GLB,, | Performed by: NURSE PRACTITIONER

## 2023-12-13 PROCEDURE — 3044F HG A1C LEVEL LT 7.0%: CPT | Mod: CPTII,S$GLB,, | Performed by: NURSE PRACTITIONER

## 2023-12-13 PROCEDURE — 99999 PR PBB SHADOW E&M-EST. PATIENT-LVL IV: CPT | Mod: PBBFAC,,, | Performed by: NURSE PRACTITIONER

## 2023-12-13 PROCEDURE — 4010F PR ACE/ARB THEARPY RXD/TAKEN: ICD-10-PCS | Mod: CPTII,S$GLB,, | Performed by: NURSE PRACTITIONER

## 2023-12-13 PROCEDURE — 99999 PR PBB SHADOW E&M-EST. PATIENT-LVL IV: ICD-10-PCS | Mod: PBBFAC,,, | Performed by: NURSE PRACTITIONER

## 2023-12-13 PROCEDURE — 3008F BODY MASS INDEX DOCD: CPT | Mod: CPTII,S$GLB,, | Performed by: NURSE PRACTITIONER

## 2023-12-13 PROCEDURE — 3078F PR MOST RECENT DIASTOLIC BLOOD PRESSURE < 80 MM HG: ICD-10-PCS | Mod: CPTII,S$GLB,, | Performed by: NURSE PRACTITIONER

## 2023-12-13 PROCEDURE — 3074F SYST BP LT 130 MM HG: CPT | Mod: CPTII,S$GLB,, | Performed by: NURSE PRACTITIONER

## 2023-12-13 PROCEDURE — 99214 PR OFFICE/OUTPT VISIT, EST, LEVL IV, 30-39 MIN: ICD-10-PCS | Mod: S$GLB,,, | Performed by: NURSE PRACTITIONER

## 2023-12-13 PROCEDURE — 3061F PR NEG MICROALBUMINURIA RESULT DOCUMENTED/REVIEW: ICD-10-PCS | Mod: CPTII,S$GLB,, | Performed by: NURSE PRACTITIONER

## 2023-12-13 PROCEDURE — 80053 COMPREHEN METABOLIC PANEL: CPT | Performed by: NURSE PRACTITIONER

## 2023-12-13 PROCEDURE — 36415 COLL VENOUS BLD VENIPUNCTURE: CPT | Mod: PO | Performed by: NURSE PRACTITIONER

## 2023-12-13 PROCEDURE — 4010F ACE/ARB THERAPY RXD/TAKEN: CPT | Mod: CPTII,S$GLB,, | Performed by: NURSE PRACTITIONER

## 2023-12-13 PROCEDURE — 1159F PR MEDICATION LIST DOCUMENTED IN MEDICAL RECORD: ICD-10-PCS | Mod: CPTII,S$GLB,, | Performed by: NURSE PRACTITIONER

## 2023-12-13 PROCEDURE — 82962 GLUCOSE BLOOD TEST: CPT | Mod: S$GLB,,, | Performed by: NURSE PRACTITIONER

## 2023-12-13 PROCEDURE — 3066F NEPHROPATHY DOC TX: CPT | Mod: CPTII,S$GLB,, | Performed by: NURSE PRACTITIONER

## 2023-12-13 PROCEDURE — 3074F PR MOST RECENT SYSTOLIC BLOOD PRESSURE < 130 MM HG: ICD-10-PCS | Mod: CPTII,S$GLB,, | Performed by: NURSE PRACTITIONER

## 2023-12-13 PROCEDURE — 1160F RVW MEDS BY RX/DR IN RCRD: CPT | Mod: CPTII,S$GLB,, | Performed by: NURSE PRACTITIONER

## 2023-12-13 PROCEDURE — 3078F DIAST BP <80 MM HG: CPT | Mod: CPTII,S$GLB,, | Performed by: NURSE PRACTITIONER

## 2023-12-13 PROCEDURE — 83036 HEMOGLOBIN GLYCOSYLATED A1C: CPT | Performed by: NURSE PRACTITIONER

## 2023-12-13 PROCEDURE — 3044F PR MOST RECENT HEMOGLOBIN A1C LEVEL <7.0%: ICD-10-PCS | Mod: CPTII,S$GLB,, | Performed by: NURSE PRACTITIONER

## 2023-12-13 RX ORDER — ONDANSETRON 4 MG/1
TABLET, ORALLY DISINTEGRATING ORAL
Qty: 20 TABLET | Refills: 0 | Status: SHIPPED | OUTPATIENT
Start: 2023-12-13

## 2023-12-13 NOTE — PROGRESS NOTES
"PCP: Scotty Figueroa MD     Subjective:     Chief Complaint: Diabetes Follow Up    HISTORY OF PRESENT ILLNESS: 58 y.o.  female presenting for diabetes follow up.     Patient has had Type II diabetes since 2014 and has the following complications: neuropathy, gastroparesis. She has attended diabetes education in the past. Other pertinent conditions: HTN, HLD, IBD, ISELA, and schizoaffective disorder ( followed by psychiatry). Of note, she has a history of cortisone injections for plantar fasciitis.       Past tried and failed medications include: Invokana ( yeast infection ); Metformin caused GI side effects; and Trulicity / Mounjaro caused abdominal pain and discomfort. Recently discontinued glimepiride due to fasting hypoglycemia, BG 46 - 56.  Toujeo was also decreased back to 46 units daily.     Blood glucose testing is performed regularly. She is enrolled in digital diabetes ( episodes tab ). For the past 14 days, fasting BGs have ranged between 95 - 158. No longer having hypoglycemia. She denies any recent hospital admissions, emergency room visits, or syncope.      Blood glucose in clinic today: 134 mg/dl    Vitals:    12/13/23 1431   BP: 123/78   Weight: 76.1 kg (167 lb 12.3 oz)   Height: 4' 11" (1.499 m)   BMI 33.89    No results found for: "CPEPTIDE"  No results found for: "GLUTAMICACID"  No results found for: "HUMANINSULIN"    DM MEDICATIONS:  Toujeo 46 units daily ( at 9 pm ) ;  Januvia 100 mg daily    Review of Systems   Constitutional:  Positive for diaphoresis.   Eyes:  Negative for visual disturbance.   Gastrointestinal:  Positive for nausea (chronic, intermittent). Negative for diarrhea and vomiting.   Endocrine: Negative for polydipsia, polyphagia and polyuria.   Neurological:  Positive for numbness. Negative for headaches.       Diabetes Management Status  Statin: Taking  ACE/ARB: Not taking    Screening or Prevention Patient's value Goal Complete/Controlled?   HgA1C Testing and Control   " Lab Results   Component Value Date    HGBA1C 6.6 (H) 2023      Annually/Less than 8% No   Lipid profile : 2023 Annually No   LDL control Lab Results   Component Value Date    LDLCALC 59 2023    Annually/Less than 100 mg/dl  No   Nephropathy screening Lab Results   Component Value Date    MICALBCREAT Unable to calculate 2023     Lab Results   Component Value Date    PROTEINUA Negative 2015    Annually No   Blood pressure BP Readings from Last 1 Encounters:   23 123/78    Less than 140/90 Yes   Dilated retinal exam : 2023 Annually Yes, [facility]   Foot exam   : 2023 Annually Yes     ACTIVITY LEVEL: Rarely Active. Discussed activities, benefits, methods, and precautions.  MEAL PLANNING: Patient reports number of meals per day to be 3 and number of snacks per day to be 2.   Patient is encouraged to carb count and consume no more than 45 - 60 grams of carbohydrates in each meal, and 1800 k / richard per day.      BLOOD GLUCOSE TESTIN times daily  SOCIAL HISTORY: .  Former smoker.  Her mother lives with her and sister unexpectedly passed away.     Objective:      Physical Exam  Constitutional:       Appearance: She is well-developed.   HENT:      Head: Normocephalic and atraumatic.   Eyes:      Pupils: Pupils are equal, round, and reactive to light.   Cardiovascular:      Rate and Rhythm: Normal rate and regular rhythm.      Pulses:           Dorsalis pedis pulses are 2+ on the right side and 2+ on the left side.   Pulmonary:      Effort: Pulmonary effort is normal.      Breath sounds: Normal breath sounds.   Feet:      Right foot:      Protective Sensation: 6 sites tested.  6 sites sensed.      Skin integrity: No ulcer, callus or dry skin.      Left foot:      Protective Sensation: 6 sites tested.  6 sites sensed.      Skin integrity: No ulcer, blister, callus or dry skin.   Skin:     General: Skin is warm and dry.      Findings: No rash.   Psychiatric:          Behavior: Behavior normal.         Thought Content: Thought content normal.         Judgment: Judgment normal.       Assessment / Plan:     1.) Type 2 diabetes mellitus with microalbuminuria, with long-term current use of insulin   Comments:  - Controlled,, most recent A1C of 6.6 %.  Patient currently enrolled in digital diabetes program.  Last month, she was having some hypoglycemia, so Toujeo was decreased to 46 units daily and glimepiride was discontinued altogether.  Continue Januvia 100 mg daily.  She has been unable to tolerate the GLP-1 drug class, also has history of gastroparesis.     Orders:  -     Hemoglobin A1C; Standing  -     POCT Glucose, Hand-Held Device  -     Comprehensive Metabolic Panel; Standing  -     SITagliptin phosphate (JANUVIA) 100 MG Tab; Take 1 tablet (100 mg total) by mouth once daily.  Dispense: 30 tablet; Refill: 3    2.) Gastroparesis  -     ondansetron (ZOFRAN-ODT) 4 MG TbDL; DISSOLVE 1 TABLET ON THE TONGUE EVERY 6 HOURS AS NEEDED FOR NAUSEA  Dispense: 20 tablet; Refill: 0    3.) Essential hypertension - continue medications as prescribed.     4.) Dyslipidemia - controlled, continue statin therapy.    Additional Plan Details:    1.) Continue monitoring blood sugar 2 x daily, fasting and ac dinner or at bedtime. Discussed ADA goal for fasting blood sugar, 80 - 130 mg/dL; pp blood sugars below 180 mg/dl. Also, discussed prevention of hypoglycemia and the need to adjust goals to higher levels if persistent hypoglycemia.    2.) Return to clinic in 4 months for follow up. The patient was explained the above plan and given opportunity to ask questions.  She understands, chooses and consents to this plan and accepts all the risks, which include but are not limited to the risks mentioned above.    Renetta Whitlock, OMAR-C, [facility]    A total of 30 minutes was spent in face to face time, of which over 50 % was spent in counseling patient on disease process, complications, treatment,  and side effects of medications.

## 2023-12-14 DIAGNOSIS — R80.9 TYPE 2 DIABETES MELLITUS WITH MICROALBUMINURIA, WITH LONG-TERM CURRENT USE OF INSULIN: ICD-10-CM

## 2023-12-14 DIAGNOSIS — E11.29 TYPE 2 DIABETES MELLITUS WITH MICROALBUMINURIA, WITH LONG-TERM CURRENT USE OF INSULIN: ICD-10-CM

## 2023-12-14 DIAGNOSIS — Z79.4 TYPE 2 DIABETES MELLITUS WITH MICROALBUMINURIA, WITH LONG-TERM CURRENT USE OF INSULIN: ICD-10-CM

## 2023-12-14 RX ORDER — GLIMEPIRIDE 4 MG/1
4 TABLET ORAL
Qty: 180 TABLET | Refills: 1 | OUTPATIENT
Start: 2023-12-14

## 2023-12-15 VITALS
SYSTOLIC BLOOD PRESSURE: 123 MMHG | DIASTOLIC BLOOD PRESSURE: 78 MMHG | HEIGHT: 59 IN | WEIGHT: 167.75 LBS | BODY MASS INDEX: 33.82 KG/M2

## 2024-01-05 RX ORDER — PEN NEEDLE, DIABETIC 30 GX3/16"
1 NEEDLE, DISPOSABLE MISCELLANEOUS DAILY
Qty: 100 EACH | Refills: 3 | Status: SHIPPED | OUTPATIENT
Start: 2024-01-05

## 2024-01-10 ENCOUNTER — PATIENT OUTREACH (OUTPATIENT)
Dept: PULMONOLOGY | Facility: CLINIC | Age: 59
End: 2024-01-10
Payer: MEDICARE

## 2024-01-12 ENCOUNTER — TELEPHONE (OUTPATIENT)
Dept: OTOLARYNGOLOGY | Facility: CLINIC | Age: 59
End: 2024-01-12
Payer: MEDICARE

## 2024-01-12 NOTE — TELEPHONE ENCOUNTER
----- Message from Lenora Nguyen sent at 1/12/2024  9:46 AM CST -----  Contact: Bianca Lima was returning the missed call regarding an appt. Please call her back at [phone].    Thanks  TS

## 2024-01-12 NOTE — TELEPHONE ENCOUNTER
Contacted pt about scheduling an appointment for ear fullness and sinus problems. Scheduled her with Ms. Odilia at her soonest appointment, pt voiced understanding

## 2024-01-15 DIAGNOSIS — R80.9 TYPE 2 DIABETES MELLITUS WITH MICROALBUMINURIA, WITH LONG-TERM CURRENT USE OF INSULIN: ICD-10-CM

## 2024-01-15 DIAGNOSIS — E11.29 TYPE 2 DIABETES MELLITUS WITH MICROALBUMINURIA, WITH LONG-TERM CURRENT USE OF INSULIN: ICD-10-CM

## 2024-01-15 DIAGNOSIS — Z79.4 TYPE 2 DIABETES MELLITUS WITH MICROALBUMINURIA, WITH LONG-TERM CURRENT USE OF INSULIN: ICD-10-CM

## 2024-01-16 RX ORDER — GLIMEPIRIDE 4 MG/1
4 TABLET ORAL
Qty: 180 TABLET | Refills: 1 | Status: SHIPPED | OUTPATIENT
Start: 2024-01-16 | End: 2024-02-21 | Stop reason: ALTCHOICE

## 2024-01-29 ENCOUNTER — HOSPITAL ENCOUNTER (OUTPATIENT)
Dept: RADIOLOGY | Facility: HOSPITAL | Age: 59
Discharge: HOME | End: 2024-01-29
Attending: STUDENT IN AN ORGANIZED HEALTH CARE EDUCATION/TRAINING PROGRAM
Payer: MEDICARE

## 2024-01-29 ENCOUNTER — OFFICE VISIT (OUTPATIENT)
Dept: SPORTS MEDICINE | Facility: CLINIC | Age: 59
End: 2024-01-29
Payer: MEDICARE

## 2024-01-29 ENCOUNTER — OFFICE VISIT (OUTPATIENT)
Dept: FAMILY MEDICINE | Facility: CLINIC | Age: 59
End: 2024-01-29
Payer: MEDICARE

## 2024-01-29 VITALS
DIASTOLIC BLOOD PRESSURE: 64 MMHG | WEIGHT: 164.38 LBS | HEIGHT: 59 IN | SYSTOLIC BLOOD PRESSURE: 122 MMHG | BODY MASS INDEX: 33.14 KG/M2 | HEART RATE: 84 BPM | OXYGEN SATURATION: 99 % | RESPIRATION RATE: 19 BRPM | TEMPERATURE: 99 F

## 2024-01-29 VITALS — WEIGHT: 164.44 LBS | BODY MASS INDEX: 33.15 KG/M2 | HEIGHT: 59 IN

## 2024-01-29 DIAGNOSIS — M25.552 LEFT HIP PAIN: ICD-10-CM

## 2024-01-29 DIAGNOSIS — G47.62 NOCTURNAL LEG CRAMPS: ICD-10-CM

## 2024-01-29 DIAGNOSIS — F25.0 SCHIZOAFFECTIVE DISORDER, BIPOLAR TYPE: ICD-10-CM

## 2024-01-29 DIAGNOSIS — E11.29 TYPE 2 DIABETES MELLITUS WITH MICROALBUMINURIA, WITH LONG-TERM CURRENT USE OF INSULIN: ICD-10-CM

## 2024-01-29 DIAGNOSIS — G47.33 OSA ON CPAP: ICD-10-CM

## 2024-01-29 DIAGNOSIS — Z79.899 POLYPHARMACY: ICD-10-CM

## 2024-01-29 DIAGNOSIS — G90.3 NEUROGENIC ORTHOSTATIC HYPOTENSION: ICD-10-CM

## 2024-01-29 DIAGNOSIS — M25.552 LEFT HIP PAIN: Primary | ICD-10-CM

## 2024-01-29 DIAGNOSIS — F13.20 SEDATIVE, HYPNOTIC OR ANXIOLYTIC DEPENDENCE, UNCOMPLICATED: ICD-10-CM

## 2024-01-29 DIAGNOSIS — J44.9 CHRONIC OBSTRUCTIVE PULMONARY DISEASE, UNSPECIFIED COPD TYPE: ICD-10-CM

## 2024-01-29 DIAGNOSIS — K63.5 POLYP OF COLON, UNSPECIFIED PART OF COLON, UNSPECIFIED TYPE: ICD-10-CM

## 2024-01-29 DIAGNOSIS — E78.5 DYSLIPIDEMIA: ICD-10-CM

## 2024-01-29 DIAGNOSIS — M76.02 GLUTEAL TENDINITIS OF LEFT BUTTOCK: Primary | ICD-10-CM

## 2024-01-29 DIAGNOSIS — G24.01 TARDIVE DYSKINESIA: ICD-10-CM

## 2024-01-29 DIAGNOSIS — E66.01 MORBID (SEVERE) OBESITY DUE TO EXCESS CALORIES: ICD-10-CM

## 2024-01-29 DIAGNOSIS — I10 ESSENTIAL HYPERTENSION: ICD-10-CM

## 2024-01-29 DIAGNOSIS — R80.9 TYPE 2 DIABETES MELLITUS WITH MICROALBUMINURIA, WITH LONG-TERM CURRENT USE OF INSULIN: ICD-10-CM

## 2024-01-29 DIAGNOSIS — Z79.4 TYPE 2 DIABETES MELLITUS WITH MICROALBUMINURIA, WITH LONG-TERM CURRENT USE OF INSULIN: ICD-10-CM

## 2024-01-29 DIAGNOSIS — E03.9 HYPOTHYROIDISM, UNSPECIFIED TYPE: ICD-10-CM

## 2024-01-29 PROCEDURE — 73502 X-RAY EXAM HIP UNI 2-3 VIEWS: CPT | Mod: TC,LT

## 2024-01-29 PROCEDURE — 99215 OFFICE O/P EST HI 40 MIN: CPT | Mod: S$GLB,,, | Performed by: FAMILY MEDICINE

## 2024-01-29 PROCEDURE — 73502 X-RAY EXAM HIP UNI 2-3 VIEWS: CPT | Mod: 26,LT,, | Performed by: RADIOLOGY

## 2024-01-29 PROCEDURE — 99999 PR PBB SHADOW E&M-EST. PATIENT-LVL V: CPT | Mod: PBBFAC,,, | Performed by: FAMILY MEDICINE

## 2024-01-29 PROCEDURE — 99203 OFFICE O/P NEW LOW 30 MIN: CPT | Mod: S$GLB,,, | Performed by: STUDENT IN AN ORGANIZED HEALTH CARE EDUCATION/TRAINING PROGRAM

## 2024-01-29 PROCEDURE — 99999 PR PBB SHADOW E&M-EST. PATIENT-LVL V: CPT | Mod: PBBFAC,,, | Performed by: STUDENT IN AN ORGANIZED HEALTH CARE EDUCATION/TRAINING PROGRAM

## 2024-01-29 NOTE — PATIENT INSTRUCTIONS
Assessment:  Bianca Weldon is a 59 y.o. female   Chief Complaint   Patient presents with    Left Hip - Pain       Encounter Diagnoses   Name Primary?    Left hip pain     Gluteal tendinitis of left buttock Yes        Plan:  Please take Tylenol 1 Gram (2 extra-strength Tylenol tablets) up to 3 times a day.  Please to not take any extra doses of tylenol if you are scheduling in this manner.    An external, ambulatory referral to physical therapy was placed today. Please reach out to them to schedule your initial consult and subsequent visits.   Apply topical diclofenac (Voltaren) up to 4 times a day to the affected area.  It can be bought over the counter at any local pharmacy.       Supplements for pain, inflammation, arthritis.    Glucosamine sulfate/chondroitin sulfate has been shown to be safe and effective for knee arthritis (and possibly other joints affected with arthritis). This is an over the counter medication/supplement and usually needs to be taken for 1-2 months before results are seen. If you do not see any results after this time, consider stopping it. The dose is usually found on the bottle, and is 1500mg to start (first 1-2 months) then you can back down to 1000 mg (current recommendations are 1500mg per day, all at once).    Some people can have stomach problems with this and if you do, you may stop taking it or divide up the doses. If you are ALLERGIC TO SHELLFISH, please do not take. Follow the instructions on the bottle.    Other supplements that have been shown to help with joint and muscle pain include:    (Unless otherwise noted, as these supplements are not FDA controlled, please follow the recommendations on the bottle)    Turmeric 500mg PO BID    Flax seed oil    Avocado soybean unsaponifiables    DO-e    MSM    Cherry juice extract (tart)    Rose Flores    Diaanaliaein    If there are any concerns about taking these supplements with other medications you may already be taking, you  can speak to your pharmacist, physician, health care provider, or research other medical information available to you. Side effects and interactions are possible with any supplement or medication - be safe!     Follow-up: as needed or sooner if there are any problems between now and then.    Thank you for choosing [facility] and Dr. Sebastian Castañeda for your orthopedic & sports medicine care. It is our goal to provide you with exceptional care that will help keep you healthy, active, and get you back in the game.    Please do not hesitate to reach out to us via email, phone, or MyChart with any questions, concerns, or feedback.    If you felt that you received exemplary care today, please consider leaving us feedback on Healthgrades at:  [email or web address]    If you are experiencing pain/discomfort ,or have questions after 5pm and would like to be connected to the [facility] on-call team, please call this number and specify which Sports Medicine provider is treating you: [phone]

## 2024-01-29 NOTE — PROGRESS NOTES
Subjective:       Patient ID: Bianca Weldon is a 59 y.o. female.    Chief Complaint: Ear Fullness      HPI Comments:       Current Outpatient Medications:     albuterol (ACCUNEB) 1.25 mg/3 mL Nebu, NEBULIZE CONTENTS OF ONE VIAL BY MOUTH TWICE DAILY, Disp: 150 mL, Rfl: 11    albuterol (PROVENTIL/VENTOLIN HFA) 90 mcg/actuation inhaler, Inhale 2 puffs into the lungs every 6 (six) hours as needed for Wheezing. Rescue, Disp: 18 g, Rfl: 0    ARIPiprazole (ABILIFY) 20 MG Tab, Take 20 mg by mouth every morning., Disp: , Rfl:     ascorbic acid, vitamin C, (VITAMIN C) 250 MG tablet, Take 250 mg by mouth once daily., Disp: , Rfl:     aspirin (ECOTRIN) 81 MG EC tablet, Take 81 mg by mouth once daily. , Disp: , Rfl:     atorvastatin (LIPITOR) 40 MG tablet, Take 1 tablet (40 mg total) by mouth once daily., Disp: 90 tablet, Rfl: 1    blood sugar diagnostic Strp, Inject 1 each into the skin 3 (three) times daily. Dispense preferred on insurance, Disp: 100 strip, Rfl: 11    buPROPion (WELLBUTRIN XL) 300 MG 24 hr tablet, Take 300 mg by mouth once daily., Disp: , Rfl:     ciprofloxacin HCl (CIPRO) 500 MG tablet, Cipro Take 1 tablet (oral) every 12 hours for 5 days 20220605 tablet every 12 hours oral 5 days suspended 500 MG, Disp: , Rfl:     clindamycin (CLEOCIN T) 1 % external solution, APPLY TO THE AFFECTED AREA TWICE DAILY AS NEEDED FOR ACE/ FOLLICULITIS ON FACE AND SCALP, Disp: 60 mL, Rfl: 4    cyclobenzaprine (FLEXERIL) 10 MG tablet, Take 10 mg by mouth every evening., Disp: , Rfl:     cycloSPORINE (RESTASIS) 0.05 % ophthalmic emulsion, Place 1 drop into both eyes 2 (two) times daily as needed. , Disp: , Rfl:     deutetrabenazine (AUSTEDO) 9 mg Tab, Take 9 mg by mouth 2 (two) times daily., Disp: , Rfl:     diclofenac sodium (VOLTAREN) 1 % Gel, APPLY FOUR GRAMS TOPICALLY EVERY DAY AS DIRECTED, Disp: 100 g, Rfl: 3    estradioL (ESTRACE) 0.01 % (0.1 mg/gram) vaginal cream, Place 1 g vaginally every 7 days., Disp: 42.5 g, Rfl: 5     fluconazole (DIFLUCAN) 150 MG Tab, TAKE ONE TABLET BY MOUTH ONCE A WEEK, AFTER COMPLETING THREE DOSE TAKE ONE BY MOUTH AS A SINGLE DOSE THERAPY, Disp: 4 tablet, Rfl: 5    fludrocortisone (FLORINEF) 0.1 mg Tab, Take 100 mcg by mouth once daily., Disp: , Rfl:     fluticasone propionate (FLONASE) 50 mcg/actuation nasal spray, USE TWO SPRAYS IN EACH NOSTRIL ONCE DAILY, Disp: 48 g, Rfl: 3    glimepiride (AMARYL) 4 MG tablet, Take 1 tablet (4 mg total) by mouth 2 (two) times daily before meals., Disp: 180 tablet, Rfl: 1    insulin glargine U-300 conc (TOUJEO MAX U-300 SOLOSTAR) 300 unit/mL (3 mL) insulin pen, Inject 46 Units into the skin once daily., Disp: 2 pen , Rfl: 3    lamoTRIgine (LAMICTAL) 200 MG tablet, Take 1 tablet by mouth 2 (two) times daily., Disp: , Rfl:     levocetirizine (XYZAL) 5 MG tablet, Take 1 tablet (5 mg total) by mouth every evening., Disp: 30 tablet, Rfl: 11    levothyroxine (SYNTHROID) 75 MCG tablet, TAKE ONE TABLET BY MOUTH EVERY DAY, Disp: 90 tablet, Rfl: 2    LIDOcaine-prilocaine (EMLA) cream, SMARTSI-4.8 Gram(s) Topical, Disp: , Rfl:     lithium (ESKALITH) 300 MG capsule, Take 900 mg by mouth every evening., Disp: , Rfl:     LORazepam (ATIVAN) 1 MG tablet, Take 1 tablet (1 mg total) by mouth 2 (two) times daily as needed for Anxiety (takes nightly)., Disp: 60 tablet, Rfl: 2    losartan (COZAAR) 25 MG tablet, Take 1 tablet (25 mg total) by mouth once daily., Disp: 90 tablet, Rfl: 0    multivitamin capsule, Take 1 capsule by mouth once daily., Disp: , Rfl:     MYRBETRIQ 50 mg Tb24, Take 50 mg by mouth 2 (two) times daily as needed. , Disp: , Rfl:     nirmatrelvir-ritonavir (PAXLOVID, EUA,) 300 mg (150 mg x 2)-100 mg copackaged tablets (EUA), Paxlovid (EUA) Take 3 Tablet (oral) 2 times per day for 5 days [id number] tablet 2 times per day oral 5 days active 300 mg (150 mg x 2)-100 mg, Disp: , Rfl:     ondansetron (ZOFRAN-ODT) 4 MG TbDL, DISSOLVE 1 TABLET ON THE TONGUE EVERY 6 HOURS AS NEEDED  "FOR NAUSEA, Disp: 20 tablet, Rfl: 0    pantoprazole (PROTONIX) 40 MG tablet, TAKE 1 TABLET BY MOUTH DAILY, Disp: 90 tablet, Rfl: 1    pen needle, diabetic 31 gauge x 3/16" Ndle, Inject 1 Stick into the skin once daily., Disp: 100 each, Rfl: 3    SITagliptin phosphate (JANUVIA) 100 MG Tab, Take 1 tablet (100 mg total) by mouth once daily., Disp: 30 tablet, Rfl: 6    SUNOSI 150 mg Tab, TAKE ONE TABLET BY MOUTH EVERY DAY, Disp: 30 tablet, Rfl: 2    TRELEGY ELLIPTA 100-62.5-25 mcg DsDv, Inhale 1 puff into the lungs once daily., Disp: 60 each, Rfl: 6    TRINTELLIX 20 mg Tab, Take 1 tablet by mouth once daily., Disp: , Rfl:     ketoconazole (NIZORAL) 2 % cream, Apply topically 2 (two) times daily. Apply to the affected area twice daily. (Patient taking differently: Apply topically 2 (two) times daily as needed. Apply to the affected area twice daily.), Disp: 30 g, Rfl: 2    triamcinolone acetonide 0.1% (KENALOG) 0.1 % cream, Apply topically 2 (two) times daily. Do not use on face (Patient taking differently: Apply topically 2 (two) times daily as needed. Do not use on face), Disp: 45 Bottle, Rfl: 6  No current facility-administered medications for this visit.    Facility-Administered Medications Ordered in Other Visits:     nozaseptin (NOZIN) nasal , , Each Nostril, On Call Procedure, Kenrick Gray MD, Given at 04/27/21 1222  Ear Fullness   Pertinent negatives include no abdominal pain, coughing, diarrhea, headaches or sore throat.     Review of Systems   Constitutional:  Negative for activity change, appetite change and fever.   HENT:  Negative for sore throat.    Respiratory:  Negative for cough and shortness of breath.    Cardiovascular:  Negative for chest pain.   Gastrointestinal:  Negative for abdominal pain, diarrhea and nausea.   Genitourinary:  Negative for difficulty urinating.   Musculoskeletal:  Positive for arthralgias and myalgias.   Neurological:  Negative for dizziness and headaches.     " "  Objective:      Vitals:    01/29/24 0839   BP: 122/64   Pulse: 84   Resp: 19   Temp: 98.5 °F (36.9 °C)   TempSrc: Tympanic   SpO2: 99%   Weight: 74.6 kg (164 lb 5.7 oz)   Height: 4' 11" (1.499 m)   PainSc:   6   PainLoc: Leg     Physical Exam  Vitals and nursing note reviewed.   Constitutional:       General: She is not in acute distress.     Appearance: She is well-developed. She is not diaphoretic.   HENT:      Head: Normocephalic.      Mouth/Throat:      Pharynx: No oropharyngeal exudate.   Neck:      Thyroid: No thyromegaly.   Cardiovascular:      Rate and Rhythm: Normal rate and regular rhythm.      Heart sounds: Normal heart sounds. No murmur heard.  Pulmonary:      Effort: Pulmonary effort is normal.      Breath sounds: Normal breath sounds. No wheezing or rales.   Abdominal:      General: There is no distension.      Palpations: Abdomen is soft.   Musculoskeletal:      Cervical back: Neck supple.      Right hip: No deformity, tenderness or bony tenderness. Normal range of motion. Normal strength.      Left hip: Tenderness and bony tenderness present. No deformity. Decreased range of motion. Normal strength.      Comments: Tenderness throughout joint but greatest at greater trochanter.  Pain elicited with internal and external rotation   Lymphadenopathy:      Cervical: No cervical adenopathy.   Skin:     General: Skin is warm and dry.   Neurological:      Mental Status: She is alert and oriented to person, place, and time.   Psychiatric:         Behavior: Behavior normal.         Thought Content: Thought content normal.         Judgment: Judgment normal.         Assessment:       1. Left hip pain    2. Chronic obstructive pulmonary disease, unspecified COPD type    3. Type 2 diabetes mellitus with microalbuminuria, with long-term current use of insulin    4. Morbid (severe) obesity due to excess calories    5. Sedative, hypnotic or anxiolytic dependence, uncomplicated    6. Schizoaffective disorder, bipolar " type    7. Neurogenic orthostatic hypotension    8. Polypharmacy    9. Polyp of colon, unspecified part of colon, unspecified type    10. Hypothyroidism, unspecified type    11. DMITRI on CPAP    12. Essential hypertension    13. Dyslipidemia    14. Tardive dyskinesia    15. Nocturnal leg cramps        Plan:   Left hip pain  Comments:  Orthopedic consult.  Can not take oral NSAIDs  Orders:  -     Ambulatory referral/consult to Orthopedics; Future; Expected date: 2024    Chronic obstructive pulmonary disease, unspecified COPD type  Comments:  On Trelegy    Type 2 diabetes mellitus with microalbuminuria, with long-term current use of insulin  Comments:  A1c 6.6.  Recent decrease in Toujeo, and stopped glimepiride, due to nocturnal hypoglycemia    Morbid (severe) obesity due to excess calories  Comments:  Weight down 9 lb in 9 months    Sedative, hypnotic or anxiolytic dependence, uncomplicated  Comments:  On Ativan    Schizoaffective disorder, bipolar type  Comments:  Followed by Psychiatry.  On lithium    Neurogenic orthostatic hypotension  Comments:  thinks she's taking flourinef    Polypharmacy  Comments:  Recent adjustments in diabetic meds    Polyp of colon, unspecified part of colon, unspecified type  Comments:  due . Father  of colon cancer    Hypothyroidism, unspecified type  Comments:  Euthyroid on current dose    DMITRI on CPAP  Comments:  Compliant    Essential hypertension  Comments:  Controlled.  Follow-up 6 months    Dyslipidemia  Comments:  LDL 51    Tardive dyskinesia  Comments:  On Austedo    Nocturnal leg cramps  Comments:  Calves specifically.  Recommend trial of pickle juice.
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
## 2024-02-07 ENCOUNTER — PATIENT MESSAGE (OUTPATIENT)
Dept: FAMILY MEDICINE | Facility: CLINIC | Age: 59
End: 2024-02-07
Payer: MEDICARE

## 2024-02-07 ENCOUNTER — LAB VISIT (OUTPATIENT)
Dept: LAB | Facility: HOSPITAL | Age: 59
End: 2024-02-07
Attending: FAMILY MEDICINE
Payer: MEDICARE

## 2024-02-07 DIAGNOSIS — R10.2 SUPRAPUBIC PAIN: ICD-10-CM

## 2024-02-07 DIAGNOSIS — B37.31 VULVOVAGINAL CANDIDIASIS: ICD-10-CM

## 2024-02-07 DIAGNOSIS — R10.2 SUPRAPUBIC PAIN: Primary | ICD-10-CM

## 2024-02-07 LAB
BILIRUB UR QL STRIP: NEGATIVE
CLARITY UR REFRACT.AUTO: CLEAR
COLOR UR AUTO: COLORLESS
GLUCOSE UR QL STRIP: ABNORMAL
HGB UR QL STRIP: NEGATIVE
KETONES UR QL STRIP: NEGATIVE
LEUKOCYTE ESTERASE UR QL STRIP: NEGATIVE
NITRITE UR QL STRIP: NEGATIVE
PH UR STRIP: 7 [PH] (ref 5–8)
PROT UR QL STRIP: NEGATIVE
SP GR UR STRIP: 1.01 (ref 1–1.03)
URN SPEC COLLECT METH UR: ABNORMAL

## 2024-02-07 PROCEDURE — 87086 URINE CULTURE/COLONY COUNT: CPT | Performed by: FAMILY MEDICINE

## 2024-02-07 PROCEDURE — 81003 URINALYSIS AUTO W/O SCOPE: CPT | Performed by: FAMILY MEDICINE

## 2024-02-07 RX ORDER — FLUCONAZOLE 150 MG/1
150 TABLET ORAL WEEKLY
Qty: 4 TABLET | Refills: 5 | Status: SHIPPED | OUTPATIENT
Start: 2024-02-07

## 2024-02-09 DIAGNOSIS — G47.10 HYPERSOMNIA: ICD-10-CM

## 2024-02-09 LAB — BACTERIA UR CULT: NO GROWTH

## 2024-02-12 RX ORDER — SOLRIAMFETOL 150 MG/1
TABLET, FILM COATED ORAL
Qty: 30 TABLET | Refills: 2 | Status: SHIPPED | OUTPATIENT
Start: 2024-02-12 | End: 2024-05-13

## 2024-02-13 ENCOUNTER — OFFICE VISIT (OUTPATIENT)
Dept: OBSTETRICS AND GYNECOLOGY | Facility: CLINIC | Age: 59
End: 2024-02-13
Payer: MEDICARE

## 2024-02-13 VITALS
DIASTOLIC BLOOD PRESSURE: 78 MMHG | HEIGHT: 59 IN | BODY MASS INDEX: 33.91 KG/M2 | SYSTOLIC BLOOD PRESSURE: 128 MMHG | WEIGHT: 168.19 LBS

## 2024-02-13 DIAGNOSIS — R10.32 LEFT LOWER QUADRANT ABDOMINAL PAIN: ICD-10-CM

## 2024-02-13 DIAGNOSIS — R10.2 PELVIC PAIN: Primary | ICD-10-CM

## 2024-02-13 PROCEDURE — 99999 PR PBB SHADOW E&M-EST. PATIENT-LVL V: CPT | Mod: PBBFAC,,, | Performed by: NURSE PRACTITIONER

## 2024-02-13 PROCEDURE — 99212 OFFICE O/P EST SF 10 MIN: CPT | Mod: S$GLB,,, | Performed by: NURSE PRACTITIONER

## 2024-02-13 NOTE — PROGRESS NOTES
Subjective:       Patient ID: Bianca Weldon is a 59 y.o. female.    Chief Complaint:  Left Abdominal Pain    No LMP recorded. Patient has had a hysterectomy.  History of Present Illness  Over the last couple of weeks she has been experiencing lest side abdominal pain pelvic   The pain starts from the back and moves to the front.   Does not ease up a lot   Sometimes when she is standing  and she goes to sit down she has a sharp pain that shoots up through her rectum.   She has been diagnosed with gluteal tendinitis  She has an extensive GI history (IBS, GERD, etc)      OB History    Para Term  AB Living   3 2 2   1 2   SAB IAB Ectopic Multiple Live Births   1       2      # Outcome Date GA Lbr Raúl/2nd Weight Sex Delivery Anes PTL Lv   3 Term      Vag-Spont      2 Term      Vag-Spont      1 SAB                Review of Systems  Review of Systems        Objective:    Physical Exam    DECLINES EXAM   Assessment:     1. Pelvic pain    2. Left lower quadrant abdominal pain              Plan:   Bianca was seen today for left abdominal pain.    Diagnoses and all orders for this visit:    Pelvic pain    Left lower quadrant abdominal pain  -     Ambulatory referral/consult to Gastroenterology; Future      Explained to client that she has had a hysterectomy with LSO (re:  bleeding)   Source of pain is not GYN related

## 2024-02-14 ENCOUNTER — PATIENT MESSAGE (OUTPATIENT)
Dept: GASTROENTEROLOGY | Facility: CLINIC | Age: 59
End: 2024-02-14
Payer: MEDICARE

## 2024-02-14 ENCOUNTER — TELEPHONE (OUTPATIENT)
Dept: GASTROENTEROLOGY | Facility: CLINIC | Age: 59
End: 2024-02-14
Payer: MEDICARE

## 2024-02-14 NOTE — TELEPHONE ENCOUNTER
----- Message from Odilia Silveira LPN sent at 2/13/2024  3:58 PM CST -----  Contact: self  [phone]    ----- Message -----  From: Johanna Tolliver  Sent: 2/13/2024   3:58 PM CST  To: [facility] Staff    Patient called in this afternoon to schedule an appointment with the above provider but none populated, can you get her scheduled. Please call back  [phone]. Thanks tpw

## 2024-02-15 ENCOUNTER — PATIENT MESSAGE (OUTPATIENT)
Dept: ADMINISTRATIVE | Facility: OTHER | Age: 59
End: 2024-02-15
Payer: MEDICARE

## 2024-02-28 ENCOUNTER — PATIENT MESSAGE (OUTPATIENT)
Dept: OTHER | Facility: OTHER | Age: 59
End: 2024-02-28
Payer: MEDICARE

## 2024-02-28 DIAGNOSIS — M25.561 PAIN IN BOTH KNEES, UNSPECIFIED CHRONICITY: Primary | ICD-10-CM

## 2024-02-28 DIAGNOSIS — M25.562 PAIN IN BOTH KNEES, UNSPECIFIED CHRONICITY: Primary | ICD-10-CM

## 2024-03-04 ENCOUNTER — OFFICE VISIT (OUTPATIENT)
Dept: ORTHOPEDICS | Facility: CLINIC | Age: 59
End: 2024-03-04
Payer: MEDICARE

## 2024-03-04 ENCOUNTER — TELEPHONE (OUTPATIENT)
Dept: ORTHOPEDICS | Facility: CLINIC | Age: 59
End: 2024-03-04
Payer: MEDICARE

## 2024-03-04 ENCOUNTER — HOSPITAL ENCOUNTER (OUTPATIENT)
Dept: RADIOLOGY | Facility: HOSPITAL | Age: 59
Discharge: HOME | End: 2024-03-04
Attending: ORTHOPAEDIC SURGERY
Payer: MEDICARE

## 2024-03-04 VITALS — HEIGHT: 59 IN | WEIGHT: 168.19 LBS | BODY MASS INDEX: 33.91 KG/M2

## 2024-03-04 DIAGNOSIS — M17.0 BILATERAL PRIMARY OSTEOARTHRITIS OF KNEE: Primary | ICD-10-CM

## 2024-03-04 DIAGNOSIS — M94.261 CHONDROMALACIA, RIGHT KNEE: ICD-10-CM

## 2024-03-04 DIAGNOSIS — M25.561 PAIN IN BOTH KNEES, UNSPECIFIED CHRONICITY: ICD-10-CM

## 2024-03-04 DIAGNOSIS — Z98.890 S/P RIGHT KNEE ARTHROSCOPY: ICD-10-CM

## 2024-03-04 DIAGNOSIS — S83.241D OTHER TEAR OF MEDIAL MENISCUS OF RIGHT KNEE AS CURRENT INJURY, SUBSEQUENT ENCOUNTER: ICD-10-CM

## 2024-03-04 DIAGNOSIS — M76.32 ILIOTIBIAL BAND SYNDROME AFFECTING LEFT LOWER LEG: ICD-10-CM

## 2024-03-04 DIAGNOSIS — M17.11 ARTHRITIS OF KNEE, RIGHT: Primary | ICD-10-CM

## 2024-03-04 DIAGNOSIS — M25.562 PAIN IN BOTH KNEES, UNSPECIFIED CHRONICITY: ICD-10-CM

## 2024-03-04 PROCEDURE — 99213 OFFICE O/P EST LOW 20 MIN: CPT | Mod: S$GLB,,, | Performed by: ORTHOPAEDIC SURGERY

## 2024-03-04 PROCEDURE — 73564 X-RAY EXAM KNEE 4 OR MORE: CPT | Mod: TC,50

## 2024-03-04 PROCEDURE — 73564 X-RAY EXAM KNEE 4 OR MORE: CPT | Mod: 26,50,, | Performed by: RADIOLOGY

## 2024-03-04 PROCEDURE — 99999 PR PBB SHADOW E&M-EST. PATIENT-LVL IV: CPT | Mod: PBBFAC,,, | Performed by: ORTHOPAEDIC SURGERY

## 2024-03-04 RX ORDER — MELOXICAM 15 MG/1
15 TABLET ORAL DAILY
Qty: 30 TABLET | Refills: 0 | Status: SHIPPED | OUTPATIENT
Start: 2024-03-04
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
## 2024-03-04 NOTE — PATIENT INSTRUCTIONS
X-ray today show moderate arthritic changes in the right knee with medial joint narrowing however the left knee still looks okay   You have diabetes and it is labile at this time you do not want proceed with steroid which is understandable   You do have it it will bowel syndrome as well as reflux however we can do meloxicam as an anti-inflammatory with food for the next 3-4 days   After 3-4 days you can start Tylenol 650 mg maximum 3 times a day if needed  Will get her insurance to approve you for rooster comb gel injections /hyaluronic acid injections - Synvisc or Monovisc or dura naveed /single injection.  Rooster comb gel might take roughly 4 weeks to help.  The injection itself might be slightly painful you need to ice the knee the next several days   I would like you to come back and see me within 7-10 days for re-evaluation

## 2024-03-05 DIAGNOSIS — M17.0 BILATERAL PRIMARY OSTEOARTHRITIS OF KNEE: Primary | ICD-10-CM

## 2024-03-08 ENCOUNTER — TELEPHONE (OUTPATIENT)
Dept: DIABETES | Facility: CLINIC | Age: 59
End: 2024-03-08
Payer: MEDICARE

## 2024-03-08 ENCOUNTER — PATIENT MESSAGE (OUTPATIENT)
Dept: OTHER | Facility: OTHER | Age: 59
End: 2024-03-08
Payer: MEDICARE

## 2024-03-08 NOTE — TELEPHONE ENCOUNTER
Patient states that she has had a regular diet without pain or steroids    Per Renetta: She does not tolerate other medicines very well so if it continues to stay high over the next day or so, we can start so short acting insulin but maybe it will settle down.     Patient made aware to keep track of her sugars over the weekend and she will receive a call on Monday morning on what next steps will be if sugars remain high

## 2024-03-08 NOTE — TELEPHONE ENCOUNTER
----- Message from Susana Shepherd sent at 3/8/2024  2:00 PM CST -----  Who Called: Pt    What is the request in detail: Requesting call back to discuss recent portal message. Please advise    Can the clinic reply by [facility]? No    Best Call Back Number: [phone]      Additional Information:

## 2024-03-08 NOTE — TELEPHONE ENCOUNTER
Spoke with patient and she states that when she woke up this morning her sugar was 349 then a hour later her sugar was 233. Then she checked it at 1:55 it was 297 and she wants to know what she should do

## 2024-03-10 DIAGNOSIS — E03.9 HYPOTHYROIDISM, UNSPECIFIED TYPE: ICD-10-CM

## 2024-03-10 DIAGNOSIS — N95.2 ATROPHIC VAGINITIS: ICD-10-CM

## 2024-03-10 NOTE — TELEPHONE ENCOUNTER
No care due was identified.  [facility] Embedded Care Due Messages. Reference number: [id number].   3/10/2024 8:03:56 AM CDT

## 2024-03-11 ENCOUNTER — PATIENT MESSAGE (OUTPATIENT)
Dept: DIABETES | Facility: CLINIC | Age: 59
End: 2024-03-11
Payer: MEDICARE

## 2024-03-11 DIAGNOSIS — R80.9 TYPE 2 DIABETES MELLITUS WITH MICROALBUMINURIA, WITH LONG-TERM CURRENT USE OF INSULIN: Primary | ICD-10-CM

## 2024-03-11 DIAGNOSIS — Z79.4 TYPE 2 DIABETES MELLITUS WITH MICROALBUMINURIA, WITH LONG-TERM CURRENT USE OF INSULIN: Primary | ICD-10-CM

## 2024-03-11 DIAGNOSIS — E11.29 TYPE 2 DIABETES MELLITUS WITH MICROALBUMINURIA, WITH LONG-TERM CURRENT USE OF INSULIN: Primary | ICD-10-CM

## 2024-03-11 RX ORDER — INSULIN ASPART 100 [IU]/ML
5 INJECTION, SOLUTION INTRAVENOUS; SUBCUTANEOUS
Qty: 15 ML | Refills: 1 | Status: SHIPPED | OUTPATIENT
Start: 2024-03-11 | End: 2024-03-26 | Stop reason: SDUPTHER

## 2024-03-11 RX ORDER — LEVOTHYROXINE SODIUM 75 UG/1
TABLET ORAL
Qty: 90 TABLET | Refills: 3 | Status: SHIPPED | OUTPATIENT
Start: 2024-03-11

## 2024-03-11 RX ORDER — LOSARTAN POTASSIUM 25 MG/1
25 TABLET ORAL DAILY
Qty: 90 TABLET | Refills: 1 | Status: SHIPPED | OUTPATIENT
Start: 2024-03-11

## 2024-03-11 RX ORDER — ESTRADIOL 0.1 MG/G
CREAM VAGINAL
Qty: 42.5 G | Refills: 5 | Status: SHIPPED | OUTPATIENT
Start: 2024-03-11

## 2024-03-11 NOTE — TELEPHONE ENCOUNTER
Refill Decision Note   Bianca Weldon  is requesting a refill authorization.  Brief Assessment and Rationale for Refill:  Approve     Medication Therapy Plan:         Comments:     Note composed:11:33 AM 03/11/2024

## 2024-03-12 RX ORDER — PEN NEEDLE, DIABETIC 30 GX3/16"
1 NEEDLE, DISPOSABLE MISCELLANEOUS 3 TIMES DAILY
Qty: 100 EACH | Refills: 3 | Status: SHIPPED | OUTPATIENT
Start: 2024-03-12

## 2024-03-14 ENCOUNTER — OFFICE VISIT (OUTPATIENT)
Dept: ORTHOPEDICS | Facility: CLINIC | Age: 59
End: 2024-03-14
Payer: MEDICARE

## 2024-03-14 ENCOUNTER — TELEPHONE (OUTPATIENT)
Dept: ORTHOPEDICS | Facility: CLINIC | Age: 59
End: 2024-03-14

## 2024-03-14 VITALS — BODY MASS INDEX: 33.91 KG/M2 | HEIGHT: 59 IN | WEIGHT: 168.19 LBS

## 2024-03-14 DIAGNOSIS — S83.241D OTHER TEAR OF MEDIAL MENISCUS OF RIGHT KNEE AS CURRENT INJURY, SUBSEQUENT ENCOUNTER: ICD-10-CM

## 2024-03-14 DIAGNOSIS — M76.32 ILIOTIBIAL BAND SYNDROME AFFECTING LEFT LOWER LEG: ICD-10-CM

## 2024-03-14 DIAGNOSIS — M94.261 CHONDROMALACIA, RIGHT KNEE: ICD-10-CM

## 2024-03-14 DIAGNOSIS — Z98.890 S/P RIGHT KNEE ARTHROSCOPY: ICD-10-CM

## 2024-03-14 DIAGNOSIS — M17.11 ARTHRITIS OF KNEE, RIGHT: Primary | ICD-10-CM

## 2024-03-14 DIAGNOSIS — M17.0 BILATERAL PRIMARY OSTEOARTHRITIS OF KNEE: Primary | ICD-10-CM

## 2024-03-14 PROCEDURE — 99213 OFFICE O/P EST LOW 20 MIN: CPT | Mod: 25,S$GLB,, | Performed by: ORTHOPAEDIC SURGERY

## 2024-03-14 PROCEDURE — 20610 DRAIN/INJ JOINT/BURSA W/O US: CPT | Mod: RT,S$GLB,, | Performed by: ORTHOPAEDIC SURGERY

## 2024-03-14 PROCEDURE — 99999 PR PBB SHADOW E&M-EST. PATIENT-LVL V: CPT | Mod: PBBFAC,,, | Performed by: ORTHOPAEDIC SURGERY

## 2024-03-14 NOTE — PROCEDURES
Large Joint Aspiration/Injection: R knee    Date/Time: 3/14/2024 1:00 PM    Performed by: Kenrick Gray MD  Authorized by: Kenrick Gray MD    Consent Done?:  Yes (Verbal)  Indications:  Arthritis  Site marked: the procedure site was marked    Timeout: prior to procedure the correct patient, procedure, and site was verified      Local anesthesia used?: Yes    Local anesthetic:  Lidocaine 1% without epinephrine    Details:  Needle Size:  22 G  Ultrasonic Guidance for needle placement?: No    Approach:  Anterolateral  Location:  Knee  Site:  R knee  Medications:  60 mg hyaluronate sodium, stabilized (DUROLANE) 60 mg/3 mL  Patient tolerance:  Patient tolerated the procedure well with no immediate complications
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
## 2024-03-14 NOTE — PATIENT INSTRUCTIONS
Your pain in the right knee is markedly improved since you have been heal last time you were having severe pain   We will inject today single injection of dura naveed which is the rooster comb gel 03/14/2024   Ice the knee on the right side for the next week or so in might swell up or hurt more from the injection  The injection might take around 4-5 weeks for it to work.  It will stimulate the lining of her knee to produce normal joint fluid  The left knee is not hurting at this time will hold off on injection  I will see you back in 3 to 6 months/will give to appointments  Will get her insurance to approve you for repeat injection in 6 months to the right knee

## 2024-03-19 ENCOUNTER — PATIENT MESSAGE (OUTPATIENT)
Dept: DIABETES | Facility: CLINIC | Age: 59
End: 2024-03-19
Payer: MEDICARE

## 2024-03-20 ENCOUNTER — CLINICAL SUPPORT (OUTPATIENT)
Dept: PULMONOLOGY | Facility: CLINIC | Age: 59
End: 2024-03-20
Payer: MEDICARE

## 2024-03-20 DIAGNOSIS — J44.89 ASTHMATIC BRONCHITIS , CHRONIC: Primary | ICD-10-CM

## 2024-03-20 DIAGNOSIS — J06.9 VIRAL UPPER RESPIRATORY TRACT INFECTION: ICD-10-CM

## 2024-03-20 RX ORDER — ALBUTEROL SULFATE 90 UG/1
2 AEROSOL, METERED RESPIRATORY (INHALATION) EVERY 6 HOURS PRN
Qty: 18 G | Refills: 5 | Status: SHIPPED | OUTPATIENT
Start: 2024-03-20 | End: 2024-06-12 | Stop reason: SDUPTHER

## 2024-03-20 NOTE — PROGRESS NOTES
"Pulmonary Disease Management  [facility]  Final Follow up Visit  Chronic Care Management    Bianca Weldon  was seen 3/20/2024  09:00 AM in the Pulmonary Disease management clinic for evaluation, education, reinforcement of self management techniques and exacerbation action plan.    The patient location is:  [address] [place] [id number]  Visit type: Virtual visit with synchronous audio and video     Maylin Rubio    Past Medical History:   Diagnosis Date    Abnormal Pap smear of cervix     Abnormal Pap smear of vagina     after hyst, repeat every 6 months, per pt, had bx's, unknown results    Acid reflux     Arthritis     Asthma     Bipolar 1 disorder     Depression     bipolar    Diabetes mellitus     Diabetes mellitus, type 2     Elevated alkaline phosphatase level     GERD (gastroesophageal reflux disease)     Hyperlipidemia     Hypertension     IBS (irritable bowel syndrome)     Interstitial cystitis     Narcolepsy     Nonalcoholic steatohepatitis     fatty liver    Orthostatic hypotension     PONV (postoperative nausea and vomiting)     Respiratory distress     States, "My lungs collapsed during hemorrhoid surgery."    Restless legs syndrome     Sleep apnea     DMITRI-CPAP    Thyroid disease        Patient's Medications   New Prescriptions    No medications on file   Previous Medications    ALBUTEROL (ACCUNEB) 1.25 MG/3 ML NEBU    NEBULIZE CONTENTS OF ONE VIAL BY MOUTH TWICE DAILY    ARIPIPRAZOLE (ABILIFY) 20 MG TAB    Take 20 mg by mouth every morning.    ASCORBIC ACID, VITAMIN C, (VITAMIN C) 250 MG TABLET    Take 250 mg by mouth once daily.    ASPIRIN (ECOTRIN) 81 MG EC TABLET    Take 81 mg by mouth once daily.     ATORVASTATIN (LIPITOR) 40 MG TABLET    Take 1 tablet (40 mg total) by mouth once daily.    BLOOD SUGAR DIAGNOSTIC STRP    Inject 1 each into the skin 3 (three) times daily. Dispense preferred on insurance    BUPROPION (WELLBUTRIN XL) 300 MG 24 HR TABLET    Take " 300 mg by mouth once daily.    CIPROFLOXACIN HCL (CIPRO) 500 MG TABLET    Cipro Take 1 tablet (oral) every 12 hours for 5 days 2022 tablet every 12 hours oral 5 days suspended 500 MG    CLINDAMYCIN (CLEOCIN T) 1 % EXTERNAL SOLUTION    APPLY TO THE AFFECTED AREA TWICE DAILY AS NEEDED FOR ACE/ FOLLICULITIS ON FACE AND SCALP    CYCLOBENZAPRINE (FLEXERIL) 10 MG TABLET    Take 10 mg by mouth every evening.    CYCLOSPORINE (RESTASIS) 0.05 % OPHTHALMIC EMULSION    Place 1 drop into both eyes 2 (two) times daily as needed.     DEUTETRABENAZINE (AUSTEDO) 9 MG TAB    Take 9 mg by mouth 2 (two) times daily.    DICLOFENAC SODIUM (VOLTAREN) 1 % GEL    APPLY FOUR GRAMS TOPICALLY EVERY DAY AS DIRECTED    ESTRADIOL (ESTRACE) 0.01 % (0.1 MG/GRAM) VAGINAL CREAM    Place 1 gram vaginally every 7 days.    FLUCONAZOLE (DIFLUCAN) 150 MG TAB    Take 1 tablet (150 mg total) by mouth once a week.    FLUDROCORTISONE (FLORINEF) 0.1 MG TAB    Take 100 mcg by mouth once daily.    FLUTICASONE PROPIONATE (FLONASE) 50 MCG/ACTUATION NASAL SPRAY    2 sprays (100 mcg total) by Each Nostril route once daily.    INSULIN ASPART U-100 (NOVOLOG FLEXPEN U-100 INSULIN) 100 UNIT/ML (3 ML) INPN PEN    Inject 5 Units into the skin 3 (three) times daily with meals.    INSULIN GLARGINE U-300 CONC (TOUJEO MAX U-300 SOLOSTAR) 300 UNIT/ML (3 ML) INSULIN PEN    Inject 58 Units into the skin once daily.    KETOCONAZOLE (NIZORAL) 2 % CREAM    Apply topically 2 (two) times daily. Apply to the affected area twice daily.    LAMOTRIGINE (LAMICTAL) 200 MG TABLET    Take 1 tablet by mouth 2 (two) times daily.    LEVOCETIRIZINE (XYZAL) 5 MG TABLET    Take 1 tablet (5 mg total) by mouth every evening.    LEVOTHYROXINE (SYNTHROID) 75 MCG TABLET    TAKE ONE TABLET BY MOUTH EVERY DAY    LIDOCAINE-PRILOCAINE (EMLA) CREAM    SMARTSI-4.8 Gram(s) Topical    LITHIUM (ESKALITH) 300 MG CAPSULE    Take 900 mg by mouth every evening.    LORAZEPAM (ATIVAN) 1 MG TABLET    Take 1  "tablet (1 mg total) by mouth 2 (two) times daily as needed for Anxiety (takes nightly).    LOSARTAN (COZAAR) 25 MG TABLET    Take 1 tablet (25 mg total) by mouth once daily.    MELOXICAM (MOBIC) 15 MG TABLET    Take 1 tablet (15 mg total) by mouth once daily. Take with food    MULTIVITAMIN CAPSULE    Take 1 capsule by mouth once daily.    MYRBETRIQ 50 MG TB24    Take 50 mg by mouth 2 (two) times daily as needed.     NIRMATRELVIR-RITONAVIR (PAXLOVID, EUA,) 300 MG (150 MG X 2)-100 MG COPACKAGED TABLETS (EUA)    Paxlovid (EUA) Take 3 Tablet (oral) 2 times per day for 5 days 20230125 tablet 2 times per day oral 5 days active 300 mg (150 mg x 2)-100 mg    ONDANSETRON (ZOFRAN-ODT) 4 MG TBDL    DISSOLVE 1 TABLET ON THE TONGUE EVERY 6 HOURS AS NEEDED FOR NAUSEA    PANTOPRAZOLE (PROTONIX) 40 MG TABLET    TAKE 1 TABLET BY MOUTH DAILY    PEN NEEDLE, DIABETIC 31 GAUGE X 3/16" NDLE    Inject 1 Stick into the skin 3 (three) times daily.    SITAGLIPTIN PHOSPHATE (JANUVIA) 100 MG TAB    Take 1 tablet (100 mg total) by mouth once daily.    SUNOSI 150 MG TAB    TAKE ONE TABLET BY MOUTH EVERY DAY    TRELEGY ELLIPTA 100-62.5-25 MCG DSDV    Inhale 1 puff into the lungs once daily.    TRIAMCINOLONE ACETONIDE 0.1% (KENALOG) 0.1 % CREAM    Apply topically 2 (two) times daily. Do not use on face    TRINTELLIX 20 MG TAB    Take 1 tablet by mouth once daily.   Modified Medications    Modified Medication Previous Medication    ALBUTEROL (PROVENTIL/VENTOLIN HFA) 90 MCG/ACTUATION INHALER albuterol (PROVENTIL/VENTOLIN HFA) 90 mcg/actuation inhaler       Inhale 2 puffs into the lungs every 6 (six) hours as needed for Wheezing. Rescue    Inhale 2 puffs into the lungs every 6 (six) hours as needed for Wheezing. Rescue   Discontinued Medications    No medications on file       Office Spirometry Results:         3/14/2024    12:49 PM 3/4/2024     9:49 AM 2/13/2024     2:04 PM 1/29/2024     1:56 PM 1/29/2024     8:39 AM 12/13/2023     2:31 PM 9/21/2023 " "    3:00 PM   Pulmonary Function Tests   SpO2     99 %     Height 4' 11" (1.499 m) 4' 11" (1.499 m) 4' 11" (1.499 m) 4' 11" (1.499 m) 4' 11" (1.499 m) 4' 11" (1.499 m) 4' 11" (1.499 m)   Weight 76.3 kg (168 lb 3.4 oz) 76.3 kg (168 lb 3.4 oz) 76.3 kg (168 lb 3.4 oz) 74.6 kg (164 lb 7.4 oz) 74.6 kg (164 lb 5.7 oz) 76.1 kg (167 lb 12.3 oz) 76.9 kg (169 lb 8.5 oz)   BMI (Calculated) 34 34 34 33.2 33.2 33.9 34.2         3/14/2024    12:49 PM   Pulmonary Studies Review   Height 4' 11" (1.499 m)   Weight 76.3 kg (168 lb 3.4 oz)   BMI (Calculated) 34   Predicted Distance 321.87   Predicted Distance Meters (Calculated) 467.54 meters                 Educational assessment:   [x]            Good  []            Fair  []            Poor    Readiness to learn:   [x]            Good  []            Fair  []            Poor    Vision Status:   [x]            Good  []            Fair  []            Poor    Reading Ability:  [x]            Good  []            Fair  []            Poor    Knowledge of condition:   [x]            Good  []            Fair  []            Poor    Language Barriers:   []            Good  []            Fair  []            Poor  [x]            None    Cognitive/ Physical Barriers:   []            Good  []            Fair  []            Poor  [x]            None    Learning best by:                       [x]            Seeing  [x]            Hearing  [x]            Reading                         [x]            Doing    Describe any barrier /Limitation or financial implications of care choices identified     []            Financial  []            Emotional  []            Education  []            Vision/Hearing  []            Physical  [x]            None  []                TOPIC /CONTENT FOR TODAY:    [x]            MDI with or without spacer  [x]            Dry powder inhaler  []            Acapella   []           Peak Flow meter  [x]            Action plan  []            Nebulizer use  [x]            " CPAP  [x]            Breathing and cough techniques  [x]            Energy conservation  [x]            Infection prevention  [x]           OTHER________________________        Learner:    [x]            Patient   []            Caregiver    Method:    [x]            Verbal explanation  []            Audio visual    []            Literature  [x]            Teach back      Evaluation:    [x]            Teach back  [x]            Demonstrate  []            Follow up phone call    []            2 weeks     [x]            4 weeks   []            PRN      Patient concerns and expectations:  Needs follow up appointment with pulmonary provider. Message sent to provider staff.    Needs refill sent to pharmacy for albuterol inhaler. Sent to pharmacy.    Therapist comments:   Patient was seen today to review respiratory medication purpose and proper technique for use of inhalers. Patient practiced proper technique using MDI with valved holding chamber (spacer) and DPI inhalers. Patient demonstrated understanding. Literature was given to patient.     Asthma trigger checklist was verbally reviewed and literature given to patient.     Infection prevention was discussed. Patient was reminded to get  vaccines. Hand hygiene and cleaning of respiratory equipment was also discussed. Patient verbalized understanding.      COPD action plan was reviewed and literature was given to patient. Patient verbalized understanding.     Discussed therapeutic goals for positive airway pressure therapy(CPAP or BiPAP): Ideal is usage 100% of nights for 6 - 8 hours per night. Minimum usage is 70% of night for at least 4 hours per night used. Reviewed CPAP habituation plan with patient. Patient expressed understanding.      Discussed complications of untreated sleep apnea with patient, including but not limited to high blood pressure, heart attack, stroke, obesity, diabetes and worsening heart failure. Patient voiced understanding.     Reviewed  breathing techniques such as pursed-lip breathing, saavedra-cough technique, and diaphragmatic breathing. Patient practiced proper technique and verbalized understanding. Literature given to patient.         Plan:    Reinforce education  Meds: Trelegy, Albuterol  [facility]  Action Plan  Immunization: Pneumococcal- DUE, Flu-FUE  Next Provider Visit: not scheduled  Next Spirometry/CPFT: not scheduled  Approximate time spent with patient: 30 minutes

## 2024-03-20 NOTE — PATIENT INSTRUCTIONS
ACTION PLAN    GREEN ZONE  My sputum is clear/white/usual color and easily cleared.  My breathing is no harder than usual.  I can do my usual activities.  I can think clearly.   Take your usual medicines, including oxygen, as you are told to do so by your health care provider.   YELLOW ZONE  My sputum has change (color, thickness, amount).  I am more short of breath than usual.  I cough or wheeze more.  I weigh more and my legs/feet swell.  I cannot do my usual activities without resting.   Call your health care provider. You will probably be told to begin taking an antibiotic and prednisone. Have your pharmacy phone number available.   RED ZONE  I cannot cough out my sputum.  I am much more short of breath than normal.  I need to sit up to breathe  I cannot do my usual activities.  I am unable to speak more than one or two words at a time.  I am confused.   Call your health care provider. You may be asked to come in to be seen, told to go to the emergency room, or told to call 9-1-1.   Using an Inhaler with a Spacer  To control asthma, you need to use your medicines the right way. Some medicines are inhaled using a device called a metered-dose inhaler (MDI). Metered-dose inhalers deliver medicine with a fine spray. You may be asked to use a spacer (holding tube) with your inhaler. The spacer helps make sure all the medicine you need goes into your lungs.   Steps for using an inhaler with a spacer  Step 1:  Remove the caps from the inhaler and spacer.  Shake the inhaler well and attach the spacer. If the inhaler is being used for the first time or has not been used for a while, prime it as directed by the product maker.  Step 2:  Breathe out normally.  Put the spacer between your teeth. Close your lips tightly around it.  Keep your chin up.  Step 3:  Spray 1 puff into the spacer by pressing down on the inhaler.  Then breathe in through your mouth as slowly and deeply as you can. This should take about 5-10 seconds.  If you breathe too quickly, you may hear a whistling sound in certain spacers.  Step 4:  Take the spacer out of your mouth.  Hold your breath for a count of 10.  Then hold your lips together and slowly breathe out through your mouth.         Step-by-Step     If youre prescribed more than 1 puff of medicine at a time, wait at least 30 seconds between puffs. This number may be different for different medicines. Shake the inhaler again. Then repeat steps 2 to 4.     Asthma Trigger Checklist  Allergens, irritants, and other things may trigger your asthma. Check the box next to each of your triggers. After each trigger is a list of ways to avoid it.   Dust mites. Dust mites live in mattresses, bedding, carpets, curtains, and indoor dust.  To kill dust mites, wash bedding in hot water (130°F) each week.  Cover mattress and pillows with special dust-mite-proof cases.  Don't use upholstered furniture like sofas or chairs in the bedroom.  Use allergy-proof filters for air conditioners and furnaces. Replace or clean them as instructed.  If you can, replace carpeting with wood or tile edmond, especially in the bedroom.  Animals. Animals with fur or feathers shed dander (allergens).  It's best to choose a pet that doesn't have fur or feathers, such as a fish or a reptile.  If you have pets, keep them off your bed and out of your bedroom.  Wash your hands and clothes after handling pets.    Mold. Mold grows in damp places, such as bathrooms, basements, and closets.  Ask someone to clean damp areas in your home every week. Or try wearing a face mask while you clean.  Run an exhaust fan while bathing. Or leave a window open in the bathroom.  Repair water leaks in or around your home.  Have someone else cut grass or rake leaves, if possible.  Don't use vaporizers or humidifiers. They encourage mold growth.    Pollen. Pollen from trees, grasses, and weeds is a common allergen. (Flower pollens are generally not a problem).  Try to  learn what types of pollen affect you most. Pollen levels vary depending on the plant, the season, and the time of day.  If possible, use air conditioning instead of opening the windows in your home or car.  Have someone else do yard work, if possible.    Cockroaches. Roaches are found in many homes and produce allergens.  Keep your kitchen clean and dry. A leaky faucet or drain can attract roaches.  Remove garbage from your home daily.  Store food in tightly sealed containers. Wash dishes as soon as they are used.  Use bait stations or traps to control roaches. Avoid using chemical sprays.    Smoke. Smoke may be from cigarettes, cigars, pipes, incense or candles, barbecues or grills, and fireplaces.  Don't smoke. And don't let people smoke in your home or car.  When you travel, ask for nonsmoking rental cars and hotel rooms.  Avoid fireplaces and wood stoves. If you can't, sit away from them. Make sure the smoke is directed outside.  Don't burn incense or use candles.  Move away from smoky outdoor cooking grills.    Smog.  Smog is from car exhaust and other pollution.  Read or listen to local air-quality reports. These let you know when air quality is poor.  Stay indoors as much as you can on smoggy days. If possible, use air conditioning instead of opening the windows.  In your car, set air conditioning to recirculate air, so less pollution gets in.    Strong odors. These include air fresheners, deodorizers, and cleaning products; perfume, deodorant, and other beauty products; incense and candles; and insect sprays and other sprays.  Use scent-free products like deodorant or body lotion.  Avoid using cleaning products with bleach and ammonia. Make your own cleaning solution with white vinegar, baking soda, or mild dish soap.  Use exhaust fans while cooking. Or open a window, if possible.   Avoid perfumes, air fresheners, potpourri, and other scented products.          Other irritants. These include dust, aerosol  sprays, and powders.  Wear a face mask while doing tasks like sanding, dusting, sweeping, and yard work. Open doors and windows if working indoors.  Use pump spray bottles instead of aerosols.  Pour liquid  onto a rag or cloth instead of spraying them.    Weather. Weather conditions can trigger symptoms or make them worse.  Watch for very high or low temperatures, very humid conditions, or a lot of wind, as these conditions can make symptoms worse.  Limit outdoor activity during the type of weather that affects you.  Wear a scarf over your mouth and nose in cold weather.    Colds, flu, and sinus infections. Upper respiratory infections can trigger asthma.  Wash your hands often with soap and warm water or use a hand  containing alcohol.  Get a yearly flu shot. And ask if you should get a pneumonia vaccine.  Take care of your general health. Get plenty of sleep. And eat a variety of healthy foods.    Food additives. Food additives can trigger asthma flare-ups in some people.  Check food labels for sulfites or other similar ingredients. These are often found in foods such as wine, beer, and dried fruits.  Avoid foods that contain these additives.    Medicine. Aspirin, NSAIDS like ibuprofen and naproxen, and heart medicines like beta-blockers may be triggers.  Tell your health care provider if you think certain medicines trigger symptoms.   Be sure to read the labels on over-the-counter medicines. They may have ingredients that cause symptoms for you.   , Emotions. Laughing, crying, or feeling excited are triggers for some people.   To help you stay calm: Try breathing in slowly through your nose for a count of 2 seconds. Close your lips and breathe out for 4 seconds. Repeat.  Try to focus on a soothing image in your mind. This will help relax you and calm your breathing.  Remember to take your daily controller medicines. When you're upset or under stress, it's easy to forget.    Exercise. For some  people, exercise can trigger symptoms. Don't let this keep you from being active.   If you have not been exercising regularly, start slow and work up gradually.  Take all of your medicines as prescribed.  If you use quick-relief medicine, make sure you have it with you when you exercise.  Stop if you have any symptoms. Make sure you talk with your provider about these symptoms.  © 4621-9166 [employer]. [address] [place] [address], [place] [address]. All rights reserved. This information is not intended as a substitute for professional medical care. Always follow your healthcare professional's instructions.

## 2024-04-17 ENCOUNTER — OFFICE VISIT (OUTPATIENT)
Dept: DIABETES | Facility: CLINIC | Age: 59
End: 2024-04-17
Payer: MEDICARE

## 2024-04-17 VITALS
WEIGHT: 169 LBS | BODY MASS INDEX: 34.07 KG/M2 | SYSTOLIC BLOOD PRESSURE: 126 MMHG | DIASTOLIC BLOOD PRESSURE: 77 MMHG | HEIGHT: 59 IN

## 2024-04-17 DIAGNOSIS — E78.5 DYSLIPIDEMIA: ICD-10-CM

## 2024-04-17 DIAGNOSIS — R80.9 TYPE 2 DIABETES MELLITUS WITH MICROALBUMINURIA, WITH LONG-TERM CURRENT USE OF INSULIN: Primary | ICD-10-CM

## 2024-04-17 DIAGNOSIS — I10 ESSENTIAL HYPERTENSION: ICD-10-CM

## 2024-04-17 DIAGNOSIS — K31.84 GASTROPARESIS: ICD-10-CM

## 2024-04-17 DIAGNOSIS — E11.29 TYPE 2 DIABETES MELLITUS WITH MICROALBUMINURIA, WITH LONG-TERM CURRENT USE OF INSULIN: Primary | ICD-10-CM

## 2024-04-17 DIAGNOSIS — Z79.4 TYPE 2 DIABETES MELLITUS WITH MICROALBUMINURIA, WITH LONG-TERM CURRENT USE OF INSULIN: Primary | ICD-10-CM

## 2024-04-17 LAB — GLUCOSE SERPL-MCNC: 230 MG/DL (ref 70–110)

## 2024-04-17 PROCEDURE — 82962 GLUCOSE BLOOD TEST: CPT | Mod: S$GLB,,, | Performed by: NURSE PRACTITIONER

## 2024-04-17 PROCEDURE — 3074F SYST BP LT 130 MM HG: CPT | Mod: CPTII,S$GLB,, | Performed by: NURSE PRACTITIONER

## 2024-04-17 PROCEDURE — 1159F MED LIST DOCD IN RCRD: CPT | Mod: CPTII,S$GLB,, | Performed by: NURSE PRACTITIONER

## 2024-04-17 PROCEDURE — 3078F DIAST BP <80 MM HG: CPT | Mod: CPTII,S$GLB,, | Performed by: NURSE PRACTITIONER

## 2024-04-17 PROCEDURE — 99214 OFFICE O/P EST MOD 30 MIN: CPT | Mod: S$GLB,,, | Performed by: NURSE PRACTITIONER

## 2024-04-17 PROCEDURE — 3008F BODY MASS INDEX DOCD: CPT | Mod: CPTII,S$GLB,, | Performed by: NURSE PRACTITIONER

## 2024-04-17 PROCEDURE — 4010F ACE/ARB THERAPY RXD/TAKEN: CPT | Mod: CPTII,S$GLB,, | Performed by: NURSE PRACTITIONER

## 2024-04-17 PROCEDURE — 99999 PR PBB SHADOW E&M-EST. PATIENT-LVL IV: CPT | Mod: PBBFAC,,, | Performed by: NURSE PRACTITIONER

## 2024-04-17 RX ORDER — INSULIN LISPRO 100 [IU]/ML
10 INJECTION, SOLUTION INTRAVENOUS; SUBCUTANEOUS 3 TIMES DAILY
Qty: 15 ML | Refills: 3 | Status: SHIPPED | OUTPATIENT
Start: 2024-04-17 | End: 2025-04-17

## 2024-04-17 RX ORDER — ONDANSETRON 4 MG/1
TABLET, ORALLY DISINTEGRATING ORAL
Qty: 20 TABLET | Refills: 0 | Status: SHIPPED | OUTPATIENT
Start: 2024-04-17

## 2024-04-17 NOTE — PROGRESS NOTES
"PCP: Scotty Figueroa MD     Subjective:     Chief Complaint: Diabetes Follow Up    HISTORY OF PRESENT ILLNESS: 59 y.o.  female presenting for diabetes follow up.     Patient has had Type II diabetes since 2014 and has the following complications: neuropathy, gastroparesis. She has attended diabetes education in the past. Other pertinent conditions: HTN, HLD, IBD, ISELA, and schizoaffective disorder ( followed by psychiatry). Of note, she has a history of cortisone injections for plantar fasciitis.       Past tried and failed medications include: Invokana ( yeast infection ); Metformin caused GI side effects; and Trulicity / Mounjaro caused abdominal pain and discomfort. Glimepiride discontinued due to hypoglycemia . She denies any recent hospital admissions, emergency room visits, or syncope.      Blood glucose testing is performed regularly. She is enrolled in digital diabetes ( episodes tab ). For the past 14 days, fasting BGs have ranged between 92 - 212. No longer have hypoglycemia.     Blood glucose in clinic today: 230 mg/dl    Vitals:    04/17/24 1527   BP: 126/77   Weight: 76.6 kg (168 lb 15.7 oz)   Height: 4' 11" (1.499 m)     BMI 34.13    No results found for: "CPEPTIDE"  No results found for: "GLUTAMICACID"  No results found for: "HUMANINSULIN"    DM MEDICATIONS:  Toujeo 60 units daily ( at 9 pm ); Novolog 4 u bfast and 5 u at lunch and dinner;  Januvia 100 mg daily    Review of Systems   Constitutional:  Negative for diaphoresis.   Eyes:  Negative for visual disturbance.   Gastrointestinal:  Positive for nausea (chronic, intermittent). Negative for diarrhea and vomiting.   Endocrine: Negative for polydipsia, polyphagia and polyuria.   Neurological:  Positive for numbness. Negative for headaches.       Diabetes Management Status  Statin: Taking  ACE/ARB: Not taking    Screening or Prevention Patient's value Goal Complete/Controlled?   HgA1C Testing and Control   Lab Results   Component Value " Date    HGBA1C 6.6 (H) 2023      Annually/Less than 8% No   Lipid profile : 2023 Annually No   LDL control Lab Results   Component Value Date    LDLCALC 59 2023    Annually/Less than 100 mg/dl  No   Nephropathy screening Lab Results   Component Value Date    MICALBCREAT Unable to calculate 2023     Lab Results   Component Value Date    PROTEINUA Negative 2024    Annually No   Blood pressure BP Readings from Last 1 Encounters:   24 126/77    Less than 140/90 Yes   Dilated retinal exam : 2023 Annually Yes, [facility], patient plans to make an appointment     Foot exam   : 2024 Annually Yes     ACTIVITY LEVEL: Rarely Active. Discussed activities, benefits, methods, and precautions.  MEAL PLANNING: Patient reports number of meals per day to be 3 and number of snacks per day to be 2.   Patient is encouraged to carb count and consume no more than 45 - 60 grams of carbohydrates in each meal, and 1800 k / richard per day.      BLOOD GLUCOSE TESTIN times daily  SOCIAL HISTORY: .  Former smoker.  Her mother lives with her and sister unexpectedly passed away.     Objective:      Physical Exam  Constitutional:       Appearance: She is well-developed.   HENT:      Head: Normocephalic and atraumatic.   Eyes:      Pupils: Pupils are equal, round, and reactive to light.   Cardiovascular:      Rate and Rhythm: Normal rate and regular rhythm.      Pulses:           Dorsalis pedis pulses are 2+ on the right side and 2+ on the left side.   Pulmonary:      Effort: Pulmonary effort is normal.      Breath sounds: Normal breath sounds.   Feet:      Right foot:      Protective Sensation: 6 sites tested.  6 sites sensed.      Skin integrity: No ulcer, callus or dry skin.      Left foot:      Protective Sensation: 6 sites tested.  6 sites sensed.      Skin integrity: No ulcer, callus or dry skin.   Skin:     General: Skin is warm and dry.      Findings: No rash.   Psychiatric:          Behavior: Behavior normal.         Thought Content: Thought content normal.         Judgment: Judgment normal.       Assessment / Plan:     1.) Type 2 diabetes mellitus with microalbuminuria, with long-term current use of insulin   Comments:  - Controlled, most recent A1C of 6.6 %.  Patient currently enrolled in digital diabetes program.  Discussed CGM therapy, will send order for Dexcom G7 via [facility]. Continue Januvia 100 mg daily.  She has been unable to tolerate the GLP-1 drug class, also has history of gastroparesis.  Continue Toujeo 60 units daily.  Provided patient with the following scale to use with Humalog:   - 199: 2 units  //   - 249: 4 units  //   - 299: 6 units  //   - 349: 8 units //  BG Over 350: 10 units.  She may consider using InPen in the future.    Orders:  -     Future Labs scheduled for tomorrow: A1C, Lipid   -     POCT Glucose, Hand-Held Device  -     insulin lispro (HUMALOG KWIKPEN INSULIN) 100 unit/mL pen; Inject 10 Units into the skin 3 (three) times daily.  Dispense: 15 mL; Refill: 3    2.) Gastroparesis  -     ondansetron (ZOFRAN-ODT) 4 MG TbDL; DISSOLVE 1 TABLET ON THE TONGUE EVERY 6 HOURS AS NEEDED FOR NAUSEA  Dispense: 20 tablet; Refill: 0    3.) Essential hypertension - continue medications as prescribed.     4.) Dyslipidemia - controlled, continue statin therapy.    Additional Plan Details:    1.) Continue monitoring blood sugar 2 x daily, fasting and ac dinner or at bedtime. Discussed ADA goal for fasting blood sugar, 80 - 130 mg/dL; pp blood sugars below 180 mg/dl. Also, discussed prevention of hypoglycemia and the need to adjust goals to higher levels if persistent hypoglycemia.    2.) Return to clinic in 4 months for follow up. The patient was explained the above plan and given opportunity to ask questions.  She understands, chooses and consents to this plan and accepts all the risks, which include but are not limited to the risks mentioned  above.    Renetta Whitlock, OMAR-C, [facility]    A total of 30 minutes was spent in face to face time, of which over 50 % was spent in counseling patient on disease process, complications, treatment, and side effects of medications.

## 2024-04-17 NOTE — PATIENT INSTRUCTIONS
Use the following scale three times a day before meals with Humalog:   - 199: 2 units    - 249: 4 units   - 299: 6 units    - 349: 8 units   BG Over 350: 10 units

## 2024-04-19 ENCOUNTER — PATIENT MESSAGE (OUTPATIENT)
Dept: DIABETES | Facility: CLINIC | Age: 59
End: 2024-04-19
Payer: MEDICARE

## 2024-04-19 DIAGNOSIS — Z79.4 TYPE 2 DIABETES MELLITUS WITH MICROALBUMINURIA, WITH LONG-TERM CURRENT USE OF INSULIN: Primary | ICD-10-CM

## 2024-04-19 DIAGNOSIS — R80.9 TYPE 2 DIABETES MELLITUS WITH MICROALBUMINURIA, WITH LONG-TERM CURRENT USE OF INSULIN: Primary | ICD-10-CM

## 2024-04-19 DIAGNOSIS — E11.29 TYPE 2 DIABETES MELLITUS WITH MICROALBUMINURIA, WITH LONG-TERM CURRENT USE OF INSULIN: Primary | ICD-10-CM

## 2024-04-22 ENCOUNTER — PATIENT MESSAGE (OUTPATIENT)
Dept: PULMONOLOGY | Facility: CLINIC | Age: 59
End: 2024-04-22
Payer: MEDICARE

## 2024-04-22 ENCOUNTER — TELEPHONE (OUTPATIENT)
Dept: PULMONOLOGY | Facility: CLINIC | Age: 59
End: 2024-04-22
Payer: MEDICARE

## 2024-04-22 ENCOUNTER — TELEPHONE (OUTPATIENT)
Dept: DIABETES | Facility: CLINIC | Age: 59
End: 2024-04-22
Payer: MEDICARE

## 2024-04-22 ENCOUNTER — LAB VISIT (OUTPATIENT)
Dept: LAB | Facility: HOSPITAL | Age: 59
End: 2024-04-22
Attending: NURSE PRACTITIONER
Payer: MEDICARE

## 2024-04-22 DIAGNOSIS — E11.29 TYPE 2 DIABETES MELLITUS WITH MICROALBUMINURIA, WITH LONG-TERM CURRENT USE OF INSULIN: ICD-10-CM

## 2024-04-22 DIAGNOSIS — R80.9 TYPE 2 DIABETES MELLITUS WITH MICROALBUMINURIA, WITH LONG-TERM CURRENT USE OF INSULIN: ICD-10-CM

## 2024-04-22 DIAGNOSIS — Z79.4 TYPE 2 DIABETES MELLITUS WITH MICROALBUMINURIA, WITH LONG-TERM CURRENT USE OF INSULIN: ICD-10-CM

## 2024-04-22 DIAGNOSIS — E11.29 TYPE 2 DIABETES MELLITUS WITH MICROALBUMINURIA, WITH LONG-TERM CURRENT USE OF INSULIN: Primary | ICD-10-CM

## 2024-04-22 DIAGNOSIS — Z79.4 TYPE 2 DIABETES MELLITUS WITH MICROALBUMINURIA, WITH LONG-TERM CURRENT USE OF INSULIN: Primary | ICD-10-CM

## 2024-04-22 DIAGNOSIS — G47.33 OSA ON CPAP: Primary | ICD-10-CM

## 2024-04-22 DIAGNOSIS — R80.9 TYPE 2 DIABETES MELLITUS WITH MICROALBUMINURIA, WITH LONG-TERM CURRENT USE OF INSULIN: Primary | ICD-10-CM

## 2024-04-22 LAB
CHOLEST SERPL-MCNC: 132 MG/DL (ref 120–199)
CHOLEST/HDLC SERPL: 2.7 {RATIO} (ref 2–5)
ESTIMATED AVG GLUCOSE: 171 MG/DL (ref 68–131)
HBA1C MFR BLD: 7.6 % (ref 4–5.6)
HDLC SERPL-MCNC: 49 MG/DL (ref 40–75)
HDLC SERPL: 37.1 % (ref 20–50)
LDLC SERPL CALC-MCNC: 69.6 MG/DL (ref 63–159)
NONHDLC SERPL-MCNC: 83 MG/DL
TRIGL SERPL-MCNC: 67 MG/DL (ref 30–150)

## 2024-04-22 PROCEDURE — 83036 HEMOGLOBIN GLYCOSYLATED A1C: CPT | Performed by: NURSE PRACTITIONER

## 2024-04-22 PROCEDURE — 80061 LIPID PANEL: CPT | Performed by: NURSE PRACTITIONER

## 2024-04-22 PROCEDURE — 36415 COLL VENOUS BLD VENIPUNCTURE: CPT | Mod: PO | Performed by: NURSE PRACTITIONER

## 2024-04-22 NOTE — TELEPHONE ENCOUNTER
Patient called voiced that she received Dexcom device. Pt is scheduled for Dexcom training will reach out to provider for referral to attach to appt

## 2024-04-23 ENCOUNTER — CLINICAL SUPPORT (OUTPATIENT)
Dept: DIABETES | Facility: CLINIC | Age: 59
End: 2024-04-23
Payer: MEDICARE

## 2024-04-23 DIAGNOSIS — E11.29 TYPE 2 DIABETES MELLITUS WITH MICROALBUMINURIA, WITH LONG-TERM CURRENT USE OF INSULIN: Primary | ICD-10-CM

## 2024-04-23 DIAGNOSIS — R80.9 TYPE 2 DIABETES MELLITUS WITH MICROALBUMINURIA, WITH LONG-TERM CURRENT USE OF INSULIN: Primary | ICD-10-CM

## 2024-04-23 DIAGNOSIS — Z79.4 TYPE 2 DIABETES MELLITUS WITH MICROALBUMINURIA, WITH LONG-TERM CURRENT USE OF INSULIN: Primary | ICD-10-CM

## 2024-04-23 PROCEDURE — 99999 PR PBB SHADOW E&M-EST. PATIENT-LVL III: CPT | Mod: PBBFAC,,,

## 2024-04-23 PROCEDURE — G0108 DIAB MANAGE TRN  PER INDIV: HCPCS | Mod: S$GLB,,,

## 2024-04-23 NOTE — PROGRESS NOTES
Diabetes Care Specialist Progress Note  Author: Mayra Leonard RD  Date: 4/23/2024    Program Intake  Reason for Diabetes Program Visit:: Intervention  Type of Intervention:: Individual  Individual: Device Training  Device Training: Personal CGM  Current diabetes risk level:: low  In the last 12 months, have you:: none  Permission to speak with others about care:: no  Continuous Glucose Monitoring  Patient has CGM: No    Lab Results   Component Value Date    HGBA1C 7.6 (H) 04/22/2024       Clinical    Clinical Assessment  Current Diabetes Treatment: Insulin  Have you ever experienced hypoglycemia (low blood sugar)?: yes  Have you ever experienced hyperglycemia (high blood sugar)?: yes    Medication Information  How do you obtain your medications?: Patient drives    Labs  Do you have regular lab work to monitor your medications?: Yes  Type of Regular Lab Work: A1c, CBC, BMP  Where do you get your labs drawn?: [facility]  Lab Compliance Barriers: No    Nutritional Status  Diet: Regular  Meal Plan 24 Hour Recall: Breakfast, Dinner  Meal Plan 24 Hour Recall - Breakfast: Toast, link sausage, coffee  Meal Plan 24 Hour Recall - Dinner: White beans, rice, pork chop, water  Change in appetite?: No  Dentation:: Intact  Recent Changes in Weight: No Recent Weight Change  Current nutritional status an area of need that is impacting patient's ability to self-manage diabetes?: No    Additional Social History    Support  Does anyone support you with your diabetes care?: yes  Who supports you?: self  Who takes you to your medical appointments?: self  Does the current support meet the patient's needs?: Yes  Is Support an area impacting ability to self-manage diabetes?: No    Access to Mass Media & Technology  Does the patient have access to any of the following devices or technologies?: Smart phone, Internet Access  Media or technology needs impacting ability to self-manage diabetes?: No    Cognitive/Behavioral Health  Alert and Oriented:  Yes  Difficulty Thinking: No  Requires Prompting: No  Requires assistance for routine expression?: No  Cognitive or behavioral barriers impacting ability to self-manage diabetes?: No    Culture/Latter-day  Culture or Mosque beliefs that may impact ability to access healthcare: No    Communication  Language preference: English  Hearing Problems: No  Vision Problems: Yes  Vision problem type:: Decreased Vision  Vision Assistance: Glasses  Communication needs impacting ability to self-manage diabetes?: No    Health Literacy  Preferred Learning Method: Face to Face, Reading Materials, Demonstration  How often do you need to have someone help you read instructions, pamphlets, or written material from your doctor or pharmacy?: Never  Health literacy needs impacting ability to self-manage diabetes?: No      Diabetes Self-Management Skills Assessment    Diabetes Disease Process/Treatment Options  Diabetes Disease Process/Treatment Options: Skills Assessment Completed: No  Deferred due to:: Time    Nutrition/Healthy Eating  Method of carbohydrate measurement:: no method  Patient can identify foods that impact blood sugar.: no (see comments)  Nutrition/Healthy Eating Skills Assessment Completed:: Yes  Assessment indicates:: Knowledge deficit, Instruction Needed  Area of need?: Yes    Physical Activity/Exercise  Physical Activity/Exercise Skills Assessment Completed: : No  Deffered due to:: Time    Medications  Patient is able to describe current diabetes management routine.: yes  Diabetes management routine:: insulin  Patient is able to identify current diabetes medications, dosages, and appropriate timing of medications.: yes  Patient understands the purpose of the medications taken for diabetes.: yes  Patient reports problems or concerns with current medication regimen.: no  Medication Skills Assessment Completed:: Yes  Assessment indicates:: Instruction Needed  Area of need?: Yes    Home Blood Glucose Monitoring  Patient  states that blood sugar is checked at home daily.: yes  Home Blood Glucose Monitoring Skills Assessment Completed: : Yes  Assessment indicates:: Knowledge deficit, Instruction Needed  Area of need?: Yes    Acute Complications  Acute Complications Skills Assessment Completed: : No  Deffered due to:: Time    Chronic Complications  Chronic Complications Skills Assessment Completed: : No  Deferred due to:: Time    Psychosocial/Coping  Psychosocial/Coping Skills Assessment Completed: : No  Deffered due to:: Time      Assessment Summary and Plan    Based on today's diabetes care assessment, the following areas of need were identified:          4/23/2024    12:01 AM   Social   Support No   Access to Mass Media/Tech No   Cognitive/Behavioral Health No   Culture/Congregation No   Communication No   Health Literacy No            4/23/2024    12:01 AM   Clinical   Lab Compliance No   Nutritional Status No            4/23/2024    12:01 AM   Diabetes Self-Management Skills   Nutrition/Healthy Eating Yes-See Diabetes Self-Management Care Plan     Medication Yes-Patient reports no problems with current medications.      Current Diabetes Medications  Humalog per scale with meals  Toujeo U-300 60 units daily    We discussed the possible need for insulin adjustment if her Dexcom shows a pattern of high or low blood sugar.     Home Blood Glucose Monitoring Yes-See Diabetes Self-Management Care Plan            Today's interventions were provided through individual discussion, instruction, and written materials were provided.      Patient verbalized understanding of instruction and written materials.  Pt was able to return back demonstration of instructions today. Patient understood key points, needs reinforcement and further instruction.     Diabetes Self-Management Care Plan:    Today's Diabetes Self-Management Care Plan was developed with Bianca's input. Bianca has agreed to work toward the following goal(s) to improve his/her overall  diabetes control.      Care Plan: Diabetes Management   Updates made since 3/24/2024 12:00 AM        Problem: Healthy Eating         Goal: Eat up to 45 grams of carbs per meal and up to 15 grams of carbs per snack.    Start Date: 4/23/2024   Expected End Date: 10/23/2024   Priority: High   Barriers: No Barriers Identified   Note:    4/23/24: Patient has had diabetes for years but is not counting carbs. She does not drink sugary beverages. The meals we discussed were within her carb range.       Task: Reviewed the sources and role of Carbohydrate, Protein, and Fat and how each nutrient impacts blood sugar. Completed 4/23/2024        Task: Provided visual examples using dry measuring cups, food models, and other familiar objects such as computer mouse, deck or cards, tennis ball etc. to help with visualization of portions. Completed 4/23/2024        Task: Explained how to count carbohydrates using the food label and the use of dry measuring cups for accurate carb counting. Completed 4/23/2024        Task: Review the importance of balancing carbohydrates with each meal using portion control techniques to count servings of carbohydrate and label reading to identify serving size and amount of total carbs per serving. Completed 4/23/2024        Problem: Blood Glucose Self-Monitoring         Goal: Patient agrees to monitor blood sugar using Dexcom G7.    Start Date: 4/23/2024   Expected End Date: 10/23/2024   Priority: Medium   Barriers: No Barriers Identified   Note:    4/23/24: DEXCOM G7 CGM TRAINING  Dexcom G7 johan downloaded to phone. [facility] data share was set up.      Overview:  5min glucose reading updates, trending arrows, BG graph screens, battery life indicator, Blue Tooth Symbol.  Menus: Trend graph, Start sensor, Events, Alerts, Settings, Shutdown, Stop Sensor   Settings:                          * Urgent Low: 55 mg/dL                          * Urgent Low Soon: on                          * Low  alert: 70 mg/dl                           * High alert: 250 mg/dl                            * Rise rate: off                          * Fall Rate: off                          * Signal Loss: on                           * No Reading: on       Patient paired sensor with phone.    Reviewed where to find sensor insertion time and sensor expiration date.   Reviewed appropriate calibration techniques.  Reviewed sensor site selection. Patient selected and prepped site using aseptic technique, inserted sensor and started session.   Discussed sensor removal from site.  Patient able to demonstrate without difficulty.  Encouraged to review manual prior to starting another sensor.   Reviewed problem solving aspects of sensor transmission/variables that can disrupt transmission.  range 20 feet.  Patient instructed on lag time and encouraged patient to use meter if sensor blood sugar does not match symptoms.  Dexcom technical support contact number given and examples of when to contact them discussed.            Follow Up Plan     Follow up in about 1 month (around 5/23/2024) for carb counting review.    Today's care plan and follow up schedule was discussed with patient.  Bianca verbalized understanding of the care plan, goals, and agrees to follow up plan.        The patient was encouraged to communicate with his/her health care provider/physician and care team regarding his/her condition(s) and treatment.  I provided the patient with my contact information today and encouraged to contact me via phone or [facility] Patient Portal as needed.     Length of Visit   Total Time: 65 Minutes

## 2024-04-23 NOTE — PATIENT INSTRUCTIONS
Goals for this visit:  Eat up to 45 grams of carbs per meal and up to 15 grams of carbs per snack.  Enter all carbs and insulin doses into your Dexcom johan. Message me or your provider if you notice a pattern of highs or lows in your blood sugar.    Message me if you have any questions or need help with anything.

## 2024-04-29 ENCOUNTER — TELEPHONE (OUTPATIENT)
Dept: DIABETES | Facility: CLINIC | Age: 59
End: 2024-04-29
Payer: MEDICARE

## 2024-04-29 NOTE — TELEPHONE ENCOUNTER
I have reviewed over 72 hours of Dexcom data for my assessment. The report is attached to this encounter.     Summary:  20% very high  32% high  48% in range  0% low  0% very low    Patient is having a lot of highs after meals. She has a scale to take Humalog before meals if her blood sugar is high but she may need some meal insulin to cover her carbs.

## 2024-05-01 ENCOUNTER — PATIENT MESSAGE (OUTPATIENT)
Dept: DIABETES | Facility: CLINIC | Age: 59
End: 2024-05-01
Payer: MEDICARE

## 2024-05-03 ENCOUNTER — PATIENT MESSAGE (OUTPATIENT)
Dept: DIABETES | Facility: CLINIC | Age: 59
End: 2024-05-03
Payer: MEDICARE

## 2024-05-04 ENCOUNTER — PATIENT MESSAGE (OUTPATIENT)
Dept: OTHER | Facility: OTHER | Age: 59
End: 2024-05-04
Payer: MEDICARE

## 2024-05-07 ENCOUNTER — TELEPHONE (OUTPATIENT)
Dept: GASTROENTEROLOGY | Facility: CLINIC | Age: 59
End: 2024-05-07
Payer: MEDICARE

## 2024-05-07 NOTE — TELEPHONE ENCOUNTER
Called pt to r/s 6/18 appt pt did not answer. Appt nee to be r/s due to provider booking out appt r/s to 6/11 a vm was left

## 2024-05-10 DIAGNOSIS — J44.89 ASTHMATIC BRONCHITIS , CHRONIC: ICD-10-CM

## 2024-05-10 DIAGNOSIS — G47.10 HYPERSOMNIA: ICD-10-CM

## 2024-05-12 ENCOUNTER — PATIENT MESSAGE (OUTPATIENT)
Dept: DIABETES | Facility: CLINIC | Age: 59
End: 2024-05-12
Payer: MEDICARE

## 2024-05-13 RX ORDER — FLUTICASONE FUROATE, UMECLIDINIUM BROMIDE AND VILANTEROL TRIFENATATE 100; 62.5; 25 UG/1; UG/1; UG/1
POWDER RESPIRATORY (INHALATION)
Qty: 60 EACH | Refills: 6 | Status: SHIPPED | OUTPATIENT
Start: 2024-05-13 | End: 2024-06-10

## 2024-05-13 RX ORDER — SOLRIAMFETOL 150 MG/1
TABLET, FILM COATED ORAL
Qty: 30 TABLET | Refills: 2 | Status: SHIPPED | OUTPATIENT
Start: 2024-05-13 | End: 2024-06-12 | Stop reason: SDUPTHER

## 2024-05-23 ENCOUNTER — CLINICAL SUPPORT (OUTPATIENT)
Dept: DIABETES | Facility: CLINIC | Age: 59
End: 2024-05-23
Payer: MEDICARE

## 2024-05-23 DIAGNOSIS — R80.9 TYPE 2 DIABETES MELLITUS WITH MICROALBUMINURIA, WITH LONG-TERM CURRENT USE OF INSULIN: Primary | ICD-10-CM

## 2024-05-23 DIAGNOSIS — E11.29 TYPE 2 DIABETES MELLITUS WITH MICROALBUMINURIA, WITH LONG-TERM CURRENT USE OF INSULIN: Primary | ICD-10-CM

## 2024-05-23 DIAGNOSIS — Z79.4 TYPE 2 DIABETES MELLITUS WITH MICROALBUMINURIA, WITH LONG-TERM CURRENT USE OF INSULIN: Primary | ICD-10-CM

## 2024-05-23 PROCEDURE — 99999 PR PBB SHADOW E&M-EST. PATIENT-LVL III: CPT | Mod: PBBFAC,,, | Performed by: DIETITIAN, REGISTERED

## 2024-05-23 PROCEDURE — G0108 DIAB MANAGE TRN  PER INDIV: HCPCS | Mod: S$GLB,,, | Performed by: DIETITIAN, REGISTERED

## 2024-05-23 NOTE — PROGRESS NOTES
Diabetes Care Specialist Follow-up Note  Author: Marisa Carrillo RD, CDE  Date: 5/23/2024    Program Intake  Reason for Diabetes Program Visit:: Intervention  Type of Intervention:: Individual  Individual: Education  Education: Self-Management Skill Review  Current diabetes risk level:: low  In the last 12 months, have you:: none  Permission to speak with others about care:: no  Continuous Glucose Monitoring  Patient has CGM: Yes  Personal CGM type:: Dexcom G7  GMI Date: 05/23/24  GMI Value: 7.8 %    Lab Results   Component Value Date    HGBA1C 7.6 (H) 04/22/2024         CURRENT DM MEDICATIONS:   Humalog 4u + SS, and SS in between meals to correct high BG.   Toujeo Max 60u QD  Januvia 100mg QD      Clinical        Problem Review  Reviewed Problem List with Patient: yes  Active comorbidities affecting diabetes self-care.: yes  Comorbidities: Other (comment) (gastroparesis, IBS)    Clinical Assessment  Current Diabetes Treatment: Oral Medication, Insulin  Have you ever experienced hypoglycemia (low blood sugar)?: yes  In the last month, how often have you experienced low blood sugar?: other (see comments) (not recently)  Are you able to tell when your blood sugar is low?: Yes  What symptoms do you experience?: Shaky, Tiredness  Have you ever been hospitalized because your blood sugar was too low?: no  How do you treat hypoglycemia (low blood sugar)?: 5-6 pieces of hard candy, 1/2 can soda/fruit juice    Medication Information  How do you obtain your medications?: Patient drives  How many days a week do you miss your medications?: 3 or more (usually skips meal time insulin)  Do you sometimes have difficulty refilling your medications?: No  Medication adherence impacting ability to self-manage diabetes?: Yes       Nutritional Status  Diet: Regular  Meal Plan 24 Hour Recall: Breakfast, Lunch, Dinner  Meal Plan 24 Hour Recall - Breakfast: nothing  Meal Plan 24 Hour Recall - Lunch: ham sandwich with lettuce and sanchez coke  zero  Meal Plan 24 Hour Recall - Dinner: can't remember  Change in appetite?: No  Current nutritional status an area of need that is impacting patient's ability to self-manage diabetes?: No    Physical activity/Exercise:   None currently but recently joined the gym.     SMBG: Dexcom G7              Diabetes Self-Management Skills Assessment     Diabetes Disease Process/Treatment Options  Patient/caregiver able to state what happens when someone has diabetes.: yes  Patient/caregiver knows what type of diabetes they have.: yes  Diabetes Type : Type II  Patient/caregiver able to identify at least three signs and symptoms of diabetes.: yes  Patient able to identify at least three risk factors for diabetes.: yes  Diabetes Disease Process/Treatment Options: Skills Assessment Completed: Yes  Assessment indicates:: Adequate understanding  Area of need?: No    Nutrition/Healthy Eating  Nutrition/Healthy Eating Skills Assessment Completed:: Yes  Assessment indicates:: Instruction Needed  Area of need?: Yes    Physical Activity/Exercise  Patient's daily activity level:: sedentary  Patient formally exercises outside of work.: no  Physical Activity/Exercise Skills Assessment Completed: : Yes  Assessment indicates:: Instruction Needed  Area of need?: Yes       Home Blood Glucose Monitoring  Patient states that blood sugar is checked at home daily.: yes  Monitoring Method:: personal continuous glucose monitor  Personal CGM type:: Dexcom G7  Patient is able to use personal CGM appropriately.: yes  CGM Report reviewed?: yes  Home Blood Glucose Monitoring Skills Assessment Completed: : Yes  Assessment indicates:: Other (comment) (review today)  Area of need?: No    Chronic Complications  Patient can identify major chronic complications of diabetes.: yes  Stated chronic complications:: neuropathy/nerve damage  Patient can identify ways to prevent or delay diabetes complications.: yes  Stated ways to prevent complications:: controlling  blood sugar  Chronic Complications Skills Assessment Completed: : Yes  Assessment indicates:: Adequate understanding  Area of need?: No         During today's follow-up visit,  the following areas required further assessment and content was provided/reviewed.    Based on today's diabetes care assessment, the following areas of need were identified:          4/23/2024    12:01 AM   Social   Support No   Access to Mass Media/Tech No   Cognitive/Behavioral Health No   Culture/Church No   Communication No   Health Literacy No            5/23/2024    12:01 AM   Clinical   Medication Adherence Yes- patient not consistently taking Humalog with meals in fear of hypoglycemia.    Nutritional Status No            5/23/2024    12:01 AM   Diabetes Self-Management Skills   Diabetes Disease Process/Treatment Options Patient has a family hx of diabetes. Reviewed patho of diabetes and importance of good BG control to prevent complications.    Nutrition/Healthy Eating Yes- see care plan update.    Physical Activity/Exercise Yes- see care plan.    Home Blood Glucose Monitoring See care plan update.   Patient is experiencing post meal hyperglycemia. She is waiting until her blood sugar increased to over 250 mg/dl before she takes her Humalog. If she takes pre-meal, she will experience hypoglycemia due to gastroparesis. She cannot predict at what point her blood sugar will start rising, very unpredictable.    Chronic Complications No        Today's interventions were provided through individual discussion, instruction, and written materials were provided.    Patient verbalized understanding of instruction and written materials.  Pt was able to return back demonstration of instructions today. Patient understood key points, needs reinforcement and further instruction.     Diabetes Self-Management Care Plan Review and Evaluation of Progress:    During today's follow-up Josue Diabetes Self-Management Care Plan progress was reviewed and  progress was evaluated including his/her input. Bianca has agreed to continue his/her journey to improve/maintain overall diabetes control by continuing to set health goals. See care plan progress below.      Care Plan: Diabetes Management   Updates made since 4/23/2024 12:00 AM        Problem: Healthy Eating         Goal: Eat up to 45 grams of carbs per meal and up to 15 grams of carbs per snack.    Start Date: 4/23/2024   Expected End Date: 10/23/2024   This Visit's Progress: On track   Priority: High   Barriers: No Barriers Identified   Note:    4/23/24: Patient has had diabetes for years but is not counting carbs. She does not drink sugary beverages. The meals we discussed were within her carb range.    5/23/24: Patient reports having gastroparesis, provided educational material from the [facility] for the managing gastroparesis with a list of food to choose, foods to avoid, tips to manage, and sample meal plans.        Task: Reviewed the sources and role of Carbohydrate, Protein, and Fat and how each nutrient impacts blood sugar. Completed 4/23/2024        Task: Provided visual examples using dry measuring cups, food models, and other familiar objects such as computer mouse, deck or cards, tennis ball etc. to help with visualization of portions. Completed 4/23/2024        Task: Explained how to count carbohydrates using the food label and the use of dry measuring cups for accurate carb counting. Completed 4/23/2024        Task: Review the importance of balancing carbohydrates with each meal using portion control techniques to count servings of carbohydrate and label reading to identify serving size and amount of total carbs per serving. Completed 4/23/2024        Problem: Blood Glucose Self-Monitoring         Goal: Patient agrees to monitor blood sugar using Dexcom G7.    Start Date: 4/23/2024   Expected End Date: 10/23/2024   This Visit's Progress: On track   Priority: Medium   Barriers: No Barriers Identified   Note:     4/23/24: DEXCOM G7 CGM TRAINING  Dexcom G7 johan downloaded to phone. [facility] data share was set up.      Overview:  5min glucose reading updates, trending arrows, BG graph screens, battery life indicator, Blue Tooth Symbol.  Menus: Trend graph, Start sensor, Events, Alerts, Settings, Shutdown, Stop Sensor   Settings:                          * Urgent Low: 55 mg/dL                          * Urgent Low Soon: on                          * Low alert: 70 mg/dl                           * High alert: 250 mg/dl                            * Rise rate: off                          * Fall Rate: off                          * Signal Loss: on                           * No Reading: on       Patient paired sensor with phone.    Reviewed where to find sensor insertion time and sensor expiration date.   Reviewed appropriate calibration techniques.  Reviewed sensor site selection. Patient selected and prepped site using aseptic technique, inserted sensor and started session.   Discussed sensor removal from site.  Patient able to demonstrate without difficulty.  Encouraged to review manual prior to starting another sensor.   Reviewed problem solving aspects of sensor transmission/variables that can disrupt transmission.  range 20 feet.  Patient instructed on lag time and encouraged patient to use meter if sensor blood sugar does not match symptoms.  Dexcom technical support contact number given and examples of when to contact them discussed.         5/23/24: Patient having trouble with Dexcom johan when changing her sensor. Option to enter sensor code was not there. Patient deleted johan and reinstalled. Able to enter successfully. Reviewed process for changing sensor.        Problem: Physical Activity and Exercise         Goal: Patient will go to the gym at least 3 days per week for 45 minutes.    Start Date: 5/23/2024   Expected End Date: 11/23/2024   Priority: Low   Barriers: Lack of Motivation to Change         Task: Discussed role of physical activity on reducing insulin resistance and improvement in overall glycemic control. Completed 5/23/2024        Task: Reviewed blood glucose monitoring before, during and after exercise/activity Completed 5/23/2024          Follow Up Plan     Follow up in about 1 month (around 6/23/2024) for E11.29.    Today's care plan and follow up schedule was discussed with patient.  Bianca verbalized understanding of the care plan, goals, and agrees to follow up plan.        The patient was encouraged to communicate with his/her health care provider/physician and care team regarding his/her condition(s) and treatment.  I provided the patient with my contact information today and encouraged to contact me via phone or [facility]'s Patient Portal as needed.     Length of Visit   Total Time: 60 Minutes

## 2024-05-24 ENCOUNTER — PATIENT MESSAGE (OUTPATIENT)
Dept: DIABETES | Facility: CLINIC | Age: 59
End: 2024-05-24
Payer: MEDICARE

## 2024-05-24 DIAGNOSIS — Z79.4 TYPE 2 DIABETES MELLITUS WITHOUT COMPLICATION, WITH LONG-TERM CURRENT USE OF INSULIN: ICD-10-CM

## 2024-05-24 DIAGNOSIS — E11.9 TYPE 2 DIABETES MELLITUS WITHOUT COMPLICATION, WITH LONG-TERM CURRENT USE OF INSULIN: ICD-10-CM

## 2024-05-24 RX ORDER — INSULIN GLARGINE 300 [IU]/ML
INJECTION, SOLUTION SUBCUTANEOUS
Qty: 2 PEN | Refills: 3 | Status: SHIPPED | OUTPATIENT
Start: 2024-05-24

## 2024-05-28 ENCOUNTER — PATIENT MESSAGE (OUTPATIENT)
Dept: ORTHOPEDICS | Facility: CLINIC | Age: 59
End: 2024-05-28
Payer: MEDICARE

## 2024-06-08 DIAGNOSIS — J44.89 ASTHMATIC BRONCHITIS , CHRONIC: ICD-10-CM

## 2024-06-08 NOTE — TELEPHONE ENCOUNTER
No care due was identified.  Health [facility] Embedded Care Due Messages. Reference number: [id number].   6/08/2024 8:02:44 AM CDT

## 2024-06-09 RX ORDER — PANTOPRAZOLE SODIUM 40 MG/1
TABLET, DELAYED RELEASE ORAL
Qty: 90 TABLET | Refills: 2 | Status: SHIPPED | OUTPATIENT
Start: 2024-06-09

## 2024-06-09 NOTE — TELEPHONE ENCOUNTER
Refill Decision Note   Bianca Weldon  is requesting a refill authorization.  Brief Assessment and Rationale for Refill:  Approve     Medication Therapy Plan:         Comments:     Note composed:3:01 AM 06/09/2024

## 2024-06-10 RX ORDER — FLUTICASONE FUROATE, UMECLIDINIUM BROMIDE AND VILANTEROL TRIFENATATE 100; 62.5; 25 UG/1; UG/1; UG/1
POWDER RESPIRATORY (INHALATION)
Qty: 60 EACH | Refills: 11 | Status: SHIPPED | OUTPATIENT
Start: 2024-06-10 | End: 2024-06-12 | Stop reason: SDUPTHER

## 2024-06-11 NOTE — PROGRESS NOTES
Immunization History   Administered Date(s) Administered    COVID-19, MRNA, LN-S, PF (Pfizer) (Purple Cap) 2021, 2021    Hepatitis B, Adult 2014, 10/13/2014, 2015    Influenza 10/29/2019    Influenza - Quadrivalent 2016, 2017, 2022    Influenza - Quadrivalent - PF *Preferred* (6 months and older) 10/26/2018, 2020    Pneumococcal Polysaccharide - 23 Valent 2014, 2018    Tdap 2014    Zoster Recombinant 2020, 2021      Social History     Tobacco Use   Smoking Status Former    Current packs/day: 0.00    Average packs/day: 1.5 packs/day for 33.0 years (49.5 ttl pk-yrs)    Types: Cigarettes    Start date: 1984    Quit date: 2017    Years since quittin.4    Passive exposure: Current   Smokeless Tobacco Never        Subjective:      Bianca Weldon is a 59 y.o. female with known Chronic bronchitis   Last seen  2018  Also has DMITRI on CPAP and ? Narcolepsy on NUVIGIL follow by [facility]  New symptoms: several months, worse 2-3 weeks, someone sitting on chest, worse with activity, better with rest  No cough No wheezing, No sputum  Now using CPAP and fans during day.  Avoid physical activity  Taking BREO and Combivent. Last PFT were normal.   Worse with heat, mask  Immunizations are current.  Weight gain from 159lb to 181 Lb  I have reviewed the patient's medical history in detail and updated the computerized patient record.    10/07/2022  Last seen 2022  Still has SOB and Asthma   On BREO  Wheezing  Ask for nebulizer  ACT is [place] reveiwed  FeNo was 5     2022  Last seen 10/07/2022  Better now on TRELEGY and Neb Albuterol  No cough, SOB wheezing  Narcolepsy and DMITRI followed by [facility]:  [place] 8.  Apap 11-13  AHI 0.6  Usage > 4 hrs 76.7%  Sees me mainly for asthma  Asthma sick plan  Spacer given    2023  Follow up to review   Here to review results  Asthma Stable  No flares  No cough, No wheezing  ACT 21  FeNo was 11:  low  Normal Spirometry  Normal CXR  Adherent with CPAp: need download  Mask: nasal  Adherent with SUNOSI       06/12/2024   Follow-up visit    reveiwed  Bed time: 12AM- 1 am  Wake time: 6-7 am  No DTS   Stable on SUNOSI  No sleepy driving  Needs refill  Using CPAP and benefits  Mask: Nasal  [facility]  Needs bring card in  Inhaler: TRELEGY  No cough, No SOB  No recent exacebations    COPD Questionnaire  How often do you cough?: A little of the time  How often do you have phlegm (mucus) in your chest?: Never  How often does your chest feel tight?: A little of the time  When you walk up a hill or one flight of stairs, how often are you breathless?: Some of the time  How often are you limited doing any activities at home?: Almost never  How often are you confident leaving the house despite your lung condition?: Most of the time  How often do you sleep soundly?: Some of the time  How often do you have energy?: Some of the time  Total score: 13          6/11/2024     4:30 PM   EPWORTH SLEEPINESS SCALE   Sitting and reading 1   Watching TV 0   Sitting, inactive in a public place (e.g. a theatre or a meeting) 0   As a passenger in a car for an hour without a break 1   Lying down to rest in the afternoon when circumstances permit 3   Sitting and talking to someone 0   Sitting quietly after a lunch without alcohol 1   In a car, while stopped for a few minutes in traffic 0   Total score 6            The following portions of the patient's history were reviewed and updated as appropriate:   She  has a past medical history of Abnormal Pap smear of cervix, Abnormal Pap smear of vagina, Acid reflux, Arthritis, Asthma, Bipolar 1 disorder, Depression, Diabetes mellitus, Diabetes mellitus, type 2, Elevated alkaline phosphatase level, GERD (gastroesophageal reflux disease), Hyperlipidemia, Hypertension, IBS (irritable bowel syndrome), Interstitial cystitis, Narcolepsy, Nonalcoholic steatohepatitis, Orthostatic hypotension, PONV  (postoperative nausea and vomiting), Respiratory distress, Restless legs syndrome, Sleep apnea, and Thyroid disease.  She does not have any pertinent problems on file.  She  has a past surgical history that includes Appendectomy; Tonsillectomy; Cystostomy w/ bladder biopsy; Hysterectomy (2001); Oophorectomy (2001); Dilation and curettage of uterus; Adenoidectomy; [place] tooth extraction; Colonoscopy; Esophageal dilation; julia shoulder surgery; Adenoidectomy; Hemorrhoid surgery; and Knee arthroscopy w/ meniscectomy (Right, 04/27/2021).  Her family history includes Anesthesia problems in her sister; Breast cancer in her maternal grandmother; Colon cancer in her father; Diabetes Mellitus in her father and mother; Heart disease (age of onset: 45) in her father; Melanoma in her mother; Rheum arthritis in her maternal grandmother; Thrombophilia in her paternal grandmother.  She  reports that she quit smoking about 7 years ago. Her smoking use included cigarettes. She started smoking about 40 years ago. She has a 49.5 pack-year smoking history. She has been exposed to tobacco smoke. She has never used smokeless tobacco. She reports that she does not drink alcohol and does not use drugs.  She has a current medication list which includes the following prescription(s): atorvastatin, blood sugar diagnostic, clindamycin, austedo, diclofenac sodium, estradiol, fluconazole, fludrocortisone, fluticasone propionate, insulin glargine u-300 conc, insulin lispro, lamotrigine, levocetirizine, levothyroxine, lidocaine-prilocaine, lithium, lorazepam, losartan, ondansetron, pantoprazole, pen needle, diabetic, sitagliptin phosphate, trintellix, albuterol, albuterol, aripiprazole, ascorbic acid (vitamin c), aspirin, bupropion, ciprofloxacin hcl, cyclobenzaprine, cyclosporine, fluticasone-umeclidin-vilanter, ketoconazole, meloxicam, multivitamin, myrbetriq, paxlovid, sunosi, and triamcinolone acetonide 0.1%, and the following  Facility-Administered Medications: nozaseptin.  Current Outpatient Medications on File Prior to Visit   Medication Sig Dispense Refill    atorvastatin (LIPITOR) 40 MG tablet Take 1 tablet (40 mg total) by mouth once daily. 90 tablet 2    blood sugar diagnostic Strp Inject 1 each into the skin 3 (three) times daily. Dispense preferred on insurance 100 strip 11    clindamycin (CLEOCIN T) 1 % external solution APPLY TO THE AFFECTED AREA TWICE DAILY AS NEEDED FOR ACE/ FOLLICULITIS ON FACE AND SCALP 60 mL 4    deutetrabenazine (AUSTEDO) 9 mg Tab Take 9 mg by mouth 2 (two) times daily.      diclofenac sodium (VOLTAREN) 1 % Gel APPLY FOUR GRAMS TOPICALLY EVERY DAY AS DIRECTED 100 g 3    estradioL (ESTRACE) 0.01 % (0.1 mg/gram) vaginal cream Place 1 gram vaginally every 7 days. 42.5 g 5    fluconazole (DIFLUCAN) 150 MG Tab Take 1 tablet (150 mg total) by mouth once a week. 4 tablet 5    fludrocortisone (FLORINEF) 0.1 mg Tab Take 100 mcg by mouth once daily.      fluticasone propionate (FLONASE) 50 mcg/actuation nasal spray 2 sprays (100 mcg total) by Each Nostril route once daily. 48 g 3    insulin glargine U-300 conc (TOUJEO MAX U-300 SOLOSTAR) 300 unit/mL (3 mL) insulin pen Inject 60 Units into the skin once daily. - Subcutaneous 2 Pen 3    insulin lispro (HUMALOG KWIKPEN INSULIN) 100 unit/mL pen Inject 10 Units into the skin 3 (three) times daily. 15 mL 3    lamoTRIgine (LAMICTAL) 200 MG tablet Take 1 tablet by mouth 2 (two) times daily.      levocetirizine (XYZAL) 5 MG tablet Take 1 tablet (5 mg total) by mouth every evening. 30 tablet 11    levothyroxine (SYNTHROID) 75 MCG tablet TAKE ONE TABLET BY MOUTH EVERY DAY 90 tablet 3    LIDOcaine-prilocaine (EMLA) cream SMARTSI-4.8 Gram(s) Topical      lithium (ESKALITH) 300 MG capsule Take 900 mg by mouth every evening.      LORazepam (ATIVAN) 1 MG tablet Take 1 tablet (1 mg total) by mouth 2 (two) times daily as needed for Anxiety (takes nightly). 60 tablet 2    losartan  "(COZAAR) 25 MG tablet Take 1 tablet (25 mg total) by mouth once daily. 90 tablet 1    ondansetron (ZOFRAN-ODT) 4 MG TbDL DISSOLVE 1 TABLET ON THE TONGUE EVERY 6 HOURS AS NEEDED FOR NAUSEA 20 tablet 0    pantoprazole (PROTONIX) 40 MG tablet TAKE 1 TABLET BY MOUTH DAILY 90 tablet 2    pen needle, diabetic 31 gauge x 3/16" Ndle Inject 1 Stick into the skin 3 (three) times daily. 100 each 3    SITagliptin phosphate (JANUVIA) 100 MG Tab Take 1 tablet (100 mg total) by mouth once daily. 30 tablet 6    TRINTELLIX 20 mg Tab Take 1 tablet by mouth once daily.      [DISCONTINUED] albuterol (ACCUNEB) 1.25 mg/3 mL Nebu NEBULIZE CONTENTS OF ONE VIAL BY MOUTH TWICE DAILY 150 mL 11    [DISCONTINUED] albuterol (PROVENTIL/VENTOLIN HFA) 90 mcg/actuation inhaler Inhale 2 puffs into the lungs every 6 (six) hours as needed for Wheezing. Rescue 18 g 5    [DISCONTINUED] fluticasone-umeclidin-vilanter (TRELEGY ELLIPTA) 100-62.5-25 mcg DsDv USE ONE PUFF BY MOUTH EVERY DAY 60 each 11    [DISCONTINUED] SUNOSI 150 mg Tab TAKE ONE TABLET BY MOUTH EVERY DAY 30 tablet 2    ARIPiprazole (ABILIFY) 20 MG Tab Take 20 mg by mouth every morning. (Patient not taking: Reported on 6/12/2024)      ascorbic acid, vitamin C, (VITAMIN C) 250 MG tablet Take 250 mg by mouth once daily. (Patient not taking: Reported on 4/17/2024)      aspirin (ECOTRIN) 81 MG EC tablet Take 81 mg by mouth once daily.  (Patient not taking: Reported on 4/17/2024)      buPROPion (WELLBUTRIN XL) 300 MG 24 hr tablet Take 300 mg by mouth once daily. (Patient not taking: Reported on 4/17/2024)      ciprofloxacin HCl (CIPRO) 500 MG tablet Cipro Take 1 tablet (oral) every 12 hours for 5 days 20220605 tablet every 12 hours oral 5 days suspended 500 MG (Patient not taking: Reported on 4/17/2024)      cyclobenzaprine (FLEXERIL) 10 MG tablet Take 10 mg by mouth every evening. (Patient not taking: Reported on 6/12/2024)      cycloSPORINE (RESTASIS) 0.05 % ophthalmic emulsion Place 1 drop into " "both eyes 2 (two) times daily as needed.  (Patient not taking: Reported on 6/12/2024)      ketoconazole (NIZORAL) 2 % cream Apply topically 2 (two) times daily. Apply to the affected area twice daily. (Patient taking differently: Apply topically 2 (two) times daily as needed. Apply to the affected area twice daily.) 30 g 2    meloxicam (MOBIC) 15 MG tablet Take 1 tablet (15 mg total) by mouth once daily. Take with food (Patient not taking: Reported on 6/12/2024) 30 tablet 0    multivitamin capsule Take 1 capsule by mouth once daily. (Patient not taking: Reported on 6/12/2024)      MYRBETRIQ 50 mg Tb24 Take 50 mg by mouth 2 (two) times daily as needed.  (Patient not taking: Reported on 4/17/2024)      nirmatrelvir-ritonavir (PAXLOVID, EUA,) 300 mg (150 mg x 2)-100 mg copackaged tablets (EUA) Paxlovid (EUA) Take 3 Tablet (oral) 2 times per day for 5 days 20230125 tablet 2 times per day oral 5 days active 300 mg (150 mg x 2)-100 mg (Patient not taking: Reported on 4/17/2024)      triamcinolone acetonide 0.1% (KENALOG) 0.1 % cream Apply topically 2 (two) times daily. Do not use on face (Patient taking differently: Apply topically 2 (two) times daily as needed. Do not use on face) 45 Bottle 6     Current Facility-Administered Medications on File Prior to Visit   Medication Dose Route Frequency Provider Last Rate Last Admin    nozaseptin (NOZIN) nasal    Each Nostril On Call Procedure Kenrick Gray MD   Given at 04/27/21 1222     She is allergic to bactrim [sulfamethoxazole-trimethoprim], latuda [lurasidone], macrodantin [nitrofurantoin macrocrystalline], canagliflozin, and metformin..     Review of Systems   Constitutional:  Negative for malaise/fatigue.   Respiratory:  Negative for shortness of breath.    Cardiovascular:  Negative for chest pain (chest pressure).   All other systems reviewed and are negative.     Objective:      /82   Pulse 65   Resp 20   Ht 4' 11" (1.499 m)   Wt 76 kg (167 lb " 8.8 oz)   SpO2 98%   BMI 33.84 kg/m²        Physical Exam  Vitals and nursing note reviewed.   Constitutional:       General: She is not in acute distress.     Appearance: She is well-developed. She is not diaphoretic.       HENT:      Head: Normocephalic.      Nose: No mucosal edema or rhinorrhea.      Mouth/Throat:      Pharynx: No oropharyngeal exudate.   Eyes:      General:         Left eye: No discharge.      Conjunctiva/sclera: Conjunctivae normal.      Pupils: Pupils are equal, round, and reactive to light.   Neck:      Thyroid: No thyromegaly.      Vascular: No JVD.      Trachea: No tracheal deviation.   Cardiovascular:      Rate and Rhythm: Normal rate and regular rhythm.      Pulses: Normal pulses.      Heart sounds: Normal heart sounds. No murmur heard.  Pulmonary:      Effort: Pulmonary effort is normal. No respiratory distress.      Breath sounds: Normal breath sounds. No wheezing.   Abdominal:      General: Bowel sounds are normal. There is no distension.      Palpations: Abdomen is soft.      Tenderness: There is no abdominal tenderness.   Musculoskeletal:         General: Normal range of motion.      Cervical back: Normal range of motion and neck supple.   Skin:     General: Skin is warm and dry.   Neurological:      Mental Status: She is alert and oriented to person, place, and time.      Cranial Nerves: No cranial nerve deficit.             PFT    FEV1 1.56L( 75.9%), FVC 1.99 L( 77.7 %), FEV1/FVC 78       Procedure Note    Clinical Guide to Interpretation or FeNO Levels :     FeNO  (ppb) LOW INTERMEDIATE HIGH   ADULT VALUES < 25 25-50          > 50   Th2-driven Inflammation Unlikely Likely Significant      Patients FeNO level at this visit : __13__ (ppb)     Interpretation of FeNO measurement in adults:  [] FENO is less than 25 ppb implies non eosinophilic airway inflammation or the absence of airway inflammation.               Comment: Low FENO (<25 ppb) in adult asthmatics with persistent  symptoms suggests other etiologies for these symptoms and a lower likelihood of benefit from adding or increasing inhaled glucocorticoids.          X-Ray Chest PA And Lateral  Narrative: EXAMINATION:  XR CHEST PA AND LATERAL    CLINICAL HISTORY:  Other specified chronic obstructive pulmonary disease    TECHNIQUE:  PA and lateral views of the chest were performed.    COMPARISON:  Prior radiographs    FINDINGS:  Cardiac silhouette and mediastinal contours are normal.  Lungs are clear.  Osseous structures are intact.  Impression: No acute cardiopulmonary process.    Electronically signed by: Robbie Garg MD  Date:    06/12/2024  Time:    08:40       Lab Results   Component Value Date    PREFVC 2.09 06/12/2024    [id number] 1.37 06/12/2024    PREPEF 5.64 06/12/2024    LNSEBU374 6.58 06/12/2024    FVYZEA5NST 75.68 06/12/2024    POSTFVC 2.08 06/12/2024    POSTFEV1 1.65 06/12/2024    [id number] 1.60 06/12/2024    POSTPEF 4.26 06/12/2024    QGEWXQB695 6.55 06/12/2024    GIZIKKH83 3.68 05/10/2018    KKVQSPE0HAA 79.09 06/12/2024    EBFKJUY6QZZ 79.55 05/10/2018    PREDICTEDFVC 2.68 05/10/2018    PREDICTEDFEV 2.11 05/10/2018     FEV1: 1.58L( 74.7%)  FVC 2.00L( 78.8%)  FEV1/FVC 76      Assessment:       Problem List Items Addressed This Visit       Type 2 diabetes mellitus with microalbuminuria, with long-term current use of insulin    Essential hypertension    Dyslipidemia    BMI 33.0-33.9,adult     General weight loss/lifestyle modification strategies discussed (elicit support from others; identify saboteurs; non-food rewards).  Diet interventions: low calorie (1000 kCal/d) deficit diet          Asthmatic bronchitis , chronic - Primary     STABLE ON Albuterol AND TRELEGY  ACT score 21  FeNo 13  FEV1: 1.58L( 74.7%)  Doing well  Albutero Neb  Immunisations :         Relevant Medications    albuterol (ACCUNEB) 1.25 mg/3 mL Nebu    albuterol (PROVENTIL/VENTOLIN HFA) 90 mcg/actuation inhaler    fluticasone-umeclidin-vilanter  (ELEANOR WINKLER) 100-62.5-25 mcg DsDv    Other Relevant Orders    X-Ray Chest PA And Lateral    Spirometry without Bronchodilator    DMITRI on CPAP     Subjectively reports adherence     APAP 11-13  Using and benefits      Bring card for download    Nasal mask         Narcolepsy         6/11/2024     4:30 PM   EPWORTH SLEEPINESS SCALE   Sitting and reading 1   Watching TV 0   Sitting, inactive in a public place (e.g. a theatre or a meeting) 0   As a passenger in a car for an hour without a break 1   Lying down to rest in the afternoon when circumstances permit 3   Sitting and talking to someone 0   Sitting quietly after a lunch without alcohol 1   In a car, while stopped for a few minutes in traffic 0   Total score 6        Refilled SUNOSI     reviewed          RESOLVED: Chronic obstructive pulmonary disease, unspecified COPD type     Other Visit Diagnoses       Viral upper respiratory tract infection        Hypersomnia        Relevant Medications    solriamfetoL (SUNOSI) 150 mg Tab          Reassurance  Bring CPAP for download tomorrow  She is using PAP well, Mild DMITRI    Plan:      Follow up in about 1 year (around 6/12/2025), or cxr, jose, refills, bring cpap data card, Sunosi medication visit in 4 months virtual.    This note was prepared using voice recognition system and is likely to have sound alike errors that may have been overlooked even after proof reading.  Please call me with any questions    Discussed diagnosis, its evaluation, treatment and usual course. All questions answered.    Thank you for the courtesy of participating in the care of this patient    Steve Best MD

## 2024-06-12 ENCOUNTER — OFFICE VISIT (OUTPATIENT)
Dept: PULMONOLOGY | Facility: CLINIC | Age: 59
End: 2024-06-12
Attending: INTERNAL MEDICINE
Payer: MEDICARE

## 2024-06-12 ENCOUNTER — HOSPITAL ENCOUNTER (OUTPATIENT)
Dept: RADIOLOGY | Facility: HOSPITAL | Age: 59
Discharge: HOME | End: 2024-06-12
Attending: INTERNAL MEDICINE
Payer: MEDICARE

## 2024-06-12 VITALS
OXYGEN SATURATION: 98 % | HEIGHT: 59 IN | SYSTOLIC BLOOD PRESSURE: 120 MMHG | HEART RATE: 65 BPM | DIASTOLIC BLOOD PRESSURE: 82 MMHG | RESPIRATION RATE: 20 BRPM | WEIGHT: 167.56 LBS | BODY MASS INDEX: 33.78 KG/M2

## 2024-06-12 DIAGNOSIS — J44.89 ASTHMATIC BRONCHITIS , CHRONIC: Primary | ICD-10-CM

## 2024-06-12 DIAGNOSIS — I10 ESSENTIAL HYPERTENSION: ICD-10-CM

## 2024-06-12 DIAGNOSIS — R80.9 TYPE 2 DIABETES MELLITUS WITH MICROALBUMINURIA, WITH LONG-TERM CURRENT USE OF INSULIN: ICD-10-CM

## 2024-06-12 DIAGNOSIS — G47.10 HYPERSOMNIA: ICD-10-CM

## 2024-06-12 DIAGNOSIS — G47.429 NARCOLEPSY DUE TO UNDERLYING CONDITION WITHOUT CATAPLEXY: ICD-10-CM

## 2024-06-12 DIAGNOSIS — J44.89 ASTHMATIC BRONCHITIS , CHRONIC: ICD-10-CM

## 2024-06-12 DIAGNOSIS — J06.9 VIRAL UPPER RESPIRATORY TRACT INFECTION: ICD-10-CM

## 2024-06-12 DIAGNOSIS — Z79.4 TYPE 2 DIABETES MELLITUS WITH MICROALBUMINURIA, WITH LONG-TERM CURRENT USE OF INSULIN: ICD-10-CM

## 2024-06-12 DIAGNOSIS — G47.33 OSA ON CPAP: ICD-10-CM

## 2024-06-12 DIAGNOSIS — E78.5 DYSLIPIDEMIA: ICD-10-CM

## 2024-06-12 DIAGNOSIS — E11.29 TYPE 2 DIABETES MELLITUS WITH MICROALBUMINURIA, WITH LONG-TERM CURRENT USE OF INSULIN: ICD-10-CM

## 2024-06-12 DIAGNOSIS — J44.9 CHRONIC OBSTRUCTIVE PULMONARY DISEASE, UNSPECIFIED COPD TYPE: ICD-10-CM

## 2024-06-12 PROBLEM — E66.01 MORBID (SEVERE) OBESITY DUE TO EXCESS CALORIES: Status: RESOLVED | Noted: 2022-06-24 | Resolved: 2024-06-12

## 2024-06-12 LAB
BRPFT: ABNORMAL
FEF 25 75 CHG: 16.6 %
FEF 25 75 LLN: 1.06
FEF 25 75 POST REF: 78 %
FEF 25 75 PRE REF: 66.9 %
FEF 25 75 REF: 2.05
FET100 CHG: -0.4 %
FEV1 CHG: 3.9 %
FEV1 FVC CHG: 4.5 %
FEV1 FVC LLN: 68
FEV1 FVC POST REF: 98.7 %
FEV1 FVC PRE REF: 94.5 %
FEV1 FVC REF: 80
FEV1 LLN: 1.61
FEV1 POST REF: 77.6 %
FEV1 PRE REF: 74.7 %
FEV1 REF: 2.12
FVC CHG: -0.6 %
FVC LLN: 2.02
FVC POST REF: 78.3 %
FVC PRE REF: 78.8 %
FVC REF: 2.66
PEF CHG: -24.5 %
PEF LLN: 4.16
PEF POST REF: 75.9 %
PEF PRE REF: 100.5 %
PEF REF: 5.62
POST FEF 25 75: 1.6 L/S (ref 1.06–3.05)
POST FET 100: 6.55 SEC
POST FEV1 FVC: 79.09 % (ref 68.05–92.16)
POST FEV1: 1.65 L (ref 1.61–2.63)
POST FVC: 2.08 L (ref 2.02–3.29)
POST PEF: 4.26 L/S (ref 4.16–7.07)
PRE FEF 25 75: 1.37 L/S (ref 1.06–3.05)
PRE FET 100: 6.58 SEC
PRE FEV1 FVC: 75.68 % (ref 68.05–92.16)
PRE FEV1: 1.58 L (ref 1.61–2.63)
PRE FVC: 2.09 L (ref 2.02–3.29)
PRE PEF: 5.64 L/S (ref 4.16–7.07)

## 2024-06-12 PROCEDURE — 99214 OFFICE O/P EST MOD 30 MIN: CPT | Mod: 25,S$GLB,, | Performed by: INTERNAL MEDICINE

## 2024-06-12 PROCEDURE — 71046 X-RAY EXAM CHEST 2 VIEWS: CPT | Mod: TC

## 2024-06-12 PROCEDURE — 94060 EVALUATION OF WHEEZING: CPT | Mod: S$GLB,,, | Performed by: INTERNAL MEDICINE

## 2024-06-12 PROCEDURE — 99999 PR PBB SHADOW E&M-EST. PATIENT-LVL V: CPT | Mod: PBBFAC,,, | Performed by: INTERNAL MEDICINE

## 2024-06-12 PROCEDURE — 1159F MED LIST DOCD IN RCRD: CPT | Mod: CPTII,S$GLB,, | Performed by: INTERNAL MEDICINE

## 2024-06-12 PROCEDURE — 4010F ACE/ARB THERAPY RXD/TAKEN: CPT | Mod: CPTII,S$GLB,, | Performed by: INTERNAL MEDICINE

## 2024-06-12 PROCEDURE — 3008F BODY MASS INDEX DOCD: CPT | Mod: CPTII,S$GLB,, | Performed by: INTERNAL MEDICINE

## 2024-06-12 PROCEDURE — 95012 NITRIC OXIDE EXP GAS DETER: CPT | Mod: S$GLB,,, | Performed by: INTERNAL MEDICINE

## 2024-06-12 PROCEDURE — 3074F SYST BP LT 130 MM HG: CPT | Mod: CPTII,S$GLB,, | Performed by: INTERNAL MEDICINE

## 2024-06-12 PROCEDURE — 1160F RVW MEDS BY RX/DR IN RCRD: CPT | Mod: CPTII,S$GLB,, | Performed by: INTERNAL MEDICINE

## 2024-06-12 PROCEDURE — 3079F DIAST BP 80-89 MM HG: CPT | Mod: CPTII,S$GLB,, | Performed by: INTERNAL MEDICINE

## 2024-06-12 PROCEDURE — 71046 X-RAY EXAM CHEST 2 VIEWS: CPT | Mod: 26,,, | Performed by: RADIOLOGY

## 2024-06-12 PROCEDURE — 3051F HG A1C>EQUAL 7.0%<8.0%: CPT | Mod: CPTII,S$GLB,, | Performed by: INTERNAL MEDICINE

## 2024-06-12 RX ORDER — ALBUTEROL SULFATE 90 UG/1
2 AEROSOL, METERED RESPIRATORY (INHALATION) EVERY 6 HOURS PRN
Qty: 18 G | Refills: 5 | Status: SHIPPED | OUTPATIENT
Start: 2024-06-12 | End: 2025-06-12

## 2024-06-12 RX ORDER — ALBUTEROL SULFATE 1.25 MG/3ML
2.5 SOLUTION RESPIRATORY (INHALATION) EVERY 6 HOURS PRN
Qty: 150 ML | Refills: 11 | Status: SHIPPED | OUTPATIENT
Start: 2024-06-12

## 2024-06-12 RX ORDER — SOLRIAMFETOL 150 MG/1
1 TABLET, FILM COATED ORAL DAILY
Qty: 30 TABLET | Refills: 4 | Status: SHIPPED | OUTPATIENT
Start: 2024-06-12

## 2024-06-12 NOTE — ASSESSMENT & PLAN NOTE
6/11/2024     4:30 PM   EPWORTH SLEEPINESS SCALE   Sitting and reading 1   Watching TV 0   Sitting, inactive in a public place (e.g. a theatre or a meeting) 0   As a passenger in a car for an hour without a break 1   Lying down to rest in the afternoon when circumstances permit 3   Sitting and talking to someone 0   Sitting quietly after a lunch without alcohol 1   In a car, while stopped for a few minutes in traffic 0   Total score 6        Refilled SUNOSI     reviewed

## 2024-06-12 NOTE — ASSESSMENT & PLAN NOTE
STABLE ON Albuterol AND TRELEGY  ACT score 21  FeNo 13  FEV1: 1.58L( 74.7%)  Doing well  Albutero Neb  Immunisations :

## 2024-06-12 NOTE — ASSESSMENT & PLAN NOTE
Subjectively reports adherence     APAP 11-13  Using and benefits      Bring card for download    Nasal mask

## 2024-06-12 NOTE — PROCEDURES
Clinical Guide to Interpretation or FeNO Levels :    FeNO  (ppb) LOW INTERMEDIATE HIGH   ADULT VALUES < 25 25-50          > 50   Th2-driven Inflammation Unlikely Likely Significant     Patients FeNO level at this visit : _13___ (ppb)    Interpretation of FeNO measurement in adults:  [x] FENO is less than 25 ppb implies non eosinophilic airway inflammation or the absence of airway inflammation.    Comment: Low FENO (<25 ppb) in adult asthmatics with persistent symptoms suggests other etiologies for these symptoms and a lower likelihood of benefit from adding or increasing inhaled glucocorticoids.    [] FENO between 25 and 50 ppb in adults should be interpreted cautiously with reference to the clinical situation (eg, symptomatic, on or off therapy, current smoking).    [] FENO greater than 50 ppb in adults  suggests eosinophilic airway inflammation   Comment: High FENO (>50 ppb) in adult asthmatics even with atypical symptoms suggests glucocorticoid responsiveness. High FENO (>50 ppb) can help identify poor adherence or uncontrolled inflammation in asthma patients with otherwise seemingly controlled asthma.    Discussion:  A FENO less than 25 ppb in adults and less than 20 ppb in children younger than 12 years of age implies noneosinophilic airway inflammation or the absence of airway inflammation.  A FENO greater than 50 ppb in adults or greater than 35 ppb in children suggests eosinophilic airway inflammation.   Values of FENO between 25 and 50 ppb in adults (20 to 35 ppb in children) should be interpreted cautiously with reference to the clinical situation (eg, symptomatic, on or off therapy, current smoking).  A rising FENO with a greater than 20 percent change and more than 25 ppb (20 ppb in children) from a previously stable level suggests increasing eosinophilic airway inflammation, but there are wide inter-individual differences.  A decrease in FENO greater than 20 percent for values over 50 ppb or more than  10 ppb for values less than 50 ppb may be clinically important.  ?FENO in other respiratory diseases - Several other diseases are associated with altered levels of exhaled NO: low levels of FENO have been noted in cystic fibrosis, current smoking, pulmonary hypertension, hypothermia, primary ciliary dyskinesia, and bronchopulmonary dysplasia. Elevated FENO has been noted in atopy, nonasthmatic eosinophilic bronchitis, COPD exacerbations, noncystic fibrosis bronchiectasis, and viral upper respiratory infections.    REFERENCE:  ATS Board of Directors, December 2004, and by the ERS Executive Committee, June 2004. ATS/ERS Recommendations for Standardized Procedures for the Online and Offline Measurement of Exhaled Lower Respiratory Nitric Oxide and Nasal Nitric Oxide. Guideline 2005

## 2024-06-13 ENCOUNTER — PATIENT MESSAGE (OUTPATIENT)
Dept: PULMONOLOGY | Facility: CLINIC | Age: 59
End: 2024-06-13
Payer: MEDICARE

## 2024-06-13 ENCOUNTER — DOCUMENTATION ONLY (OUTPATIENT)
Dept: SLEEP MEDICINE | Facility: CLINIC | Age: 59
End: 2024-06-13
Payer: MEDICARE

## 2024-06-13 NOTE — PROGRESS NOTES
Care Team  Pulmonary, [facility]. [address] [place] [id number] [phone]  Compliance Information 3/15/2024 - 6/12/2024  Compliance Summary  3/15/2024 - 6/12/2024 (90 days)  Days with Device Usage 88 days  Days without Device Usage 2 days  Percent Days with Device Usage 97.8%  Cumulative Usage 25 days 15 hrs. 10 mins. 30 secs.  Maximum Usage (1 Day) 10 hrs. 16 mins. 29 secs.  Average Usage (All Days) 6 hrs. 50 mins. 7 secs.  Average Usage (Days Used) 6 hrs. 59 mins. 26 secs.  Minimum Usage (1 Day) 1 hrs. 10 mins. 50 secs.  Percent of Days with Usage >= 4 Hours 90.0%  Percent of Days with Usage < 4 Hours 10.0%  Date Range  Total Blower Time 28 days 20 hrs. 53 mins. 6 secs.  Average AHI 0.4  Auto-CPAP Summary  Auto-CPAP Mean Pressure 11.3 cmH2O  Auto-CPAP Peak Average Pressure 12.2 cmH2O  Device Pressure <= 90% of Time 12.0 cmH2O  Average Time in Large Leak Per Day 1 hrs. 27 mins. 44 secs.  Printed By:

## 2024-06-27 ENCOUNTER — PATIENT MESSAGE (OUTPATIENT)
Dept: DIABETES | Facility: CLINIC | Age: 59
End: 2024-06-27
Payer: MEDICARE

## 2024-07-19 ENCOUNTER — HOSPITAL ENCOUNTER (OUTPATIENT)
Dept: RADIOLOGY | Facility: HOSPITAL | Age: 59
Discharge: HOME | End: 2024-07-19
Attending: OBSTETRICS & GYNECOLOGY
Payer: MEDICARE

## 2024-07-19 DIAGNOSIS — R92.8 ABNORMAL MAMMOGRAM: ICD-10-CM

## 2024-07-19 PROCEDURE — 77066 DX MAMMO INCL CAD BI: CPT | Mod: TC

## 2024-07-19 PROCEDURE — 77066 DX MAMMO INCL CAD BI: CPT | Mod: 26,,, | Performed by: RADIOLOGY

## 2024-07-19 PROCEDURE — 77062 BREAST TOMOSYNTHESIS BI: CPT | Mod: TC

## 2024-07-19 PROCEDURE — 77062 BREAST TOMOSYNTHESIS BI: CPT | Mod: 26,,, | Performed by: RADIOLOGY

## 2024-08-06 DIAGNOSIS — B37.31 VULVOVAGINAL CANDIDIASIS: ICD-10-CM

## 2024-08-06 RX ORDER — FLUCONAZOLE 150 MG/1
150 TABLET ORAL WEEKLY
Qty: 4 TABLET | Refills: 5 | Status: SHIPPED | OUTPATIENT
Start: 2024-08-06

## 2024-08-20 ENCOUNTER — PATIENT MESSAGE (OUTPATIENT)
Dept: DIABETES | Facility: CLINIC | Age: 59
End: 2024-08-20
Payer: MEDICARE

## 2024-09-06 DIAGNOSIS — N95.2 ATROPHIC VAGINITIS: ICD-10-CM

## 2024-09-06 RX ORDER — ESTRADIOL 0.1 MG/G
CREAM VAGINAL
Qty: 42.5 G | Refills: 5 | Status: SHIPPED | OUTPATIENT
Start: 2024-09-06

## 2024-09-06 NOTE — TELEPHONE ENCOUNTER
Care Due:                  Date            Visit Type   Department     Provider  --------------------------------------------------------------------------------                                EP -                              PRIMARY      [facility] INTERNAL  Last Visit: 01-      CARE ([facility])   MEDICINE       Scotty Figueroa  Next Visit: None Scheduled  None         None Found                                                            Last  Test          Frequency    Reason                     Performed    Due Date  --------------------------------------------------------------------------------    CBC.........  12 months..  diclofenac...............  04- 04-    HBA1C.......  6 months...  insulin..................  04-   10-    [facility] Embedded Care Due Messages. Reference number: [id number].   9/06/2024 8:05:08 AM CDT

## 2024-09-06 NOTE — TELEPHONE ENCOUNTER
Refill Decision Note   Bianca Weldon  is requesting a refill authorization.  Brief Assessment and Rationale for Refill:  Approve     Medication Therapy Plan:         Comments:     Note composed:9:53 AM 09/06/2024

## 2024-09-07 NOTE — TELEPHONE ENCOUNTER
Refill Routing Note   Medication(s) are not appropriate for processing by [facility] for the following reason(s):        Drug-disease interaction  Drug-Disease: losartan and Neurogenic orthostatic hypotension    [facility] action(s):  Defer     Requires labs : Yes             Appointments  past 12m or future 3m with PCP    Date Provider   Last Visit   1/29/2024 Scotty Figueroa MD   Next Visit   Visit date not found Scotty Figueroa MD   ED visits in past 90 days: 0        Note composed:7:01 AM 09/07/2024

## 2024-09-09 RX ORDER — LOSARTAN POTASSIUM 25 MG/1
25 TABLET ORAL DAILY
Qty: 90 TABLET | Refills: 1 | Status: SHIPPED | OUTPATIENT
Start: 2024-09-09

## 2024-09-10 ENCOUNTER — TELEPHONE (OUTPATIENT)
Dept: DIABETES | Facility: CLINIC | Age: 59
End: 2024-09-10
Payer: MEDICARE

## 2024-09-10 NOTE — TELEPHONE ENCOUNTER
----- Message from Len Mays sent at 9/10/2024 11:33 AM CDT -----  Contact: self   .Type: Patient Call Back        Who called:      Patient   What is the request in detail:    Called in concerning appt that was scheduled for tomorrow with  Renetta Whitlock, PhD, NP-C . Patient tried to reschedule appt but accidentally scheduled it to 12/18 . Please call back   Can the clinic reply by [facility]?           Would the patient rather a call back or a response via [facility]?      [email or web address];l   Best call back number:  [phone]

## 2024-09-16 ENCOUNTER — OFFICE VISIT (OUTPATIENT)
Dept: ORTHOPEDICS | Facility: CLINIC | Age: 59
End: 2024-09-16
Payer: MEDICARE

## 2024-09-16 VITALS — HEIGHT: 59 IN | BODY MASS INDEX: 33.78 KG/M2 | WEIGHT: 167.56 LBS

## 2024-09-16 DIAGNOSIS — M17.11 ARTHRITIS OF KNEE, RIGHT: Primary | ICD-10-CM

## 2024-09-16 DIAGNOSIS — M76.32 ILIOTIBIAL BAND SYNDROME AFFECTING LEFT LOWER LEG: ICD-10-CM

## 2024-09-16 DIAGNOSIS — S83.241D OTHER TEAR OF MEDIAL MENISCUS OF RIGHT KNEE AS CURRENT INJURY, SUBSEQUENT ENCOUNTER: ICD-10-CM

## 2024-09-16 DIAGNOSIS — Z98.890 S/P RIGHT KNEE ARTHROSCOPY: ICD-10-CM

## 2024-09-16 PROCEDURE — 3051F HG A1C>EQUAL 7.0%<8.0%: CPT | Mod: CPTII,S$GLB,, | Performed by: ORTHOPAEDIC SURGERY

## 2024-09-16 PROCEDURE — 99999 PR PBB SHADOW E&M-EST. PATIENT-LVL V: CPT | Mod: PBBFAC,,, | Performed by: ORTHOPAEDIC SURGERY

## 2024-09-16 PROCEDURE — 1159F MED LIST DOCD IN RCRD: CPT | Mod: CPTII,S$GLB,, | Performed by: ORTHOPAEDIC SURGERY

## 2024-09-16 PROCEDURE — 4010F ACE/ARB THERAPY RXD/TAKEN: CPT | Mod: CPTII,S$GLB,, | Performed by: ORTHOPAEDIC SURGERY

## 2024-09-16 PROCEDURE — 3008F BODY MASS INDEX DOCD: CPT | Mod: CPTII,S$GLB,, | Performed by: ORTHOPAEDIC SURGERY

## 2024-09-16 PROCEDURE — 99212 OFFICE O/P EST SF 10 MIN: CPT | Mod: S$GLB,,, | Performed by: ORTHOPAEDIC SURGERY

## 2024-09-16 NOTE — PATIENT INSTRUCTIONS
Doing extremely well you not hurting much 1/10 occasional discomfort   The jason naveed seems to work really well for you at this time and you able to manage  I will give you appointment to see my assistant buddy PRAJAPATI in 6 weeks if you doing okay you can reschedule.  If you need the injection she can give him to you  I will give you an appointment with me within 3 months  If you are doing well you can cancel her appointments   You jason naveed is approved until 12/31/2024 even if you do not need them we can always submit for repeat approval
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
## 2024-10-01 ENCOUNTER — HOSPITAL ENCOUNTER (OUTPATIENT)
Dept: RADIOLOGY | Facility: HOSPITAL | Age: 59
Discharge: HOME | End: 2024-10-01
Attending: INTERNAL MEDICINE
Payer: MEDICARE

## 2024-10-01 DIAGNOSIS — J44.89 ASTHMATIC BRONCHITIS , CHRONIC: ICD-10-CM

## 2024-10-01 PROCEDURE — 71046 X-RAY EXAM CHEST 2 VIEWS: CPT | Mod: 26,,, | Performed by: RADIOLOGY

## 2024-10-01 PROCEDURE — 71046 X-RAY EXAM CHEST 2 VIEWS: CPT | Mod: TC,PO

## 2024-10-05 ENCOUNTER — LAB VISIT (OUTPATIENT)
Dept: LAB | Facility: HOSPITAL | Age: 59
End: 2024-10-05
Payer: MEDICARE

## 2024-10-05 ENCOUNTER — OFFICE VISIT (OUTPATIENT)
Dept: DIABETES | Facility: CLINIC | Age: 59
End: 2024-10-05
Payer: MEDICARE

## 2024-10-05 VITALS
HEART RATE: 73 BPM | WEIGHT: 166.88 LBS | SYSTOLIC BLOOD PRESSURE: 120 MMHG | DIASTOLIC BLOOD PRESSURE: 63 MMHG | BODY MASS INDEX: 33.71 KG/M2

## 2024-10-05 DIAGNOSIS — Z79.4 TYPE 2 DIABETES MELLITUS WITHOUT COMPLICATION, WITH LONG-TERM CURRENT USE OF INSULIN: Primary | ICD-10-CM

## 2024-10-05 DIAGNOSIS — I10 ESSENTIAL HYPERTENSION: ICD-10-CM

## 2024-10-05 DIAGNOSIS — E11.9 TYPE 2 DIABETES MELLITUS WITHOUT COMPLICATION, WITH LONG-TERM CURRENT USE OF INSULIN: ICD-10-CM

## 2024-10-05 DIAGNOSIS — E11.9 TYPE 2 DIABETES MELLITUS WITHOUT COMPLICATION, WITH LONG-TERM CURRENT USE OF INSULIN: Primary | ICD-10-CM

## 2024-10-05 DIAGNOSIS — E78.5 DYSLIPIDEMIA: ICD-10-CM

## 2024-10-05 DIAGNOSIS — Z79.4 TYPE 2 DIABETES MELLITUS WITHOUT COMPLICATION, WITH LONG-TERM CURRENT USE OF INSULIN: ICD-10-CM

## 2024-10-05 LAB — GLUCOSE SERPL-MCNC: 148 MG/DL (ref 70–110)

## 2024-10-05 PROCEDURE — 3074F SYST BP LT 130 MM HG: CPT | Mod: CPTII,S$GLB,, | Performed by: NURSE PRACTITIONER

## 2024-10-05 PROCEDURE — 1159F MED LIST DOCD IN RCRD: CPT | Mod: CPTII,S$GLB,, | Performed by: NURSE PRACTITIONER

## 2024-10-05 PROCEDURE — 3008F BODY MASS INDEX DOCD: CPT | Mod: CPTII,S$GLB,, | Performed by: NURSE PRACTITIONER

## 2024-10-05 PROCEDURE — 3044F HG A1C LEVEL LT 7.0%: CPT | Mod: CPTII,S$GLB,, | Performed by: NURSE PRACTITIONER

## 2024-10-05 PROCEDURE — 99214 OFFICE O/P EST MOD 30 MIN: CPT | Mod: S$GLB,,, | Performed by: NURSE PRACTITIONER

## 2024-10-05 PROCEDURE — 1160F RVW MEDS BY RX/DR IN RCRD: CPT | Mod: CPTII,S$GLB,, | Performed by: NURSE PRACTITIONER

## 2024-10-05 PROCEDURE — 99999 PR PBB SHADOW E&M-EST. PATIENT-LVL V: CPT | Mod: PBBFAC,,, | Performed by: NURSE PRACTITIONER

## 2024-10-05 PROCEDURE — 4010F ACE/ARB THERAPY RXD/TAKEN: CPT | Mod: CPTII,S$GLB,, | Performed by: NURSE PRACTITIONER

## 2024-10-05 PROCEDURE — 82962 GLUCOSE BLOOD TEST: CPT | Mod: S$GLB,,, | Performed by: NURSE PRACTITIONER

## 2024-10-05 PROCEDURE — 3078F DIAST BP <80 MM HG: CPT | Mod: CPTII,S$GLB,, | Performed by: NURSE PRACTITIONER

## 2024-10-05 NOTE — PROGRESS NOTES
"PCP: Scotty Figueroa MD     Subjective:     Chief Complaint: Diabetes Follow Up    HISTORY OF PRESENT ILLNESS: 59 y.o.  female presenting for diabetes follow up. Patient has had Type II diabetes since 2014 and has the following complications: neuropathy, gastroparesis. She has attended diabetes education in the past. Other pertinent conditions: HTN, HLD, IBD, ISELA, and schizoaffective disorder ( followed by psychiatry). Of note, she has a history of cortisone injections for plantar fasciitis.       Past tried and failed medications include: Invokana ( yeast infection ); Metformin caused GI side effects; and Trulicity / Mounjaro caused abdominal pain and discomfort. Glimepiride discontinued due to hypoglycemia.     The patient Dexcom CGM was downloaded, active CGM usage at 93 %. For the past 14 days ( Sept 20 - Oct 3 ), patient average glucose was 169 mg/dL, with standard deviation of 49. She was above range 35 % of the time, in range 65% of the time, and below range 0% of the time. Overall, there was a pattern of pp spikes around 12 noon due to lunch, and in the evening time between 6 PM - MN.  Patient denies any recent hospital admissions, emergency room visits, or syncope.      Blood glucose in clinic today: 148 mg/dl    Vitals:    10/05/24 1147   BP: 120/63   BP Location: Left arm   Patient Position: Sitting   Pulse: 73   Weight: 75.7 kg (166 lb 14.2 oz)     BMI 33.71    No results found for: "CPEPTIDE"  No results found for: "GLUTAMICACID"  No results found for: "HUMANINSULIN"    DM MEDICATIONS:  Toujeo 60 units daily ( at 9 pm ); Humalog 8 units before meals;  Januvia 100 mg daily    Review of Systems   Constitutional:  Negative for diaphoresis.   Eyes:  Negative for visual disturbance.   Gastrointestinal:  Positive for nausea (chronic, intermittent). Negative for diarrhea and vomiting.   Endocrine: Negative for polydipsia, polyphagia and polyuria.   Neurological:  Positive for numbness. Negative for " headaches.       Diabetes Management Status  Statin: Taking  ACE/ARB: Not taking    Screening or Prevention Patient's value Goal Complete/Controlled?   HgA1C Testing and Control   Lab Results   Component Value Date    HGBA1C 6.4 (H) 10/05/2024    HGBA1C 6.4 (H) 10/05/2024      Annually/Less than 8% No   Lipid profile : 2024 Annually No   LDL control Lab Results   Component Value Date    LDLCALC 69.6 2024    Annually/Less than 100 mg/dl  No   Nephropathy screening Lab Results   Component Value Date    MICALBCREAT Unable to calculate 2023     Lab Results   Component Value Date    PROTEINUA Negative 2024    Annually No   Blood pressure BP Readings from Last 1 Encounters:   10/05/24 120/63    Less than 140/90 Yes   Dilated retinal exam : 2023 Annually Yes, [facility], patient plans to make an appointment     Foot exam   : 10/05/2024 Annually Yes     ACTIVITY LEVEL: Rarely Active. Discussed activities, benefits, methods, and precautions.  MEAL PLANNING: Patient reports number of meals per day to be 3 and number of snacks per day to be 2.   Patient is encouraged to carb count and consume no more than 45 - 60 grams of carbohydrates in each meal, and 1800 k / richard per day.      BLOOD GLUCOSE TESTIN times daily  SOCIAL HISTORY: .  Former smoker.  Her mother lives with her and sister unexpectedly passed away.     Objective:      Physical Exam  Constitutional:       Appearance: She is well-developed.   HENT:      Head: Normocephalic and atraumatic.   Eyes:      Pupils: Pupils are equal, round, and reactive to light.   Cardiovascular:      Rate and Rhythm: Normal rate and regular rhythm.      Pulses:           Dorsalis pedis pulses are 2+ on the right side and 2+ on the left side.   Pulmonary:      Effort: Pulmonary effort is normal.      Breath sounds: Normal breath sounds.   Feet:      Right foot:      Protective Sensation: 6 sites tested.  6 sites sensed.      Skin  integrity: No ulcer, callus or dry skin.      Left foot:      Protective Sensation: 6 sites tested.  6 sites sensed.      Skin integrity: No ulcer, callus or dry skin.   Skin:     General: Skin is warm and dry.      Findings: No rash.   Psychiatric:         Behavior: Behavior normal.         Thought Content: Thought content normal.         Judgment: Judgment normal.       Assessment / Plan:     1.) Type 2 diabetes mellitus with microalbuminuria, with long-term current use of insulin   Comments:  - Controlled, most recent A1C of 6.4 %.  Patient currently enrolled in digital diabetes program. Continue Januvia 100 mg daily.  She has been unable to tolerate the GLP-1 drug class, also has history of gastroparesis.      Continue Toujeo 60 units daily.  Increase Humalog 10 units BEFORE meals. She uses the following correction for high blood sugars:  - 199: 2 units  //   - 249: 4 units  //   - 299: 6 units  //   - 349: 8 units //  BG Over 350: 10 units.  Patient is interested in pump therapy, appointment scheduled with diabetes educator for evaluation.     Orders:  -     POCT Glucose, Hand-Held Device  -     Ambulatory referral/consult to Diabetes Education; Future; Expected date: 10/12/2024  -     Hemoglobin A1C; Standing  -     Comprehensive Metabolic Panel; Standing  -     Microalbumin/Creatinine Ratio, Urine; Standing    3.) Essential hypertension - continue medications as prescribed.     4.) Dyslipidemia - controlled, continue statin therapy.    Additional Plan Details:    1.) Continue monitoring blood sugar 2 x daily, fasting and ac dinner or at bedtime. Discussed ADA goal for fasting blood sugar, 80 - 130 mg/dL; pp blood sugars below 180 mg/dl. Also, discussed prevention of hypoglycemia and the need to adjust goals to higher levels if persistent hypoglycemia.    2.) Return to clinic in 4 months for follow up. The patient was explained the above plan and given opportunity to ask questions.  She  understands, chooses and consents to this plan and accepts all the risks, which include but are not limited to the risks mentioned above.    Renetta Whitlock, OMAR-C, [facility]    A total of 30 minutes was spent in face to face time, of which over 50 % was spent in counseling patient on disease process, complications, treatment, and side effects of medications.

## 2024-10-06 DIAGNOSIS — E11.29 TYPE 2 DIABETES MELLITUS WITH MICROALBUMINURIA, WITH LONG-TERM CURRENT USE OF INSULIN: ICD-10-CM

## 2024-10-06 DIAGNOSIS — R80.9 TYPE 2 DIABETES MELLITUS WITH MICROALBUMINURIA, WITH LONG-TERM CURRENT USE OF INSULIN: ICD-10-CM

## 2024-10-06 DIAGNOSIS — Z79.4 TYPE 2 DIABETES MELLITUS WITH MICROALBUMINURIA, WITH LONG-TERM CURRENT USE OF INSULIN: ICD-10-CM

## 2024-10-07 RX ORDER — SITAGLIPTIN 100 MG/1
100 TABLET, FILM COATED ORAL
Qty: 30 TABLET | Refills: 6 | Status: SHIPPED | OUTPATIENT
Start: 2024-10-07

## 2024-10-16 ENCOUNTER — PATIENT MESSAGE (OUTPATIENT)
Dept: PULMONOLOGY | Facility: CLINIC | Age: 59
End: 2024-10-16
Payer: MEDICARE

## 2024-10-16 ENCOUNTER — TELEPHONE (OUTPATIENT)
Dept: PULMONOLOGY | Facility: CLINIC | Age: 59
End: 2024-10-16
Payer: MEDICARE

## 2024-10-16 NOTE — TELEPHONE ENCOUNTER
Attempt to contact pt in regards to getting 11/26 appt rs due to provider being out. Left detailed message advising pt of new appt time.

## 2024-10-21 ENCOUNTER — OFFICE VISIT (OUTPATIENT)
Dept: GASTROENTEROLOGY | Facility: CLINIC | Age: 59
End: 2024-10-21
Payer: MEDICARE

## 2024-10-21 ENCOUNTER — HOSPITAL ENCOUNTER (OUTPATIENT)
Dept: RADIOLOGY | Facility: HOSPITAL | Age: 59
Discharge: HOME | End: 2024-10-21
Attending: NURSE PRACTITIONER
Payer: MEDICARE

## 2024-10-21 VITALS
HEIGHT: 59 IN | BODY MASS INDEX: 34.04 KG/M2 | WEIGHT: 168.88 LBS | HEART RATE: 77 BPM | DIASTOLIC BLOOD PRESSURE: 80 MMHG | SYSTOLIC BLOOD PRESSURE: 134 MMHG

## 2024-10-21 DIAGNOSIS — R10.84 GENERALIZED ABDOMINAL PAIN: ICD-10-CM

## 2024-10-21 DIAGNOSIS — R10.84 GENERALIZED ABDOMINAL PAIN: Primary | ICD-10-CM

## 2024-10-21 DIAGNOSIS — E11.43 GASTROPARESIS DUE TO DM: ICD-10-CM

## 2024-10-21 DIAGNOSIS — R19.8 ALTERED BOWEL FUNCTION: ICD-10-CM

## 2024-10-21 DIAGNOSIS — R10.32 LEFT LOWER QUADRANT ABDOMINAL PAIN: ICD-10-CM

## 2024-10-21 DIAGNOSIS — K31.84 GASTROPARESIS DUE TO DM: ICD-10-CM

## 2024-10-21 PROCEDURE — 3079F DIAST BP 80-89 MM HG: CPT | Mod: CPTII,S$GLB,, | Performed by: NURSE PRACTITIONER

## 2024-10-21 PROCEDURE — 3075F SYST BP GE 130 - 139MM HG: CPT | Mod: CPTII,S$GLB,, | Performed by: NURSE PRACTITIONER

## 2024-10-21 PROCEDURE — 74019 RADEX ABDOMEN 2 VIEWS: CPT | Mod: TC

## 2024-10-21 PROCEDURE — 1159F MED LIST DOCD IN RCRD: CPT | Mod: CPTII,S$GLB,, | Performed by: NURSE PRACTITIONER

## 2024-10-21 PROCEDURE — 3008F BODY MASS INDEX DOCD: CPT | Mod: CPTII,S$GLB,, | Performed by: NURSE PRACTITIONER

## 2024-10-21 PROCEDURE — 4010F ACE/ARB THERAPY RXD/TAKEN: CPT | Mod: CPTII,S$GLB,, | Performed by: NURSE PRACTITIONER

## 2024-10-21 PROCEDURE — 99999 PR PBB SHADOW E&M-EST. PATIENT-LVL V: CPT | Mod: PBBFAC,,, | Performed by: NURSE PRACTITIONER

## 2024-10-21 PROCEDURE — 1160F RVW MEDS BY RX/DR IN RCRD: CPT | Mod: CPTII,S$GLB,, | Performed by: NURSE PRACTITIONER

## 2024-10-21 PROCEDURE — 74019 RADEX ABDOMEN 2 VIEWS: CPT | Mod: 26,,, | Performed by: RADIOLOGY

## 2024-10-21 PROCEDURE — 99214 OFFICE O/P EST MOD 30 MIN: CPT | Mod: S$GLB,,, | Performed by: NURSE PRACTITIONER

## 2024-10-21 PROCEDURE — 3044F HG A1C LEVEL LT 7.0%: CPT | Mod: CPTII,S$GLB,, | Performed by: NURSE PRACTITIONER

## 2024-10-21 RX ORDER — DICYCLOMINE HYDROCHLORIDE 20 MG/1
20 TABLET ORAL 3 TIMES DAILY PRN
Qty: 90 TABLET | Refills: 0 | Status: SHIPPED | OUTPATIENT
Start: 2024-10-21

## 2024-10-21 NOTE — PROGRESS NOTES
"Clinic Follow Up:  [facility] Follow Up Note    Reason for Follow Up:  The primary encounter diagnosis was Generalized abdominal pain. Diagnoses of Altered bowel function, Gastroparesis due to DM, and Left lower quadrant abdominal pain were also pertinent to this visit.    PCP: Scotty Figueroa   [address] [facility] / [place] [id number]    HPI:  This is a 59 y.o. female here for follow up of the above.   Previously seen by Dr. Grady. Went to [facility] after Dr. Grady retired. She would like to switch her care back to [facility].   She was told she had "compacted bowels" a couple of months ago at provider at [facility].   Has diarrhea. "Uncontrollable" bowel movements. She has between 1-3 per day. Sometimes incomplete where she will have to go back to the restroom after just finishing.   Does take nausea medications. Constant nausea. She does have occasional vomiting. Unable to use Reglan as she has hx of Tardive dyskinesia.   Has generalized abdominal pain. Feels cramp like pain.     Review of Systems   Constitutional:  Negative for activity change and appetite change.        As per interval history above   Respiratory:  Negative for cough and shortness of breath.    Cardiovascular:  Negative for chest pain.   Gastrointestinal:  Positive for abdominal pain, constipation, diarrhea, nausea and vomiting. Negative for blood in stool and rectal pain.   Skin:  Negative for color change and rash.       Medical History:  Past Medical History:   Diagnosis Date    Abnormal Pap smear of cervix     Abnormal Pap smear of vagina     after hyst, repeat every 6 months, per pt, had bx's, unknown results    Acid reflux     Arthritis     Asthma     Bipolar 1 disorder     Depression     bipolar    Diabetes mellitus     Diabetes mellitus, type 2     Elevated alkaline phosphatase level     GERD (gastroesophageal reflux disease)     Hyperlipidemia     Hypertension     IBS (irritable bowel syndrome)     " "Interstitial cystitis     Narcolepsy     Nonalcoholic steatohepatitis     fatty liver    Orthostatic hypotension     PONV (postoperative nausea and vomiting)     Respiratory distress     States, "My lungs collapsed during hemorrhoid surgery."    Restless legs syndrome     Sleep apnea     DMITRI-CPAP    Thyroid disease        Surgical History:   Past Surgical History:   Procedure Laterality Date    ADENOIDECTOMY      ADENOIDECTOMY      APPENDECTOMY      julia shoulder surgery      COLONOSCOPY      CYSTOSTOMY W/ BLADDER BIOPSY      interstial cystitis    DILATION AND CURETTAGE OF UTERUS      missed ab    ESOPHAGEAL DILATION      HEMORRHOID SURGERY      HYSTERECTOMY      TLH, LSO (endometriosis)    KNEE ARTHROSCOPY W/ MENISCECTOMY Right 2021    Procedure: ARTHROSCOPY, KNEE, WITH MENISCECTOMY;  Surgeon: Kenrick Gray MD;  Location: [facility];  Service: Orthopedics;  Laterality: Right;   partial medial meniscectomy    OOPHORECTOMY      LSO    TONSILLECTOMY      WISDOM TOOTH EXTRACTION         Family History:   Family History   Problem Relation Name Age of Onset    Diabetes Mellitus Mother      Melanoma Mother      Diabetes Mellitus Father      Colon cancer Father      Heart disease Father  45        CABG    Rheum arthritis Maternal Grandmother      Breast cancer Maternal Grandmother      Thrombophilia Paternal Grandmother          DVTs    Anesthesia problems Sister 1         hypertension    Ovarian cancer Neg Hx         Social History:   Social History     Tobacco Use    Smoking status: Former     Current packs/day: 0.00     Average packs/day: 1.5 packs/day for 33.0 years (49.5 ttl pk-yrs)     Types: Cigarettes     Start date: 1984     Quit date: 2017     Years since quittin.7     Passive exposure: Current    Smokeless tobacco: Never   Substance Use Topics    Alcohol use: No    Drug use: No       Allergies:   Review of patient's allergies indicates:   Allergen Reactions    Bactrim " [sulfamethoxazole-trimethoprim] Swelling, Other (See Comments) and Anaphylaxis    Latuda [lurasidone] Anaphylaxis    Macrodantin [nitrofurantoin macrocrystalline] Swelling, Other (See Comments) and Anaphylaxis    Canagliflozin      Yeast infections    Metformin Diarrhea       Home Medications:  Current Outpatient Medications on File Prior to Visit   Medication Sig Dispense Refill    albuterol (ACCUNEB) 1.25 mg/3 mL Nebu Take 6 mLs (2.5 mg total) by nebulization every 6 (six) hours as needed (SOB). Rescue 150 mL 11    albuterol (PROVENTIL/VENTOLIN HFA) 90 mcg/actuation inhaler Inhale 2 puffs into the lungs every 6 (six) hours as needed for Wheezing. Rescue 18 g 5    atorvastatin (LIPITOR) 40 MG tablet Take 1 tablet (40 mg total) by mouth once daily. 90 tablet 2    blood sugar diagnostic Strp Inject 1 each into the skin 3 (three) times daily. Dispense preferred on insurance 100 strip 11    clindamycin (CLEOCIN T) 1 % external solution APPLY TO THE AFFECTED AREA TWICE DAILY AS NEEDED FOR ACE/ FOLLICULITIS ON FACE AND SCALP 60 mL 4    cyclobenzaprine (FLEXERIL) 10 MG tablet Take 10 mg by mouth every evening.      deutetrabenazine (AUSTEDO) 9 mg Tab Take 9 mg by mouth 2 (two) times daily.      diclofenac sodium (VOLTAREN) 1 % Gel APPLY FOUR GRAMS TOPICALLY EVERY DAY AS DIRECTED 100 g 3    estradioL (ESTRACE) 0.01 % (0.1 mg/gram) vaginal cream Place 1 gram vaginally every 7 days. 42.5 g 5    fluconazole (DIFLUCAN) 150 MG Tab Take 1 tablet (150 mg total) by mouth once a week. 4 tablet 5    fludrocortisone (FLORINEF) 0.1 mg Tab Take 100 mcg by mouth once daily.      fluticasone propionate (FLONASE) 50 mcg/actuation nasal spray 2 sprays (100 mcg total) by Each Nostril route once daily. 48 g 3    fluticasone-umeclidin-vilanter (TRELEGY ELLIPTA) 100-62.5-25 mcg DsDv Inhale 1 puff into the lungs once daily. 60 each 5    insulin glargine U-300 conc (TOUJEO MAX U-300 SOLOSTAR) 300 unit/mL (3 mL) insulin pen Inject 66 Units into  "the skin once daily. - Subcutaneous 2 Pen 3    insulin lispro (HUMALOG KWIKPEN INSULIN) 100 unit/mL pen Inject 9 Units into the skin 3 (three) times daily. 6.3 mL 6    JANUVIA 100 mg Tab Take 1 tablet (100 mg total) by mouth once daily. 30 tablet 6    lamoTRIgine (LAMICTAL) 200 MG tablet Take 1 tablet by mouth 2 (two) times daily.      levothyroxine (SYNTHROID) 75 MCG tablet TAKE ONE TABLET BY MOUTH EVERY DAY 90 tablet 3    LIDOcaine-prilocaine (EMLA) cream SMARTSI-4.8 Gram(s) Topical      lithium (ESKALITH) 300 MG capsule Take 900 mg by mouth every evening.      LORazepam (ATIVAN) 1 MG tablet Take 1 tablet (1 mg total) by mouth 2 (two) times daily as needed for Anxiety (takes nightly). 60 tablet 2    losartan (COZAAR) 25 MG tablet Take 1 tablet (25 mg total) by mouth once daily. 90 tablet 1    multivitamin capsule Take 1 capsule by mouth once daily.      MYRBETRIQ 50 mg Tb24 Take 50 mg by mouth 2 (two) times daily as needed.      nirmatrelvir-ritonavir (PAXLOVID, EUA,) 300 mg (150 mg x 2)-100 mg copackaged tablets (EUA)       ondansetron (ZOFRAN-ODT) 4 MG TbDL DISSOLVE 1 TABLET ON THE TONGUE EVERY 6 HOURS AS NEEDED FOR NAUSEA 20 tablet 0    pantoprazole (PROTONIX) 40 MG tablet TAKE 1 TABLET BY MOUTH DAILY 90 tablet 2    pen needle, diabetic 31 gauge x 3/16" Ndle Inject 1 Stick into the skin 3 (three) times daily. 100 each 3    solriamfetoL (SUNOSI) 150 mg Tab Take 1 tablet by mouth once daily. 30 tablet 4    TRINTELLIX 20 mg Tab Take 1 tablet by mouth once daily.      ketoconazole (NIZORAL) 2 % cream Apply topically 2 (two) times daily. Apply to the affected area twice daily. (Patient taking differently: Apply topically 2 (two) times daily as needed. Apply to the affected area twice daily.) 30 g 2    levocetirizine (XYZAL) 5 MG tablet Take 1 tablet (5 mg total) by mouth every evening. 30 tablet 11    triamcinolone acetonide 0.1% (KENALOG) 0.1 % cream Apply topically 2 (two) times daily. Do not use on face " "(Patient taking differently: Apply topically 2 (two) times daily as needed. Do not use on face) 45 Bottle 6     Current Facility-Administered Medications on File Prior to Visit   Medication Dose Route Frequency Provider Last Rate Last Admin    nozaseptin (NOZIN) nasal    Each Nostril On Call Procedure Kenrick Gray MD   Given at 04/27/21 1222       /80 (BP Location: Right arm, Patient Position: Sitting)   Pulse 77   Ht 4' 11" (1.499 m)   Wt 76.6 kg (168 lb 14 oz)   BMI 34.11 kg/m²   Body mass index is 34.11 kg/m².  Physical Exam  Constitutional:       General: She is not in acute distress.  HENT:      Head: Normocephalic.   Neurological:      General: No focal deficit present.      Mental Status: She is alert.   Psychiatric:         Mood and Affect: Mood normal.         Judgment: Judgment normal.         Labs: Pertinent labs reviewed.    Assessment:   1. Generalized abdominal pain    2. Altered bowel function    3. Gastroparesis due to DM    4. Left lower quadrant abdominal pain        Recommendations:   - xray   - Bentyl PRN pain.     Generalized abdominal pain  -     Ambulatory referral/consult to Gastroenterology  -     X-Ray Abdomen Flat And Erect; Future; Expected date: 10/21/2024  -     dicyclomine (BENTYL) 20 mg tablet; Take 1 tablet (20 mg total) by mouth 3 (three) times daily as needed (abdominal pain).  Dispense: 90 tablet; Refill: 0    Altered bowel function  -     X-Ray Abdomen Flat And Erect; Future; Expected date: 10/21/2024    Gastroparesis due to DM    Left lower quadrant abdominal pain    Return to Clinic:  Follow up to be determined after results/ procedure(s).    Thank you for the opportunity to participate in the care of this patient.  NE Fonseca        "

## 2024-10-24 ENCOUNTER — PATIENT MESSAGE (OUTPATIENT)
Dept: GASTROENTEROLOGY | Facility: CLINIC | Age: 59
End: 2024-10-24
Payer: MEDICARE

## 2024-10-24 DIAGNOSIS — E11.43 GASTROPARESIS DUE TO DM: Primary | ICD-10-CM

## 2024-10-24 DIAGNOSIS — K31.84 GASTROPARESIS DUE TO DM: Primary | ICD-10-CM

## 2024-10-28 ENCOUNTER — CLINICAL SUPPORT (OUTPATIENT)
Dept: DIABETES | Facility: CLINIC | Age: 59
End: 2024-10-28
Payer: MEDICARE

## 2024-10-28 DIAGNOSIS — Z79.4 TYPE 2 DIABETES MELLITUS WITHOUT COMPLICATION, WITH LONG-TERM CURRENT USE OF INSULIN: ICD-10-CM

## 2024-10-28 DIAGNOSIS — E11.9 TYPE 2 DIABETES MELLITUS WITHOUT COMPLICATION, WITH LONG-TERM CURRENT USE OF INSULIN: ICD-10-CM

## 2024-10-28 PROCEDURE — 99999 PR PBB SHADOW E&M-EST. PATIENT-LVL IV: CPT | Mod: PBBFAC,,, | Performed by: DIETITIAN, REGISTERED

## 2024-10-28 PROCEDURE — G0108 DIAB MANAGE TRN  PER INDIV: HCPCS | Mod: S$GLB,,, | Performed by: DIETITIAN, REGISTERED

## 2024-10-28 RX ORDER — ERYTHROMYCIN ETHYLSUCCINATE 200 MG/5ML
250 SUSPENSION ORAL
Qty: 250 ML | Refills: 0 | Status: SHIPPED | OUTPATIENT
Start: 2024-10-28 | End: 2024-11-07

## 2024-10-29 ENCOUNTER — PATIENT MESSAGE (OUTPATIENT)
Dept: DIABETES | Facility: CLINIC | Age: 59
End: 2024-10-29
Payer: MEDICARE

## 2024-10-30 ENCOUNTER — PATIENT MESSAGE (OUTPATIENT)
Dept: FAMILY MEDICINE | Facility: CLINIC | Age: 59
End: 2024-10-30
Payer: MEDICARE

## 2024-10-31 ENCOUNTER — TELEPHONE (OUTPATIENT)
Dept: GASTROENTEROLOGY | Facility: CLINIC | Age: 59
End: 2024-10-31
Payer: MEDICARE

## 2024-11-01 RX ORDER — INSULIN PMP CART,AUT,G6/7,CNTR
1 EACH SUBCUTANEOUS
Qty: 10 EACH | Refills: 3 | Status: SHIPPED | OUTPATIENT
Start: 2024-11-01

## 2024-11-01 RX ORDER — INSULIN PMP CART,AUT,G6/7,CNTR
1 EACH SUBCUTANEOUS
Qty: 1 EACH | Refills: 0 | Status: SHIPPED | OUTPATIENT
Start: 2024-11-01

## 2024-11-06 ENCOUNTER — PATIENT MESSAGE (OUTPATIENT)
Dept: DIABETES | Facility: CLINIC | Age: 59
End: 2024-11-06
Payer: MEDICARE

## 2024-11-12 RX ORDER — INSULIN LISPRO 100 [IU]/ML
80 INJECTION, SOLUTION INTRAVENOUS; SUBCUTANEOUS CONTINUOUS
Qty: 30 ML | Refills: 3 | Status: SHIPPED | OUTPATIENT
Start: 2024-11-12 | End: 2025-11-12

## 2024-11-13 ENCOUNTER — TELEPHONE (OUTPATIENT)
Dept: DIABETES | Facility: CLINIC | Age: 59
End: 2024-11-13
Payer: MEDICARE

## 2024-11-18 ENCOUNTER — PATIENT MESSAGE (OUTPATIENT)
Dept: DIABETES | Facility: CLINIC | Age: 59
End: 2024-11-18
Payer: MEDICARE

## 2024-11-19 ENCOUNTER — CLINICAL SUPPORT (OUTPATIENT)
Dept: DIABETES | Facility: CLINIC | Age: 59
End: 2024-11-19
Payer: MEDICARE

## 2024-11-19 DIAGNOSIS — Z79.4 TYPE 2 DIABETES MELLITUS WITHOUT COMPLICATION, WITH LONG-TERM CURRENT USE OF INSULIN: Primary | ICD-10-CM

## 2024-11-19 DIAGNOSIS — E11.9 TYPE 2 DIABETES MELLITUS WITHOUT COMPLICATION, WITH LONG-TERM CURRENT USE OF INSULIN: Primary | ICD-10-CM

## 2024-11-19 PROCEDURE — G0108 DIAB MANAGE TRN  PER INDIV: HCPCS | Mod: S$GLB,,, | Performed by: DIETITIAN, REGISTERED

## 2024-11-19 NOTE — PROGRESS NOTES
Diabetes Care Specialist Progress Note  Author: Marisa Carrillo RD, CDE  Date: 11/19/2024    Intake    Program Intake  Reason for Diabetes Program Visit:: Intervention  Type of Intervention:: Individual  Individual: Device Training  Device Training: Insulin Pump Start (Omnipod 5 pump training)  Current diabetes risk level:: moderate  In the last month, have you used the ER or been admitted to the hospital: No  Permission to speak with others about care:: no    Current Diabetes Treatment: Oral Medications, Insulin  Oral Medication Type/Dose: Januvia 100mg QD  Method of insulin delivery?: Injections  Injection Type: Pens  Pen Type/Dose: Humalog 10u TID // Toujeo Max 66u QD    Continuous Glucose Monitoring  Patient has CGM: Yes  Personal CGM type:: Dexcom G7  GMI Date: 11/19/24  GMI Value: 6.9 %    Lab Results   Component Value Date    HGBA1C 6.4 (H) 10/05/2024    HGBA1C 6.4 (H) 10/05/2024       Diabetes Self-Management Skills Assessment    Medication Skills Assessment  Patient is able to identify current diabetes medications, dosages, and appropriate timing of medications.: yes  Medication Skills Assessment Completed:: Yes  Assessment indicates:: Instruction Needed, Knowledge deficit  Area of need?: Yes      Nutrition/Healthy Eating  Nutrition/Healthy Eating Skills Assessment Completed:: Yes  Assessment indicates:: Instruction Needed  Area of need?: Yes    Home Blood Glucose Monitoring  Patient states that blood sugar is checked at home daily.: yes  Monitoring Method:: personal continuous glucose monitor  Personal CGM type:: Dexcom G7   What is your current Time in Range?: 78%  Home Blood Glucose Monitoring Skills Assessment Completed: : Yes  Assessment indicates:: Adequate understanding  Area of need?: No      Assessment Summary and Plan    Based on today's diabetes care assessment, the following areas of need were identified:      Identified Areas of Need      Medication/Current Diabetes Treatment: See care plan.     Nutrition/Healthy Eating: See care plan update.    Home Blood Glucose Monitoring: No        Today's interventions were provided through individual discussion, instruction, and written materials were provided.      Patient verbalized understanding of instruction and written materials.  Pt was able to return back demonstration of instructions today. Patient understood key points, needs reinforcement and further instruction.     Diabetes Self-Management Care Plan:    Today's Diabetes Self-Management Care Plan was developed with Bianca's input. Bianca has agreed to work toward the following goal(s) to improve his/her overall diabetes control.      Care Plan: Diabetes Management   Updates made since 11/20/2023 12:00 AM        Problem: Healthy Eating             Goal: Patient will count carbs at meals and snacks.    Start Date: 10/28/2024   Expected End Date: 12/26/2024   Priority: High   Barriers: No Barriers Identified   Note:    11/19/24: Reviewed importance of carb counting with pump therapy. Patient will use Food Lists, food labels, or smart phone johan to calculate carb content.        Task: Reviewed the sources and role of Carbohydrate, Protein, and Fat and how each nutrient impacts blood sugar. Completed 10/28/2024        Task: Provided visual examples using dry measuring cups, food models, and other familiar objects such as computer mouse, deck or cards, tennis ball etc. to help with visualization of portions. Completed 10/28/2024        Task: Explained how to count carbohydrates using the food label and the use of dry measuring cups for accurate carb counting. Completed 10/28/2024        Task: Review the importance of balancing carbohydrates with each meal using portion control techniques to count servings of carbohydrate and label reading to identify serving size and amount of total carbs per serving. Completed 10/28/2024        Task: Provided patient with Smart Charts to record carb intake of meals and snacks.  Completed 10/28/2024               Problem: Medications         Goal: Patient will use Omnipod 5 in Automated Mode to deliver insulin.    Start Date: 11/19/2024   Expected End Date: 2/19/2025   Priority: Medium   Barriers: No Barriers Identified        Task: Trained patient to use Omnipod 5 system. Completed 11/19/2024   Note:    OMNIPOD 5 INSULIN PUMP START  Pump training was provided per Omni Pod protocol.     Patient understands she will no longer take Toujeo injections daily.  Details of pump therapy were covered included following: controller features and programming, pod activation, pod site selection and rotation, automatic pod priming and insertion, setting & editing basal rates in manual mode, giving bolus and other features in the set up menu.  Patient demonstrated ability to program controller, activate and insert pod using aseptic technique.  Patient demonstrated ability to program Dexcom transmitter into controller and start automated limited mode.    Instructed pt on use of basic pump features ie...give a bolus, pause insulin, switch from manual to automated mode.  Reviewed features available in manual mode verses automated mode.   Reviewed when and how to use activity function in automated mode.  Reviewed site selection of pods, rotation of sites and hard stop to change pod every 72 hrs.   Instructed that insulin vial is good out of refrigeration for up to 28-30 days .   Reviewed treatment of hypoglycemia, hyperglycemia; sick day care, DKA, and troubleshooting of pump.  Omni Pod 24 hour support can be reached at [phone].     INITIAL SETTINGS: (per provider Renetta Whitlock)    Basal rate: 1.4/hr  Maximum basal: 3.0u/hr     Bolus Menu:  ISF: 1:25  Carb Ratio : 1:7  Blood glucose target: 110; correct above: 120  Active insulin: 3 hrs  Maximum bolus = 15 units  Reverse Correction: ON    Low pod insulin: 10 units  Pod expiration alarm:  4 hours    Podder ID username: [email or web address] Harocelso username:  [email or web address]    Patient has written materials for Omnipod 5 for home use.  Patient verbalized understanding of all instructions given.  Reviewed back up plan in case of pump malfunction.  Educated that if glucose levels increasing and patient is delivering insulin, it is recommended to change pod.             Follow Up Plan     Follow up in about 2 weeks (around 12/3/2024) for E11.9.    Today's care plan and follow up schedule was discussed with patient.  Bianca verbalized understanding of the care plan, goals, and agrees to follow up plan.        The patient was encouraged to communicate with his/her health care provider/physician and care team regarding his/her condition(s) and treatment.  I provided the patient with my contact information today and encouraged to contact me via phone or [facility] Patient Portal as needed.     Length of Visit   Total Time: 90 Minutes

## 2024-11-20 ENCOUNTER — PATIENT MESSAGE (OUTPATIENT)
Dept: DIABETES | Facility: CLINIC | Age: 59
End: 2024-11-20
Payer: MEDICARE

## 2024-11-21 ENCOUNTER — PATIENT MESSAGE (OUTPATIENT)
Dept: DIABETES | Facility: CLINIC | Age: 59
End: 2024-11-21
Payer: MEDICARE

## 2024-11-26 ENCOUNTER — TELEPHONE (OUTPATIENT)
Dept: DIABETES | Facility: CLINIC | Age: 59
End: 2024-11-26
Payer: MEDICARE

## 2024-11-28 ENCOUNTER — PATIENT MESSAGE (OUTPATIENT)
Dept: DIABETES | Facility: CLINIC | Age: 59
End: 2024-11-28
Payer: MEDICARE

## 2024-11-28 DIAGNOSIS — E11.29 TYPE 2 DIABETES MELLITUS WITH MICROALBUMINURIA, WITH LONG-TERM CURRENT USE OF INSULIN: Primary | ICD-10-CM

## 2024-11-28 DIAGNOSIS — R80.9 TYPE 2 DIABETES MELLITUS WITH MICROALBUMINURIA, WITH LONG-TERM CURRENT USE OF INSULIN: Primary | ICD-10-CM

## 2024-11-28 DIAGNOSIS — Z79.4 TYPE 2 DIABETES MELLITUS WITH MICROALBUMINURIA, WITH LONG-TERM CURRENT USE OF INSULIN: Primary | ICD-10-CM

## 2024-11-29 ENCOUNTER — PATIENT MESSAGE (OUTPATIENT)
Dept: DIABETES | Facility: CLINIC | Age: 59
End: 2024-11-29
Payer: MEDICARE

## 2024-12-07 RX ORDER — INSULIN LISPRO 100 [IU]/ML
10 INJECTION, SOLUTION INTRAVENOUS; SUBCUTANEOUS 3 TIMES DAILY
Qty: 15 ML | Refills: 1 | Status: SHIPPED | OUTPATIENT
Start: 2024-12-07 | End: 2024-12-11 | Stop reason: SDUPTHER

## 2024-12-10 DIAGNOSIS — R80.9 TYPE 2 DIABETES MELLITUS WITH MICROALBUMINURIA, WITH LONG-TERM CURRENT USE OF INSULIN: ICD-10-CM

## 2024-12-10 DIAGNOSIS — G47.10 HYPERSOMNIA: ICD-10-CM

## 2024-12-10 DIAGNOSIS — K31.84 GASTROPARESIS: ICD-10-CM

## 2024-12-10 DIAGNOSIS — J06.9 VIRAL UPPER RESPIRATORY TRACT INFECTION: ICD-10-CM

## 2024-12-10 DIAGNOSIS — Z79.4 TYPE 2 DIABETES MELLITUS WITH MICROALBUMINURIA, WITH LONG-TERM CURRENT USE OF INSULIN: ICD-10-CM

## 2024-12-10 DIAGNOSIS — E11.29 TYPE 2 DIABETES MELLITUS WITH MICROALBUMINURIA, WITH LONG-TERM CURRENT USE OF INSULIN: ICD-10-CM

## 2024-12-10 RX ORDER — PEN NEEDLE, DIABETIC 30 GX3/16"
1 NEEDLE, DISPOSABLE MISCELLANEOUS 3 TIMES DAILY
Qty: 100 EACH | Refills: 3 | Status: CANCELLED | OUTPATIENT
Start: 2024-12-10

## 2024-12-10 RX ORDER — INSULIN LISPRO 100 [IU]/ML
10 INJECTION, SOLUTION INTRAVENOUS; SUBCUTANEOUS 3 TIMES DAILY
Qty: 15 ML | Refills: 1 | Status: CANCELLED | OUTPATIENT
Start: 2024-12-10 | End: 2025-05-22

## 2024-12-10 NOTE — TELEPHONE ENCOUNTER
Care Due:                  Date            Visit Type   Department     Provider  --------------------------------------------------------------------------------                                EP -                              PRIMARY      [facility] INTER[facility]  Last Visit: 01-      CARE ([facility])   MEDICINE       Scotty Figueroa  Next Visit: None Scheduled  None         None Found                                                            Last  Test          Frequency    Reason                     Performed    Due Date  --------------------------------------------------------------------------------    Office Visit  12 months..  diclofenac...............  01- 01-    CBC.........  12 months..  diclofenac...............  04- 04-    [employer] Embedded Care Due Messages. Reference number: [id number].   12/10/2024 4:32:44 PM CST

## 2024-12-11 RX ORDER — ONDANSETRON 4 MG/1
TABLET, ORALLY DISINTEGRATING ORAL
Qty: 20 TABLET | Refills: 0 | Status: SHIPPED | OUTPATIENT
Start: 2024-12-11

## 2024-12-11 RX ORDER — SOLRIAMFETOL 150 MG/1
1 TABLET, FILM COATED ORAL DAILY
Qty: 30 TABLET | Refills: 1 | Status: SHIPPED | OUTPATIENT
Start: 2024-12-11

## 2024-12-11 RX ORDER — PEN NEEDLE, DIABETIC 30 GX3/16"
1 NEEDLE, DISPOSABLE MISCELLANEOUS
Qty: 150 EACH | Refills: 6 | Status: SHIPPED | OUTPATIENT
Start: 2024-12-11

## 2024-12-11 RX ORDER — LEVOCETIRIZINE DIHYDROCHLORIDE 5 MG/1
5 TABLET, FILM COATED ORAL NIGHTLY
Qty: 30 TABLET | Refills: 11 | Status: SHIPPED | OUTPATIENT
Start: 2024-12-11 | End: 2025-12-11

## 2024-12-11 RX ORDER — INSULIN LISPRO 100 [IU]/ML
10 INJECTION, SOLUTION INTRAVENOUS; SUBCUTANEOUS 3 TIMES DAILY
Qty: 15 ML | Refills: 1 | Status: SHIPPED | OUTPATIENT
Start: 2024-12-11 | End: 2025-05-23

## 2024-12-12 RX ORDER — LOSARTAN POTASSIUM 25 MG/1
25 TABLET ORAL DAILY
Qty: 90 TABLET | Refills: 1 | Status: SHIPPED | OUTPATIENT
Start: 2024-12-12

## 2024-12-13 ENCOUNTER — TELEPHONE (OUTPATIENT)
Dept: FAMILY MEDICINE | Facility: CLINIC | Age: 59
End: 2024-12-13
Payer: MEDICARE

## 2024-12-16 ENCOUNTER — TELEPHONE (OUTPATIENT)
Dept: DIABETES | Facility: CLINIC | Age: 59
End: 2024-12-16
Payer: MEDICARE

## 2024-12-16 NOTE — TELEPHONE ENCOUNTER
----- Message from Emma sent at 12/16/2024 12:46 PM CST -----  Contact: pt  Type:  Sooner Apoointment Request    Caller is requesting a sooner appointment.  Caller declined first available appointment listed below.  Caller will not accept being placed on the waitlist and is requesting a message be sent to doctor.  Name of Caller: pt  When is the first available appointment? Pt is elías for 12/18 but need an earlier time in the day  Symptoms: 4 mo f/u  Would the patient rather a call back or a response via [facility]?  phone  Best Call Back Number: [phone]  Additional Information:

## 2024-12-16 NOTE — TELEPHONE ENCOUNTER
----- Message from Heather sent at 12/16/2024  1:47 PM CST -----  Contact: obdulio  Type:  Patient Returning Call    Who Called:obdulio  Who Left Message for Patient: nurse   Does the patient know what this is regarding?: missed call/apt rech  Would the patient rather a call back or a response via [facility]?  call  Best Call Back Number: [phone]   Additional Information:

## 2024-12-16 NOTE — TELEPHONE ENCOUNTER
I called  to assist with getting her a sooner time on 12/18 Renetta Whitlock,Phd,NP, but did not have anything available on 12/18. I offered her to be seen by another provider for now and she could still be seen by her normal provider in February for the appointment already scheduled.  agreed to this nd we got her scheduled on 12/26/24 @2:30pm with Areli Garcia NP.

## 2024-12-24 ENCOUNTER — TELEPHONE (OUTPATIENT)
Dept: ORTHOPEDICS | Facility: CLINIC | Age: 59
End: 2024-12-24
Payer: MEDICARE

## 2024-12-24 DIAGNOSIS — M17.0 BILATERAL PRIMARY OSTEOARTHRITIS OF KNEE: Primary | ICD-10-CM

## 2024-12-26 ENCOUNTER — OFFICE VISIT (OUTPATIENT)
Dept: DIABETES | Facility: CLINIC | Age: 59
End: 2024-12-26
Payer: MEDICARE

## 2024-12-26 VITALS
WEIGHT: 167.31 LBS | DIASTOLIC BLOOD PRESSURE: 56 MMHG | HEART RATE: 66 BPM | BODY MASS INDEX: 33.8 KG/M2 | SYSTOLIC BLOOD PRESSURE: 111 MMHG

## 2024-12-26 DIAGNOSIS — Z79.4 TYPE 2 DIABETES MELLITUS WITH MICROALBUMINURIA, WITH LONG-TERM CURRENT USE OF INSULIN: Primary | ICD-10-CM

## 2024-12-26 DIAGNOSIS — E11.29 TYPE 2 DIABETES MELLITUS WITH MICROALBUMINURIA, WITH LONG-TERM CURRENT USE OF INSULIN: Primary | ICD-10-CM

## 2024-12-26 DIAGNOSIS — I10 ESSENTIAL HYPERTENSION: ICD-10-CM

## 2024-12-26 DIAGNOSIS — K31.84 GASTROPARESIS: ICD-10-CM

## 2024-12-26 DIAGNOSIS — E78.5 DYSLIPIDEMIA: ICD-10-CM

## 2024-12-26 DIAGNOSIS — R80.9 TYPE 2 DIABETES MELLITUS WITH MICROALBUMINURIA, WITH LONG-TERM CURRENT USE OF INSULIN: Primary | ICD-10-CM

## 2024-12-26 LAB — GLUCOSE SERPL-MCNC: 106 MG/DL (ref 70–110)

## 2024-12-26 PROCEDURE — 99999 PR PBB SHADOW E&M-EST. PATIENT-LVL V: CPT | Mod: PBBFAC,,, | Performed by: NURSE PRACTITIONER

## 2024-12-26 RX ORDER — INSULIN LISPRO 100 [IU]/ML
INJECTION, SOLUTION INTRAVENOUS; SUBCUTANEOUS
Qty: 15 ML | Refills: 0 | Status: SHIPPED | OUTPATIENT
Start: 2024-12-26

## 2024-12-26 RX ORDER — ONDANSETRON 4 MG/1
TABLET, ORALLY DISINTEGRATING ORAL
Qty: 20 TABLET | Refills: 0 | Status: SHIPPED | OUTPATIENT
Start: 2024-12-26

## 2024-12-26 RX ORDER — INSULIN GLARGINE 300 [IU]/ML
INJECTION, SOLUTION SUBCUTANEOUS
Qty: 2 PEN | Refills: 2 | Status: SHIPPED | OUTPATIENT
Start: 2024-12-26

## 2024-12-26 NOTE — PROGRESS NOTES
"    Patient ID: Bianca Weldon is a 59 y.o. female.  Patient's current PCP is Scotty Figueroa MD.   Collaborating Physician: ISMAEL Hernandez MD    Chief Complaint: Diabetes Mellitus    HPI  Bianca Weldon is a 59 y.o. White female presenting for a follow up for diabetes. She is a patient of DIYA Whitlock, PhD, NP      Patient has been diagnosed with type 2 diabetes since 2014 .  Complications related to diabetes: peripheral neuropathy and gastroparesis  Recent diabetes related hospitalizations: none  Previous diabetes education:yes    Current diet: Informal carb counting. Variable fat intake. Weight stable  Activity level: none set    Current issues:Stopped OP5 on own due to discomfort at the site. Line of site issues. Tape caused itching/rash. Most discomfort when wearing on abdomen. Here today to review sugars    Personal history of pancreatitis: denies  Personal history of abdominal surgery: denies  Personal history of thyroid surgery: denies  Family history of pancreatic cancer in first-degree relative: denies  Family history of MTC/MEN/endocrine tumors: denies     Past Medical History:   Diagnosis Date    Abnormal Pap smear of cervix     Abnormal Pap smear of vagina     after hyst, repeat every 6 months, per pt, had bx's, unknown results    Acid reflux     Arthritis     Asthma     Bipolar 1 disorder     Depression     bipolar    Diabetes mellitus     Diabetes mellitus, type 2     Elevated alkaline phosphatase level     GERD (gastroesophageal reflux disease)     Hyperlipidemia     Hypertension     IBS (irritable bowel syndrome)     Interstitial cystitis     Narcolepsy     Nonalcoholic steatohepatitis     fatty liver    Orthostatic hypotension     PONV (postoperative nausea and vomiting)     Respiratory distress     States, "My lungs collapsed during hemorrhoid surgery."    Restless legs syndrome     Sleep apnea     DMITRI-CPAP    Thyroid disease      Social History     Socioeconomic History    Marital status: Significant " Other   Occupational History    Occupation: disabled   Tobacco Use    Smoking status: Former     Current packs/day: 0.00     Average packs/day: 1.5 packs/day for 33.0 years (49.5 ttl pk-yrs)     Types: Cigarettes     Start date: 1984     Quit date: 2017     Years since quittin.9     Passive exposure: Current    Smokeless tobacco: Never   Substance and Sexual Activity    Alcohol use: No    Drug use: No    Sexual activity: Yes     Partners: Male     Birth control/protection: Surgical     Comment: hyst; mut monog   Other Topics Concern    Are you pregnant or think you may be? No    Breast-feeding No     Social Drivers of Health     Financial Resource Strain: Low Risk  (2024)    Overall Financial Resource Strain (CARDIA)     Difficulty of Paying Living Expenses: Not hard at all   Food Insecurity: Unknown (2024)    Hunger Vital Sign     Worried About Running Out of Food in the Last Year: Never true     Ran Out of Food in the Last Year: Patient declined   Transportation Needs: Unknown (2024)    PRAPARE - Transportation     Lack of Transportation (Medical): No     Lack of Transportation (Non-Medical): Patient declined   Physical Activity: Insufficiently Active (2024)    Exercise Vital Sign     Days of Exercise per Week: 3 days     Minutes of Exercise per Session: 40 min   Stress: No Stress Concern Present (2024)    [place] [employer] - Occupational Stress Questionnaire     Feeling of Stress : Not at all   Housing Stability: Unknown (2024)    Housing Stability Vital Sign     Unable to Pay for Housing in the Last Year: Patient declined       Review of patient's allergies indicates:   Allergen Reactions    Bactrim [sulfamethoxazole-trimethoprim] Swelling, Other (See Comments) and Anaphylaxis    Latuda [lurasidone] Anaphylaxis    Macrodantin [nitrofurantoin macrocrystalline] Swelling, Other (See Comments) and Anaphylaxis    Canagliflozin      Yeast infections     Metformin Diarrhea       CURRENT DM MEDICATIONS:   Diabetes Medications               insulin glargine U-300 conc (TOUJEO MAX U-300 SOLOSTAR) 300 unit/mL (3 mL) insulin pen Inject 66 Units into the skin once daily. - Subcutaneous  60 units Every night    insulin lispro (HUMALOG KWIKPEN INSULIN) 100 unit/mL pen Inject 10 Units into the skin 3 (three) times daily.  2-6 depending on premeal glucose. If < 120 premeal will check post and if spikes > 150 after meal will give insulin    JANUVIA 100 mg Tab Take 1 tablet (100 mg total) by mouth once daily.     Past failed treatment(s) include:   Invokana ( yeast infection );   Metformin caused GI side effects; and   Trulicity / Mounjaro caused abdominal pain and discomfort.   Glimepiride discontinued due to hypoglycemia.     Meter/cgm: Dexcom G7  Blood glucose testing is performed regularly.   Patient is testing continuously times per day.  Any episodes of hypoglycemia? Yes, when not eating or not eating enough  Glucose trends:   Per CGM download, for the last 14 days:    Average glucose of 155 mg/dL. Patient is 75% in range. 22% of readings are mildly elevated with 3% of readings > 250. 0% hypoglycemia. SD 44 mg/dL. Estimated GMI -%. Glycemic control is generally stable. Trending low when not eating. Spiking after meals. Higher with high fat meals.      Her blood sugar in the clinic today was:   Lab Results   Component Value Date    POCGLU 106 12/26/2024       Statin: Taking  ACE/ARB: Taking    Labs reviewed and are noted below.    Screening or Prevention Patient's value Goal Complete/Controlled?   HgA1C Testing and Control   Lab Results   Component Value Date    HGBA1C 6.4 (H) 10/05/2024    HGBA1C 6.4 (H) 10/05/2024      Annually/Less than 8% Yes   Lipid profile : 04/22/2024 Annually Yes   LDL control Lab Results   Component Value Date    LDLCALC 69.6 04/22/2024    Annually/Less than 100 mg/dl  Yes   Nephropathy screening Lab Results   Component Value Date    MICALBCREAT  "Unable to calculate 08/09/2023     Lab Results   Component Value Date    PROTEINUA Negative 02/07/2024    Annually Yes   Blood pressure BP Readings from Last 1 Encounters:   12/26/24 (!) 111/56    Less than 140/90 Yes   Dilated retinal exam : 01/05/2023 DUE Annually Yes    Foot exam   : 10/05/2024 Annually Yes     Glucose   Date Value Ref Range Status   10/05/2024 98 70 - 110 mg/dL Final   10/05/2024 101 70 - 110 mg/dL Final     Anion Gap   Date Value Ref Range Status   10/05/2024 9 8 - 16 mmol/L Final   10/05/2024 8 8 - 16 mmol/L Final     eGFR if    Date Value Ref Range Status   04/06/2022 >60.0 >60 mL/min/1.73 m^2 Final     eGFR if non    Date Value Ref Range Status   04/06/2022 >60.0 >60 mL/min/1.73 m^2 Final     Comment:     Calculation used to obtain the estimated glomerular filtration  rate (eGFR) is the CKD-EPI equation.        Lab Results   Component Value Date    FREET4 1.30 06/27/2022    TSH 3.77 11/12/2024     No results found for: "CPEPTIDE"  No results found for: "GLUTAMICACID"    Wt Readings from Last 3 Encounters:   12/26/24 1345 75.9 kg (167 lb 5.3 oz)   10/21/24 1046 76.6 kg (168 lb 14 oz)   10/05/24 1147 75.7 kg (166 lb 14.2 oz)       Review of Systems   Constitutional:  Negative for malaise/fatigue and weight loss.   Eyes:  Negative for blurred vision and double vision.   Respiratory:  Negative for shortness of breath.    Cardiovascular: Negative.    Gastrointestinal:  Positive for constipation.   Genitourinary:  Negative for frequency.   Musculoskeletal:  Negative for myalgias.   Neurological: Negative.    Psychiatric/Behavioral: Negative.         Physical Exam  Vitals reviewed.   Constitutional:       General: She is not in acute distress.     Appearance: Normal appearance. She is obese.   Eyes:      Conjunctiva/sclera: Conjunctivae normal.      Pupils: Pupils are equal, round, and reactive to light.   Neck:      Thyroid: No thyroid mass, thyromegaly or thyroid " tenderness.      Vascular: No carotid bruit.   Cardiovascular:      Rate and Rhythm: Normal rate and regular rhythm.      Pulses: Normal pulses.      Heart sounds: Normal heart sounds.   Pulmonary:      Effort: Pulmonary effort is normal.      Breath sounds: Normal breath sounds.   Abdominal:      General: Bowel sounds are normal.      Palpations: Abdomen is soft.   Musculoskeletal:      Cervical back: Normal range of motion and neck supple.      Right lower leg: No edema.      Left lower leg: No edema.   Skin:     General: Skin is warm and dry.      Comments: Sites without hypertrophy and/or infection   Neurological:      General: No focal deficit present.      Mental Status: She is alert and oriented to person, place, and time.   Psychiatric:         Mood and Affect: Mood normal.         Behavior: Behavior normal.           Assessment & Plan    Bianca was seen today for diabetes mellitus.    Diagnoses and all orders for this visit:    Type 2 diabetes mellitus with microalbuminuria, with long-term current use of insulin - controlled per A1c  -     POCT Glucose, Hand-Held Device  -     insulin glargine U-300 conc (TOUJEO MAX U-300 SOLOSTAR) 300 unit/mL (3 mL) insulin pen; Inject 66 Units into the skin once daily. - Subcutaneous  -     insulin lispro (HUMALOG KWIKPEN INSULIN) 100 unit/mL pen; Inject up to 50 units per day  -     SITagliptin phosphate (JANUVIA) 100 MG Tab; Take 1 tablet (100 mg total) by mouth once daily.        -    Lower Toujeo to 58 units every night to prevent hypoglycemia between meals        -    Increase Lispro/Humalog by 1 unit with meals (increase to 4-6)         -    Recommend bolusing before meals based on premeal glucose AND carb intake         -    Divide total meal insulin in half before meal/half one hour later for high fat meals        -    Discussed solutions to site concerns with OP5 if chooses to resume  Gastroparesis  -     ondansetron (ZOFRAN-ODT) 4 MG TbDL; DISSOLVE 1 TABLET ON  THE TONGUE EVERY 6 HOURS AS NEEDED FOR NAUSEA    Essential hypertension - at goal on low dose ARB    Dyslipidemia  - on statin            - Follow up: 2 months with Renetta     Visit today included increased complexity associated with the care of the episodic problem Hyperglycemia addressed and managing the longitudinal care of the patient due to the serious and/or complex managed problem(s) t2dm.

## 2024-12-26 NOTE — Clinical Note
aRn Jackson,  Saw her today - doing pretty well off OP5. Not taking enough insulin with meals, sheri at night. Tried to lower Toujeo a small bit and increase Humalog a little - if she will do it. We Discussed site issue solutions if she chooses to go back to pump - B.

## 2024-12-27 ENCOUNTER — TELEPHONE (OUTPATIENT)
Dept: DIABETES | Facility: CLINIC | Age: 59
End: 2024-12-27
Payer: MEDICARE

## 2024-12-27 NOTE — TELEPHONE ENCOUNTER
Received PA from [employer], stated unable to located member in system. Called pharmacy to check coverage of Humalog. Per pharmacy, covered but came back as refill too soon

## 2025-01-28 DIAGNOSIS — B37.31 VULVOVAGINAL CANDIDIASIS: ICD-10-CM

## 2025-01-28 RX ORDER — FLUCONAZOLE 150 MG/1
150 TABLET ORAL WEEKLY
Qty: 4 TABLET | Refills: 5 | Status: SHIPPED | OUTPATIENT
Start: 2025-01-28

## 2025-02-28 DIAGNOSIS — G47.10 HYPERSOMNIA: ICD-10-CM

## 2025-02-28 DIAGNOSIS — N95.2 ATROPHIC VAGINITIS: ICD-10-CM

## 2025-02-28 RX ORDER — PANTOPRAZOLE SODIUM 40 MG/1
40 TABLET, DELAYED RELEASE ORAL
Qty: 90 TABLET | Refills: 0 | Status: SHIPPED | OUTPATIENT
Start: 2025-02-28

## 2025-02-28 RX ORDER — SOLRIAMFETOL 150 MG/1
1 TABLET, FILM COATED ORAL
Qty: 30 TABLET | Refills: 0 | Status: SHIPPED | OUTPATIENT
Start: 2025-02-28

## 2025-02-28 NOTE — TELEPHONE ENCOUNTER
Provider Staff:  Action required for this patient    Requires appointment   Requires labs      Please see care gap opportunities below in Care Due Message.    Thanks!  [facility]     Appointments      Date Provider   Last Visit   1/29/2024 Scotty Figueroa MD   Next Visit   Visit date not found Scotty Figueroa MD     Refill Decision Note   Bianca Weldon  is requesting a refill authorization.  Brief Assessment and Rationale for Refill:  Approve     Medication Therapy Plan:         Comments:     Note composed:11:03 AM 02/28/2025

## 2025-02-28 NOTE — TELEPHONE ENCOUNTER
Care Due:                  Date            Visit Type   Department     Provider  --------------------------------------------------------------------------------                                EP -                              PRIMARY      [facility]  Last Visit: 01-      CARE ([facility])   MEDICINE       Scotty Figueroa  Next Visit: None Scheduled  None         None Found                                                            Last  Test          Frequency    Reason                     Performed    Due Date  --------------------------------------------------------------------------------    Office Visit  12 months..  atorvastatin, diclofenac,   01- 01-                             losartan.................    CBC.........  12 months..  diclofenac...............  04- 04-    [employer] Embedded Care Due Messages. Reference number: [id number].   2/28/2025 8:05:34 AM CST

## 2025-02-28 NOTE — TELEPHONE ENCOUNTER
Refill Routing Note   Medication(s) are not appropriate for processing by [facility] for the following reason(s):        Patient not seen by provider within 15 months    [facility] action(s):  Defer               Appointments  past 12m or future 3m with PCP    Date Provider   Last Visit   9/21/2023 Nieves Alberts MD   Next Visit   Visit date not found Nieves Alberts MD   ED visits in past 90 days: 0        Note composed:9:32 AM 02/28/2025

## 2025-03-03 DIAGNOSIS — R80.9 TYPE 2 DIABETES MELLITUS WITH MICROALBUMINURIA, WITH LONG-TERM CURRENT USE OF INSULIN: ICD-10-CM

## 2025-03-03 DIAGNOSIS — Z79.4 TYPE 2 DIABETES MELLITUS WITH MICROALBUMINURIA, WITH LONG-TERM CURRENT USE OF INSULIN: ICD-10-CM

## 2025-03-03 DIAGNOSIS — E11.29 TYPE 2 DIABETES MELLITUS WITH MICROALBUMINURIA, WITH LONG-TERM CURRENT USE OF INSULIN: ICD-10-CM

## 2025-03-03 RX ORDER — INSULIN LISPRO 100 [IU]/ML
INJECTION, SOLUTION INTRAVENOUS; SUBCUTANEOUS
Qty: 15 ML | Refills: 0 | Status: SHIPPED | OUTPATIENT
Start: 2025-03-03

## 2025-03-03 RX ORDER — ESTRADIOL 0.1 MG/G
CREAM VAGINAL
Qty: 42.5 G | Refills: 0 | Status: SHIPPED | OUTPATIENT
Start: 2025-03-03

## 2025-03-12 ENCOUNTER — OFFICE VISIT (OUTPATIENT)
Dept: PULMONOLOGY | Facility: CLINIC | Age: 60
End: 2025-03-12
Payer: MEDICARE

## 2025-03-12 VITALS — WEIGHT: 164 LBS | BODY MASS INDEX: 34.43 KG/M2 | HEIGHT: 58 IN

## 2025-03-12 DIAGNOSIS — R80.9 TYPE 2 DIABETES MELLITUS WITH MICROALBUMINURIA, WITH LONG-TERM CURRENT USE OF INSULIN: ICD-10-CM

## 2025-03-12 DIAGNOSIS — E78.5 DYSLIPIDEMIA: ICD-10-CM

## 2025-03-12 DIAGNOSIS — E03.8 OTHER SPECIFIED HYPOTHYROIDISM: ICD-10-CM

## 2025-03-12 DIAGNOSIS — I10 ESSENTIAL HYPERTENSION: ICD-10-CM

## 2025-03-12 DIAGNOSIS — G47.33 OSA ON CPAP: ICD-10-CM

## 2025-03-12 DIAGNOSIS — J44.89 ASTHMATIC BRONCHITIS , CHRONIC: Primary | ICD-10-CM

## 2025-03-12 DIAGNOSIS — Z79.4 TYPE 2 DIABETES MELLITUS WITH MICROALBUMINURIA, WITH LONG-TERM CURRENT USE OF INSULIN: ICD-10-CM

## 2025-03-12 DIAGNOSIS — E11.29 TYPE 2 DIABETES MELLITUS WITH MICROALBUMINURIA, WITH LONG-TERM CURRENT USE OF INSULIN: ICD-10-CM

## 2025-03-12 DIAGNOSIS — G47.429 NARCOLEPSY DUE TO UNDERLYING CONDITION WITHOUT CATAPLEXY: ICD-10-CM

## 2025-03-12 NOTE — PROGRESS NOTES
The patient location is: [facility]  The chief complaint leading to consultation is:   Chief Complaint   Patient presents with    Fatigue    Sleep Apnea         Visit type: audiovisual    Face to Face time with patient: 12 min   30 minutes of total time spent on the encounter, which includes face to face time and non-face to face time preparing to see the patient (eg, review of tests), Obtaining and/or reviewing separately obtained history, Documenting clinical information in the electronic or other health record, Independently interpreting results (not separately reported) and communicating results to the patient/family/caregiver, or Care coordination (not separately reported).         Each patient to whom he or she provides medical services by telemedicine is:  (1) informed of the relationship between the physician and patient and the respective role of any other health care provider with respect to management of the patient; and (2) notified that he or she may decline to receive medical services by telemedicine and may withdraw from such care at any time.    Notes:       Immunization History   Administered Date(s) Administered    COVID-19, MRNA, LN-S, PF (Pfizer) (Purple Cap) 2021, 2021    Hepatitis B, Adult 2014, 10/13/2014, 2015    Influenza 10/29/2019    Influenza - Quadrivalent 2016, 2017, 2022    Influenza - Quadrivalent - PF *Preferred* (6 months and older) 10/26/2018, 2020    Pneumococcal Polysaccharide - 23 Valent 2014, 2018    Tdap 2014    Zoster Recombinant 2020, 2021      Social History     Tobacco Use   Smoking Status Former    Current packs/day: 0.00    Average packs/day: 1.5 packs/day for 33.0 years (49.5 ttl pk-yrs)    Types: Cigarettes    Start date: 1984    Quit date: 2017    Years since quittin.1    Passive exposure: Current   Smokeless Tobacco Never        Subjective:      Bianca Weldon is a 60 y.o.  female with known Chronic bronchitis   Last seen  05/11/2018  Also has DMITRI on CPAP and ? Narcolepsy on NUVIGIL follow by [facility]  New symptoms: several months, worse 2-3 weeks, someone sitting on chest, worse with activity, better with rest  No cough No wheezing, No sputum  Now using CPAP and fans during day.  Avoid physical activity  Taking BREO and Combivent. Last PFT were normal.   Worse with heat, mask  Immunizations are current.  Weight gain from 159lb to 181 Lb  I have reviewed the patient's medical history in detail and updated the computerized patient record.    10/07/2022  Last seen 08/31/2022  Still has SOB and Asthma   On BREO  Wheezing  Ask for nebulizer  ACT is [place] reveiwed  FeNo was 5     11/11/2022  Last seen 10/07/2022  Better now on TRELEGY and Neb Albuterol  No cough, SOB wheezing  Narcolepsy and DMITRI followed by [facility]:  [place] 8.  Apap 11-13  AHI 0.6  Usage > 4 hrs 76.7%  Sees me mainly for asthma  Asthma sick plan  Spacer given    05/09/2023  Follow up to review   Here to review results  Asthma Stable  No flares  No cough, No wheezing  ACT 21  FeNo was 11: low  Normal Spirometry  Normal CXR  Adherent with CPAp: need download  Mask: nasal  Adherent with SUNOSI       06/12/2024   Follow-up visit    reveiwed  Bed time: 12AM- 1 am  Wake time: 6-7 am  No DTS   Stable on SUNOSI  No sleepy driving  Needs refill  Using CPAP and benefits  Mask: Nasal  [facility] station  Needs bring card in  Inhaler: TRELEGY  No cough, No SOB  No recent exacebations    03/12/2025   Follow-up visit   Using CPAP and Benefit  Has Albuterol and Trelegy  Using CPAP  Mask : Nasal  Bed time: 10- 12 MN  Wake time: 5 am  Tired  in day  Using Sunosi  Had to pull over once when driving  May consider adding Wakix  Download data only availble 05/14/2024  to 06/12/2024  Needs spacer device  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 a theatre or a meeting) 0   As a passenger in a car for an hour without a break 0   Lying down to rest in the afternoon when circumstances permit 1   Sitting and talking to someone 0   Sitting quietly after a lunch without alcohol 0   In a car, while stopped for a few minutes in traffic 1   Total score 3           The following portions of the patient's history were reviewed and updated as appropriate:   She  has a past medical history of Abnormal Pap smear of cervix, Abnormal Pap smear of vagina, Acid reflux, Arthritis, Asthma, Bipolar 1 disorder, Depression, Diabetes mellitus, Diabetes mellitus, type 2, Elevated alkaline phosphatase level, GERD (gastroesophageal reflux disease), Hyperlipidemia, Hypertension, IBS (irritable bowel syndrome), Interstitial cystitis, Narcolepsy, Nonalcoholic steatohepatitis, Orthostatic hypotension, PONV (postoperative nausea and vomiting), Respiratory distress, Restless legs syndrome, Sleep apnea, and Thyroid disease.  She does not have any pertinent problems on file.  She  has a past surgical history that includes Appendectomy; Tonsillectomy; Cystostomy w/ bladder biopsy; Hysterectomy (2001); Oophorectomy (2001); Dilation and curettage of uterus; Adenoidectomy; [place] tooth extraction; Colonoscopy; Esophageal dilation; julia shoulder surgery; Adenoidectomy; Hemorrhoid surgery; and Knee arthroscopy w/ meniscectomy (Right, 04/27/2021).  Her family history includes Anesthesia problems in her sister; Breast cancer in her maternal grandmother; Colon cancer in her father; Diabetes Mellitus in her father and mother; Heart disease (age of onset: 45) in her father; Melanoma in her mother; Rheum arthritis in her maternal grandmother; Thrombophilia in her paternal grandmother.  She  reports that she quit smoking about 8 years ago. Her smoking use included cigarettes. She started smoking about 41 years ago. She has a 49.5 pack-year smoking history. She  has been exposed to tobacco smoke. She has never used smokeless tobacco. She reports that she does not drink alcohol and does not use drugs.  She has a current medication list which includes the following prescription(s): albuterol, albuterol, atorvastatin, blood sugar diagnostic, blood-glucose sensor, clindamycin, cyclobenzaprine, austedo, diclofenac sodium, dicyclomine, estradiol, fluconazole, fludrocortisone, fluticasone propionate, fluticasone-umeclidin-vilanter, insulin glargine u-300 conc, insulin lispro, lamotrigine, levocetirizine, levothyroxine, lidocaine-prilocaine, lithium, lorazepam, losartan, multivitamin, myrbetriq, paxlovid, ondansetron, pantoprazole, pen needle, diabetic, sitagliptin phosphate, sunosi, trintellix, inhalation spacing device, ketoconazole, and triamcinolone acetonide 0.1%, and the following Facility-Administered Medications: nozaseptin.  Current Outpatient Medications on File Prior to Visit   Medication Sig Dispense Refill    albuterol (ACCUNEB) 1.25 mg/3 mL Nebu Take 6 mLs (2.5 mg total) by nebulization every 6 (six) hours as needed (SOB). Rescue 150 mL 11    albuterol (PROVENTIL/VENTOLIN HFA) 90 mcg/actuation inhaler Inhale 2 puffs into the lungs every 6 (six) hours as needed for Wheezing. Rescue 18 g 5    atorvastatin (LIPITOR) 40 MG tablet Take 1 tablet (40 mg total) by mouth once daily. 90 tablet 0    blood sugar diagnostic Strp Inject 1 each into the skin 3 (three) times daily. Dispense preferred on insurance 100 strip 11    blood-glucose sensor (DEXCOM G7 SENSOR MISC) by Misc.(Non-Drug; Combo Route) route.      clindamycin (CLEOCIN T) 1 % external solution APPLY TO THE AFFECTED AREA TWICE DAILY AS NEEDED FOR ACE/ FOLLICULITIS ON FACE AND SCALP 60 mL 4    cyclobenzaprine (FLEXERIL) 10 MG tablet Take 10 mg by mouth every evening.      deutetrabenazine (AUSTEDO) 9 mg Tab Take 9 mg by mouth 2 (two) times daily.      diclofenac sodium (VOLTAREN) 1 % Gel APPLY FOUR GRAMS TOPICALLY  EVERY DAY AS DIRECTED 100 g 3    dicyclomine (BENTYL) 20 mg tablet Take 1 tablet (20 mg total) by mouth 3 (three) times daily as needed (abdominal pain). 90 tablet 0    estradioL (ESTRACE) 0.01 % (0.1 mg/gram) vaginal cream Place 1 gram vaginally every 7 days. 42.5 g 0    fluconazole (DIFLUCAN) 150 MG Tab TAKE ONE TABLET BY MOUTH ONCE A WEEK 4 tablet 5    fludrocortisone (FLORINEF) 0.1 mg Tab Take 100 mcg by mouth once daily.      fluticasone propionate (FLONASE) 50 mcg/actuation nasal spray USE TWO SPRAYS IN EACH NOSTRIL DAILY 48 g 0    fluticasone-umeclidin-vilanter (TRELEGY ELLIPTA) 100-62.5-25 mcg DsDv Inhale 1 puff into the lungs once daily. 60 each 5    insulin glargine U-300 conc (TOUJEO MAX U-300 SOLOSTAR) 300 unit/mL (3 mL) insulin pen Inject 66 Units into the skin once daily. - Subcutaneous 2 Pen 2    insulin lispro (HUMALOG KWIKPEN INSULIN) 100 unit/mL pen Inject up to 50 units per day 15 mL 0    lamoTRIgine (LAMICTAL) 200 MG tablet Take 1 tablet by mouth 2 (two) times daily.      levocetirizine (XYZAL) 5 MG tablet Take 1 tablet (5 mg total) by mouth every evening. 30 tablet 11    levothyroxine (SYNTHROID) 75 MCG tablet TAKE ONE TABLET BY MOUTH EVERY DAY 90 tablet 0    LIDOcaine-prilocaine (EMLA) cream SMARTSI-4.8 Gram(s) Topical      lithium (ESKALITH) 300 MG capsule Take 900 mg by mouth every evening.      LORazepam (ATIVAN) 1 MG tablet Take 1 tablet (1 mg total) by mouth 2 (two) times daily as needed for Anxiety (takes nightly). 60 tablet 2    losartan (COZAAR) 25 MG tablet Take 1 tablet (25 mg total) by mouth once daily. 90 tablet 1    multivitamin capsule Take 1 capsule by mouth once daily.      MYRBETRIQ 50 mg Tb24 Take 50 mg by mouth 2 (two) times daily as needed.      nirmatrelvir-ritonavir (PAXLOVID, EUA,) 300 mg (150 mg x 2)-100 mg copackaged tablets (EUA)       ondansetron (ZOFRAN-ODT) 4 MG TbDL DISSOLVE 1 TABLET ON THE TONGUE EVERY 6 HOURS AS NEEDED FOR NAUSEA 20 tablet 0    pantoprazole  "(PROTONIX) 40 MG tablet TAKE 1 TABLET BY MOUTH DAILY 90 tablet 0    pen needle, diabetic 31 gauge x 3/16" Ndle Inject 1 Stick into the skin 5 (five) times daily. 150 each 6    SITagliptin phosphate (JANUVIA) 100 MG Tab Take 1 tablet (100 mg total) by mouth once daily. 30 tablet 2    solriamfetoL (SUNOSI) 150 mg Tab TAKE ONE TABLET BY MOUTH ONCE DAILY 30 tablet 0    TRINTELLIX 20 mg Tab Take 1 tablet by mouth once daily.      ketoconazole (NIZORAL) 2 % cream Apply topically 2 (two) times daily. Apply to the affected area twice daily. (Patient taking differently: Apply topically 2 (two) times daily as needed. Apply to the affected area twice daily.) 30 g 2    triamcinolone acetonide 0.1% (KENALOG) 0.1 % cream Apply topically 2 (two) times daily. Do not use on face (Patient taking differently: Apply topically 2 (two) times daily as needed. Do not use on face) 45 Bottle 6     Current Facility-Administered Medications on File Prior to Visit   Medication Dose Route Frequency Provider Last Rate Last Admin    nozaseptin (NOZIN) nasal    Each Nostril On Call Procedure Kenrick Gray MD   Given at 04/27/21 1222     She is allergic to bactrim [sulfamethoxazole-trimethoprim], latuda [lurasidone], macrodantin [nitrofurantoin macrocrystalline], canagliflozin, and metformin..     Review of Systems   Constitutional:  Negative for malaise/fatigue.   Respiratory:  Negative for shortness of breath.    Cardiovascular:  Negative for chest pain (chest pressure).   All other systems reviewed and are negative.     Objective:      Ht 4' 10" (1.473 m)   Wt 74.4 kg (164 lb)   BMI 34.28 kg/m²        Physical Exam  Vitals and nursing note reviewed.   Constitutional:       General: She is not in acute distress.     Appearance: She is well-developed. She is not diaphoretic.       HENT:      Head: Normocephalic.      Nose: No mucosal edema or rhinorrhea.      Mouth/Throat:      Pharynx: No oropharyngeal exudate.   Eyes:      " General:         Left eye: No discharge.      Conjunctiva/sclera: Conjunctivae normal.      Pupils: Pupils are equal, round, and reactive to light.   Neck:      Thyroid: No thyromegaly.      Vascular: No JVD.      Trachea: No tracheal deviation.   Cardiovascular:      Rate and Rhythm: Normal rate and regular rhythm.      Pulses: Normal pulses.      Heart sounds: Normal heart sounds. No murmur heard.  Pulmonary:      Effort: Pulmonary effort is normal. No respiratory distress.      Breath sounds: Normal breath sounds. No wheezing.   Abdominal:      General: Bowel sounds are normal. There is no distension.      Palpations: Abdomen is soft.      Tenderness: There is no abdominal tenderness.   Musculoskeletal:         General: Normal range of motion.      Cervical back: Normal range of motion and neck supple.   Skin:     General: Skin is warm and dry.   Neurological:      Mental Status: She is alert and oriented to person, place, and time.      Cranial Nerves: No cranial nerve deficit.                   X-Ray Abdomen Flat And Erect  Narrative: EXAM: XR ABDOMEN FLAT AND ERECT    CLINICAL HISTORY: Generalized abdominal pain    COMPARISON: None    TECHNIQUE: Supine and erect views of the abdomen and pelvis    FINDINGS: No gross intraperitoneal free air.  No dilated loops of small bowel or colon to suggest obstruction or ileus.  No radiopaque renal calculi identified.  Tiny calcifications in the left pelvis are presumed phleboliths.  Degenerative changes of the spine.  The regional osseous structures appear intact.  Impression:  No radiographic evidence of acute abdominal disease.    Finalized on: 10/21/2024 11:28 AM By:  Dilip Salguero MD  [facility]# [id number]      2024-10-21 11:30:38.105    [facility]       Lab Results   Component Value Date    PREFVC 2.09 06/12/2024    HHYTUE9603 1.37 06/12/2024    PREPEF 5.64 06/12/2024    LCCFCK069 6.58 06/12/2024    KJETHM5WHZ 75.68 06/12/2024    POSTFVC 2.08 06/12/2024    POSTFEV1 1.65  2024    [id number] 1.60 2024    POSTPEF 4.26 2024    [id number] 6.55 2024    YWOIUOC97 3.68 05/10/2018    WTROTDY4WSB 79.09 2024    OWCGGUS0CWB 79.55 05/10/2018    PREDICTEDFVC 2.68 05/10/2018    PREDICTEDFEV 2.11 05/10/2018           Assessment:       Problem List Items Addressed This Visit       Hypothyroid    Essential hypertension    Stable on Cozaar         Dyslipidemia    Stable on Lipitor         BMI 33.0-33.9,adult    Patient would benefit from weight loss and has tried to set realistic goals to achieve success. Lifestyle changes were discussed on eating healthy, exercising at least 150 minutes weekly, and reducing sedentary behavior.   Discussed the risk factors associated with obesity: Arthritis/DMITRI/Diabetes/Fatty Liver/Cardiovascular disease/GERD/HTN/HLP.          Asthmatic bronchitis , chronic - Primary    Into follow up for updated PFTs   Stable on Trelegy Ellipta and albuterol   Apparently not getting enough benefit from the rescue inhaler   Spacer ordered   Pulmonary disease management   Inhaler technique things to reviewed with in-person office visit         Relevant Medications    inhalation spacing device    Other Relevant Orders    Fraction of  Nitric Oxide    Spirometry with/without bronchodilator    X-Ray Chest PA And Lateral    Ambulatory referral/consult to Pulmonary Disease Management w/ Respiratory Therapist    Type 2 diabetes mellitus with microalbuminuria, with long-term current use of insulin    STABLE; NAZARIO ROBERTO TOUJEO         DMITRI on CPAP    Subjectively reports adherence     APAP 11-13  Using and benefits      Bring card for download    Nasal mask         Relevant Orders    CPAP/BIPAP SUPPLIES    Narcolepsy        3/12/2025   EPWORTH SLEEPINESS SCALE   Sitting and reading 1   Watching TV 0   Sitting, inactive in a public place (e.g. a theatre or a meeting) 0   As a passenger in a car for an hour without a break 0   Lying down to rest in the  afternoon when circumstances permit 1   Sitting and talking to someone 0   Sitting quietly after a lunch without alcohol 0   In a car, while stopped for a few minutes in traffic 1   Total score 3        Refilled SUNOSI     reviewed           We will need additional spirometry  Bring CPAP for download    May need additional medication if she is having daytime sleepiness    Plan:      Follow up in about 8 weeks (around 5/7/2025), or PDM, may add Wakix, cxr, jose, FeNo, download.    This note was prepared using voice recognition system and is likely to have sound alike errors that may have been overlooked even after proof reading.  Please call me with any questions    Discussed diagnosis, its evaluation, treatment and usual course. All questions answered.    Thank you for the courtesy of participating in the care of this patient    Steve Best MD

## 2025-03-12 NOTE — ASSESSMENT & PLAN NOTE
3/12/2025   EPWORTH SLEEPINESS SCALE   Sitting and reading 1   Watching TV 0   Sitting, inactive in a public place (e.g. a theatre or a meeting) 0   As a passenger in a car for an hour without a break 0   Lying down to rest in the afternoon when circumstances permit 1   Sitting and talking to someone 0   Sitting quietly after a lunch without alcohol 0   In a car, while stopped for a few minutes in traffic 1   Total score 3        Refilled SUNOSI     reviewed

## 2025-03-12 NOTE — ASSESSMENT & PLAN NOTE
Patient would benefit from weight loss and has tried to set realistic goals to achieve success. Lifestyle changes were discussed on eating healthy, exercising at least 150 minutes weekly, and reducing sedentary behavior.   Discussed the risk factors associated with obesity: Arthritis/DMITRI/Diabetes/Fatty Liver/Cardiovascular disease/GERD/HTN/HLP.

## 2025-03-12 NOTE — ASSESSMENT & PLAN NOTE
Into follow up for updated PFTs   Stable on Trelegy Ellipta and albuterol   Apparently not getting enough benefit from the rescue inhaler   Spacer ordered   Pulmonary disease management   Inhaler technique things to reviewed with in-person office visit

## 2025-03-28 DIAGNOSIS — R80.9 TYPE 2 DIABETES MELLITUS WITH MICROALBUMINURIA, WITH LONG-TERM CURRENT USE OF INSULIN: ICD-10-CM

## 2025-03-28 DIAGNOSIS — Z79.4 TYPE 2 DIABETES MELLITUS WITH MICROALBUMINURIA, WITH LONG-TERM CURRENT USE OF INSULIN: ICD-10-CM

## 2025-03-28 DIAGNOSIS — G47.10 HYPERSOMNIA: ICD-10-CM

## 2025-03-28 DIAGNOSIS — E11.29 TYPE 2 DIABETES MELLITUS WITH MICROALBUMINURIA, WITH LONG-TERM CURRENT USE OF INSULIN: ICD-10-CM

## 2025-03-28 DIAGNOSIS — N95.2 ATROPHIC VAGINITIS: ICD-10-CM

## 2025-03-28 RX ORDER — SOLRIAMFETOL 150 MG/1
1 TABLET, FILM COATED ORAL
Qty: 30 TABLET | Refills: 4 | Status: SHIPPED | OUTPATIENT
Start: 2025-03-28

## 2025-03-28 RX ORDER — INSULIN LISPRO 100 [IU]/ML
8 INJECTION, SOLUTION INTRAVENOUS; SUBCUTANEOUS
Qty: 15 ML | Refills: 3 | Status: SHIPPED | OUTPATIENT
Start: 2025-03-28

## 2025-03-28 NOTE — TELEPHONE ENCOUNTER
Refill Routing Note   Medication(s) are not appropriate for processing by [facility] for the following reason(s):        Patient not seen by provider within 15 months    [facility] action(s):  Defer               Appointments  past 12m or future 3m with PCP    Date Provider   Last Visit   9/21/2023 Nieves Alberts MD   Next Visit   Visit date not found Nieves Alberts MD   ED visits in past 90 days: 0        Note composed:8:30 AM 03/28/2025

## 2025-03-31 RX ORDER — ESTRADIOL 0.1 MG/G
CREAM VAGINAL
Qty: 42.5 G | Refills: 0 | Status: SHIPPED | OUTPATIENT
Start: 2025-03-31

## 2025-04-28 ENCOUNTER — PATIENT MESSAGE (OUTPATIENT)
Dept: DIABETES | Facility: CLINIC | Age: 60
End: 2025-04-28
Payer: MEDICARE

## 2025-04-29 NOTE — TELEPHONE ENCOUNTER
Spoke with Sameer about order placed in [employer]acte. Was advised by rep, the order was accepted but marked as rejected due to the patient being est with company. Advised rep we are still getting faxed requested for a new order. Asked rep if order can not be marked as rejected if they are filling the request. Rep will speak with supervisor about issues and call office back.

## 2025-04-29 NOTE — TELEPHONE ENCOUNTER
UPDATE per Duramed:     Given this is a current patient I will reject this request in [place] but the request will still be processed with the documents that were uploaded .Thank you!

## 2025-04-30 DIAGNOSIS — E03.9 HYPOTHYROIDISM, UNSPECIFIED TYPE: ICD-10-CM

## 2025-04-30 DIAGNOSIS — N95.2 ATROPHIC VAGINITIS: ICD-10-CM

## 2025-04-30 RX ORDER — ESTRADIOL 0.1 MG/G
CREAM VAGINAL
Qty: 42.5 G | Refills: 0 | Status: SHIPPED | OUTPATIENT
Start: 2025-04-30

## 2025-04-30 NOTE — TELEPHONE ENCOUNTER
Care Due:                  Date            Visit Type   Department     Provider  --------------------------------------------------------------------------------                                EP -                              PRIMARY      [facility] INTER[facility]  Last Visit: 01-      CARE ([facility])   MEDICINE       Scotty Figueroa  Next Visit: None Scheduled  None         None Found                                                            Last  Test          Frequency    Reason                     Performed    Due Date  --------------------------------------------------------------------------------    Office Visit  12 months..  atorvastatin, diclofenac,   01- 01-                             losartan, pantoprazole...    CBC.........  12 months..  diclofenac...............  Not Found    Overdue    Lipid Panel.  12 months..  atorvastatin.............  04- 04-    [employer] Embedded Care Due Messages. Reference number: [id number].   4/30/2025 12:44:41 PM CDT

## 2025-05-03 RX ORDER — LEVOTHYROXINE SODIUM 75 UG/1
75 TABLET ORAL
Qty: 90 TABLET | Refills: 0 | Status: SHIPPED | OUTPATIENT
Start: 2025-05-03

## 2025-05-03 RX ORDER — FLUTICASONE PROPIONATE 50 MCG
2 SPRAY, SUSPENSION (ML) NASAL
Qty: 48 G | Refills: 0 | Status: SHIPPED | OUTPATIENT
Start: 2025-05-03

## 2025-05-03 RX ORDER — ATORVASTATIN CALCIUM 40 MG/1
40 TABLET, FILM COATED ORAL DAILY
Qty: 90 TABLET | Refills: 0 | Status: SHIPPED | OUTPATIENT
Start: 2025-05-03

## 2025-05-11 ENCOUNTER — PATIENT MESSAGE (OUTPATIENT)
Dept: DIABETES | Facility: CLINIC | Age: 60
End: 2025-05-11
Payer: MEDICARE

## 2025-05-14 ENCOUNTER — OFFICE VISIT (OUTPATIENT)
Dept: FAMILY MEDICINE | Facility: CLINIC | Age: 60
End: 2025-05-14
Payer: MEDICARE

## 2025-05-14 VITALS
HEART RATE: 80 BPM | HEIGHT: 58 IN | WEIGHT: 161.06 LBS | TEMPERATURE: 99 F | BODY MASS INDEX: 33.81 KG/M2 | RESPIRATION RATE: 16 BRPM | SYSTOLIC BLOOD PRESSURE: 110 MMHG | DIASTOLIC BLOOD PRESSURE: 60 MMHG | OXYGEN SATURATION: 99 %

## 2025-05-14 DIAGNOSIS — E03.9 HYPOTHYROIDISM, UNSPECIFIED TYPE: ICD-10-CM

## 2025-05-14 DIAGNOSIS — J01.90 ACUTE NON-RECURRENT SINUSITIS, UNSPECIFIED LOCATION: ICD-10-CM

## 2025-05-14 DIAGNOSIS — E78.5 DYSLIPIDEMIA: ICD-10-CM

## 2025-05-14 DIAGNOSIS — R79.9 ABNORMAL FINDING OF BLOOD CHEMISTRY, UNSPECIFIED: ICD-10-CM

## 2025-05-14 DIAGNOSIS — E66.01 SEVERE OBESITY (BMI 35.0-39.9) WITH COMORBIDITY: ICD-10-CM

## 2025-05-14 DIAGNOSIS — E11.29 TYPE 2 DIABETES MELLITUS WITH MICROALBUMINURIA, WITH LONG-TERM CURRENT USE OF INSULIN: ICD-10-CM

## 2025-05-14 DIAGNOSIS — G90.3 NEUROGENIC ORTHOSTATIC HYPOTENSION: ICD-10-CM

## 2025-05-14 DIAGNOSIS — G25.81 RLS (RESTLESS LEGS SYNDROME): Primary | ICD-10-CM

## 2025-05-14 DIAGNOSIS — F25.0 SCHIZOAFFECTIVE DISORDER, BIPOLAR TYPE: ICD-10-CM

## 2025-05-14 DIAGNOSIS — Z79.4 TYPE 2 DIABETES MELLITUS WITH MICROALBUMINURIA, WITH LONG-TERM CURRENT USE OF INSULIN: ICD-10-CM

## 2025-05-14 DIAGNOSIS — G47.33 OSA ON CPAP: ICD-10-CM

## 2025-05-14 DIAGNOSIS — R80.9 TYPE 2 DIABETES MELLITUS WITH MICROALBUMINURIA, WITH LONG-TERM CURRENT USE OF INSULIN: ICD-10-CM

## 2025-05-14 DIAGNOSIS — I10 ESSENTIAL HYPERTENSION: ICD-10-CM

## 2025-05-14 PROCEDURE — 99999 PR PBB SHADOW E&M-EST. PATIENT-LVL V: CPT | Mod: PBBFAC,,, | Performed by: FAMILY MEDICINE

## 2025-05-14 PROCEDURE — 1159F MED LIST DOCD IN RCRD: CPT | Mod: CPTII,S$GLB,, | Performed by: FAMILY MEDICINE

## 2025-05-14 PROCEDURE — 3008F BODY MASS INDEX DOCD: CPT | Mod: CPTII,S$GLB,, | Performed by: FAMILY MEDICINE

## 2025-05-14 PROCEDURE — 99215 OFFICE O/P EST HI 40 MIN: CPT | Mod: S$GLB,,, | Performed by: FAMILY MEDICINE

## 2025-05-14 PROCEDURE — 3074F SYST BP LT 130 MM HG: CPT | Mod: CPTII,S$GLB,, | Performed by: FAMILY MEDICINE

## 2025-05-14 PROCEDURE — G2211 COMPLEX E/M VISIT ADD ON: HCPCS | Mod: S$GLB,,, | Performed by: FAMILY MEDICINE

## 2025-05-14 PROCEDURE — 4010F ACE/ARB THERAPY RXD/TAKEN: CPT | Mod: CPTII,S$GLB,, | Performed by: FAMILY MEDICINE

## 2025-05-14 PROCEDURE — 3078F DIAST BP <80 MM HG: CPT | Mod: CPTII,S$GLB,, | Performed by: FAMILY MEDICINE

## 2025-05-14 RX ORDER — PROMETHAZINE HYDROCHLORIDE AND DEXTROMETHORPHAN HYDROBROMIDE 6.25; 15 MG/5ML; MG/5ML
5 SYRUP ORAL 3 TIMES DAILY PRN
Qty: 118 ML | Refills: 0 | Status: SHIPPED | OUTPATIENT
Start: 2025-05-14 | End: 2025-05-24

## 2025-05-14 RX ORDER — ROPINIROLE 0.5 MG/1
0.5 TABLET, FILM COATED ORAL NIGHTLY
Qty: 30 TABLET | Refills: 1 | Status: SHIPPED | OUTPATIENT
Start: 2025-05-14 | End: 2026-05-14

## 2025-05-14 RX ORDER — AMOXICILLIN AND CLAVULANATE POTASSIUM 875; 125 MG/1; MG/1
1 TABLET, FILM COATED ORAL EVERY 12 HOURS
Qty: 20 TABLET | Refills: 0 | Status: SHIPPED | OUTPATIENT
Start: 2025-05-14

## 2025-05-14 NOTE — PROGRESS NOTES
"Subjective:       Patient ID: Bianca Weldon is a 60 y.o. female.    Chief Complaint: Annual Exam      HPI Comments:     Current Medications[1]      Last seen by me about 15 months ago.      Today she complains of a several day history of sinus pressure and green nasal discharge, chills, cough.  Denies body aches or headaches.  Taking Coricidin.  Nonsmoker    Continues to have unpleasant feeling in her calves bilaterally at night.  Right greater than left.  Dull achy feeling.  No swelling  Will check a ferritin and treat her with Requip.    Using her CPAP regularly.    Did not tolerate GLP 1 therapy but may try again.  Has had some adjustments made in her diabetes meds lately.  Last A1c was 6.4.  Will check that again today together with microalbumin      Review of Systems   Constitutional:  Positive for chills. Negative for activity change, appetite change and fever.   HENT:  Positive for congestion, postnasal drip, rhinorrhea and sinus pressure. Negative for sore throat.    Respiratory:  Positive for cough. Negative for shortness of breath.    Cardiovascular:  Negative for chest pain.   Gastrointestinal:  Negative for abdominal pain, diarrhea and nausea.   Genitourinary:  Negative for difficulty urinating.   Musculoskeletal:  Negative for arthralgias and myalgias.   Neurological:  Negative for dizziness and headaches.       Objective:      Vitals:    05/14/25 1359   BP: 110/60   Pulse: 80   Resp: 16   Temp: 99.4 °F (37.4 °C)   TempSrc: Tympanic   SpO2: 99%   Weight: 73.1 kg (161 lb 0.7 oz)   Height: 4' 10" (1.473 m)   PainSc: 0-No pain     Physical Exam  Vitals and nursing note reviewed.   Constitutional:       General: She is not in acute distress.     Appearance: She is well-developed. She is ill-appearing. She is not diaphoretic.   HENT:      Head: Normocephalic.      Right Ear: Tympanic membrane, ear canal and external ear normal.      Left Ear: Tympanic membrane, ear canal and external ear normal.      Nose: " Mucosal edema and rhinorrhea present.      Mouth/Throat:      Pharynx: Postnasal drip present. No oropharyngeal exudate or posterior oropharyngeal erythema.   Neck:      Thyroid: No thyromegaly.   Cardiovascular:      Rate and Rhythm: Normal rate and regular rhythm.      Heart sounds: Normal heart sounds. No murmur heard.  Pulmonary:      Effort: Pulmonary effort is normal.      Breath sounds: Normal breath sounds. No wheezing or rales.   Abdominal:      General: There is no distension.      Palpations: Abdomen is soft.   Musculoskeletal:      Cervical back: Neck supple.      Comments: No calf tenderness   Lymphadenopathy:      Cervical: No cervical adenopathy.   Skin:     General: Skin is warm and dry.   Neurological:      Mental Status: She is alert and oriented to person, place, and time.   Psychiatric:         Mood and Affect: Mood normal.         Behavior: Behavior normal.         Thought Content: Thought content normal.         Judgment: Judgment normal.         Assessment:       1. RLS (restless legs syndrome)    2. Schizoaffective disorder, bipolar type    3. Neurogenic orthostatic hypotension    4. Type 2 diabetes mellitus with microalbuminuria, with long-term current use of insulin    5. Hypothyroidism, unspecified type    6. Severe obesity (BMI 35.0-39.9) with comorbidity    7. DMITRI on CPAP    8. Essential hypertension    9. Dyslipidemia    10. Acute non-recurrent sinusitis, unspecified location    11. Abnormal finding of blood chemistry, unspecified        Plan:   RLS (restless legs syndrome)  Comments:  Trial of Requip.  Follow-up parameters given.  Ferritin  Orders:  -     rOPINIRole (REQUIP) 0.5 MG tablet; Take 1 tablet (0.5 mg total) by mouth every evening.  Dispense: 30 tablet; Refill: 1  -     CBC Auto Differential; Future; Expected date: 05/14/2025  -     Iron and TIBC; Future; Expected date: 05/14/2025  -     Ferritin; Future; Expected date: 05/14/2025    Schizoaffective disorder, bipolar  type  Comments:  Followed by Psychiatry.  On lithium    Neurogenic orthostatic hypotension  Comments:  No recent problems    Type 2 diabetes mellitus with microalbuminuria, with long-term current use of insulin  Comments:  A1c and microalbumin today  Orders:  -     Microalbumin/Creatinine Ratio, Urine; Future; Expected date: 05/14/2025  -     Hemoglobin A1C; Future; Expected date: 05/14/2025    Hypothyroidism, unspecified type  Comments:  Euthyroid on current dose    Severe obesity (BMI 35.0-39.9) with comorbidity  Comments:  No recent weight gain    DMITRI on CPAP  Comments:  Nightly use    Essential hypertension  Comments:  Controlled.  Follow-up 6 months    Dyslipidemia  Comments:  Fasting lipid profile  Orders:  -     Lipid Panel; Future; Expected date: 05/14/2025    Acute non-recurrent sinusitis, unspecified location  Comments:  Augmentin.  Supportive care  Orders:  -     promethazine-dextromethorphan (PROMETHAZINE-DM) 6.25-15 mg/5 mL Syrp; Take 5 mLs by mouth 3 (three) times daily as needed.  Dispense: 118 mL; Refill: 0    Abnormal finding of blood chemistry, unspecified  -     Iron and TIBC; Future; Expected date: 05/14/2025  -     Ferritin; Future; Expected date: 05/14/2025    Other orders  -     amoxicillin-clavulanate 875-125mg (AUGMENTIN) 875-125 mg per tablet; Take 1 tablet by mouth every 12 (twelve) hours.  Dispense: 20 tablet; Refill: 0                 [1]   Current Outpatient Medications:     albuterol (ACCUNEB) 1.25 mg/3 mL Nebu, Take 6 mLs (2.5 mg total) by nebulization every 6 (six) hours as needed (SOB). Rescue, Disp: 150 mL, Rfl: 11    albuterol (PROVENTIL/VENTOLIN HFA) 90 mcg/actuation inhaler, Inhale 2 puffs into the lungs every 6 (six) hours as needed for Wheezing. Rescue, Disp: 18 g, Rfl: 5    atorvastatin (LIPITOR) 40 MG tablet, Take 1 tablet (40 mg total) by mouth once daily., Disp: 90 tablet, Rfl: 0    blood sugar diagnostic Strp, Inject 1 each into the skin 3 (three) times daily. Dispense  preferred on insurance, Disp: 100 strip, Rfl: 11    blood-glucose sensor (DEXCOM G7 SENSOR MISC), by Misc.(Non-Drug; Combo Route) route., Disp: , Rfl:     clindamycin (CLEOCIN T) 1 % external solution, APPLY TO THE AFFECTED AREA TWICE DAILY AS NEEDED FOR ACE/ FOLLICULITIS ON FACE AND SCALP, Disp: 60 mL, Rfl: 4    cyclobenzaprine (FLEXERIL) 10 MG tablet, Take 10 mg by mouth every evening., Disp: , Rfl:     deutetrabenazine (AUSTEDO) 9 mg Tab, Take 9 mg by mouth 2 (two) times daily., Disp: , Rfl:     diclofenac sodium (VOLTAREN) 1 % Gel, APPLY FOUR GRAMS TOPICALLY EVERY DAY AS DIRECTED, Disp: 100 g, Rfl: 3    dicyclomine (BENTYL) 20 mg tablet, Take 1 tablet (20 mg total) by mouth 3 (three) times daily as needed (abdominal pain)., Disp: 90 tablet, Rfl: 0    estradioL (ESTRACE) 0.01 % (0.1 mg/gram) vaginal cream, Place 1 gram vaginally every 7 days., Disp: 42.5 g, Rfl: 0    fluconazole (DIFLUCAN) 150 MG Tab, TAKE ONE TABLET BY MOUTH ONCE A WEEK, Disp: 4 tablet, Rfl: 5    fludrocortisone (FLORINEF) 0.1 mg Tab, Take 100 mcg by mouth once daily., Disp: , Rfl:     fluticasone propionate (FLONASE) 50 mcg/actuation nasal spray, USE TWO SPRAYS IN EACH NOSTRIL DAILY, Disp: 48 g, Rfl: 0    fluticasone-umeclidin-vilanter (TRELEGY ELLIPTA) 100-62.5-25 mcg DsDv, Inhale 1 puff into the lungs once daily., Disp: 60 each, Rfl: 5    inhalation spacing device, Use as directed for inhalation., Disp: 1 each, Rfl: 0    insulin glargine U-300 conc (TOUJEO MAX U-300 SOLOSTAR) 300 unit/mL (3 mL) insulin pen, Inject 66 Units into the skin once daily. - Subcutaneous, Disp: 2 Pen, Rfl: 2    insulin lispro 100 unit/mL pen, Inject 8 Units into the skin 3 (three) times daily before meals., Disp: 15 mL, Rfl: 3    lamoTRIgine (LAMICTAL) 200 MG tablet, Take 1 tablet by mouth 2 (two) times daily., Disp: , Rfl:     levocetirizine (XYZAL) 5 MG tablet, Take 1 tablet (5 mg total) by mouth every evening., Disp: 30 tablet, Rfl: 11    levothyroxine (SYNTHROID)  "75 MCG tablet, TAKE ONE TABLET BY MOUTH EVERY DAY, Disp: 90 tablet, Rfl: 0    LIDOcaine-prilocaine (EMLA) cream, SMARTSI-4.8 Gram(s) Topical, Disp: , Rfl:     lithium (ESKALITH) 300 MG capsule, Take 900 mg by mouth every evening., Disp: , Rfl:     LORazepam (ATIVAN) 1 MG tablet, Take 1 tablet (1 mg total) by mouth 2 (two) times daily as needed for Anxiety (takes nightly)., Disp: 60 tablet, Rfl: 2    losartan (COZAAR) 25 MG tablet, Take 1 tablet (25 mg total) by mouth once daily., Disp: 90 tablet, Rfl: 1    multivitamin capsule, Take 1 capsule by mouth once daily., Disp: , Rfl:     MYRBETRIQ 50 mg Tb24, Take 50 mg by mouth 2 (two) times daily as needed., Disp: , Rfl:     nirmatrelvir-ritonavir (PAXLOVID, EUA,) 300 mg (150 mg x 2)-100 mg copackaged tablets (EUA), , Disp: , Rfl:     ondansetron (ZOFRAN-ODT) 4 MG TbDL, DISSOLVE 1 TABLET ON THE TONGUE EVERY 6 HOURS AS NEEDED FOR NAUSEA, Disp: 20 tablet, Rfl: 0    pantoprazole (PROTONIX) 40 MG tablet, TAKE 1 TABLET BY MOUTH DAILY, Disp: 90 tablet, Rfl: 0    pen needle, diabetic 31 gauge x 3/16" Ndle, Inject 1 Stick into the skin 5 (five) times daily., Disp: 150 each, Rfl: 6    SITagliptin phosphate (JANUVIA) 100 MG Tab, Take 1 tablet (100 mg total) by mouth once daily., Disp: 30 tablet, Rfl: 2    solriamfetoL (SUNOSI) 150 mg Tab, TAKE ONE TABLET BY MOUTH ONCE DAILY, Disp: 30 tablet, Rfl: 4    TRINTELLIX 20 mg Tab, Take 1 tablet by mouth once daily., Disp: , Rfl:     amoxicillin-clavulanate 875-125mg (AUGMENTIN) 875-125 mg per tablet, Take 1 tablet by mouth every 12 (twelve) hours., Disp: 20 tablet, Rfl: 0    ketoconazole (NIZORAL) 2 % cream, Apply topically 2 (two) times daily. Apply to the affected area twice daily. (Patient taking differently: Apply topically 2 (two) times daily as needed. Apply to the affected area twice daily.), Disp: 30 g, Rfl: 2    promethazine-dextromethorphan (PROMETHAZINE-DM) 6.25-15 mg/5 mL Syrp, Take 5 mLs by mouth 3 (three) times daily as " needed., Disp: 118 mL, Rfl: 0    rOPINIRole (REQUIP) 0.5 MG tablet, Take 1 tablet (0.5 mg total) by mouth every evening., Disp: 30 tablet, Rfl: 1    triamcinolone acetonide 0.1% (KENALOG) 0.1 % cream, Apply topically 2 (two) times daily. Do not use on face (Patient taking differently: Apply topically 2 (two) times daily as needed. Do not use on face), Disp: 45 Bottle, Rfl: 6  No current facility-administered medications for this visit.    Facility-Administered Medications Ordered in Other Visits:     nozaseptin (NOZIN) nasal , , Each Nostril, On Call Procedure, Kenrick Gray MD, Given at 04/27/21 1220

## 2025-05-15 ENCOUNTER — LAB VISIT (OUTPATIENT)
Dept: LAB | Facility: HOSPITAL | Age: 60
End: 2025-05-15
Attending: FAMILY MEDICINE
Payer: MEDICARE

## 2025-05-15 DIAGNOSIS — E11.29 TYPE 2 DIABETES MELLITUS WITH MICROALBUMINURIA, WITH LONG-TERM CURRENT USE OF INSULIN: ICD-10-CM

## 2025-05-15 DIAGNOSIS — Z79.4 TYPE 2 DIABETES MELLITUS WITH MICROALBUMINURIA, WITH LONG-TERM CURRENT USE OF INSULIN: ICD-10-CM

## 2025-05-15 DIAGNOSIS — G25.81 RLS (RESTLESS LEGS SYNDROME): ICD-10-CM

## 2025-05-15 DIAGNOSIS — R80.9 TYPE 2 DIABETES MELLITUS WITH MICROALBUMINURIA, WITH LONG-TERM CURRENT USE OF INSULIN: ICD-10-CM

## 2025-05-15 DIAGNOSIS — R79.9 ABNORMAL FINDING OF BLOOD CHEMISTRY, UNSPECIFIED: ICD-10-CM

## 2025-05-15 DIAGNOSIS — E78.5 DYSLIPIDEMIA: ICD-10-CM

## 2025-05-15 LAB
ABSOLUTE EOSINOPHIL (OHS): 0.24 K/UL
ABSOLUTE MONOCYTE (OHS): 0.43 K/UL (ref 0.3–1)
ABSOLUTE NEUTROPHIL COUNT (OHS): 3.32 K/UL (ref 1.8–7.7)
ALBUMIN/CREAT UR: 10.9 UG/MG
BASOPHILS # BLD AUTO: 0.03 K/UL
BASOPHILS NFR BLD AUTO: 0.5 %
CHOLEST SERPL-MCNC: 105 MG/DL (ref 120–199)
CHOLEST/HDLC SERPL: 2.4 {RATIO} (ref 2–5)
CREAT UR-MCNC: 64 MG/DL (ref 15–325)
EAG (OHS): 128 MG/DL (ref 68–131)
ERYTHROCYTE [DISTWIDTH] IN BLOOD BY AUTOMATED COUNT: 12.7 % (ref 11.5–14.5)
FERRITIN SERPL-MCNC: 231 NG/ML (ref 20–300)
HBA1C MFR BLD: 6.1 % (ref 4–5.6)
HCT VFR BLD AUTO: 42.4 % (ref 37–48.5)
HDLC SERPL-MCNC: 43 MG/DL (ref 40–75)
HDLC SERPL: 41 % (ref 20–50)
HGB BLD-MCNC: 13.8 GM/DL (ref 12–16)
IMM GRANULOCYTES # BLD AUTO: 0.02 K/UL (ref 0–0.04)
IMM GRANULOCYTES NFR BLD AUTO: 0.3 % (ref 0–0.5)
IRON SATN MFR SERPL: 19 % (ref 20–50)
IRON SERPL-MCNC: 68 UG/DL (ref 30–160)
LDLC SERPL CALC-MCNC: 49.2 MG/DL (ref 63–159)
LYMPHOCYTES # BLD AUTO: 2.25 K/UL (ref 1–4.8)
MCH RBC QN AUTO: 28.8 PG (ref 27–31)
MCHC RBC AUTO-ENTMCNC: 32.5 G/DL (ref 32–36)
MCV RBC AUTO: 89 FL (ref 82–98)
MICROALBUMIN UR-MCNC: 7 UG/ML (ref ?–5000)
NONHDLC SERPL-MCNC: 62 MG/DL
NUCLEATED RBC (/100WBC) (OHS): 0 /100 WBC
PLATELET # BLD AUTO: 199 K/UL (ref 150–450)
PMV BLD AUTO: 10.3 FL (ref 9.2–12.9)
RBC # BLD AUTO: 4.79 M/UL (ref 4–5.4)
RELATIVE EOSINOPHIL (OHS): 3.8 %
RELATIVE LYMPHOCYTE (OHS): 35.8 % (ref 18–48)
RELATIVE MONOCYTE (OHS): 6.8 % (ref 4–15)
RELATIVE NEUTROPHIL (OHS): 52.8 % (ref 38–73)
TIBC SERPL-MCNC: 352 UG/DL (ref 250–450)
TRANSFERRIN SERPL-MCNC: 238 MG/DL (ref 200–375)
TRIGL SERPL-MCNC: 64 MG/DL (ref 30–150)
WBC # BLD AUTO: 6.29 K/UL (ref 3.9–12.7)

## 2025-05-15 PROCEDURE — 85025 COMPLETE CBC W/AUTO DIFF WBC: CPT

## 2025-05-15 PROCEDURE — 83540 ASSAY OF IRON: CPT

## 2025-05-15 PROCEDURE — 36415 COLL VENOUS BLD VENIPUNCTURE: CPT | Mod: PO

## 2025-05-15 PROCEDURE — 82570 ASSAY OF URINE CREATININE: CPT

## 2025-05-15 PROCEDURE — 83036 HEMOGLOBIN GLYCOSYLATED A1C: CPT

## 2025-05-15 PROCEDURE — 80061 LIPID PANEL: CPT

## 2025-05-15 PROCEDURE — 82728 ASSAY OF FERRITIN: CPT

## 2025-05-21 ENCOUNTER — TELEPHONE (OUTPATIENT)
Dept: FAMILY MEDICINE | Facility: CLINIC | Age: 60
End: 2025-05-21
Payer: MEDICARE

## 2025-05-21 ENCOUNTER — NURSE TRIAGE (OUTPATIENT)
Dept: ADMINISTRATIVE | Facility: CLINIC | Age: 60
End: 2025-05-21
Payer: MEDICARE

## 2025-05-21 ENCOUNTER — PATIENT MESSAGE (OUTPATIENT)
Dept: FAMILY MEDICINE | Facility: CLINIC | Age: 60
End: 2025-05-21
Payer: MEDICARE

## 2025-05-21 ENCOUNTER — PATIENT MESSAGE (OUTPATIENT)
Dept: OTHER | Facility: OTHER | Age: 60
End: 2025-05-21
Payer: MEDICARE

## 2025-05-21 RX ORDER — METHYLPREDNISOLONE 4 MG/1
TABLET ORAL
Qty: 1 EACH | Refills: 0 | Status: SHIPPED | OUTPATIENT
Start: 2025-05-21

## 2025-05-21 NOTE — TELEPHONE ENCOUNTER
----- Message from Last sent at 5/21/2025 11:40 AM CDT -----  Type:  Needs Medical AdviceWho Called: INES YANCEY [[id number]]Symptoms (please be specific):  How long has patient had these symptoms:  Pharmacy name and phone #:  Would the patient rather a call back or a response via [facility]? Best Call Back Number:  [phone] Information: Patient called to schedule a steroid injection

## 2025-05-22 NOTE — TELEPHONE ENCOUNTER
Spoke with patient who states she is currently taking a Medrol dose pack due to her having a sinus infection.  Patient states her blood sugars have been fluctuating between 275-302.  Most recent reading was 302 at 8:40 pm.  Patient states she took 10 units of lispro at 8 pm tonight after getting the 275 glucose reading.  Patient states she took 6 units of lispro at lunch and another 10 units at dinner which was 4:30 pm.  Patient also takes Januvia 100 mg orally in the Am and Toujeo max 58 units every night (has not taken dose so far).   Patient states she has symptoms of high blood sugar which includes frequent urination and increased thirst.  Patient has a Dexcom and states she adjust her insulin accordingly based on her sliding scale.   Per protocol awaiting feedback from Dr. Oneal via secure chat.  Attempted to call message stated call could not be completed.       Reason for Disposition   [1] Symptoms of high blood sugar (e.g., increased thirst, frequent urination, weight loss) AND [2] not able to test blood glucose AND [3] pregnant    Additional Information   Negative: Unconscious or difficult to awaken   Negative: Acting confused (e.g., disoriented, slurred speech)   Negative: Very weak (e.g., can't stand)   Negative: Sounds like a life-threatening emergency to the triager   Negative: Blood glucose > 500 mg/dL (27.8 mmol/L)   Negative: [1] Blood glucose > 240 mg/dL (13.3 mmol/L) AND [2] urine ketones moderate-large (or more than 1+)   Negative: [1] Blood glucose > 240 mg/dL (13.3 mmol/L) AND [2] blood ketones > 1.4 mmol/L   Negative: [1] Blood glucose > 240 mg/dL (13.3 mmol/L) AND [2] vomiting AND [3] unable to check for ketones (in blood or urine)   Negative: [1] New-onset diabetes suspected (e.g., frequent urination, weak, weight loss) AND [2] vomiting or rapid breathing   Negative: Vomiting lasts > 4 hours   Negative: Patient sounds very sick or weak to the triager   Negative: Fever > 100.4 F  (38.0 C)   Negative: Blood glucose > 400 mg/dL (22.2 mmol/L)   Negative: [1] Blood glucose > 300 mg/dL (16.7 mmol/L) AND [2] two or more times in a row   Negative: Urine ketones moderate - large (or blood ketones > 1.4 mmol/L)   Negative: [1] Vomiting AND [2] signs of dehydration (e.g., very dry mouth, lightheaded, dark urine)   Negative: [1] Blood glucose > 240 mg/dL (13.3 mmol/L) AND [2] rapid breathing    Protocols used: Diabetes - High Blood Sugar-A-AH

## 2025-05-22 NOTE — TELEPHONE ENCOUNTER
Spoke with Dr. Oneal  who states to have patient inject 6 units of lispro now.  Re-check blood glucose in 2 hours, if above 300 patient is to inject 2 units of lispro.  Patient should also proceed with injecting her nightly dose of toujeo max 58 units.  Details reviewed with patient and she verbalized  understanding.

## 2025-05-26 ENCOUNTER — HOSPITAL ENCOUNTER (OUTPATIENT)
Dept: RADIOLOGY | Facility: HOSPITAL | Age: 60
Discharge: HOME | End: 2025-05-26
Attending: INTERNAL MEDICINE
Payer: MEDICARE

## 2025-05-26 ENCOUNTER — CLINICAL SUPPORT (OUTPATIENT)
Dept: PULMONOLOGY | Facility: CLINIC | Age: 60
End: 2025-05-26
Attending: INTERNAL MEDICINE
Payer: MEDICARE

## 2025-05-26 ENCOUNTER — CLINICAL SUPPORT (OUTPATIENT)
Dept: PULMONOLOGY | Facility: CLINIC | Age: 60
End: 2025-05-26
Payer: MEDICARE

## 2025-05-26 VITALS — BODY MASS INDEX: 34.06 KG/M2 | HEIGHT: 58 IN | OXYGEN SATURATION: 98 % | HEART RATE: 70 BPM | WEIGHT: 162.25 LBS

## 2025-05-26 DIAGNOSIS — J44.89 ASTHMATIC BRONCHITIS , CHRONIC: ICD-10-CM

## 2025-05-26 LAB
BRPFT: NORMAL
FEF 25 75 CHG: 10.6 %
FEF 25 75 LLN: 1.32
FEF 25 75 POST REF: 66.4 %
FEF 25 75 PRE REF: 60 %
FEF 25 75 REF: 2.72
FET100 CHG: 16 %
FEV1 CHG: 5.8 %
FEV1 FVC CHG: 0.8 %
FEV1 FVC LLN: 68
FEV1 FVC POST REF: 99.2 %
FEV1 FVC PRE REF: 98.4 %
FEV1 FVC REF: 80
FEV1 LLN: 1.52
FEV1 POST REF: 88.1 %
FEV1 PRE REF: 83.3 %
FEV1 REF: 2.01
FVC CHG: 5 %
FVC LLN: 1.9
FVC POST REF: 88.5 %
FVC PRE REF: 84.4 %
FVC REF: 2.51
PEF CHG: 3.3 %
PEF LLN: 4
PEF POST REF: 89.1 %
PEF PRE REF: 86.2 %
PEF REF: 5.4
POST FEF 25 75: 1.8 L/S (ref 1.32–4.12)
POST FET 100: 7.5 SEC
POST FEV1 FVC: 79.56 % (ref 67.98–92.35)
POST FEV1: 1.77 L (ref 1.52–2.5)
POST FVC: 2.23 L (ref 1.9–3.13)
POST PEF: 4.81 L/S (ref 4–6.8)
PRE FEF 25 75: 1.63 L/S (ref 1.32–4.12)
PRE FET 100: 6.46 SEC
PRE FEV1 FVC: 78.93 % (ref 67.98–92.35)
PRE FEV1: 1.67 L (ref 1.52–2.5)
PRE FVC: 2.12 L (ref 1.9–3.13)
PRE PEF: 4.65 L/S (ref 4–6.8)

## 2025-05-26 PROCEDURE — 71046 X-RAY EXAM CHEST 2 VIEWS: CPT | Mod: 26,,, | Performed by: RADIOLOGY

## 2025-05-26 PROCEDURE — 99999 PR PBB SHADOW E&M-EST. PATIENT-LVL III: CPT | Mod: PBBFAC,,,

## 2025-05-26 PROCEDURE — 71046 X-RAY EXAM CHEST 2 VIEWS: CPT | Mod: TC

## 2025-05-26 NOTE — PROGRESS NOTES
Pulmonary Disease Management  [facility]  Initial Visit    Referring Provider: Nasir  Diagnosis: Asthmati bronchitis  Last Hospital Admission: na  Last provider visit: 3/2/25      Current Outpatient Medications:     albuterol (ACCUNEB) 1.25 mg/3 mL Nebu, Take 6 mLs (2.5 mg total) by nebulization every 6 (six) hours as needed (SOB). Rescue, Disp: 150 mL, Rfl: 11    albuterol (PROVENTIL/VENTOLIN HFA) 90 mcg/actuation inhaler, Inhale 2 puffs into the lungs every 6 (six) hours as needed for Wheezing. Rescue, Disp: 18 g, Rfl: 5    amoxicillin-clavulanate 875-125mg (AUGMENTIN) 875-125 mg per tablet, Take 1 tablet by mouth every 12 (twelve) hours., Disp: 20 tablet, Rfl: 0    atorvastatin (LIPITOR) 40 MG tablet, Take 1 tablet (40 mg total) by mouth once daily., Disp: 90 tablet, Rfl: 0    blood sugar diagnostic Strp, Inject 1 each into the skin 3 (three) times daily. Dispense preferred on insurance, Disp: 100 strip, Rfl: 11    blood-glucose sensor (DEXCOM G7 SENSOR MISC), by Misc.(Non-Drug; Combo Route) route., Disp: , Rfl:     clindamycin (CLEOCIN T) 1 % external solution, APPLY TO THE AFFECTED AREA TWICE DAILY AS NEEDED FOR ACE/ FOLLICULITIS ON FACE AND SCALP, Disp: 60 mL, Rfl: 4    cyclobenzaprine (FLEXERIL) 10 MG tablet, Take 10 mg by mouth every evening., Disp: , Rfl:     deutetrabenazine (AUSTEDO) 9 mg Tab, Take 9 mg by mouth 2 (two) times daily., Disp: , Rfl:     diclofenac sodium (VOLTAREN) 1 % Gel, APPLY FOUR GRAMS TOPICALLY EVERY DAY AS DIRECTED, Disp: 100 g, Rfl: 3    dicyclomine (BENTYL) 20 mg tablet, Take 1 tablet (20 mg total) by mouth 3 (three) times daily as needed (abdominal pain)., Disp: 90 tablet, Rfl: 0    estradioL (ESTRACE) 0.01 % (0.1 mg/gram) vaginal cream, Place 1 gram vaginally every 7 days., Disp: 42.5 g, Rfl: 0    fluconazole (DIFLUCAN) 150 MG Tab, TAKE ONE TABLET BY MOUTH ONCE A WEEK, Disp: 4 tablet, Rfl: 5    fludrocortisone (FLORINEF) 0.1 mg Tab, Take 100 mcg by mouth once daily.,  Disp: , Rfl:     fluticasone propionate (FLONASE) 50 mcg/actuation nasal spray, USE TWO SPRAYS IN EACH NOSTRIL DAILY, Disp: 48 g, Rfl: 0    fluticasone-umeclidin-vilanter (TRELEGY ELLIPTA) 100-62.5-25 mcg DsDv, Inhale 1 puff into the lungs once daily., Disp: 60 each, Rfl: 5    inhalation spacing device, Use as directed for inhalation., Disp: 1 each, Rfl: 0    insulin glargine U-300 conc (TOUJEO MAX U-300 SOLOSTAR) 300 unit/mL (3 mL) insulin pen, Inject 66 Units into the skin once daily. - Subcutaneous, Disp: 2 Pen, Rfl: 2    insulin lispro 100 unit/mL pen, Inject 8 Units into the skin 3 (three) times daily before meals., Disp: 15 mL, Rfl: 3    ketoconazole (NIZORAL) 2 % cream, Apply topically 2 (two) times daily. Apply to the affected area twice daily. (Patient taking differently: Apply topically 2 (two) times daily as needed. Apply to the affected area twice daily.), Disp: 30 g, Rfl: 2    lamoTRIgine (LAMICTAL) 200 MG tablet, Take 1 tablet by mouth 2 (two) times daily., Disp: , Rfl:     levocetirizine (XYZAL) 5 MG tablet, Take 1 tablet (5 mg total) by mouth every evening., Disp: 30 tablet, Rfl: 11    levothyroxine (SYNTHROID) 75 MCG tablet, TAKE ONE TABLET BY MOUTH EVERY DAY, Disp: 90 tablet, Rfl: 0    LIDOcaine-prilocaine (EMLA) cream, SMARTSI-4.8 Gram(s) Topical, Disp: , Rfl:     lithium (ESKALITH) 300 MG capsule, Take 900 mg by mouth every evening., Disp: , Rfl:     LORazepam (ATIVAN) 1 MG tablet, Take 1 tablet (1 mg total) by mouth 2 (two) times daily as needed for Anxiety (takes nightly)., Disp: 60 tablet, Rfl: 2    losartan (COZAAR) 25 MG tablet, Take 1 tablet (25 mg total) by mouth once daily., Disp: 90 tablet, Rfl: 1    methylPREDNISolone (MEDROL DOSEPACK) 4 mg tablet, use as directed, Disp: 1 each, Rfl: 0    multivitamin capsule, Take 1 capsule by mouth once daily., Disp: , Rfl:     MYRBETRIQ 50 mg Tb24, Take 50 mg by mouth 2 (two) times daily as needed., Disp: , Rfl:     nirmatrelvir-ritonavir  "(PAXLOVID, EUA,) 300 mg (150 mg x 2)-100 mg copackaged tablets (EUA), , Disp: , Rfl:     ondansetron (ZOFRAN-ODT) 4 MG TbDL, DISSOLVE 1 TABLET ON THE TONGUE EVERY 6 HOURS AS NEEDED FOR NAUSEA, Disp: 20 tablet, Rfl: 0    pantoprazole (PROTONIX) 40 MG tablet, TAKE 1 TABLET BY MOUTH DAILY, Disp: 90 tablet, Rfl: 0    pen needle, diabetic 31 gauge x 3/16" Ndle, Inject 1 Stick into the skin 5 (five) times daily., Disp: 150 each, Rfl: 6    rOPINIRole (REQUIP) 0.5 MG tablet, Take 1 tablet (0.5 mg total) by mouth every evening., Disp: 30 tablet, Rfl: 1    SITagliptin phosphate (JANUVIA) 100 MG Tab, Take 1 tablet (100 mg total) by mouth once daily., Disp: 30 tablet, Rfl: 2    solriamfetoL (SUNOSI) 150 mg Tab, TAKE ONE TABLET BY MOUTH ONCE DAILY, Disp: 30 tablet, Rfl: 4    triamcinolone acetonide 0.1% (KENALOG) 0.1 % cream, Apply topically 2 (two) times daily. Do not use on face (Patient taking differently: Apply topically 2 (two) times daily as needed. Do not use on face), Disp: 45 Bottle, Rfl: 6    TRINTELLIX 20 mg Tab, Take 1 tablet by mouth once daily., Disp: , Rfl:   No current facility-administered medications for this visit.    Facility-Administered Medications Ordered in Other Visits:     nozaseptin (NOZIN) nasal , , Each Nostril, On Call Procedure, Kenrick Gray MD, Given at 04/27/21 1222    Review of patient's allergies indicates:   Allergen Reactions    Bactrim [sulfamethoxazole-trimethoprim] Swelling, Other (See Comments) and Anaphylaxis    Latuda [lurasidone] Anaphylaxis    Macrodantin [nitrofurantoin macrocrystalline] Swelling, Other (See Comments) and Anaphylaxis    Canagliflozin      Yeast infections    Metformin Diarrhea       Smoking history:   Social History     Tobacco Use   Smoking Status Passive Smoke Exposure - Never Smoker    Passive exposure: Current   Smokeless Tobacco Never   Tobacco Comments    Light smoker, quit 2017       Pulse: 70  SpO2: 98 %        Height: 4' 10" (147.3 " cm)  Weight: 73.6 kg (162 lb 4.1 oz)  BMI (kg/m2): 33.98                        Resting Elliott Dyspnea Rating : 2      Current Oxygen Use: No                 Does the patient use BiPAP, CPAP or Trilogy?: Yes                                                    ACT Questionnaire:  Asthma Control Test  In the past 4  weeks, how much of the time did your asthma keep you from getting as much done at work, school or at home?: Some of the time  During the past 4 weeks, how often have you had shortness of breath?: More than once a day  During the past 4 weeks, how often did your asthma symptoms (wheezing, couging, shortness of breath, chest tightness or pain) wake you up at night or earlier than usual in the morning?: Not at all  During the past 4 weeks, how often have you used your rescue inhaler or nebulizer medication (such as albuterol)?: 1 or 2 times per day  How would you rate your asthma control during the past 4 weeks?: Somewhat controlled  If your score is 19 or less, your asthma may not be under control: 14    Bonner Sleepiness Scale:  Bonner Sleepiness Scale  Sitting and reading: Slight chance of dozing  Watching TV: Slight chance of dozing  Sitting, inactive in a public place (e.g. a theatre or a meeting): Would never doze  As a passenger in a car for an hour without a break: Slight chance of dozing  Lying down to rest in the afternoon when circumstances permit: High chance of dozing  Sitting and talking to someone: Would never doze  Sitting quietly after a lunch without alcohol: Slight chance of dozing  In a car, while stopped for a few minutes in traffic: Would never doze  Total score: 7        Has this patient had any pulmonary studies (PFTS)?: Yes  PFT Results:  Pre FVC   Date/Time Value Ref Range Status   06/12/2024 09:41 AM 2.09 2.02 - 3.29 L Final     Pre FEV1   Date/Time Value Ref Range Status   06/12/2024 09:41 AM 1.58 (L) 1.61 - 2.63 L Final     Pre FEV1 FVC   Date/Time Value Ref Range Status  "  06/12/2024 09:41 AM 75.68 68.05 - 92.16 % Final         Is this patient a candidate for pulmonary rehab?: No     Method Used for Education: Demonstration, Literature, Verbal          Patient Concerns or Expectations: na  Therapist Comments:   Bianca Weldon  was seen 5/26/2025  1:00 PM in the Pulmonary Disease management clinic for evaluation, education, reinforcement of self management techniques and exacerbation action plan.    Maylin Rubio    Past Medical History:   Diagnosis Date    Abnormal Pap smear of cervix     Abnormal Pap smear of vagina     after hyst, repeat every 6 months, per pt, had bx's, unknown results    Acid reflux     Arthritis     Asthma Several years ago, under controll    Bipolar 1 disorder     Cataract 4 years ago    Surgical removed 4 yr ago right/Left eye    Depression     bipolar    Diabetes mellitus 7 years ago    Diabetes mellitus, type 2     Disorder of kidney and ureter 2021    Seeping protein fliud mildly    Elevated alkaline phosphatase level     Endometrial cancer Over 25 years ago    Endometriosis    GERD (gastroesophageal reflux disease) 36 years old under control w protonix    Hyperlipidemia     Hyperparathyroidism 38 years old    Hypertension 4 years ago    Hypothyroidism 40years old    IBS (irritable bowel syndrome)     Interstitial cystitis     Migraine headache 20 years ago    Narcolepsy     Nonalcoholic steatohepatitis     fatty liver    Obesity 53 years old    Orthostatic hypotension     Osteoarthritis 1 year ago    PONV (postoperative nausea and vomiting)     Respiratory distress     States, "My lungs collapsed during hemorrhoid surgery."    Restless legs syndrome     Seasonal allergies 30 years ago    Sleep apnea 1 year ago    DMITRI-CPAP    Thyroid disease        Patient's Medications   New Prescriptions    No medications on file   Previous Medications    ALBUTEROL (ACCUNEB) 1.25 MG/3 ML NEBU    Take 6 mLs (2.5 mg total) by nebulization every 6 (six) hours as needed " (SOB). Rescue    ALBUTEROL (PROVENTIL/VENTOLIN HFA) 90 MCG/ACTUATION INHALER    Inhale 2 puffs into the lungs every 6 (six) hours as needed for Wheezing. Rescue    AMOXICILLIN-CLAVULANATE 875-125MG (AUGMENTIN) 875-125 MG PER TABLET    Take 1 tablet by mouth every 12 (twelve) hours.    ATORVASTATIN (LIPITOR) 40 MG TABLET    Take 1 tablet (40 mg total) by mouth once daily.    BLOOD SUGAR DIAGNOSTIC STRP    Inject 1 each into the skin 3 (three) times daily. Dispense preferred on insurance    BLOOD-GLUCOSE SENSOR (DEXCOM G7 SENSOR MISC)    by Misc.(Non-Drug; Combo Route) route.    CLINDAMYCIN (CLEOCIN T) 1 % EXTERNAL SOLUTION    APPLY TO THE AFFECTED AREA TWICE DAILY AS NEEDED FOR ACE/ FOLLICULITIS ON FACE AND SCALP    CYCLOBENZAPRINE (FLEXERIL) 10 MG TABLET    Take 10 mg by mouth every evening.    DEUTETRABENAZINE (AUSTEDO) 9 MG TAB    Take 9 mg by mouth 2 (two) times daily.    DICLOFENAC SODIUM (VOLTAREN) 1 % GEL    APPLY FOUR GRAMS TOPICALLY EVERY DAY AS DIRECTED    DICYCLOMINE (BENTYL) 20 MG TABLET    Take 1 tablet (20 mg total) by mouth 3 (three) times daily as needed (abdominal pain).    ESTRADIOL (ESTRACE) 0.01 % (0.1 MG/GRAM) VAGINAL CREAM    Place 1 gram vaginally every 7 days.    FLUCONAZOLE (DIFLUCAN) 150 MG TAB    TAKE ONE TABLET BY MOUTH ONCE A WEEK    FLUDROCORTISONE (FLORINEF) 0.1 MG TAB    Take 100 mcg by mouth once daily.    FLUTICASONE PROPIONATE (FLONASE) 50 MCG/ACTUATION NASAL SPRAY    USE TWO SPRAYS IN EACH NOSTRIL DAILY    FLUTICASONE-UMECLIDIN-VILANTER (TRELEGY ELLIPTA) 100-62.5-25 MCG DSDV    Inhale 1 puff into the lungs once daily.    INHALATION SPACING DEVICE    Use as directed for inhalation.    INSULIN GLARGINE U-300 CONC (TOUJEO MAX U-300 SOLOSTAR) 300 UNIT/ML (3 ML) INSULIN PEN    Inject 66 Units into the skin once daily. - Subcutaneous    INSULIN LISPRO 100 UNIT/ML PEN    Inject 8 Units into the skin 3 (three) times daily before meals.    KETOCONAZOLE (NIZORAL) 2 % CREAM    Apply topically  "2 (two) times daily. Apply to the affected area twice daily.    LAMOTRIGINE (LAMICTAL) 200 MG TABLET    Take 1 tablet by mouth 2 (two) times daily.    LEVOCETIRIZINE (XYZAL) 5 MG TABLET    Take 1 tablet (5 mg total) by mouth every evening.    LEVOTHYROXINE (SYNTHROID) 75 MCG TABLET    TAKE ONE TABLET BY MOUTH EVERY DAY    LIDOCAINE-PRILOCAINE (EMLA) CREAM    SMARTSI-4.8 Gram(s) Topical    LITHIUM (ESKALITH) 300 MG CAPSULE    Take 900 mg by mouth every evening.    LORAZEPAM (ATIVAN) 1 MG TABLET    Take 1 tablet (1 mg total) by mouth 2 (two) times daily as needed for Anxiety (takes nightly).    LOSARTAN (COZAAR) 25 MG TABLET    Take 1 tablet (25 mg total) by mouth once daily.    METHYLPREDNISOLONE (MEDROL DOSEPACK) 4 MG TABLET    use as directed    MULTIVITAMIN CAPSULE    Take 1 capsule by mouth once daily.    MYRBETRIQ 50 MG TB24    Take 50 mg by mouth 2 (two) times daily as needed.    NIRMATRELVIR-RITONAVIR (PAXLOVID, EUA,) 300 MG (150 MG X 2)-100 MG COPACKAGED TABLETS (EUA)        ONDANSETRON (ZOFRAN-ODT) 4 MG TBDL    DISSOLVE 1 TABLET ON THE TONGUE EVERY 6 HOURS AS NEEDED FOR NAUSEA    PANTOPRAZOLE (PROTONIX) 40 MG TABLET    TAKE 1 TABLET BY MOUTH DAILY    PEN NEEDLE, DIABETIC 31 GAUGE X 3/16" NDLE    Inject 1 Stick into the skin 5 (five) times daily.    ROPINIROLE (REQUIP) 0.5 MG TABLET    Take 1 tablet (0.5 mg total) by mouth every evening.    SITAGLIPTIN PHOSPHATE (JANUVIA) 100 MG TAB    Take 1 tablet (100 mg total) by mouth once daily.    SOLRIAMFETOL (SUNOSI) 150 MG TAB    TAKE ONE TABLET BY MOUTH ONCE DAILY    TRIAMCINOLONE ACETONIDE 0.1% (KENALOG) 0.1 % CREAM    Apply topically 2 (two) times daily. Do not use on face    TRINTELLIX 20 MG TAB    Take 1 tablet by mouth once daily.   Modified Medications    No medications on file   Discontinued Medications    No medications on file             Educational assessment:   [x]            Good  []            Fair  []            Poor    Readiness to learn:   [x]   "          Good  []            Fair  []            Poor    Vision Status:   [x]            Good  []            Fair  []            Poor    Reading Ability:  [x]            Good  []            Fair  []            Poor    Knowledge of condition:   [x]            Good  []            Fair  []            Poor    Language Barriers:   []            Good  []            Fair  []            Poor  [x]            None    Cognitive/ Physical Barriers:   []            Good  []            Fair  []            Poor  [x]            None    Learning best by:                       [x]            Seeing  [x]            Hearing  [x]            Reading                         [x]            Doing    Describe any barrier /Limitation or financial implications of care choices identified     []            Financial  []            Emotional  []            Education  []            Vision/Hearing  []            Physical  [x]            None  []                TOPIC /CONTENT FOR TODAY:    [x]            MDI with or without spacer  [x]            Dry powder inhaler  []            Acapella   []           Peak Flow meter  [x]            Asthma action plan  []            Nebulizer use  [x]            Oxygen use safety  [x]            Breathing and cough techniques  [x]            Energy conservation  [x]            Infection prevention  [x]           OTHER________________________        Learner:    [x]            Patient   []            Caregiver    Method:    [x]            Verbal explanation  []            Audio visual    [x]            Literature  [x]            Teach back      Evaluation:    [x]            Teach back  [x]            Demonstrate  [x]            Follow up phone call    []            2 weeks     [x]            4 weeks   []            PRN    Additional comments:   Patient was seen today to review respiratory medication purpose and proper technique for use of inhalers. Patient practiced proper technique using MDI with valved holding  chamber (spacer) and DPI inhalers. Patient demonstrated understanding. Literature was given to patient.     Reviewed the difference in controller (TRELEGY) and rescue (ALBUTEROL) medications.    Asthma trigger checklist was verbally reviewed and literature given to patient.     Infection prevention was discussed. Patient was reminded to get influenza vaccine. Hand hygiene and cleaning of respiratory equipment was also discussed. Patient verbalized understanding.     Reviewed breathing techniques such as pursed-lip breathing, saavedra-cough technique, and diaphragmatic breathing. Patient practiced proper technique and verbalized understanding. Literature given to patient.      COPD action plan was reviewed and literature was given to patient. Patient verbalized understanding.       Plan:  Monthly Pulmonary Disease Management Questionnaire  Follow-up PDM appointment scheduled for 11/25/25  Reinforce education  Meds: TRELEGY, ALBUTEROL  DME Needs: NA  Action Plan: ASTHMA  Next Provider Visit: 6/6/25    Approximate time spent with patient: 60 MINUTES

## 2025-05-26 NOTE — PATIENT INSTRUCTIONS
ACTION PLAN    GREEN ZONE  My sputum is clear/white/usual color and easily cleared.  My breathing is no harder than usual.  I can do my usual activities.  I can think clearly.   Take your usual medicines, including oxygen, as you are told to do so by your health care provider.   YELLOW ZONE  My sputum has change (color, thickness, amount).  I am more short of breath than usual.  I cough or wheeze more.  I weigh more and my legs/feet swell.  I cannot do my usual activities without resting.   Call your health care provider. You will probably be told to begin taking an antibiotic and prednisone. Have your pharmacy phone number available.   RED ZONE  I cannot cough out my sputum.  I am much more short of breath than normal.  I need to sit up to breathe  I cannot do my usual activities.  I am unable to speak more than one or two words at a time.  I am confused.   Call your health care provider. You may be asked to come in to be seen, told to go to the emergency room, or told to call 9-1-1.     Asthma Trigger Checklist  Allergens, irritants, and other things may trigger your asthma. Check the box next to each of your triggers. After each trigger is a list of ways to avoid it.   Dust mites. Dust mites live in mattresses, bedding, carpets, curtains, and indoor dust.  To kill dust mites, wash bedding in hot water (130°F) each week.  Cover mattress and pillows with special dust-mite-proof cases.  Don't use upholstered furniture like sofas or chairs in the bedroom.  Use allergy-proof filters for air conditioners and furnaces. Replace or clean them as instructed.  If you can, replace carpeting with wood or tile edmond, especially in the bedroom.  Animals. Animals with fur or feathers shed dander (allergens).  It's best to choose a pet that doesn't have fur or feathers, such as a fish or a reptile.  If you have pets, keep them off your bed and out of your bedroom.  Wash your hands and clothes after handling pets.    Mold. Mold  grows in damp places, such as bathrooms, basements, and closets.  Ask someone to clean damp areas in your home every week. Or try wearing a face mask while you clean.  Run an exhaust fan while bathing. Or leave a window open in the bathroom.  Repair water leaks in or around your home.  Have someone else cut grass or rake leaves, if possible.  Don't use vaporizers or humidifiers. They encourage mold growth.    Pollen. Pollen from trees, grasses, and weeds is a common allergen. (Flower pollens are generally not a problem).  Try to learn what types of pollen affect you most. Pollen levels vary depending on the plant, the season, and the time of day.  If possible, use air conditioning instead of opening the windows in your home or car.  Have someone else do yard work, if possible.    Cockroaches. Roaches are found in many homes and produce allergens.  Keep your kitchen clean and dry. A leaky faucet or drain can attract roaches.  Remove garbage from your home daily.  Store food in tightly sealed containers. Wash dishes as soon as they are used.  Use bait stations or traps to control roaches. Avoid using chemical sprays.    Smoke. Smoke may be from cigarettes, cigars, pipes, incense or candles, barbecues or grills, and fireplaces.  Don't smoke. And don't let people smoke in your home or car.  When you travel, ask for nonsmoking rental cars and hotel rooms.  Avoid fireplaces and wood stoves. If you can't, sit away from them. Make sure the smoke is directed outside.  Don't burn incense or use candles.  Move away from smoky outdoor cooking grills.    Smog.  Smog is from car exhaust and other pollution.  Read or listen to local air-quality reports. These let you know when air quality is poor.  Stay indoors as much as you can on smoggy days. If possible, use air conditioning instead of opening the windows.  In your car, set air conditioning to recirculate air, so less pollution gets in.    Strong odors. These include air  fresheners, deodorizers, and cleaning products; perfume, deodorant, and other beauty products; incense and candles; and insect sprays and other sprays.  Use scent-free products like deodorant or body lotion.  Avoid using cleaning products with bleach and ammonia. Make your own cleaning solution with white vinegar, baking soda, or mild dish soap.  Use exhaust fans while cooking. Or open a window, if possible.   Avoid perfumes, air fresheners, potpourri, and other scented products.          Other irritants. These include dust, aerosol sprays, and powders.  Wear a face mask while doing tasks like sanding, dusting, sweeping, and yard work. Open doors and windows if working indoors.  Use pump spray bottles instead of aerosols.  Pour liquid  onto a rag or cloth instead of spraying them.    Weather. Weather conditions can trigger symptoms or make them worse.  Watch for very high or low temperatures, very humid conditions, or a lot of wind, as these conditions can make symptoms worse.  Limit outdoor activity during the type of weather that affects you.  Wear a scarf over your mouth and nose in cold weather.    Colds, flu, and sinus infections. Upper respiratory infections can trigger asthma.  Wash your hands often with soap and warm water or use a hand  containing alcohol.  Get a yearly flu shot. And ask if you should get a pneumonia vaccine.  Take care of your general health. Get plenty of sleep. And eat a variety of healthy foods.    Food additives. Food additives can trigger asthma flare-ups in some people.  Check food labels for sulfites or other similar ingredients. These are often found in foods such as wine, beer, and dried fruits.  Avoid foods that contain these additives.    Medicine. Aspirin, NSAIDS like ibuprofen and naproxen, and heart medicines like beta-blockers may be triggers.  Tell your health care provider if you think certain medicines trigger symptoms.   Be sure to read the labels on  over-the-counter medicines. They may have ingredients that cause symptoms for you.   , Emotions. Laughing, crying, or feeling excited are triggers for some people.   To help you stay calm: Try breathing in slowly through your nose for a count of 2 seconds. Close your lips and breathe out for 4 seconds. Repeat.  Try to focus on a soothing image in your mind. This will help relax you and calm your breathing.  Remember to take your daily controller medicines. When you're upset or under stress, it's easy to forget.    Exercise. For some people, exercise can trigger symptoms. Don't let this keep you from being active.   If you have not been exercising regularly, start slow and work up gradually.  Take all of your medicines as prescribed.  If you use quick-relief medicine, make sure you have it with you when you exercise.  Stop if you have any symptoms. Make sure you talk with your provider about these symptoms.  © 6746-6246 [employer]. [address] [place] [address], [place] [address]. All rights reserved. This information is not intended as a substitute for professional medical care. Always follow your healthcare professional's instructions.            [address]  Shortness of Breath: Maximizing Your Energy    Fear of shortness of breath may stop you from being as active as you once were. You don't have to live this way. Managing your time and pacing yourself can help you conserve energy and do more. It's even OK if you're short of breath sometimes. You can learn to work through this without limiting your activities.  Manage your time  Shortness of breath can make everyday tasks take longer. This means there's less time to do the things you enjoy. You can help prevent this by managing your time. Try these tips:  Plan ahead so your tasks are spaced throughout the day. As you plan, keep in mind the times of day you tend to have the most energy.  Do only one thing at a time. Finish one task before starting  another.  Gather everything you need before you start a task. This cuts out unneeded steps while you're working.  Think about what you really need to do. Be realistic about what you can get done in a day.      Balance activity and rest  When you're tired, your activities will take longer. Fatigue also makes you more likely to get an infection. Plan your day so that your tasks are spaced throughout the day. To have more energy:  Stop and rest when you need to. Don't wait until you're overtired.  Switch back and forth between hard tasks and easy ones.  Give yourself plenty of time for each task, so you don't have to hurry.  Take 20- to 30-minute rest breaks after meals and throughout the day.  If an activity takes a lot of energy, break it into smaller parts. For instance, fold the laundry first. Then take a break before putting it away.  Try not to exert yourself in extreme cold or heat.       Find ways to conserve energy  Conserving your energy can help you stay active and breathe better. The way you use your body during a task can help you conserve energy. Think of ways to make things easier, and take your time to ease shortness of breath.  For some tasks, you can also use special aids designed to reduce the amount of energy needed. Here are some tips:  Sit whenever possible, and keep your arms close to your body. Use slow, smooth motions.  Sit to dress and undress, shave, brush your teeth, and comb your hair. Use a long-handled reacher to pull on socks and shoes, and long-handled items like shoe horns.  Sit on a bench to shower. Use warm water, not hot. (Steam can make it harder to breathe.) Dry off by wrapping yourself in a terrycloth robe.  Use energy-saving appliances, such as an electric can opener, a power toothbrush, and a .  Use a cart with wheels to move groceries, laundry, dishes, and other items around the house. Some carts have seats so you can rest when you need to.  Use lightweight, nonstick  pots and pans to cook. Soak dirty dishes instead of scrubbing them. Air-dry dishes, or use a .  Mix, cook, serve, and store foods in the same dish.  Keep the things you use most at waist level, so you can get them without reaching or bending.  Use steps slowly, pausing at each step. If you have steps outside or in your home, think about adding ramps or stair lifts.  Think about ways that others can help you. You might get help from friends, family members, or home health aides.      Remember to breathe  Sometimes people with an illness or condition that affects the lungs try to rush through tasks so they won't get short of breath. This uses more energy and can actually increase shortness of breath. Instead, slow down and pace your breathing. These tips may help:  Move slowly during tasks that take a lot of effort, such as climbing stairs or pushing a shopping cart.  Use pursed-lip and diaphragmatic breathing while you go about a task.  Breathe out (exhale) when you exert effort. For example, breathe out as you lift up a grocery bag. Once you're holding the bag, breathe in. Ask the  at the [employer] to pack your grocery bags so they are light and easy to carry.  Focus on taking slow, deep breaths. If your breathing is shallow, you don't take in as much air.  Remember that it's OK to be short of breath. Just pace yourself and do pursed-lip breathing.      Talk with your healthcare provider about:  If you should use supplemental oxygen  If you need a referral to occupational and physical therapy. Therapists can help you with exercise and daily activities, and how to make things easier.      Pursed-lip breathing  This type of breathing helps you exhale better. Breathing this way during activity will help you reduce shortness of breath:  Relax your neck and shoulder muscles. Breathe in (inhale) slowly through your nose for 2 counts or more.  Pucker your lips as if you are going to blow out a candle.  Breathe out slowly and gently through your lips for at least twice as long as you inhaled.

## 2025-05-27 ENCOUNTER — RESULTS FOLLOW-UP (OUTPATIENT)
Dept: SLEEP MEDICINE | Facility: CLINIC | Age: 60
End: 2025-05-27

## 2025-05-28 DIAGNOSIS — N95.2 ATROPHIC VAGINITIS: ICD-10-CM

## 2025-05-28 RX ORDER — ESTRADIOL 0.1 MG/G
CREAM VAGINAL
Qty: 42.5 G | Refills: 0 | Status: SHIPPED | OUTPATIENT
Start: 2025-05-28

## 2025-05-28 RX ORDER — PANTOPRAZOLE SODIUM 40 MG/1
40 TABLET, DELAYED RELEASE ORAL
Qty: 90 TABLET | Refills: 3 | Status: SHIPPED | OUTPATIENT
Start: 2025-05-28

## 2025-05-28 NOTE — TELEPHONE ENCOUNTER
Care Due:                  Date            Visit Type   Department     Provider  --------------------------------------------------------------------------------                                EP -                              PRIMARY      [facility] INTERNAL  Last Visit: 05-      CARE ([facility])   MEDICINE       Scotty Figueroa                              [facility]                              PRIMARY      [facility] INTERNAL  Next Visit: 11-      CARE ([facility])   HUGO Figueroa                                                            Last  Test          Frequency    Reason                     Performed    Due Date  --------------------------------------------------------------------------------    TSH.........  12 months..  levothyroxine............  Not Found    Overdue    [employer] Embedded Care Due Messages. Reference number: [id number].   5/28/2025 8:03:31 AM CDT

## 2025-05-28 NOTE — TELEPHONE ENCOUNTER
Provider Staff:  Action required for this patient    Requires labs      Please see care gap opportunities below in Care Due Message.    Thanks!  [facility]     Appointments      Date Provider   Last Visit   5/14/2025 Scotty Figueroa MD   Next Visit   11/14/2025 Scotty Figueroa MD     Refill Decision Note   Bianca Weldon  is requesting a refill authorization.  Brief Assessment and Rationale for Refill:  Approve     Medication Therapy Plan:        Comments:     Note composed:4:17 PM 05/28/2025

## 2025-05-30 ENCOUNTER — PATIENT OUTREACH (OUTPATIENT)
Dept: ADMINISTRATIVE | Facility: HOSPITAL | Age: 60
End: 2025-05-30
Payer: MEDICARE

## 2025-05-30 NOTE — PROGRESS NOTES
Working Eye exam Report: Chart searched, [facility] DM EYE EXAM faxed to Dr. Harding 1x to request. Reminder set.

## 2025-05-30 NOTE — LETTER
AUTHORIZATION FOR RELEASE OF   CONFIDENTIAL INFORMATION    Dear DR. MCKEON,    We are seeing Bianca Weldon, date of birth 1965, in the clinic at [facility] INTERNAL MEDICINE. Scotty Figueroa MD is the patient's PCP. Bianca Weldon has an outstanding lab/procedure at the time we reviewed her chart. In order to help keep her health information updated, she has authorized us to request the following medical record(s):        (  )  MAMMOGRAM                                      (  )  COLONOSCOPY      (  )  PAP SMEAR                                          (  )  OUTSIDE LAB RESULTS     (  )  DEXA SCAN                                          ( X ) DM  EYE EXAM            (  )  FOOT EXAM                                          (  )  ENTIRE RECORD     (  )  OUTSIDE IMMUNIZATIONS                 (  )  _______________      Please fax/Email records to:  Fax: [phone]  Email: [email or web address]      Carmen Ovalles [facility]  Care Coordination Department  [facility]  Phone: [phone]           Patient Name: Bianca Weldon  : 1965  Patient Phone #: [phone]

## 2025-06-05 DIAGNOSIS — J44.89 ASTHMATIC BRONCHITIS , CHRONIC: Primary | ICD-10-CM

## 2025-06-06 ENCOUNTER — OFFICE VISIT (OUTPATIENT)
Dept: PULMONOLOGY | Facility: CLINIC | Age: 60
End: 2025-06-06
Payer: MEDICARE

## 2025-06-06 VITALS
DIASTOLIC BLOOD PRESSURE: 66 MMHG | SYSTOLIC BLOOD PRESSURE: 130 MMHG | HEART RATE: 67 BPM | RESPIRATION RATE: 14 BRPM | WEIGHT: 163.13 LBS | OXYGEN SATURATION: 98 % | BODY MASS INDEX: 34.24 KG/M2 | HEIGHT: 58 IN

## 2025-06-06 DIAGNOSIS — J44.89 ASTHMATIC BRONCHITIS , CHRONIC: Primary | ICD-10-CM

## 2025-06-06 DIAGNOSIS — G25.81 RESTLESS LEGS SYNDROME: ICD-10-CM

## 2025-06-06 DIAGNOSIS — E11.29 TYPE 2 DIABETES MELLITUS WITH MICROALBUMINURIA, WITH LONG-TERM CURRENT USE OF INSULIN: ICD-10-CM

## 2025-06-06 DIAGNOSIS — R80.9 TYPE 2 DIABETES MELLITUS WITH MICROALBUMINURIA, WITH LONG-TERM CURRENT USE OF INSULIN: ICD-10-CM

## 2025-06-06 DIAGNOSIS — I10 ESSENTIAL HYPERTENSION: ICD-10-CM

## 2025-06-06 DIAGNOSIS — G47.33 OSA ON CPAP: ICD-10-CM

## 2025-06-06 DIAGNOSIS — Z79.4 TYPE 2 DIABETES MELLITUS WITH MICROALBUMINURIA, WITH LONG-TERM CURRENT USE OF INSULIN: ICD-10-CM

## 2025-06-06 DIAGNOSIS — G47.429 NARCOLEPSY DUE TO UNDERLYING CONDITION WITHOUT CATAPLEXY: ICD-10-CM

## 2025-06-06 DIAGNOSIS — E78.5 DYSLIPIDEMIA: ICD-10-CM

## 2025-06-06 PROCEDURE — 99999 PR PBB SHADOW E&M-EST. PATIENT-LVL V: CPT | Mod: PBBFAC,,, | Performed by: INTERNAL MEDICINE

## 2025-06-10 ENCOUNTER — OFFICE VISIT (OUTPATIENT)
Dept: FAMILY MEDICINE | Facility: CLINIC | Age: 60
End: 2025-06-10
Payer: MEDICARE

## 2025-06-10 VITALS
DIASTOLIC BLOOD PRESSURE: 72 MMHG | OXYGEN SATURATION: 98 % | BODY MASS INDEX: 34.33 KG/M2 | TEMPERATURE: 99 F | HEIGHT: 58 IN | SYSTOLIC BLOOD PRESSURE: 138 MMHG | WEIGHT: 163.56 LBS | HEART RATE: 80 BPM

## 2025-06-10 DIAGNOSIS — J44.89 ASTHMATIC BRONCHITIS , CHRONIC: ICD-10-CM

## 2025-06-10 DIAGNOSIS — J32.9 RECURRENT SINUSITIS: Primary | ICD-10-CM

## 2025-06-10 DIAGNOSIS — H61.22 IMPACTED CERUMEN OF LEFT EAR: ICD-10-CM

## 2025-06-10 PROCEDURE — 99999 PR PBB SHADOW E&M-EST. PATIENT-LVL V: CPT | Mod: PBBFAC,,, | Performed by: STUDENT IN AN ORGANIZED HEALTH CARE EDUCATION/TRAINING PROGRAM

## 2025-06-10 PROCEDURE — 99214 OFFICE O/P EST MOD 30 MIN: CPT | Mod: S$GLB,,, | Performed by: STUDENT IN AN ORGANIZED HEALTH CARE EDUCATION/TRAINING PROGRAM

## 2025-06-10 PROCEDURE — 3008F BODY MASS INDEX DOCD: CPT | Mod: CPTII,S$GLB,, | Performed by: STUDENT IN AN ORGANIZED HEALTH CARE EDUCATION/TRAINING PROGRAM

## 2025-06-10 PROCEDURE — 3078F DIAST BP <80 MM HG: CPT | Mod: CPTII,S$GLB,, | Performed by: STUDENT IN AN ORGANIZED HEALTH CARE EDUCATION/TRAINING PROGRAM

## 2025-06-10 PROCEDURE — 3075F SYST BP GE 130 - 139MM HG: CPT | Mod: CPTII,S$GLB,, | Performed by: STUDENT IN AN ORGANIZED HEALTH CARE EDUCATION/TRAINING PROGRAM

## 2025-06-10 PROCEDURE — 3066F NEPHROPATHY DOC TX: CPT | Mod: CPTII,S$GLB,, | Performed by: STUDENT IN AN ORGANIZED HEALTH CARE EDUCATION/TRAINING PROGRAM

## 2025-06-10 PROCEDURE — 3061F NEG MICROALBUMINURIA REV: CPT | Mod: CPTII,S$GLB,, | Performed by: STUDENT IN AN ORGANIZED HEALTH CARE EDUCATION/TRAINING PROGRAM

## 2025-06-10 PROCEDURE — 3044F HG A1C LEVEL LT 7.0%: CPT | Mod: CPTII,S$GLB,, | Performed by: STUDENT IN AN ORGANIZED HEALTH CARE EDUCATION/TRAINING PROGRAM

## 2025-06-10 PROCEDURE — 1159F MED LIST DOCD IN RCRD: CPT | Mod: CPTII,S$GLB,, | Performed by: STUDENT IN AN ORGANIZED HEALTH CARE EDUCATION/TRAINING PROGRAM

## 2025-06-10 PROCEDURE — 4010F ACE/ARB THERAPY RXD/TAKEN: CPT | Mod: CPTII,S$GLB,, | Performed by: STUDENT IN AN ORGANIZED HEALTH CARE EDUCATION/TRAINING PROGRAM

## 2025-06-10 RX ORDER — LEVOFLOXACIN 500 MG/1
500 TABLET, FILM COATED ORAL DAILY
Qty: 7 TABLET | Refills: 0 | Status: SHIPPED | OUTPATIENT
Start: 2025-06-10 | End: 2025-06-12 | Stop reason: SDUPTHER

## 2025-06-10 NOTE — PROGRESS NOTES
Patient ID: Bianca Weldon is a 60 y.o. female.    Chief Complaint: Sinus Problem    History of Present Illness    CHIEF COMPLAINT:  Ms. Weldon presents today for ongoing sinus infection.    HISTORY OF PRESENT ILLNESS:  She presents with ongoing sinus infection that started May 14th, for which she was prescribed Augmentin. She reports thick green mucus production in the morning, persistent sinus pressure and pain, and ears getting plugged up at night and when lying down. She denies fever. She recently had earwax removal at after-hours care with significant amount of wax noted.    MEDICAL HISTORY:  She has a history of recurrent sinus infections occurring at least twice per year and a deviated septum. She has asthma with recent normal chest XRs and pulmonary function tests from pulmonologist evaluation.    MEDICATION INTOLERANCES:  She reports inability to tolerate Prednisone due to significant blood sugar elevation, with levels reaching above 300 at night. She indicates blood sugar regulation takes approximately 6 months to normalize after Prednisone use.      ROS:  General: -fever, -chills, -fatigue, -weight gain, -weight loss  Eyes: -vision changes, -redness, -discharge  ENT: -ear pain, -nasal congestion, -sore throat, +nasal discharge, +ear pressure  Cardiovascular: -chest pain, -palpitations, -lower extremity edema  Respiratory: -cough, -shortness of breath  Gastrointestinal: -abdominal pain, -nausea, -vomiting, -diarrhea, -constipation, -blood in stool  Genitourinary: -dysuria, -hematuria, -frequency  Musculoskeletal: -joint pain, -muscle pain  Skin: -rash, -lesion  Neurological: -headache, -dizziness, -numbness, -tingling  Psychiatric: -anxiety, -depression, -sleep difficulty  Head: +sinus pressure         Pmh, Psh, Family Hx, Social Hx updated in Epic Tabs today.       5/14/2025     2:01 PM 1/29/2024     8:40 AM 12/13/2023     2:32 PM 6/24/2022     1:15 PM 8/27/2021     7:47 AM   Depression Patient Health  Questionnaire   Over the last two weeks how often have you been bothered by little interest or pleasure in doing things Several days Not at all Not at all Several days  Not at all    Over the last two weeks how often have you been bothered by feeling down, depressed or hopeless Several days Not at all Not at all Several days Not at all    PHQ-2 Total Score 2 0 0 2 0       Data saved with a previous flowsheet row definition       Active Problem List with Overview Notes    Diagnosis Date Noted    Restless legs syndrome     Intertrigo 09/21/2023    Sedative, hypnotic or anxiolytic dependence, uncomplicated 04/12/2023    Breast lump on right side at 10 o'clock position 08/25/2022    Candidiasis of vulva and vagina 08/25/2022    Schizoaffective disorder, bipolar type 06/24/2022    Neurogenic orthostatic hypotension 06/24/2022    Acute medial meniscus tear of right knee 04/27/2021    DMITRI on CPAP 09/17/2020    Narcolepsy 09/17/2020    Type 2 diabetes mellitus with microalbuminuria, with long-term current use of insulin 03/11/2020    Family history of ischemic heart disease 02/07/2017    Uncontrolled diabetes mellitus type 2 without complications 01/11/2017    Asthmatic bronchitis , chronic 11/16/2016    Migraines 09/22/2014    IBS (irritable bowel syndrome) 08/11/2014    Hypothyroid 08/11/2014    Essential hypertension 08/11/2014    Dyslipidemia 08/11/2014    BMI 33.0-33.9,adult 08/11/2014    Vitamin D deficiency 02/10/2014       Past Medical History:   Diagnosis Date    Abnormal Pap smear of cervix     Abnormal Pap smear of vagina     after hyst, repeat every 6 months, per pt, had bx's, unknown results    Acid reflux     Arthritis     Asthma Several years ago, under controll    Bipolar 1 disorder     Cataract 4 years ago    Surgical removed 4 yr ago right/Left eye    Depression     bipolar    Diabetes mellitus 7 years ago    Diabetes mellitus, type 2     Disorder of kidney and ureter 2021    Seeping protein fliud mildly  "   Elevated alkaline phosphatase level     Endometrial cancer Over 25 years ago    Endometriosis    GERD (gastroesophageal reflux disease) 36 years old under control w protonix    Hyperlipidemia     Hyperparathyroidism 38 years old    Hypertension 4 years ago    Hypothyroidism 40years old    IBS (irritable bowel syndrome)     Interstitial cystitis     Migraine headache 20 years ago    Narcolepsy     Nonalcoholic steatohepatitis     fatty liver    Obesity 53 years old    Orthostatic hypotension     Osteoarthritis 1 year ago    PONV (postoperative nausea and vomiting)     Respiratory distress     States, "My lungs collapsed during hemorrhoid surgery."    Restless legs syndrome     Seasonal allergies 30 years ago    Sleep apnea 1 year ago    DMITRI-CPAP    Thyroid disease        Past Surgical History:   Procedure Laterality Date    ADENOIDECTOMY      ADENOIDECTOMY      ADENOIDECTOMY  2019    APPENDECTOMY  36 years old    julia shoulder surgery      COLONOSCOPY      CYSTOSTOMY W/ BLADDER BIOPSY      interstial cystitis    DILATION AND CURETTAGE OF UTERUS      missed ab    ESOPHAGEAL DILATION      EYE SURGERY      Cataracts both eyes done    HEMORRHOID SURGERY      HYSTERECTOMY  2001    TLH, LSO (endometriosis)    KNEE ARTHROSCOPY W/ MENISCECTOMY Right 04/27/2021    Procedure: ARTHROSCOPY, KNEE, WITH MENISCECTOMY;  Surgeon: Kenrick Gray MD;  Location: [facility];  Service: Orthopedics;  Laterality: Right;   partial medial meniscectomy    KNEE SURGERY  2 yr    R knee    OOPHORECTOMY  2001    LSO    TONSILLECTOMY      WISDOM TOOTH EXTRACTION         Family History   Problem Relation Name Age of Onset    Diabetes Mellitus Mother Dee     Melanoma Mother Dee     Cancer Mother Dee         Melanoma tumor, right eye removed    Diabetes Mother Dee     Hearing loss Mother Dee     Hypertension Mother Dee     Vision loss Mother Dee         Melanoma tumor,  right eye removed, cataracts, glacoma    Diabetes " Mellitus Father Jarocho Smith jr.     Colon cancer Father Jarocho Luis jr.     Heart disease Father Jarocho Luis jr. 45        Diseased heart double bypass triple bypass    Cancer Father Jarocho Luis jr.         Colon cancer     Diabetes Father Jarocho Luis jr.     Hearing loss Father Jarocho Luis jr.     Hypertension Father Jarocho Smith jr.     Rheum arthritis Maternal Grandmother Erminee     Breast cancer Maternal Grandmother Erminee     Arthritis Maternal Grandmother Erminee         Rumitoid arthiritis    Cancer Maternal Grandmother Erminee         Breast cancer double masectomy    Thrombophilia Paternal Grandmother Shakila         DVTs    Arthritis Paternal Grandmother Shakila     Depression Paternal Grandmother Shakila     Anesthesia problems Sister 1         hypertension    Cancer Paternal Grandfather Jarocho smith sr.         Bladder kidney cancer    Heart disease Paternal Grandfather Jarocho smith sr.         Heart attack    Stroke Paternal Grandfather Jarocho smith sr.         Paralized right side    Depression Maternal Aunt Vani  great aunt     Mental illness Maternal Aunt Vani  great aunt         mental mood disorders, break downs    Depression Paternal Aunt Olga Lidia     Early death Sister Olga         Blockage 100% left ventricle, 61 years old  march 10, 2021    Hearing loss Sister Olga     Heart disease Sister Olga         100% blockage left ventricle,  march 10, 2021    Diabetes Sister Olga     Hypertension Sister Olga     Allergies Sister Olga     Heart failure Sister Olga         Passed away 100% blockage left ventrical    Diabetes Paternal Aunt Aimee     Kidney disease Brother Carlos     Hearing loss Brother Carlos     Heart disease Brother Carlos     Hypertension Brother Carlos     Hearing loss Daughter Maureen     Asthma Son Chasidy grandson     Asthma Son Chasidy grandson     Diabetes Paternal Aunt Aimee     Diabetes Sister Olga sister     Hypertension Sister Olga sister     Early death Sister Olga          Blockage 100% left ventricle, 61 years old  march 10, 2021    Hearing loss Sister Olga     Heart disease Sister Olga         100% blockage left ventricle,  march 10, 2021    Hearing loss Brother Maureen daughter     Heart disease Brother Maureen daughter     Hypertension Brother Maureen daughter     Hearing loss Daughter Maureen     Kidney disease Brother Carlos     Ovarian cancer Neg Hx         Social History     Socioeconomic History    Marital status: Significant Other   Occupational History    Occupation: disabled   Tobacco Use    Smoking status: Passive Smoke Exposure - Never Smoker     Passive exposure: Current    Smokeless tobacco: Never    Tobacco comments:     Light smoker, quit 2017   Substance and Sexual Activity    Alcohol use: No    Drug use: No    Sexual activity: Yes     Partners: Male     Birth control/protection: Other-see comments     Comment: Hystorectomy   Other Topics Concern    Are you pregnant or think you may be? No    Breast-feeding No     Social Drivers of Health     Financial Resource Strain: Medium Risk (2025)    Overall Financial Resource Strain (CARDIA)     Difficulty of Paying Living Expenses: Somewhat hard   Food Insecurity: Food Insecurity Present (2025)    Hunger Vital Sign     Worried About Running Out of Food in the Last Year: Sometimes true     Ran Out of Food in the Last Year: Never true   Transportation Needs: No Transportation Needs (2025)    PRAPARE - Transportation     Lack of Transportation (Medical): No     Lack of Transportation (Non-Medical): No   Physical Activity: Insufficiently Active (2025)    Exercise Vital Sign     Days of Exercise per Week: 1 day     Minutes of Exercise per Session: 20 min   Stress: No Stress Concern Present (2025)    [employer] - Occupational Stress Questionnaire     Feeling of Stress : Not at all   Housing Stability: Low Risk  (2025)    Housing Stability Vital Sign     Unable to  Pay for Housing in the Last Year: No     Number of Times Moved in the Last Year: 0     Homeless in the Last Year: No       Medications Ordered Prior to Encounter[1]    Review of patient's allergies indicates:   Allergen Reactions    Bactrim [sulfamethoxazole-trimethoprim] Swelling, Other (See Comments) and Anaphylaxis    Latuda [lurasidone] Anaphylaxis    Macrodantin [nitrofurantoin macrocrystalline] Swelling, Other (See Comments) and Anaphylaxis    Canagliflozin      Yeast infections    Metformin Diarrhea         Review of Systems        Physical Exam  HENT:      Left Ear: There is impacted cerumen.      Nose: Congestion present.   Eyes:      Extraocular Movements: Extraocular movements intact.      Pupils: Pupils are equal, round, and reactive to light.   Cardiovascular:      Rate and Rhythm: Normal rate and regular rhythm.      Pulses: Normal pulses.      Heart sounds: Normal heart sounds.   Pulmonary:      Effort: Pulmonary effort is normal.      Breath sounds: Normal breath sounds.   Musculoskeletal:      Right lower leg: No edema.      Left lower leg: No edema.   Neurological:      Mental Status: She is alert.        Assessment:     1. Recurrent sinusitis    2. Impacted cerumen of left ear    3. Asthmatic bronchitis , chronic        LABS:   Lab Results   Component Value Date    HGBA1C 6.1 (H) 05/15/2025    HGBA1C 6.4 (H) 10/05/2024    HGBA1C 6.4 (H) 10/05/2024      Lab Results   Component Value Date    CHOL 105 (L) 05/15/2025    CHOL 132 04/22/2024    CHOL 131 03/14/2023     Lab Results   Component Value Date    LDLCALC 49.2 (L) 05/15/2025    LDLCALC 69.6 04/22/2024    LDLCALC 59 03/14/2023     Lab Results   Component Value Date    WBC 6.29 05/15/2025    HGB 13.8 05/15/2025    HCT 42.4 05/15/2025     05/15/2025    CHOL 105 (L) 05/15/2025    TRIG 64 05/15/2025    HDL 43 05/15/2025    ALT 17 10/05/2024    ALT 17 10/05/2024    AST 19 10/05/2024    AST 18 10/05/2024     10/05/2024     10/05/2024     K 3.9 10/05/2024    K 4.1 10/05/2024     10/05/2024     10/05/2024    CREATININE 1.1 10/05/2024    CREATININE 1.0 10/05/2024    BUN 10 10/05/2024    BUN 9 10/05/2024    CO2 24 10/05/2024    CO2 27 10/05/2024    TSH 2.23 02/28/2025    INR 1.0 03/09/2018    HGBA1C 6.1 (H) 05/15/2025       Plan:   Bianca was seen today for sinus problem.    Diagnoses and all orders for this visit:    Recurrent sinusitis  -     levoFLOXacin (LEVAQUIN) 500 MG tablet; Take 1 tablet (500 mg total) by mouth once daily. for 7 days  -     Ambulatory referral/consult to ENT; Future    Impacted cerumen of left ear  -     Ambulatory referral/consult to ENT; Future    Asthmatic bronchitis , chronic        Assessment & Plan    ## ACUTE SINUSITIS:  - Noted patient's persistent sinus infection symptoms since May 14th with green, thick mucus, sinus pressure, and plugged ears despite previous Augmentin treatment.  - Ms. Weldon denies fever.  - Prescribed Levofloxacin 1 tablet daily for 10 days due to failed Augmentin treatment.  - Reviewed EKG; QT interval appears normal.  - Explained potential side effects of Levofloxacin, including rare chances of tendon rupture and arrhythmia.  - Instructed patient to perform saline nasal irrigation using Neti pot, apply warm compresses to sinuses to aid drainage, and continue using Flonase for nasal congestion if needed.    ## RECURRENT AND CHRONIC SINUSITIS:  - Ms. Weldon reports frequent sinus infections occurring a few times per year.  - Discussed potential anatomical issues that may contribute to recurrent infections.  - Referred to ENT for comprehensive evaluation, including potential endoscopy or CT to assess chronic sinus problems and identify possible anatomical causes.    ## DEVIATED NASAL SEPTUM:  - Discussed deviated septum as a potential cause for recurrent sinus infections.  - ENT referral will include evaluation of the deviated septum and potential treatment options.    ## ASTHMA:  -  Ms. Weldon reports having asthma but no recent wheezing.  - Recent pulmonologist checkup shows good results with normal x-rays and pulmonary function tests.    ## IMPACTED CERUMEN:  - Ms. Weldon reports earwax buildup, especially in the left ear, causing plugged ears, which was confirmed on physical exam.  - Recommend Debrox ear drops for earwax removal.  - Referred to ENT for proper earwax removal and further management.    ## DRUG ALLERGY:  - Noted patient reports hyperglycemia as a side effect of Prednisone.  - Discussed Prednisone side effects and alternative treatment options.    ## FOLLOW-UP:  - Referred to ENT for comprehensive evaluation of recurrent sinus problems, deviated septum, and earwax removal.           Face to Face time with patient:  3:40 PM CDT -      Each patient to whom he or she provides medical services by telemedicine is:  (1) informed of the relationship between the physician and patient and the respective role of any other health care provider with respect to management of the patient; and (2) notified that he or she may decline to receive medical services by telemedicine and may withdraw from such care at any time.    I spent a total of    32   minutes face to face and non-face to face on the date of this visit.This includes time preparing to see the patient (eg, review of tests, notes), obtaining and/or reviewing additional history from an independent historian and/or outside medical records, documenting clinical information in the electronic health record, independently interpreting results and/or communicating results to the patient/family/caregiver, or care coordinator.  Visit today included increased complexity associated with the care of the episodic problem addressed and managing the longitudinal care of the patient due to the serious and/or complex managed problem(s).    There are no Patient Instructions on file for this visit.    No follow-ups on file.  The ASCVD Risk score  (Alida BEARDEN, et al., 2019) failed to calculate for the following reasons:    The valid total cholesterol range is 130 to 320 mg/dL    This note was generated with the assistance of ambient listening technology. Verbal consent was obtained by the patient and accompanying visitor(s) for the recording of patient appointment to facilitate this note. I attest to having reviewed and edited the generated note for accuracy, though some syntax or spelling errors may persist. Please contact the author of this note for any clarification.         [1]   Current Outpatient Medications on File Prior to Visit   Medication Sig Dispense Refill    albuterol (ACCUNEB) 1.25 mg/3 mL Nebu Take 6 mLs (2.5 mg total) by nebulization every 6 (six) hours as needed (SOB). Rescue 150 mL 11    albuterol (PROVENTIL/VENTOLIN HFA) 90 mcg/actuation inhaler Inhale 2 puffs into the lungs every 6 (six) hours as needed for Wheezing. Rescue 18 g 5    atorvastatin (LIPITOR) 40 MG tablet Take 1 tablet (40 mg total) by mouth once daily. 90 tablet 0    blood sugar diagnostic Strp Inject 1 each into the skin 3 (three) times daily. Dispense preferred on insurance 100 strip 11    blood-glucose sensor (DEXCOM G7 SENSOR MISC) by Misc.(Non-Drug; Combo Route) route.      clindamycin (CLEOCIN T) 1 % external solution APPLY TO THE AFFECTED AREA TWICE DAILY AS NEEDED FOR ACE/ FOLLICULITIS ON FACE AND SCALP 60 mL 4    cyclobenzaprine (FLEXERIL) 10 MG tablet Take 10 mg by mouth every evening.      deutetrabenazine (AUSTEDO) 9 mg Tab Take 9 mg by mouth 2 (two) times daily.      diclofenac sodium (VOLTAREN) 1 % Gel APPLY FOUR GRAMS TOPICALLY EVERY DAY AS DIRECTED 100 g 3    dicyclomine (BENTYL) 20 mg tablet Take 1 tablet (20 mg total) by mouth 3 (three) times daily as needed (abdominal pain). 90 tablet 0    estradioL (ESTRACE) 0.01 % (0.1 mg/gram) vaginal cream PLACE ONE GRAM VAGINALLY EVERY 7 DAYS 42.5 g 0    fludrocortisone (FLORINEF) 0.1 mg Tab Take 100 mcg by mouth once  "daily.      fluticasone propionate (FLONASE) 50 mcg/actuation nasal spray USE TWO SPRAYS IN EACH NOSTRIL DAILY 48 g 0    fluticasone-umeclidin-vilanter (TRELEGY ELLIPTA) 100-62.5-25 mcg DsDv Inhale 1 puff into the lungs once daily. 60 each 5    inhalation spacing device Use as directed for inhalation. 1 each 0    insulin glargine U-300 conc (TOUJEO MAX U-300 SOLOSTAR) 300 unit/mL (3 mL) insulin pen Inject 66 Units into the skin once daily. - Subcutaneous 2 Pen 2    insulin lispro 100 unit/mL pen Inject 8 Units into the skin 3 (three) times daily before meals. 15 mL 3    ketoconazole (NIZORAL) 2 % cream Apply topically 2 (two) times daily. Apply to the affected area twice daily. 30 g 2    lamoTRIgine (LAMICTAL) 200 MG tablet Take 1 tablet by mouth 2 (two) times daily.      levocetirizine (XYZAL) 5 MG tablet Take 1 tablet (5 mg total) by mouth every evening. 30 tablet 11    levothyroxine (SYNTHROID) 75 MCG tablet TAKE ONE TABLET BY MOUTH EVERY DAY 90 tablet 0    LIDOcaine-prilocaine (EMLA) cream SMARTSI-4.8 Gram(s) Topical      lithium (ESKALITH) 300 MG capsule Take 900 mg by mouth every evening.      LORazepam (ATIVAN) 1 MG tablet Take 1 tablet (1 mg total) by mouth 2 (two) times daily as needed for Anxiety (takes nightly). 60 tablet 2    losartan (COZAAR) 25 MG tablet Take 1 tablet (25 mg total) by mouth once daily. 90 tablet 1    multivitamin capsule Take 1 capsule by mouth once daily.      MYRBETRIQ 50 mg Tb24 Take 50 mg by mouth 2 (two) times daily as needed.      nirmatrelvir-ritonavir (PAXLOVID, EUA,) 300 mg (150 mg x 2)-100 mg copackaged tablets (EUA)       ondansetron (ZOFRAN-ODT) 4 MG TbDL DISSOLVE 1 TABLET ON THE TONGUE EVERY 6 HOURS AS NEEDED FOR NAUSEA 20 tablet 0    pantoprazole (PROTONIX) 40 MG tablet TAKE 1 TABLET BY MOUTH DAILY 90 tablet 3    pen needle, diabetic 31 gauge x 3/16" Ndle Inject 1 Stick into the skin 5 (five) times daily. 150 each 6    rOPINIRole (REQUIP) 0.5 MG tablet Take 1 tablet " (0.5 mg total) by mouth every evening. 30 tablet 1    SITagliptin phosphate (JANUVIA) 100 MG Tab Take 1 tablet (100 mg total) by mouth once daily. 30 tablet 2    solriamfetoL (SUNOSI) 150 mg Tab TAKE ONE TABLET BY MOUTH ONCE DAILY 30 tablet 4    triamcinolone acetonide 0.1% (KENALOG) 0.1 % cream Apply topically 2 (two) times daily. Do not use on face 45 Bottle 6    TRINTELLIX 20 mg Tab Take 1 tablet by mouth once daily.      [DISCONTINUED] fluconazole (DIFLUCAN) 150 MG Tab TAKE ONE TABLET BY MOUTH ONCE A WEEK 4 tablet 5    amoxicillin-clavulanate 875-125mg (AUGMENTIN) 875-125 mg per tablet Take 1 tablet by mouth every 12 (twelve) hours. (Patient not taking: Reported on 6/10/2025) 20 tablet 0    methylPREDNISolone (MEDROL DOSEPACK) 4 mg tablet use as directed (Patient not taking: Reported on 6/10/2025) 1 each 0     Current Facility-Administered Medications on File Prior to Visit   Medication Dose Route Frequency Provider Last Rate Last Admin    nozaseptin (NOZIN) nasal    Each Nostril On Call Procedure Kenrick Gray MD   Given at 04/27/21 1226

## 2025-06-11 ENCOUNTER — OFFICE VISIT (OUTPATIENT)
Dept: DIABETES | Facility: CLINIC | Age: 60
End: 2025-06-11
Payer: MEDICARE

## 2025-06-11 DIAGNOSIS — R80.9 TYPE 2 DIABETES MELLITUS WITH MICROALBUMINURIA, WITH LONG-TERM CURRENT USE OF INSULIN: Primary | ICD-10-CM

## 2025-06-11 DIAGNOSIS — E11.29 TYPE 2 DIABETES MELLITUS WITH MICROALBUMINURIA, WITH LONG-TERM CURRENT USE OF INSULIN: Primary | ICD-10-CM

## 2025-06-11 DIAGNOSIS — Z79.4 TYPE 2 DIABETES MELLITUS WITH MICROALBUMINURIA, WITH LONG-TERM CURRENT USE OF INSULIN: Primary | ICD-10-CM

## 2025-06-11 PROCEDURE — 98006 SYNCH AUDIO-VIDEO EST MOD 30: CPT | Mod: 95,,, | Performed by: NURSE PRACTITIONER

## 2025-06-11 PROCEDURE — 3061F NEG MICROALBUMINURIA REV: CPT | Mod: CPTII,95,, | Performed by: NURSE PRACTITIONER

## 2025-06-11 PROCEDURE — 3066F NEPHROPATHY DOC TX: CPT | Mod: CPTII,95,, | Performed by: NURSE PRACTITIONER

## 2025-06-11 PROCEDURE — 95251 CONT GLUC MNTR ANALYSIS I&R: CPT | Mod: NDTC,,, | Performed by: NURSE PRACTITIONER

## 2025-06-11 PROCEDURE — 4010F ACE/ARB THERAPY RXD/TAKEN: CPT | Mod: CPTII,95,, | Performed by: NURSE PRACTITIONER

## 2025-06-11 PROCEDURE — 1159F MED LIST DOCD IN RCRD: CPT | Mod: CPTII,95,, | Performed by: NURSE PRACTITIONER

## 2025-06-11 PROCEDURE — 3044F HG A1C LEVEL LT 7.0%: CPT | Mod: CPTII,95,, | Performed by: NURSE PRACTITIONER

## 2025-06-11 NOTE — PROGRESS NOTES
The patient location is: [place]  The chief complaint leading to consultation is: Type 2 Diabetes    Visit type: audiovisual    Face to Face time with patient: 35 minutes  38 minutes of total time spent on the encounter, which includes face to face time and non-face to face time preparing to see the patient (eg, review of tests), Obtaining and/or reviewing separately obtained history, Documenting clinical information in the electronic or other health record, Independently interpreting results (not separately reported) and communicating results to the patient/family/caregiver, or Care coordination (not separately reported).     HISTORY OF PRESENT ILLNESS: 60-year-old  female presenting virtually for diabetes follow up.     Patient has had Type II diabetes since 2014 and has the following complications: neuropathy, gastroparesis. She has attended diabetes education in the past. Other pertinent conditions: HTN, HLD, IBD, ISELA, and schizoaffective disorder ( followed by psychiatry ). Of note, she has a history of cortisone injections for plantar fasciitis.       Past tried and failed medications include: Invokana ( yeast infection ); Metformin caused GI side effects; and Trulicity / Mounjaro caused abdominal pain and discomfort. Glimepiride discontinued due to hypoglycemia. Patient stopped OP5 pump on her own due to site discomfort. On today, patient inquires about starting Wegovy.     The patient Dexcom G7 CGM was reviewed.  Active CGM usage at 94 %. For the past 14 days ( May 29 - June 11 ), average glucose was 175 mg/dL, with standard deviation of 52. She was above range 40 % of the time, in range 59 % of the time, and below range 1 % of the time. Overall, pattern of hyperglycemia spikes between 7 PM - MN.     Patient denies any recent hospital admissions, emergency room visits, or syncope. However, in May, she was treated with oral steroids for sinusitis. Since then, patient reports that BGs have remained  "elevated.     DM MEDICATIONS:  Toujeo MAX 60 units at bedtime;  Lispro 5-10 units before meals; Januvia 100 mg daily    Review of Systems   Eyes:  Negative for visual disturbance.   Gastrointestinal:  Positive for abdominal pain and nausea. Negative for constipation and diarrhea.   Endocrine: Negative for polydipsia, polyphagia and polyuria.   Neurological:  Positive for numbness.     Diabetes Management Status  Statin: Taking  ACE/ARB: Taking    Screening or Prevention Patient's value Goal Complete/Controlled?   HgA1C Testing and Control   Lab Results   Component Value Date    HGBA1C 6.1 (H) 05/15/2025      Annually/Less than 8% Yes   Lipid profile : 05/15/2025 Annually Yes   LDL control Lab Results   Component Value Date    LDLCALC 49.2 (L) 05/15/2025    Annually/Less than 100 mg/dl  Yes   Nephropathy screening Lab Results   Component Value Date    LABMICR 7.0 05/15/2025     Lab Results   Component Value Date    PROTEINUA Negative 02/07/2024     No results found for: "UTPCR"   Annually Yes   Blood pressure BP Readings from Last 1 Encounters:   06/10/25 138/72    Less than 140/90 Yes   Dilated retinal exam : 01/05/2023 Annually No   Foot exam   : 10/05/2024 Annually Yes     Assessment and Plan:    1.) Type 2 diabetes mellitus with microalbuminuria, with long-term current use of insulin   Comments:  Controlled, most recent A1C of 6.1 %.  Patient currently enrolled in digital diabetes program. Continue Januvia 100 mg daily.  She has been unable to tolerate the GLP-1 drug class, also has history of gastroparesis. Therefore, suggested that patient not try Wegovy.     Due to hyperglycemia, advised to increase Toujeo 70 units daily.  Increase Humalog 12-14 units BEFORE meals. She uses the following correction for high blood sugars:  - 199: 2 units  //   - 249: 4 units  //   - 299: 6 units  //   - 349: 8 units //  BG Over 350: 10 units.      Orders:  -     insulin glargine U-300 conc (TOUJEO MAX " "U-300 SOLOSTAR) 300 unit/mL (3 mL) insulin pen; Inject 70 Units into the skin once daily. - Subcutaneous  Dispense: 2   -     insulin lispro 100 unit/mL pen; Inject 14 Units into the skin 3 (three) times daily before meals.  Dispense: 15 mL; Refill: 3  -     pen needle, diabetic 31 gauge x 3/16" Ndle; Inject 1 Stick into the skin 5 (five) times daily.  Dispense: 500 each; Refill: 1    Additional Plan Details:    1.) Continue monitoring blood sugar via Dexcom G7 CGM. Discussed ADA goal for fasting blood sugar, 80 - 130 mg/dL; pp blood sugars below 180 mg/dl. Also, discussed prevention of hypoglycemia and the need to adjust goals to higher levels if persistent hypoglycemia.    2.) Return to clinic in 4 months for follow up. The patient was explained the above plan and given opportunity to ask questions.  She understands, chooses and consents to this plan and accepts all the risks, which include but are not limited to the risks mentioned above.    Renetta Whitlock NP-C, [facility]    Each patient to whom he or she provides medical services by telemedicine is:  (1) informed of the relationship between the physician and patient and the respective role of any other health care provider with respect to management of the patient; and (2) notified that he or she may decline to receive medical services by telemedicine and may withdraw from such care at any time.    "

## 2025-06-12 ENCOUNTER — OFFICE VISIT (OUTPATIENT)
Dept: OTOLARYNGOLOGY | Facility: CLINIC | Age: 60
End: 2025-06-12
Payer: MEDICARE

## 2025-06-12 ENCOUNTER — HOSPITAL ENCOUNTER (OUTPATIENT)
Dept: RADIOLOGY | Facility: HOSPITAL | Age: 60
Discharge: HOME | End: 2025-06-12
Payer: MEDICARE

## 2025-06-12 VITALS — WEIGHT: 163.56 LBS | BODY MASS INDEX: 34.33 KG/M2 | HEIGHT: 58 IN

## 2025-06-12 DIAGNOSIS — J32.9 RECURRENT SINUSITIS: ICD-10-CM

## 2025-06-12 DIAGNOSIS — J30.9 ALLERGIC RHINITIS, UNSPECIFIED SEASONALITY, UNSPECIFIED TRIGGER: ICD-10-CM

## 2025-06-12 DIAGNOSIS — H61.23 BILATERAL IMPACTED CERUMEN: Primary | ICD-10-CM

## 2025-06-12 LAB
LEFT EYE DM RETINOPATHY: NEGATIVE
RIGHT EYE DM RETINOPATHY: NEGATIVE

## 2025-06-12 PROCEDURE — 70220 X-RAY EXAM OF SINUSES: CPT | Mod: 26,,, | Performed by: RADIOLOGY

## 2025-06-12 PROCEDURE — 70220 X-RAY EXAM OF SINUSES: CPT | Mod: TC

## 2025-06-12 PROCEDURE — 99999 PR PBB SHADOW E&M-EST. PATIENT-LVL V: CPT | Mod: PBBFAC,,,

## 2025-06-12 RX ORDER — LEVOFLOXACIN 500 MG/1
500 TABLET, FILM COATED ORAL DAILY
Qty: 7 TABLET | Refills: 0 | Status: SHIPPED | OUTPATIENT
Start: 2025-06-12 | End: 2025-06-19

## 2025-06-12 NOTE — PROGRESS NOTES
Subjective:   Patient ID: Bianca Weldon is a 60 y.o. female.    Chief Complaint: Sinusitis (Pt is having sinus issues with possible wax in both ears )    History of Present Illness    Patient presents today with ear fullness and sinus infection. She reports left ear symptoms including sensation of ear closing off primarily at night, muffled hearing, and crackling with talking and swallowing. She had significant earwax removal at [facility] a few months ago. She is experiencing a sinus infection since May 14th with green mucus, postnasal drip, and pressure in frontal sinuses. Denies fever or upper tooth pain. This is her first sinus infection this year, though she typically experiences 2-3 annually. She has a history of multiple allergies confirmed through testing, except for ragweed. She previously received two different types of allergy shots several years ago. Current allergy symptoms include watery and itchy eyes. She takes Flonase daily and Levocetirizine for allergies. She was previously on Augmentin and is currently taking Levofloxacin for 7 days. She was unable to complete a prescribed steroid pack due to significant elevation in glucose levels related to diabetes. Denies smoking.       Review of patient's allergies indicates:   Allergen Reactions    Bactrim [sulfamethoxazole-trimethoprim] Swelling, Other (See Comments) and Anaphylaxis    Latuda [lurasidone] Anaphylaxis    Macrodantin [nitrofurantoin macrocrystalline] Swelling, Other (See Comments) and Anaphylaxis    Canagliflozin      Yeast infections    Metformin Diarrhea           Review of Systems   Constitutional:  Negative for chills, fatigue, fever and unexpected weight change.   HENT:  Positive for congestion, postnasal drip, rhinorrhea and sinus pressure. Negative for dental problem, ear discharge, ear pain, facial swelling, hearing loss, nosebleeds, sneezing, sore throat, tinnitus, trouble swallowing and voice change.    Eyes:  Negative for  "redness, itching and visual disturbance.   Respiratory:  Negative for cough, choking, shortness of breath and wheezing.    Cardiovascular:  Negative for chest pain and palpitations.   Gastrointestinal:  Negative for abdominal pain.        No reflux.   Musculoskeletal:  Negative for gait problem.   Skin:  Negative for rash.   Neurological:  Negative for dizziness, light-headedness and headaches.         Objective:   Ht 4' 10" (1.473 m)   Wt 74.2 kg (163 lb 9.3 oz)   BMI 34.19 kg/m²     Physical Exam  Constitutional:       General: She is not in acute distress.     Appearance: Normal appearance. She is not ill-appearing.   HENT:      Head: Normocephalic and atraumatic.      Right Ear: Tympanic membrane, ear canal and external ear normal. There is impacted cerumen.      Left Ear: Tympanic membrane, ear canal and external ear normal. There is impacted cerumen.      Nose: No congestion or rhinorrhea.      Right Sinus: Frontal sinus tenderness present.      Left Sinus: Frontal sinus tenderness present.      Mouth/Throat:      Mouth: Mucous membranes are moist.      Pharynx: Oropharynx is clear. Uvula midline. No oropharyngeal exudate or posterior oropharyngeal erythema.   Eyes:      Extraocular Movements: Extraocular movements intact.      Conjunctiva/sclera: Conjunctivae normal.      Pupils: Pupils are equal, round, and reactive to light.   Neck:      Thyroid: No thyroid mass.   Pulmonary:      Effort: Pulmonary effort is normal. No respiratory distress.   Musculoskeletal:         General: Normal range of motion.      Cervical back: Full passive range of motion without pain.   Lymphadenopathy:      Cervical: No cervical adenopathy.   Neurological:      General: No focal deficit present.      Mental Status: She is alert and oriented to person, place, and time.   Psychiatric:         Mood and Affect: Mood normal.         Behavior: Behavior normal. Behavior is cooperative.         Thought Content: Thought content normal.  "        Judgment: Judgment normal.        Procedure Note    CHIEF COMPLAINT:  Cerumen Impaction    Description:  The patient was seated in an exam chair.  An ear speculum was placed in the right EAC and was examined under the microscope.  Suction  were used to remove a large cerumen impaction.  The tympanic membrane was visualized and was normal in appearance.  The procedure was repeated on the left side in a similar fashion.  The TM was intact and normal on this side as well.  The patient tolerated the procedure well.     Assessment/Plan:     1. Bilateral impacted cerumen    2. Recurrent sinusitis    3. Allergic rhinitis, unspecified seasonality, unspecified trigger          Bilateral impacted cerumen  -     Ambulatory referral/consult to ENT    Recurrent sinusitis  -     Ambulatory referral/consult to ENT  -     levoFLOXacin (LEVAQUIN) 500 MG tablet; Take 1 tablet (500 mg total) by mouth once daily. for 7 days  Dispense: 7 tablet; Refill: 0  -     X-Ray Sinuses Min 3 Views; Future; Expected date: 06/12/2025    Allergic rhinitis, unspecified seasonality, unspecified trigger        Assessment & Plan    RECURRENT SINUSITIS:  - Assessed recurrent sinus infections   - Determined current antibiotic course insufficient; extended levofloxacin (Levaquin) for additional 7 days to complete a full 14-day course   - Considered CT if symptoms persist after extended antibiotic course   - Explained importance of saline rinses for clearing mucus and managing sinus infections using NeilMed bottle with saline or distilled water   - Ordered sinus XR to evaluate current condition  -Started Afrin nasal spray for 3 days  -  Increased Flonase to twice daily for 3 days, then return to daily    BILATERAL IMPACTED CERUMEN:  - Assessed ear fullness    ALLERGIC RHINITIS, UNSPECIFIED SEASONALITY, UNSPECIFIED TRIGGER:  - Considered uncontrolled allergies as potential cause for recurrent sinus infections   - Considered allergy testing to address  "potential underlying cause   - Discussed need to rotate antihistamines every 3-6 months to maintain effectiveness; switch to a different OTC antihistamine (e.g., from Zyrtec to Allegra)  - Increased Flonase to twice daily for 3 days, then return to daily    PLAN SUMMARY:  - Extended levofloxacin (Levaquin) for additional 7 days to complete 14-day course  - Ordered sinus X-ray  - Started Afrin nasal spray for 3 days  - Increased Flonase to twice daily for 3 days, then return to daily  - Recommend saline rinses using NeilMed bottle with saline or distilled water  - Advised switching to a different OTC antihistamine (e.g., from Zyrtec to Allegra)  - Consider CT if symptoms persist after extended antibiotic course  - Consider allergy testing to address potential underlying cause   - FU in 3-4 weeks or sooner if needed          Cerumen impaction:  Removed today without difficulty.  I would recommend the use of a wax softening drop, either over the counter Debrox or mineral oil, on a weekly basis.  I also instructed the patient to avoid Qtips.    Sinus Protocol    Sinusitis is caused by inflammation creating blockage in the natural drainage pathways of the sinuses.  This "Sinus Protocol" is designed to open, flush out and decrease the inflammation within those pathways.      Day 1-3 (Perform 2x per day)     Afrin (pump spray mist OTC) 2 sprays in each nostril   Primitivo Med Sinus Wash - Make sure you use distilled water!  Fluticasone nasal spray 2 sprays in each nostril      Days 4 - follow up visit (perform 1x per day)    Primitivo Med Sinus wash  Fluticasone nasal spray 2 sprays in each nostril        *TAKE ALL OTHER MEDICATIONS AS DIRECTED   *MAKE SURE YOU STOP USING AFRIN AFTER THE 3RD DAY AS THERE IS A RISK OF BECOMING DEPENDENT ON THAT MEDICATION     This note was generated with the assistance of ambient listening technology. Verbal consent was obtained by the patient and accompanying visitor(s) for the recording of patient " appointment to facilitate this note. I attest to having reviewed and edited the generated note for accuracy, though some syntax or spelling errors may persist. Please contact the author of this note for any clarification.

## 2025-06-12 NOTE — PATIENT INSTRUCTIONS
"Sinus Protocol    Sinusitis is caused by inflammation creating blockage in the natural drainage pathways of the sinuses.  This "Sinus Protocol" is designed to open, flush out and decrease the inflammation within those pathways.      Day 1-3 (Perform 2x per day)     Afrin (pump spray mist OTC) 2 sprays in each nostril   Primitivo Med Sinus Wash - Make sure you use distilled water!  Fluticasone nasal spray 2 sprays in each nostril      Days 4 - follow up visit (perform 1x per day)    Primitivo Med Sinus wash  Fluticasone nasal spray 2 sprays in each nostril        *TAKE ALL OTHER MEDICATIONS AS DIRECTED  *MAKE SURE YOU STOP USING AFRIN AFTER THE 3RD DAY AS THERE IS A RISK OF BECOMING DEPENDENT ON THAT MEDICATION   "

## 2025-06-13 ENCOUNTER — RESULTS FOLLOW-UP (OUTPATIENT)
Dept: OTOLARYNGOLOGY | Facility: CLINIC | Age: 60
End: 2025-06-13

## 2025-06-15 RX ORDER — INSULIN LISPRO 100 [IU]/ML
14 INJECTION, SOLUTION INTRAVENOUS; SUBCUTANEOUS
Qty: 15 ML | Refills: 3 | Status: SHIPPED | OUTPATIENT
Start: 2025-06-15

## 2025-06-15 RX ORDER — PEN NEEDLE, DIABETIC 30 GX3/16"
1 NEEDLE, DISPOSABLE MISCELLANEOUS
Qty: 500 EACH | Refills: 1 | Status: SHIPPED | OUTPATIENT
Start: 2025-06-15

## 2025-06-15 RX ORDER — PEN NEEDLE, DIABETIC 30 GX3/16"
1 NEEDLE, DISPOSABLE MISCELLANEOUS
Qty: 150 EACH | Refills: 6 | Status: SHIPPED | OUTPATIENT
Start: 2025-06-15 | End: 2025-06-15 | Stop reason: DRUGHIGH

## 2025-06-15 RX ORDER — INSULIN GLARGINE 300 [IU]/ML
INJECTION, SOLUTION SUBCUTANEOUS
Qty: 2 PEN | Refills: 2 | Status: SHIPPED | OUTPATIENT
Start: 2025-06-15 | End: 2025-06-16

## 2025-06-16 DIAGNOSIS — R80.9 TYPE 2 DIABETES MELLITUS WITH MICROALBUMINURIA, WITH LONG-TERM CURRENT USE OF INSULIN: ICD-10-CM

## 2025-06-16 DIAGNOSIS — Z79.4 TYPE 2 DIABETES MELLITUS WITH MICROALBUMINURIA, WITH LONG-TERM CURRENT USE OF INSULIN: ICD-10-CM

## 2025-06-16 DIAGNOSIS — E11.29 TYPE 2 DIABETES MELLITUS WITH MICROALBUMINURIA, WITH LONG-TERM CURRENT USE OF INSULIN: ICD-10-CM

## 2025-06-16 RX ORDER — INSULIN GLARGINE 300 U/ML
INJECTION, SOLUTION SUBCUTANEOUS
Qty: 4 PEN | Refills: 3 | Status: SHIPPED | OUTPATIENT
Start: 2025-06-16

## 2025-07-01 DIAGNOSIS — Z79.4 TYPE 2 DIABETES MELLITUS WITH MICROALBUMINURIA, WITH LONG-TERM CURRENT USE OF INSULIN: ICD-10-CM

## 2025-07-01 DIAGNOSIS — E11.29 TYPE 2 DIABETES MELLITUS WITH MICROALBUMINURIA, WITH LONG-TERM CURRENT USE OF INSULIN: ICD-10-CM

## 2025-07-01 DIAGNOSIS — R80.9 TYPE 2 DIABETES MELLITUS WITH MICROALBUMINURIA, WITH LONG-TERM CURRENT USE OF INSULIN: ICD-10-CM

## 2025-07-16 ENCOUNTER — PATIENT MESSAGE (OUTPATIENT)
Dept: DIABETES | Facility: CLINIC | Age: 60
End: 2025-07-16
Payer: MEDICARE

## 2025-07-17 ENCOUNTER — TELEPHONE (OUTPATIENT)
Dept: FAMILY MEDICINE | Facility: CLINIC | Age: 60
End: 2025-07-17
Payer: MEDICARE

## 2025-07-17 ENCOUNTER — LAB VISIT (OUTPATIENT)
Dept: LAB | Facility: HOSPITAL | Age: 60
End: 2025-07-17
Attending: FAMILY MEDICINE
Payer: MEDICARE

## 2025-07-17 ENCOUNTER — OFFICE VISIT (OUTPATIENT)
Dept: GASTROENTEROLOGY | Facility: CLINIC | Age: 60
End: 2025-07-17
Payer: MEDICARE

## 2025-07-17 ENCOUNTER — PATIENT MESSAGE (OUTPATIENT)
Dept: GASTROENTEROLOGY | Facility: CLINIC | Age: 60
End: 2025-07-17

## 2025-07-17 ENCOUNTER — NURSE TRIAGE (OUTPATIENT)
Dept: ADMINISTRATIVE | Facility: CLINIC | Age: 60
End: 2025-07-17
Payer: MEDICARE

## 2025-07-17 VITALS
DIASTOLIC BLOOD PRESSURE: 67 MMHG | BODY MASS INDEX: 34.52 KG/M2 | HEART RATE: 72 BPM | WEIGHT: 164.44 LBS | HEIGHT: 58 IN | SYSTOLIC BLOOD PRESSURE: 129 MMHG

## 2025-07-17 DIAGNOSIS — K58.0 IRRITABLE BOWEL SYNDROME WITH DIARRHEA: Primary | ICD-10-CM

## 2025-07-17 DIAGNOSIS — K31.84 GASTROPARESIS DUE TO DM: ICD-10-CM

## 2025-07-17 DIAGNOSIS — E11.43 GASTROPARESIS DUE TO DM: ICD-10-CM

## 2025-07-17 DIAGNOSIS — R80.9 TYPE 2 DIABETES MELLITUS WITH MICROALBUMINURIA, WITH LONG-TERM CURRENT USE OF INSULIN: ICD-10-CM

## 2025-07-17 DIAGNOSIS — R11.0 NAUSEA: ICD-10-CM

## 2025-07-17 DIAGNOSIS — E11.29 TYPE 2 DIABETES MELLITUS WITH MICROALBUMINURIA, WITH LONG-TERM CURRENT USE OF INSULIN: ICD-10-CM

## 2025-07-17 DIAGNOSIS — K21.9 GASTROESOPHAGEAL REFLUX DISEASE WITHOUT ESOPHAGITIS: ICD-10-CM

## 2025-07-17 DIAGNOSIS — K58.0 IRRITABLE BOWEL SYNDROME WITH DIARRHEA: ICD-10-CM

## 2025-07-17 DIAGNOSIS — Z79.4 TYPE 2 DIABETES MELLITUS WITH MICROALBUMINURIA, WITH LONG-TERM CURRENT USE OF INSULIN: ICD-10-CM

## 2025-07-17 DIAGNOSIS — E11.9 TYPE 2 DIABETES MELLITUS WITHOUT COMPLICATION, WITH LONG-TERM CURRENT USE OF INSULIN: ICD-10-CM

## 2025-07-17 DIAGNOSIS — Z79.4 TYPE 2 DIABETES MELLITUS WITHOUT COMPLICATION, WITH LONG-TERM CURRENT USE OF INSULIN: ICD-10-CM

## 2025-07-17 LAB
ALBUMIN SERPL BCP-MCNC: 4 G/DL (ref 3.5–5.2)
ALP SERPL-CCNC: 140 UNIT/L (ref 40–150)
ALT SERPL W/O P-5'-P-CCNC: 16 UNIT/L (ref 10–44)
ANION GAP (OHS): 7 MMOL/L (ref 8–16)
AST SERPL-CCNC: 18 UNIT/L (ref 11–45)
BILIRUB SERPL-MCNC: 0.6 MG/DL (ref 0.1–1)
BUN SERPL-MCNC: 11 MG/DL (ref 6–20)
CALCIUM SERPL-MCNC: 8.8 MG/DL (ref 8.7–10.5)
CHLORIDE SERPL-SCNC: 107 MMOL/L (ref 95–110)
CO2 SERPL-SCNC: 29 MMOL/L (ref 23–29)
CREAT SERPL-MCNC: 1.2 MG/DL (ref 0.5–1.4)
GFR SERPLBLD CREATININE-BSD FMLA CKD-EPI: 52 ML/MIN/1.73/M2
GLUCOSE SERPL-MCNC: 115 MG/DL (ref 70–110)
POTASSIUM SERPL-SCNC: 3.8 MMOL/L (ref 3.5–5.1)
PROT SERPL-MCNC: 6.6 GM/DL (ref 6–8.4)
SODIUM SERPL-SCNC: 143 MMOL/L (ref 136–145)

## 2025-07-17 PROCEDURE — 1159F MED LIST DOCD IN RCRD: CPT | Mod: CPTII,S$GLB,, | Performed by: INTERNAL MEDICINE

## 2025-07-17 PROCEDURE — 99999 PR PBB SHADOW E&M-EST. PATIENT-LVL IV: CPT | Mod: PBBFAC,,, | Performed by: INTERNAL MEDICINE

## 2025-07-17 PROCEDURE — 3008F BODY MASS INDEX DOCD: CPT | Mod: CPTII,S$GLB,, | Performed by: INTERNAL MEDICINE

## 2025-07-17 PROCEDURE — 36415 COLL VENOUS BLD VENIPUNCTURE: CPT | Mod: PO

## 2025-07-17 PROCEDURE — 3074F SYST BP LT 130 MM HG: CPT | Mod: CPTII,S$GLB,, | Performed by: INTERNAL MEDICINE

## 2025-07-17 PROCEDURE — G2211 COMPLEX E/M VISIT ADD ON: HCPCS | Mod: S$GLB,,, | Performed by: INTERNAL MEDICINE

## 2025-07-17 PROCEDURE — 3078F DIAST BP <80 MM HG: CPT | Mod: CPTII,S$GLB,, | Performed by: INTERNAL MEDICINE

## 2025-07-17 PROCEDURE — 3044F HG A1C LEVEL LT 7.0%: CPT | Mod: CPTII,S$GLB,, | Performed by: INTERNAL MEDICINE

## 2025-07-17 PROCEDURE — 3066F NEPHROPATHY DOC TX: CPT | Mod: CPTII,S$GLB,, | Performed by: INTERNAL MEDICINE

## 2025-07-17 PROCEDURE — 4010F ACE/ARB THERAPY RXD/TAKEN: CPT | Mod: CPTII,S$GLB,, | Performed by: INTERNAL MEDICINE

## 2025-07-17 PROCEDURE — 99214 OFFICE O/P EST MOD 30 MIN: CPT | Mod: S$GLB,,, | Performed by: INTERNAL MEDICINE

## 2025-07-17 PROCEDURE — 80053 COMPREHEN METABOLIC PANEL: CPT

## 2025-07-17 PROCEDURE — 83993 ASSAY FOR CALPROTECTIN FECAL: CPT

## 2025-07-17 PROCEDURE — 87177 OVA AND PARASITES SMEARS: CPT

## 2025-07-17 PROCEDURE — 3061F NEG MICROALBUMINURIA REV: CPT | Mod: CPTII,S$GLB,, | Performed by: INTERNAL MEDICINE

## 2025-07-17 PROCEDURE — 1160F RVW MEDS BY RX/DR IN RCRD: CPT | Mod: CPTII,S$GLB,, | Performed by: INTERNAL MEDICINE

## 2025-07-17 NOTE — PROGRESS NOTES
[facility]    Patient evaluated at the request of Scotty Figueroa Md  [address] [place] [address]     PCP: Scotty Figueroa MD    Chief Complaint    Diarrhea; GI Problem         History of present illness/subjective    This is a 60-year-old female presenting for follow-up of chronic gastrointestinal symptoms, including generalized abdominal pain, altered bowel habits, and gastroparesis. She was previously under the care of Dr. Grady and transitioned to [facility] after his [facility]. She now wishes to resume her care within the [facility] system.    The patient reports a history of chronic explosive diarrhea and fecal incontinence, with bowel movements occurring 1-3 times daily. She describes them as watery and often incomplete, requiring return trips to the restroom shortly after finishing. A provider at [facility] previously informed her that she had compacted bowels, though the context and diagnostic basis for this are unclear.    She also experiences constant nausea and occasional vomiting. She is currently taking antiemetics but is unable to tolerate metoclopramide (Reglan) due to a history of tardive dyskinesia. Her abdominal pain is generalized and cramp-like in nature, without clear exacerbating or relieving factors. She denies any blood in the stool, rectal pain, appetite changes, or weight loss. Her symptoms are likely multifactorial, with diabetes and polypharmacy contributing significantly to her gastrointestinal dysmotility and bowel irregularities. Her last Colonoscopy was in 2021 and was found to have benign rectal polyps.       Past Medical History:   Diagnosis Date    Abnormal Pap smear of cervix     Abnormal Pap smear of vagina     after hyst, repeat every 6 months, per pt, had bx's, unknown results    Acid reflux     Arthritis     Asthma Several years ago, under controll    Bipolar 1 disorder     Cataract 4 years ago    Surgical removed 4 yr  "ago right/Left eye    Depression     bipolar    Diabetes mellitus 7 years ago    Diabetes mellitus, type 2     Disorder of kidney and ureter 2021    Seeping protein fliud mildly    Elevated alkaline phosphatase level     Endometrial cancer Over 25 years ago    Endometriosis    GERD (gastroesophageal reflux disease) 36 years old under control w protonix    Hyperlipidemia     Hyperparathyroidism 38 years old    Hypertension 4 years ago    Hypothyroidism 40years old    IBS (irritable bowel syndrome)     Interstitial cystitis     Migraine headache 20 years ago    Narcolepsy     Nonalcoholic steatohepatitis     fatty liver    Obesity 53 years old    Orthostatic hypotension     Osteoarthritis 1 year ago    PONV (postoperative nausea and vomiting)     Respiratory distress     States, "My lungs collapsed during hemorrhoid surgery."    Restless legs syndrome     Seasonal allergies 30 years ago    Sleep apnea 1 year ago    DMITRI-CPAP    Thyroid disease        Past Surgical History:   Procedure Laterality Date    ADENOIDECTOMY      ADENOIDECTOMY      ADENOIDECTOMY  2019    APPENDECTOMY  36 years old    julia shoulder surgery      COLONOSCOPY      CYSTOSTOMY W/ BLADDER BIOPSY      interstial cystitis    DILATION AND CURETTAGE OF UTERUS      missed ab    ESOPHAGEAL DILATION      EYE SURGERY      Cataracts both eyes done    HEMORRHOID SURGERY      HYSTERECTOMY  2001    TLH, LSO (endometriosis)    KNEE ARTHROSCOPY W/ MENISCECTOMY Right 04/27/2021    Procedure: ARTHROSCOPY, KNEE, WITH MENISCECTOMY;  Surgeon: Kenrick Gray MD;  Location: [facility];  Service: Orthopedics;  Laterality: Right;   partial medial meniscectomy    KNEE SURGERY  2 yr    R knee    OOPHORECTOMY  2001    LSO    TONSILLECTOMY  7 years old    WISDOM TOOTH EXTRACTION         Medications Ordered Prior to Encounter[1]    Review of patient's allergies indicates:   Allergen Reactions    Bactrim [sulfamethoxazole-trimethoprim] Swelling, Other (See Comments) and " Anaphylaxis    Latuda [lurasidone] Anaphylaxis    Macrodantin [nitrofurantoin macrocrystalline] Swelling, Other (See Comments) and Anaphylaxis    Canagliflozin      Yeast infections    Metformin Diarrhea       Social History[2]    Family History   Problem Relation Name Age of Onset    Diabetes Mellitus Mother Dee     Melanoma Mother Dee     Cancer Mother Dee         Melanoma tumor, right eye removed    Diabetes Mother Dee     Hearing loss Mother Dee     Hypertension Mother Dee     Vision loss Mother Dee         Melanoma tumor,  right eye removed, cataracts, glacoma    Diabetes Mellitus Father Jarocho Smith jr.     Colon cancer Father Jarocho Luis jr.     Heart disease Father Jarocho Luis jr. 45        Diseased heart double bypass triple bypass    Cancer Father Jarocho Luis jr.         Colon cancer     Diabetes Father Jarocho Luis jr.     Hearing loss Father Jarocho Luis jr.     Hypertension Father Jarocho Luis jr.     Rheum arthritis Maternal Grandmother Erminee     Breast cancer Maternal Grandmother Erminee     Arthritis Maternal Grandmother Erminee         Rumitoid arthiritis    Cancer Maternal Grandmother Erminee         Breast cancer double masectomy    Thrombophilia Paternal Grandmother Shakila         DVTs    Arthritis Paternal Grandmother Shakila     Depression Paternal Grandmother Shakila     Anesthesia problems Sister 1         hypertension    Cancer Paternal Grandfather Jarocho smith sr.         Bladder kidney cancer    Heart disease Paternal Grandfather Jarocho smith sr.         Heart attack    Stroke Paternal Grandfather Jarocho smith sr.         Paralized right side    Depression Maternal Aunt Vani  great aunt     Mental illness Maternal Aunt Vani  great aunt         mental mood disorders, break downs    Depression Paternal Aunt Olga Lidia     Early death Sister Olga         Blockage 100% left ventricle, 61 years old  march 10, 2021    Hearing loss Sister Olga     Heart disease Sister Olga         " 100% blockage left ventricle,  march 10, 2021    Diabetes Sister Olga     Hypertension Sister Olga     Allergies Sister Olga     Heart failure Sister Olga         Passed away 100% blockage left ventrical    Diabetes Paternal Aunt Aimee     Kidney disease Brother Carlos     Hearing loss Brother Carlos     Heart disease Brother Carlos     Hypertension Brother Carlos     Hearing loss Daughter Maureen     Asthma Son Chasidy grandson     Asthma Son Chasidy grandson     Diabetes Paternal Aunt Aimee     Diabetes Sister Olga sister     Hypertension Sister Olga sister     Early death Sister Olga         Blockage 100% left ventricle, 61 years old  march 10, 2021    Hearing loss Sister Olga     Heart disease Sister Olga         100% blockage left ventricle,  march 10, 2021    Hearing loss Brother Maureen daughter     Heart disease Brother Maureen daughter     Hypertension Brother Maureen daughter     Hearing loss Daughter Maureen     Kidney disease Brother Carlos     Allergies Sister Olga     Heart failure Sister Olga         Passed away 100% blockage left ventrical    Diabetes Paternal Aunt Aieme     Diabetes Sister Olga sister     Hypertension Sister Olga sister     Hypertension Brother Maureen daughter     Allergies Sister Olga     Heart failure Sister Olga         Passed away 100% blockage left ventrical    Diabetes Paternal Aunt Aimee     Diabetes Sister Olga sister     Hypertension Sister Olga sister     Hypertension Brother Maureen daughter     Ovarian cancer Neg Hx         Vitals:    25 1004   BP: 129/67   Patient Position: Sitting   Pulse: 72   Weight: 74.6 kg (164 lb 7.4 oz)   Height: 4' 10" (1.473 m)     Body mass index is 34.37 kg/m².    Physical Exam  Constitutional:       Appearance: She is well-developed.   HENT:      Head: Normocephalic and atraumatic.   Eyes:      Pupils: Pupils are equal, round, and reactive to light.   Neck:      Thyroid: No thyromegaly.   Cardiovascular:      Rate " and Rhythm: Normal rate and regular rhythm.      Heart sounds: Normal heart sounds. No murmur heard.  Pulmonary:      Effort: Pulmonary effort is normal. No respiratory distress.      Breath sounds: Normal breath sounds. No wheezing or rales.   Abdominal:      General: Bowel sounds are normal. There is no distension.      Palpations: Abdomen is soft. There is no mass.      Tenderness: There is no abdominal tenderness.   Musculoskeletal:         General: No tenderness. Normal range of motion.      Cervical back: Normal range of motion and neck supple.   Lymphadenopathy:      Cervical: No cervical adenopathy.   Skin:     General: Skin is warm and dry.      Findings: No erythema.   Neurological:      Mental Status: She is alert and oriented to person, place, and time.      Cranial Nerves: No cranial nerve deficit.      Deep Tendon Reflexes: Reflexes are normal and symmetric.   Psychiatric:         Behavior: Behavior normal.         Lab Results   Component Value Date    WBC 6.29 05/15/2025    HGB 13.8 05/15/2025    HCT 42.4 05/15/2025    MCV 89 05/15/2025     05/15/2025         BMP  Lab Results   Component Value Date     10/05/2024     10/05/2024    K 3.9 10/05/2024    K 4.1 10/05/2024     10/05/2024     10/05/2024    CO2 24 10/05/2024    CO2 27 10/05/2024    BUN 10 10/05/2024    BUN 9 10/05/2024    CREATININE 1.1 10/05/2024    CREATININE 1.0 10/05/2024    CALCIUM 10.1 10/05/2024    CALCIUM 10.1 10/05/2024    ANIONGAP 9 10/05/2024    ANIONGAP 8 10/05/2024    EGFRNORACEVR 57.9 (A) 10/05/2024    EGFRNORACEVR >60.0 10/05/2024       Lab Results   Component Value Date    ALT 17 10/05/2024    ALT 17 10/05/2024    AST 19 10/05/2024    AST 18 10/05/2024    GGT 69 (H) 04/18/2017    ALKPHOS 144 (H) 10/05/2024    ALKPHOS 148 (H) 10/05/2024    BILITOT 0.6 10/05/2024    BILITOT 0.6 10/05/2024       Lab Results   Component Value Date    LIPASE 14 04/21/2015       ASSESSMENT AND RECOMMENDATIONS     1.  Irritable bowel syndrome with diarrhea  -     Calprotectin, Stool; Future; Expected date: 07/17/2025  -     Stool Exam-Ova,Cysts,Parasites; Future; Expected date: 07/17/2025    2. Type 2 diabetes mellitus with microalbuminuria, with long-term current use of insulin    3. Gastroparesis due to DM    4. Gastroesophageal reflux disease without esophagitis    5. Nausea  -     Ambulatory referral/consult to Endo Procedure ; Future; Expected date: 07/18/2025    Follow up if symptoms worsen or fail to improve.    Patient cannot take Reglan because of side effects, specifically Tardive dyskinesia.     To support optimal gut health and maintain regular bowel movements, we recommend incorporating the following into your daily routine:    Psyllium Husk - A soluble fiber that aids digestion, promotes stool regularity, and supports overall gut health.  Magnesium Glycinate - Helps with muscle relaxation, including the intestinal muscles, to ease bowel movements and reduce cramping.  Ground Flaxseed - A rich source of fiber and omega-3 fatty acids that supports digestion and reduces inflammation.  Probiotics (Florastor or Align) - Supports a healthy gut microbiome by replenishing beneficial bacteria and promoting digestive balance.    Patients may adjust the frequency and dosage of these supplements based on their individual tolerance and response. If you experience loose stools or discomfort, you may reduce the dosage or take them less frequently. If you need additional digestive support, gradual increases may be beneficial.    It is essential to drink plenty of water throughout the day to ensure these supplements work effectively and to prevent constipation.     Additionally, lifestyle modifications can significantly improve symptoms of acid reflux. These include:  Maintaining a healthy weight  Elevating the head of the bed to reduce nighttime reflux  Avoiding trigger foods such as spicy, acidic, or fatty  foods  Refraining from eating late meals and staying upright for at least 1.5 hours after eating    Please follow the recommended guidelines, listen to your body, and consult your healthcare provider if you experience any concerns or changes in your digestive health.    Body mass index is 34.37 kg/m². Morbid obesity complicates all aspects of disease management from diagnostic modalities to treatment. Weight loss encouraged and health benefits explained to patient.     General weight loss/lifestyle modification strategies discussed (elicit support from others; identify saboteurs; non-food rewards, etc).  Informal exercise measures discussed, e.g. taking stairs instead of elevator.  Regular aerobic exercise program discussed.       Risks, benefits and alternative options were discussed with the patient. Risks including but not limited to infection, bleeding, heart or respiratory problems and perforation that may require surgery were all explained to the patient. The patient had a chance for questions if there were doubts or concerns about the test. The referring provider will be notified that our consultation is complete and available through the patient's records.    Thanks for letting us participate in the care of this nice patient,          Kris Virgen               [1]   Current Outpatient Medications on File Prior to Visit   Medication Sig Dispense Refill    albuterol (ACCUNEB) 1.25 mg/3 mL Nebu Take 6 mLs (2.5 mg total) by nebulization every 6 (six) hours as needed (SOB). Rescue 150 mL 11    atorvastatin (LIPITOR) 40 MG tablet Take 1 tablet (40 mg total) by mouth once daily. 90 tablet 0    blood sugar diagnostic Strp Inject 1 each into the skin 3 (three) times daily. Dispense preferred on insurance 100 strip 11    blood-glucose sensor (DEXCOM G7 SENSOR MISC) by Misc.(Non-Drug; Combo Route) route.      clindamycin (CLEOCIN T) 1 % external solution APPLY TO THE AFFECTED AREA TWICE DAILY AS NEEDED FOR ACE/  "FOLLICULITIS ON FACE AND SCALP 60 mL 4    deutetrabenazine (AUSTEDO) 9 mg Tab Take 9 mg by mouth 2 (two) times daily.      diclofenac sodium (VOLTAREN) 1 % Gel APPLY FOUR GRAMS TOPICALLY EVERY DAY AS DIRECTED 100 g 3    dicyclomine (BENTYL) 20 mg tablet Take 1 tablet (20 mg total) by mouth 3 (three) times daily as needed (abdominal pain). 90 tablet 0    estradioL (ESTRACE) 0.01 % (0.1 mg/gram) vaginal cream PLACE ONE GRAM VAGINALLY EVERY 7 DAYS 42.5 g 0    fludrocortisone (FLORINEF) 0.1 mg Tab Take 100 mcg by mouth once daily.      fluticasone propionate (FLONASE) 50 mcg/actuation nasal spray USE TWO SPRAYS IN EACH NOSTRIL DAILY 48 g 0    fluticasone-umeclidin-vilanter (TRELEGY ELLIPTA) 100-62.5-25 mcg DsDv Inhale 1 puff into the lungs once daily. 60 each 5    inhalation spacing device Use as directed for inhalation. 1 each 0    insulin glargine U-300 conc (TOUJEO MAX U-300 SOLOSTAR) 300 unit/mL (3 mL) insulin pen INJECT 70 UNITS SUBCUTANEOUSLY INTO THE SKIN DAILY 4 Pen 3    insulin lispro 100 unit/mL pen Inject 14 Units into the skin 3 (three) times daily before meals. 15 mL 3    levocetirizine (XYZAL) 5 MG tablet Take 1 tablet (5 mg total) by mouth every evening. 30 tablet 11    levothyroxine (SYNTHROID) 75 MCG tablet TAKE ONE TABLET BY MOUTH EVERY DAY 90 tablet 0    lithium (ESKALITH) 300 MG capsule Take 900 mg by mouth every evening.      LORazepam (ATIVAN) 1 MG tablet Take 1 tablet (1 mg total) by mouth 2 (two) times daily as needed for Anxiety (takes nightly). 60 tablet 2    losartan (COZAAR) 25 MG tablet Take 1 tablet (25 mg total) by mouth once daily. 90 tablet 1    ondansetron (ZOFRAN-ODT) 4 MG TbDL DISSOLVE 1 TABLET ON THE TONGUE EVERY 6 HOURS AS NEEDED FOR NAUSEA 20 tablet 0    pantoprazole (PROTONIX) 40 MG tablet TAKE 1 TABLET BY MOUTH DAILY 90 tablet 3    pen needle, diabetic 31 gauge x 3/16" Ndle Inject 1 Stick into the skin 5 (five) times daily. 500 each 1    rOPINIRole (REQUIP) 0.5 MG tablet Take 1 " tablet (0.5 mg total) by mouth every evening. 30 tablet 1    SITagliptin phosphate (JANUVIA) 100 MG Tab Take 1 tablet (100 mg total) by mouth once daily. 30 tablet 3    solriamfetoL (SUNOSI) 150 mg Tab TAKE ONE TABLET BY MOUTH ONCE DAILY 30 tablet 4    ketoconazole (NIZORAL) 2 % cream Apply topically 2 (two) times daily. Apply to the affected area twice daily. 30 g 2    triamcinolone acetonide 0.1% (KENALOG) 0.1 % cream Apply topically 2 (two) times daily. Do not use on face 45 Bottle 6    [DISCONTINUED] cyclobenzaprine (FLEXERIL) 10 MG tablet Take 10 mg by mouth every evening. (Patient not taking: Reported on 2025)      [DISCONTINUED] lamoTRIgine (LAMICTAL) 200 MG tablet Take 1 tablet by mouth 2 (two) times daily. (Patient not taking: Reported on 2025)      [DISCONTINUED] LIDOcaine-prilocaine (EMLA) cream SMARTSI-4.8 Gram(s) Topical      [DISCONTINUED] multivitamin capsule Take 1 capsule by mouth once daily. (Patient not taking: Reported on 2025)      [DISCONTINUED] MYRBETRIQ 50 mg Tb24 Take 50 mg by mouth 2 (two) times daily as needed. (Patient not taking: Reported on 2025)      [DISCONTINUED] nirmatrelvir-ritonavir (PAXLOVID, EUA,) 300 mg (150 mg x 2)-100 mg copackaged tablets (EUA)  (Patient not taking: Reported on 2025)      [DISCONTINUED] TRINTELLIX 20 mg Tab Take 1 tablet by mouth once daily.       Current Facility-Administered Medications on File Prior to Visit   Medication Dose Route Frequency Provider Last Rate Last Admin    nozaseptin (NOZIN) nasal    Each Nostril On Call Procedure Kenrick Gray MD   Given at 21 1222   [2]   Social History  Socioeconomic History    Marital status: Significant Other   Occupational History    Occupation: disabled   Tobacco Use    Smoking status: Never     Passive exposure: Current    Smokeless tobacco: Never    Tobacco comments:     Light smoker, quit 2017   Substance and Sexual Activity    Alcohol use: No    Drug use: No     Sexual activity: Yes     Partners: Male     Birth control/protection: Other-see comments     Comment: Hystorectomy   Other Topics Concern    Are you pregnant or think you may be? No    Breast-feeding No     Social Drivers of Health     Financial Resource Strain: Medium Risk (5/12/2025)    Overall Financial Resource Strain (CARDIA)     Difficulty of Paying Living Expenses: Somewhat hard   Food Insecurity: Food Insecurity Present (5/12/2025)    Hunger Vital Sign     Worried About Running Out of Food in the Last Year: Sometimes true     Ran Out of Food in the Last Year: Never true   Transportation Needs: No Transportation Needs (5/12/2025)    PRAPARE - Transportation     Lack of Transportation (Medical): No     Lack of Transportation (Non-Medical): No   Physical Activity: Insufficiently Active (5/12/2025)    Exercise Vital Sign     Days of Exercise per Week: 1 day     Minutes of Exercise per Session: 20 min   Stress: No Stress Concern Present (5/12/2025)    [employer] [place] [employer] - Occupational Stress Questionnaire     Feeling of Stress : Not at all   Housing Stability: Low Risk  (5/12/2025)    Housing Stability Vital Sign     Unable to Pay for Housing in the Last Year: No     Number of Times Moved in the Last Year: 0     Homeless in the Last Year: No

## 2025-07-17 NOTE — TELEPHONE ENCOUNTER
Pt calling with c/o severe dizziness for about a week. Pt stated she is unable to walk within holding on to the wall or furniture. Care advice recommends go to ED now. Pt verbalized understanding.  Reason for Disposition   SEVERE dizziness (vertigo) (e.g., unable to walk without assistance)    Additional Information   Negative: [1] Weakness (i.e., paralysis, loss of muscle strength) of the face, arm or leg on one side of the body AND [2] sudden onset AND [3] present now   Negative: [1] Numbness (i.e., loss of sensation) of the face, arm or leg on one side of the body AND [2] sudden onset AND [3] present now   Negative: [1] Loss of speech or garbled speech AND [2] sudden onset AND [3] present now   Negative: Difficult to awaken or acting confused (e.g., disoriented, slurred speech)   Negative: Sounds like a life-threatening emergency to the triager    Protocols used: Dizziness - Vertigo-A-

## 2025-07-17 NOTE — TELEPHONE ENCOUNTER
Copied from CRM #[id number]. Topic: Appointments - Same Day Access  >> Jul 17, 2025  8:34 AM Emma wrote:  Type:  Same Day Appointment Request    Caller is requesting a same day appointment.  Caller declined first available appointment listed below.    Name of Caller: pt  When is the first available appointment? 7/21  Symptoms: Symptoms: Vomiting, Diarrhea, Dizziness  Outcome: Transfer to a nurse or provider NOW!  Reason: Caller denied all higher acuity questions    The caller rejected this outcome.  Best Call Back Number: [phone]   Additional Information:  willing to see PA  Called and spoke with patient she stated that due to Dr. Figueroa being out until Tuesday of new week, she will wait and go to her ENT doctors appointment and if she continues to feel bad she will go to ER.

## 2025-07-18 ENCOUNTER — HOSPITAL ENCOUNTER (OUTPATIENT)
Dept: PREADMISSION TESTING | Facility: HOSPITAL | Age: 60
Discharge: HOME | End: 2025-07-18
Attending: INTERNAL MEDICINE
Payer: MEDICARE

## 2025-07-18 ENCOUNTER — PATIENT MESSAGE (OUTPATIENT)
Dept: GASTROENTEROLOGY | Facility: CLINIC | Age: 60
End: 2025-07-18
Payer: MEDICARE

## 2025-07-18 DIAGNOSIS — G25.81 RLS (RESTLESS LEGS SYNDROME): ICD-10-CM

## 2025-07-18 DIAGNOSIS — R11.0 NAUSEA: Primary | ICD-10-CM

## 2025-07-18 LAB
CALPROTECTIN INTERP (OHS): NORMAL
CALPROTECTIN STOOL (OHS): 10.8 ΜG/G

## 2025-07-18 RX ORDER — GLIMEPIRIDE 4 MG/1
4 TABLET ORAL
Qty: 90 TABLET | Refills: 1 | Status: SHIPPED | OUTPATIENT
Start: 2025-07-18 | End: 2026-07-18

## 2025-07-18 NOTE — TELEPHONE ENCOUNTER
Refill Routing Note   Medication(s) are not appropriate for processing by [facility] for the following reason(s):        New or recently adjusted medication    [facility] action(s):  Defer               Appointments  past 12m or future 3m with PCP    Date Provider   Last Visit   5/14/2025 Scotty Figueroa MD   Next Visit   11/14/2025 Scotty Figueroa MD   ED visits in past 90 days: 0        Note composed:7:24 AM 07/18/2025

## 2025-07-18 NOTE — TELEPHONE ENCOUNTER
No care due was identified.  [facility] Embedded Care Due Messages. Reference number: [id number].   7/18/2025 12:15:58 AM CDT

## 2025-07-21 ENCOUNTER — PATIENT MESSAGE (OUTPATIENT)
Dept: FAMILY MEDICINE | Facility: CLINIC | Age: 60
End: 2025-07-21
Payer: MEDICARE

## 2025-07-21 DIAGNOSIS — G25.81 RLS (RESTLESS LEGS SYNDROME): ICD-10-CM

## 2025-07-21 NOTE — TELEPHONE ENCOUNTER
No care due was identified.  [facility] Embedded Care Due Messages. Reference number: [id number].   7/21/2025 4:47:47 PM CDT

## 2025-07-22 ENCOUNTER — OFFICE VISIT (OUTPATIENT)
Dept: OTOLARYNGOLOGY | Facility: CLINIC | Age: 60
End: 2025-07-22
Payer: MEDICARE

## 2025-07-22 ENCOUNTER — TELEPHONE (OUTPATIENT)
Dept: OTOLARYNGOLOGY | Facility: CLINIC | Age: 60
End: 2025-07-22

## 2025-07-22 VITALS — HEIGHT: 58 IN | WEIGHT: 164.44 LBS | BODY MASS INDEX: 34.52 KG/M2

## 2025-07-22 DIAGNOSIS — H81.20 VESTIBULAR NEURONITIS, UNSPECIFIED LATERALITY: Primary | ICD-10-CM

## 2025-07-22 PROCEDURE — 1159F MED LIST DOCD IN RCRD: CPT | Mod: CPTII,S$GLB,,

## 2025-07-22 PROCEDURE — 99214 OFFICE O/P EST MOD 30 MIN: CPT | Mod: S$GLB,,,

## 2025-07-22 PROCEDURE — 4010F ACE/ARB THERAPY RXD/TAKEN: CPT | Mod: CPTII,S$GLB,,

## 2025-07-22 PROCEDURE — 99999 PR PBB SHADOW E&M-EST. PATIENT-LVL III: CPT | Mod: PBBFAC,,,

## 2025-07-22 PROCEDURE — 3044F HG A1C LEVEL LT 7.0%: CPT | Mod: CPTII,S$GLB,,

## 2025-07-22 PROCEDURE — 3061F NEG MICROALBUMINURIA REV: CPT | Mod: CPTII,S$GLB,,

## 2025-07-22 PROCEDURE — 3066F NEPHROPATHY DOC TX: CPT | Mod: CPTII,S$GLB,,

## 2025-07-22 PROCEDURE — 3008F BODY MASS INDEX DOCD: CPT | Mod: CPTII,S$GLB,,

## 2025-07-22 RX ORDER — MECLIZINE HCL 12.5 MG 12.5 MG/1
12.5 TABLET ORAL 3 TIMES DAILY PRN
Qty: 10 TABLET | Refills: 0 | Status: SHIPPED | OUTPATIENT
Start: 2025-07-22 | End: 2025-08-01

## 2025-07-22 RX ORDER — DIAZEPAM 5 MG/1
5 TABLET ORAL EVERY 6 HOURS PRN
Qty: 5 TABLET | Refills: 0 | Status: CANCELLED | OUTPATIENT
Start: 2025-07-22 | End: 2025-07-27

## 2025-07-22 NOTE — PROGRESS NOTES
Subjective:   Patient ID: Bianca Weldon is a 60 y.o. female.    Chief Complaint: Dizziness (Pt states she is now dizzy for 3-4 weeks /Pt is currently meclizine /Pt also states she has fluid in both ears )    History of Present Illness    Patient presents today with ear fullness and sinus infection. She reports left ear symptoms including sensation of ear closing off primarily at night, muffled hearing, and crackling with talking and swallowing. She had significant earwax removal at [facility] a few months ago. She is experiencing a sinus infection since May 14th with green mucus, postnasal drip, and pressure in frontal sinuses. Denies fever or upper tooth pain. This is her first sinus infection this year, though she typically experiences 2-3 annually. She has a history of multiple allergies confirmed through testing, except for ragweed. She previously received two different types of allergy shots several years ago. Current allergy symptoms include watery and itchy eyes. She takes Flonase daily and Levocetirizine for allergies. She was previously on Augmentin and is currently taking Levofloxacin for 7 days. She was unable to complete a prescribed steroid pack due to significant elevation in glucose levels related to diabetes. Denies smoking.    7/22/25 update: pt is here for FU. She has been feeling better since last coarse of abx. No longer having purulent drainage or sinus pressure. She c/o severe spinning dizziness, nausea & vomiting beginning 7/17. Seen at  & prescribed steroids, zofran & meclizine. Unable to take steroids due to it elevating her blood sugar. She states meclizine helps moderately. Yesterday is the first day she has felt better. Symptoms are constant & denies alleviating or aggravating factors. She denies changes in vision or hearing. Denies new medications or diets. She has been avoiding driving while having these symptoms.          Review of patient's allergies indicates:   Allergen  "Reactions    Bactrim [sulfamethoxazole-trimethoprim] Swelling, Other (See Comments) and Anaphylaxis    Latuda [lurasidone] Anaphylaxis    Macrodantin [nitrofurantoin macrocrystalline] Swelling, Other (See Comments) and Anaphylaxis    Canagliflozin      Yeast infections    Metformin Diarrhea           Review of Systems   Constitutional: Negative.  Negative for chills, fatigue, fever and unexpected weight change.   HENT:  Negative for congestion, dental problem, ear discharge, ear pain, facial swelling, hearing loss, nosebleeds, postnasal drip, rhinorrhea, sinus pressure, sneezing, sore throat, tinnitus, trouble swallowing and voice change.    Eyes:  Positive for photophobia. Negative for redness, itching and visual disturbance.   Respiratory:  Negative for cough, choking, shortness of breath and wheezing.    Cardiovascular: Negative.  Negative for chest pain and palpitations.   Gastrointestinal:  Positive for diarrhea and vomiting. Negative for abdominal pain.        No reflux.   Genitourinary: Negative.    Musculoskeletal: Negative.  Negative for gait problem.   Skin: Negative.  Negative for rash.   Neurological:  Positive for dizziness. Negative for light-headedness and headaches.   Hematological: Negative.    Psychiatric/Behavioral:  The patient is nervous/anxious.          Objective:   Ht 4' 10" (1.473 m)   Wt 74.6 kg (164 lb 7.4 oz)   BMI 34.37 kg/m²     Physical Exam  Constitutional:       General: She is not in acute distress.     Appearance: Normal appearance. She is not ill-appearing.   HENT:      Head: Normocephalic and atraumatic.      Right Ear: Tympanic membrane, ear canal and external ear normal. There is no impacted cerumen.      Left Ear: Tympanic membrane, ear canal and external ear normal. There is no impacted cerumen.      Nose: No congestion or rhinorrhea.      Right Sinus: No frontal sinus tenderness.      Left Sinus: No frontal sinus tenderness.      Comments: Wood-Hallpike negative AU       " Mouth/Throat:      Mouth: Mucous membranes are moist.      Pharynx: Oropharynx is clear. Uvula midline. No oropharyngeal exudate or posterior oropharyngeal erythema.   Eyes:      Extraocular Movements: Extraocular movements intact.      Conjunctiva/sclera: Conjunctivae normal.      Pupils: Pupils are equal, round, and reactive to light.   Neck:      Thyroid: No thyroid mass.   Pulmonary:      Effort: Pulmonary effort is normal. No respiratory distress.   Musculoskeletal:         General: Normal range of motion.      Cervical back: Full passive range of motion without pain.   Lymphadenopathy:      Cervical: No cervical adenopathy.   Neurological:      General: No focal deficit present.      Mental Status: She is alert and oriented to person, place, and time.      Cranial Nerves: Cranial nerves 2-12 are intact.      Comments: EOM evaluation elicited vertigo symptoms   Psychiatric:         Mood and Affect: Mood normal.         Behavior: Behavior normal. Behavior is cooperative.         Thought Content: Thought content normal.         Judgment: Judgment normal.            Assessment/Plan:     1. Vestibular neuronitis, unspecified laterality          Vestibular neuronitis, unspecified laterality  -     Ambulatory Referral/Consult to Physical Therapy; Future; Expected date: 07/29/2025  -     meclizine (ANTIVERT) 12.5 mg tablet; Take 1 tablet (12.5 mg total) by mouth 3 (three) times daily as needed for Dizziness or Nausea.  Dispense: 10 tablet; Refill: 0        Assessment & Plan    RECURRENT SINUSITIS:  - Assessed recurrent sinus infections   - Determined current antibiotic course insufficient; extended levofloxacin (Levaquin) for additional 7 days to complete a full 14-day course   - Considered CT if symptoms persist after extended antibiotic course   - Explained importance of saline rinses for clearing mucus and managing sinus infections using NeilMed bottle with saline or distilled water   - Ordered sinus XR to evaluate  current condition  -Started Afrin nasal spray for 3 days  -  Increased Flonase to twice daily for 3 days, then return to daily    BILATERAL IMPACTED CERUMEN:  - Assessed ear fullness    ALLERGIC RHINITIS, UNSPECIFIED SEASONALITY, UNSPECIFIED TRIGGER:  - Considered uncontrolled allergies as potential cause for recurrent sinus infections   - Considered allergy testing to address potential underlying cause   - Discussed need to rotate antihistamines every 3-6 months to maintain effectiveness; switch to a different OTC antihistamine (e.g., from Zyrtec to Allegra)  - Increased Flonase to twice daily for 3 days, then return to daily    PLAN SUMMARY:  - Extended levofloxacin (Levaquin) for additional 7 days to complete 14-day course  - Ordered sinus X-ray  - Started Afrin nasal spray for 3 days  - Increased Flonase to twice daily for 3 days, then return to daily  - Recommend saline rinses using NeilMed bottle with saline or distilled water  - Advised switching to a different OTC antihistamine (e.g., from Zyrtec to Allegra)  - Consider CT if symptoms persist after extended antibiotic course  - Consider allergy testing to address potential underlying cause   - FU in 3-4 weeks or sooner if needed        Cerumen impaction:  Removed today without difficulty.  I would recommend the use of a wax softening drop, either over the counter Debrox or mineral oil, on a weekly basis.  I also instructed the patient to avoid Qtips.       7/22/25 update:   Her history today is consistent with likely a vestibular neuritis.  We discussed that it takes time for the vestibular system to compensate; however, she is gradually improving so that is good sign.  No concerning acute neurological symptoms today. We agreed upon a trial of VRT, sent referral. Recommend she take meclizine only for severe episodes of dizziness. Offered to send Valium, but she is also on Ativan so decided against that due to the risk of severe respiratory depression when  taken together. Avoid driving or operating heavy machinery while symptomatic. We also discussed imaging such as MRI IAC to evaluate for retrocochlear lesions, but she would like to hold off for now. Continue zofran PRN for nausea.    We agreed to hold off on CT sinus as her symptoms have improved since last visit & her main concern at this time is the vertigo symptoms.     She will please let me know if she would like to proceed with any imaging we discussed today.      This note was generated with the assistance of ambient listening technology. Verbal consent was obtained by the patient and accompanying visitor(s) for the recording of patient appointment to facilitate this note. I attest to having reviewed and edited the generated note for accuracy, though some syntax or spelling errors may persist. Please contact the author of this note for any clarification.       I spent a total of 30 minutes on the day of the visit.  This includes face to face time and non-face to face time preparing to see the patient (eg, review of tests), obtaining and/or reviewing separately obtained history, documenting clinical information in the electronic or other health record, independently interpreting results and communicating results to the patient/family/caregiver, or care coordinator.

## 2025-07-22 NOTE — TELEPHONE ENCOUNTER
Tried to call patient, no answer & unable to leave voicemail. Wanted to let her know I realized she has Ativan on her medication list, so I will not send Valium as well as they are both benzodiazepines & use together run the risk of causing respiratory depression.

## 2025-07-23 ENCOUNTER — PATIENT MESSAGE (OUTPATIENT)
Dept: FAMILY MEDICINE | Facility: CLINIC | Age: 60
End: 2025-07-23
Payer: MEDICARE

## 2025-07-23 DIAGNOSIS — E03.9 HYPOTHYROIDISM, UNSPECIFIED TYPE: ICD-10-CM

## 2025-07-23 RX ORDER — FLUTICASONE PROPIONATE 50 MCG
2 SPRAY, SUSPENSION (ML) NASAL
Qty: 48 G | Refills: 3 | Status: SHIPPED | OUTPATIENT
Start: 2025-07-23

## 2025-07-23 RX ORDER — ROPINIROLE 0.5 MG/1
0.5 TABLET, FILM COATED ORAL NIGHTLY
Qty: 90 TABLET | Refills: 3 | Status: SHIPPED | OUTPATIENT
Start: 2025-07-23

## 2025-07-23 RX ORDER — ATORVASTATIN CALCIUM 40 MG/1
40 TABLET, FILM COATED ORAL
Qty: 90 TABLET | Refills: 3 | Status: SHIPPED | OUTPATIENT
Start: 2025-07-23

## 2025-07-23 RX ORDER — LEVOTHYROXINE SODIUM 75 UG/1
75 TABLET ORAL
Qty: 90 TABLET | Refills: 2 | Status: SHIPPED | OUTPATIENT
Start: 2025-07-23

## 2025-07-23 NOTE — TELEPHONE ENCOUNTER
No care due was identified.  [facility] Embedded Care Due Messages. Reference number: [id number].   7/23/2025 8:04:44 AM CDT  
Refill Decision Note   Bianca Weldon  is requesting a refill authorization.  Brief Assessment and Rationale for Refill:  Approve     Medication Therapy Plan:         Comments:     Note composed:11:52 AM 07/23/2025               
98.6

## 2025-07-24 LAB — O+P STL MICRO: NORMAL

## 2025-07-25 RX ORDER — ROPINIROLE 0.5 MG/1
0.5 TABLET, FILM COATED ORAL NIGHTLY
Qty: 30 TABLET | Refills: 1 | OUTPATIENT
Start: 2025-07-25 | End: 2026-07-25

## 2025-07-30 ENCOUNTER — PATIENT MESSAGE (OUTPATIENT)
Dept: DIABETES | Facility: CLINIC | Age: 60
End: 2025-07-30
Payer: MEDICARE

## 2025-07-30 DIAGNOSIS — N95.2 ATROPHIC VAGINITIS: ICD-10-CM

## 2025-07-30 DIAGNOSIS — B37.31 VULVOVAGINAL CANDIDIASIS: ICD-10-CM

## 2025-07-30 NOTE — TELEPHONE ENCOUNTER
Refill Routing Note   Medication(s) are not appropriate for processing by [facility] for the following reason(s):        ED/Hospital Visit since last OV with provider  Outside of protocol: Diflucan  Required labs outdated    [facility] action(s):  Defer  Route        Medication Therapy Plan: Mammography is out of date      Appointments  past 12m or future 3m with PCP    Date Provider   Last Visit   9/21/2023 Nieves Alberts MD   Next Visit   Visit date not found Nieves Alberts MD   ED visits in past 90 days: 0        Note composed:1:48 PM 07/30/2025

## 2025-07-31 RX ORDER — FLUCONAZOLE 150 MG/1
150 TABLET ORAL WEEKLY
Qty: 4 TABLET | Refills: 0 | Status: SHIPPED | OUTPATIENT
Start: 2025-07-31

## 2025-07-31 RX ORDER — ESTRADIOL 0.1 MG/G
CREAM VAGINAL
Qty: 42.5 G | Refills: 0 | Status: SHIPPED | OUTPATIENT
Start: 2025-07-31

## 2025-08-05 RX ORDER — QUETIAPINE FUMARATE 100 MG/1
100 TABLET, FILM COATED ORAL NIGHTLY
COMMUNITY
Start: 2025-07-08

## 2025-08-05 RX ORDER — PREDNISONE 20 MG/1
40 TABLET ORAL EVERY MORNING
COMMUNITY
Start: 2025-07-17

## 2025-08-05 RX ORDER — MOLNUPIRAVIR 200 MG/1
CAPSULE ORAL
COMMUNITY
Start: 2025-02-13

## 2025-08-06 ENCOUNTER — OFFICE VISIT (OUTPATIENT)
Dept: DIABETES | Facility: CLINIC | Age: 60
End: 2025-08-06
Payer: MEDICARE

## 2025-08-06 VITALS
HEART RATE: 79 BPM | BODY MASS INDEX: 34.65 KG/M2 | WEIGHT: 165.81 LBS | SYSTOLIC BLOOD PRESSURE: 145 MMHG | DIASTOLIC BLOOD PRESSURE: 84 MMHG

## 2025-08-06 DIAGNOSIS — Z79.4 TYPE 2 DIABETES MELLITUS WITH MICROALBUMINURIA, WITH LONG-TERM CURRENT USE OF INSULIN: Primary | ICD-10-CM

## 2025-08-06 DIAGNOSIS — E11.29 TYPE 2 DIABETES MELLITUS WITH MICROALBUMINURIA, WITH LONG-TERM CURRENT USE OF INSULIN: Primary | ICD-10-CM

## 2025-08-06 DIAGNOSIS — E78.5 DYSLIPIDEMIA: ICD-10-CM

## 2025-08-06 DIAGNOSIS — B37.31 VULVOVAGINAL CANDIDIASIS: ICD-10-CM

## 2025-08-06 DIAGNOSIS — I10 ESSENTIAL HYPERTENSION: ICD-10-CM

## 2025-08-06 DIAGNOSIS — R80.9 TYPE 2 DIABETES MELLITUS WITH MICROALBUMINURIA, WITH LONG-TERM CURRENT USE OF INSULIN: Primary | ICD-10-CM

## 2025-08-06 LAB — GLUCOSE SERPL-MCNC: 163 MG/DL (ref 70–110)

## 2025-08-06 PROCEDURE — 3066F NEPHROPATHY DOC TX: CPT | Mod: CPTII,S$GLB,, | Performed by: NURSE PRACTITIONER

## 2025-08-06 PROCEDURE — 3008F BODY MASS INDEX DOCD: CPT | Mod: CPTII,S$GLB,, | Performed by: NURSE PRACTITIONER

## 2025-08-06 PROCEDURE — 4010F ACE/ARB THERAPY RXD/TAKEN: CPT | Mod: CPTII,S$GLB,, | Performed by: NURSE PRACTITIONER

## 2025-08-06 PROCEDURE — 1159F MED LIST DOCD IN RCRD: CPT | Mod: CPTII,S$GLB,, | Performed by: NURSE PRACTITIONER

## 2025-08-06 PROCEDURE — 3079F DIAST BP 80-89 MM HG: CPT | Mod: CPTII,S$GLB,, | Performed by: NURSE PRACTITIONER

## 2025-08-06 PROCEDURE — 3077F SYST BP >= 140 MM HG: CPT | Mod: CPTII,S$GLB,, | Performed by: NURSE PRACTITIONER

## 2025-08-06 PROCEDURE — 3061F NEG MICROALBUMINURIA REV: CPT | Mod: CPTII,S$GLB,, | Performed by: NURSE PRACTITIONER

## 2025-08-06 PROCEDURE — 3044F HG A1C LEVEL LT 7.0%: CPT | Mod: CPTII,S$GLB,, | Performed by: NURSE PRACTITIONER

## 2025-08-06 PROCEDURE — 99999 PR PBB SHADOW E&M-EST. PATIENT-LVL V: CPT | Mod: PBBFAC,,, | Performed by: NURSE PRACTITIONER

## 2025-08-06 PROCEDURE — 99214 OFFICE O/P EST MOD 30 MIN: CPT | Mod: S$GLB,,, | Performed by: NURSE PRACTITIONER

## 2025-08-06 PROCEDURE — 82962 GLUCOSE BLOOD TEST: CPT | Mod: S$GLB,,, | Performed by: NURSE PRACTITIONER

## 2025-08-06 RX ORDER — FLUCONAZOLE 150 MG/1
150 TABLET ORAL WEEKLY
Qty: 4 TABLET | Refills: 0 | Status: SHIPPED | OUTPATIENT
Start: 2025-08-06

## 2025-08-06 NOTE — PROGRESS NOTES
PCP: Dr. Figueroa    HISTORY OF PRESENT ILLNESS: 60-year-old  female presenting for diabetes follow up.     Patient has had Type II diabetes since 2014 and has the following complications: neuropathy, gastroparesis. She has attended diabetes education in the past. Other pertinent conditions: HTN, HLD, IBD, ISELA, and schizoaffective disorder ( followed by psychiatry ). Of note, she has a history of cortisone injections for plantar fasciitis.       Past tried and failed medications include: Invokana ( yeast infection ); Metformin caused GI side effects; and Trulicity / Mounjaro caused abdominal pain and discomfort. Glimepiride discontinued due to hypoglycemia. Patient stopped OP5 pump on her own due to site discomfort.     The patient Dexcom G7 CGM was reviewed. Active CGM usage at 96 %. For the past 14 days ( July 22  - Aug 4), average glucose was 153 mg/dL, with standard deviation of 50. She was above range 28 % of the time, in range 72 % of the time, and below range 0 % of the time. Overall, pattern of hyperglycemia spikes between 9 PM - MN.     Patient denies any recent hospital admissions, emergency room visits, or syncope. However, in May, she was treated with oral steroids for sinusitis. Since then, patient reports that BGs have remained elevated.     DM MEDICATIONS:  Toujeo MAX 60 units at bedtime;  Lispro 5-10 units before meals; Januvia 100 mg daily    Review of Systems   Eyes:  Negative for visual disturbance.   Gastrointestinal:  Positive for abdominal pain and nausea. Negative for constipation and diarrhea.   Endocrine: Negative for polydipsia, polyphagia and polyuria.   Neurological:  Positive for numbness.     Diabetes Management Status  Statin: Taking  ACE/ARB: Taking    Screening or Prevention Patient's value Goal Complete/Controlled?   HgA1C Testing and Control   Lab Results   Component Value Date    HGBA1C 6.1 (H) 05/15/2025      Annually/Less than 8% Yes   Lipid profile : 05/15/2025 Annually  "Yes   LDL control Lab Results   Component Value Date    LDLCALC 49.2 (L) 05/15/2025    Annually/Less than 100 mg/dl  Yes   Nephropathy screening Lab Results   Component Value Date    LABMICR 7.0 05/15/2025     Lab Results   Component Value Date    PROTEINUA Negative 02/07/2024     No results found for: "UTPCR"   Annually Yes   Blood pressure BP Readings from Last 1 Encounters:   08/06/25 (!) 145/84    Less than 140/90 Yes   Dilated retinal exam : 01/05/2023 Annually No   Foot exam   : 10/05/2024 Annually Yes     Physical Exam  Constitutional:       Appearance: She is well-developed.   HENT:      Head: Normocephalic and atraumatic.   Eyes:      Pupils: Pupils are equal, round, and reactive to light.   Cardiovascular:      Rate and Rhythm: Normal rate and regular rhythm.      Pulses:           Dorsalis pedis pulses are 2+ on the right side and 2+ on the left side.   Pulmonary:      Effort: Pulmonary effort is normal.      Breath sounds: Normal breath sounds.   Feet:      Right foot:      Protective Sensation: 6 sites tested.  6 sites sensed.      Skin integrity: No ulcer, callus or dry skin.      Left foot:      Protective Sensation: 6 sites tested.  6 sites sensed.      Skin integrity: No ulcer, callus or dry skin.   Skin:     General: Skin is warm and dry.   Psychiatric:         Behavior: Behavior normal.         Thought Content: Thought content normal.         Judgment: Judgment normal.       Assessment and Plan:    1.) Type 2 diabetes mellitus with microalbuminuria, with long-term current use of insulin   Comments:  Controlled, most recent A1C of 6.1 %.  Patient currently enrolled in digital diabetes program. Continue Januvia 100 mg daily.  She has been unable to tolerate the GLP-1 drug class, also has history of gastroparesis. Therefore, suggested that patient not try Wegovy.      Discussed trying low dose SGLT-2.  Explained MOA and potential for bladder, yeast infection. Advised to stay hydrated. Start Jardiance " 10 mg daily.     Due to hyperglycemia, advised to Continue Toujeo 60 units daily.  Take Lispro, base of 5 units + correction.  BEFORE meals. She uses the following correction for high blood sugars:  - 199: 2 units  //   - 249: 4 units  //   - 299: 6 units  //   - 349: 8 units //  BG Over 350: 10 units.      Orders:  -     POCT Glucose, Hand-Held Device  -     empagliflozin (JARDIANCE) 10 mg tablet; Take 1 tablet (10 mg total) by mouth once daily.  Dispense: 30 tablet; Refill: 1    2.) Vulvovaginal candidiasis  -     fluconazole (DIFLUCAN) 150 MG Tab; Take 1 tablet (150 mg total) by mouth once a week.  Dispense: 4 tablet; Refill: 0    3.) Essential hypertension- continue medication as prescribed.    4.) Dyslipidemia - continue statin therapy.     Additional Plan Details:    1.) Continue monitoring blood sugar via Dexcom G7 CGM. Discussed ADA goal for fasting blood sugar, 80 - 130 mg/dL; pp blood sugars below 180 mg/dl. Also, discussed prevention of hypoglycemia and the need to adjust goals to higher levels if persistent hypoglycemia.    2.) Return to clinic in 4 months for follow up. The patient was explained the above plan and given opportunity to ask questions.  She understands, chooses and consents to this plan and accepts all the risks, which include but are not limited to the risks mentioned above.    CHANEL [facility], [facility]    A total of 30 minutes was spent in face to face time, of which over 50 % was spent in counseling patient on disease process, complications, treatment, and side effects of medications.

## 2025-08-09 ENCOUNTER — PATIENT MESSAGE (OUTPATIENT)
Dept: FAMILY MEDICINE | Facility: CLINIC | Age: 60
End: 2025-08-09
Payer: MEDICARE

## 2025-08-11 ENCOUNTER — ANESTHESIA EVENT (OUTPATIENT)
Dept: ENDOSCOPY | Facility: HOSPITAL | Age: 60
End: 2025-08-11
Payer: MEDICARE

## 2025-08-11 ENCOUNTER — PATIENT MESSAGE (OUTPATIENT)
Dept: GASTROENTEROLOGY | Facility: CLINIC | Age: 60
End: 2025-08-11
Payer: MEDICARE

## 2025-08-11 ENCOUNTER — ANESTHESIA (OUTPATIENT)
Dept: ENDOSCOPY | Facility: HOSPITAL | Age: 60
End: 2025-08-11
Payer: MEDICARE

## 2025-08-11 ENCOUNTER — HOSPITAL ENCOUNTER (OUTPATIENT)
Dept: ENDOSCOPY | Facility: HOSPITAL | Age: 60
Discharge: HOME | End: 2025-08-11
Attending: INTERNAL MEDICINE
Payer: MEDICARE

## 2025-08-11 DIAGNOSIS — K44.9 HIATAL HERNIA: ICD-10-CM

## 2025-08-11 DIAGNOSIS — K21.9 GASTROESOPHAGEAL REFLUX DISEASE WITHOUT ESOPHAGITIS: Primary | ICD-10-CM

## 2025-08-11 DIAGNOSIS — K58.0 IRRITABLE BOWEL SYNDROME WITH DIARRHEA: ICD-10-CM

## 2025-08-11 DIAGNOSIS — R11.0 NAUSEA: ICD-10-CM

## 2025-08-11 LAB
GLUCOSE SERPL-MCNC: 104 MG/DL (ref 70–110)
POCT GLUCOSE: 104 MG/DL (ref 70–110)

## 2025-08-11 PROCEDURE — 43239 EGD BIOPSY SINGLE/MULTIPLE: CPT | Performed by: INTERNAL MEDICINE

## 2025-08-11 PROCEDURE — 25000003 PHARM REV CODE 250: Performed by: NURSE ANESTHETIST, CERTIFIED REGISTERED

## 2025-08-11 PROCEDURE — 37000009 HC ANESTHESIA EA ADD 15 MINS

## 2025-08-11 PROCEDURE — 88305 TISSUE EXAM BY PATHOLOGIST: CPT | Mod: TC | Performed by: INTERNAL MEDICINE

## 2025-08-11 PROCEDURE — 43239 EGD BIOPSY SINGLE/MULTIPLE: CPT | Mod: ,,, | Performed by: INTERNAL MEDICINE

## 2025-08-11 PROCEDURE — 27201012 HC FORCEPS, HOT/COLD, DISP: Performed by: INTERNAL MEDICINE

## 2025-08-11 PROCEDURE — 63600175 PHARM REV CODE 636 W HCPCS: Performed by: NURSE ANESTHETIST, CERTIFIED REGISTERED

## 2025-08-11 PROCEDURE — 37000008 HC ANESTHESIA 1ST 15 MINUTES

## 2025-08-11 RX ORDER — OXYMETAZOLINE HCL 0.05 %
SPRAY, NON-AEROSOL (ML) NASAL
Status: DISCONTINUED | OUTPATIENT
Start: 2025-08-11 | End: 2025-08-11

## 2025-08-11 RX ORDER — SODIUM CHLORIDE 9 MG/ML
INJECTION, SOLUTION INTRAVENOUS CONTINUOUS
Status: DISCONTINUED | OUTPATIENT
Start: 2025-08-11 | End: 2025-08-12 | Stop reason: HOSPADM

## 2025-08-11 RX ORDER — PROPOFOL 10 MG/ML
VIAL (ML) INTRAVENOUS
Status: DISCONTINUED | OUTPATIENT
Start: 2025-08-11 | End: 2025-08-11

## 2025-08-11 RX ORDER — LIDOCAINE HYDROCHLORIDE 10 MG/ML
INJECTION, SOLUTION EPIDURAL; INFILTRATION; INTRACAUDAL; PERINEURAL
Status: DISCONTINUED | OUTPATIENT
Start: 2025-08-11 | End: 2025-08-11

## 2025-08-11 RX ADMIN — NASAL DECONGESTANT 5 SPRAY: 0.05 SPRAY NASAL at 10:08

## 2025-08-11 RX ADMIN — PROPOFOL 50 MG: 10 INJECTION, EMULSION INTRAVENOUS at 10:08

## 2025-08-11 RX ADMIN — LIDOCAINE HYDROCHLORIDE 50 MG: 10 SOLUTION INTRAVENOUS at 10:08

## 2025-08-12 VITALS
BODY MASS INDEX: 34.63 KG/M2 | WEIGHT: 165 LBS | OXYGEN SATURATION: 97 % | HEART RATE: 74 BPM | HEIGHT: 58 IN | SYSTOLIC BLOOD PRESSURE: 119 MMHG | RESPIRATION RATE: 18 BRPM | DIASTOLIC BLOOD PRESSURE: 58 MMHG | TEMPERATURE: 99 F

## 2025-08-12 DIAGNOSIS — G25.81 RLS (RESTLESS LEGS SYNDROME): ICD-10-CM

## 2025-08-12 LAB
ESTROGEN SERPL-MCNC: NORMAL PG/ML
INSULIN SERPL-ACNC: NORMAL U[IU]/ML
LAB AP CLINICAL INFORMATION: NORMAL
LAB AP GROSS DESCRIPTION: NORMAL
LAB AP PERFORMING LOCATION(S): NORMAL
LAB AP REPORT FOOTNOTES: NORMAL

## 2025-08-12 RX ORDER — ROPINIROLE 1 MG/1
1 TABLET, FILM COATED ORAL NIGHTLY
Qty: 90 TABLET | Refills: 1 | Status: SHIPPED | OUTPATIENT
Start: 2025-08-12

## 2025-08-19 ENCOUNTER — PATIENT MESSAGE (OUTPATIENT)
Dept: ADMINISTRATIVE | Facility: HOSPITAL | Age: 60
End: 2025-08-19
Payer: MEDICARE

## 2025-08-21 ENCOUNTER — PATIENT MESSAGE (OUTPATIENT)
Dept: ADMINISTRATIVE | Facility: OTHER | Age: 60
End: 2025-08-21
Payer: MEDICARE

## 2025-08-27 DIAGNOSIS — G47.10 HYPERSOMNIA: ICD-10-CM

## 2025-08-27 RX ORDER — LOSARTAN POTASSIUM 25 MG/1
25 TABLET ORAL DAILY
Qty: 90 TABLET | Refills: 2 | Status: SHIPPED | OUTPATIENT
Start: 2025-08-27

## 2025-08-27 RX ORDER — SOLRIAMFETOL 150 MG/1
1 TABLET, FILM COATED ORAL
Qty: 30 TABLET | Refills: 4 | Status: SHIPPED | OUTPATIENT
Start: 2025-08-27

## 2025-08-29 RX ORDER — LOSARTAN POTASSIUM 25 MG/1
25 TABLET ORAL DAILY
Qty: 90 TABLET | Refills: 2 | OUTPATIENT
Start: 2025-08-29

## 2025-09-03 ENCOUNTER — TELEPHONE (OUTPATIENT)
Dept: DIABETES | Facility: CLINIC | Age: 60
End: 2025-09-03
Payer: MEDICARE

## (undated) DEVICE — ALCOHOL 70% ISOP RUBBING 4OZ

## (undated) DEVICE — NDL SPINAL 18GX3.5 SPINOCAN

## (undated) DEVICE — SEE MEDLINE ITEM 146231

## (undated) DEVICE — SEE MEDLINE ITEM 146292

## (undated) DEVICE — SHAVER TOMCAT 4.0

## (undated) DEVICE — SUT ETHILON 3-0 PS2 18 BLK

## (undated) DEVICE — SEE MEDLINE ITEM 152529

## (undated) DEVICE — GAUZE SPONGE 4X4 12PLY

## (undated) DEVICE — TOURNIQUET SB QC SP 34X4IN

## (undated) DEVICE — SEE MEDLINE ITEM 152622

## (undated) DEVICE — MANIFOLD 4 PORT

## (undated) DEVICE — APPLICATOR CHLORAPREP ORN 26ML

## (undated) DEVICE — SOL IRR NACL .9% 3000ML

## (undated) DEVICE — SYR 30CC LUER LOCK

## (undated) DEVICE — SEE MEDLINE ITEM 152739

## (undated) DEVICE — DRAPE PLASTIC U 60X72

## (undated) DEVICE — SEE MEDLINE ITEM 157027

## (undated) DEVICE — DRESSING XEROFORM FOIL PK 1X8

## (undated) DEVICE — SEE MEDLINE ITEM 157117

## (undated) DEVICE — GOWN SMARTGOWN LVL4 X-LONG XL

## (undated) DEVICE — SEE MEDLINE ITEM 152523

## (undated) DEVICE — DRAPE EMERALD 87X114.75X113

## (undated) DEVICE — PAD CAST SPECIALIST STRL 6

## (undated) DEVICE — SUT ETHILON 3/0 18IN PS-1

## (undated) DEVICE — SEE MEDLINE ITEM 157216